# Patient Record
Sex: FEMALE | Race: BLACK OR AFRICAN AMERICAN | Employment: PART TIME | URBAN - METROPOLITAN AREA
[De-identification: names, ages, dates, MRNs, and addresses within clinical notes are randomized per-mention and may not be internally consistent; named-entity substitution may affect disease eponyms.]

---

## 2017-01-18 DIAGNOSIS — I10 ESSENTIAL HYPERTENSION WITH GOAL BLOOD PRESSURE LESS THAN 130/80: ICD-10-CM

## 2017-01-18 DIAGNOSIS — I50.82 BIVENTRICULAR FAILURE (HCC): ICD-10-CM

## 2017-01-18 RX ORDER — RAMIPRIL 5 MG/1
CAPSULE ORAL
Qty: 90 CAP | Refills: 0 | Status: SHIPPED | OUTPATIENT
Start: 2017-01-18 | End: 2018-02-28 | Stop reason: SDUPTHER

## 2017-01-26 ENCOUNTER — OFFICE VISIT (OUTPATIENT)
Dept: INTERNAL MEDICINE CLINIC | Age: 77
End: 2017-01-26

## 2017-01-26 ENCOUNTER — HOSPITAL ENCOUNTER (OUTPATIENT)
Dept: LAB | Age: 77
Discharge: HOME | End: 2017-01-26
Payer: MEDICARE

## 2017-01-26 VITALS
RESPIRATION RATE: 20 BRPM | DIASTOLIC BLOOD PRESSURE: 100 MMHG | TEMPERATURE: 97.9 F | OXYGEN SATURATION: 94 % | WEIGHT: 210 LBS | SYSTOLIC BLOOD PRESSURE: 180 MMHG | HEART RATE: 68 BPM | HEIGHT: 61 IN | BODY MASS INDEX: 39.65 KG/M2

## 2017-01-26 DIAGNOSIS — I10 ESSENTIAL HYPERTENSION: Primary | ICD-10-CM

## 2017-01-26 DIAGNOSIS — I48.0 PAROXYSMAL A-FIB (HCC): ICD-10-CM

## 2017-01-26 DIAGNOSIS — E55.9 VITAMIN D DEFICIENCY: ICD-10-CM

## 2017-01-26 DIAGNOSIS — I50.82 BIVENTRICULAR FAILURE (HCC): ICD-10-CM

## 2017-01-26 DIAGNOSIS — E78.2 MIXED HYPERLIPIDEMIA: ICD-10-CM

## 2017-01-26 PROCEDURE — 80053 COMPREHEN METABOLIC PANEL: CPT

## 2017-01-26 PROCEDURE — 36415 COLL VENOUS BLD VENIPUNCTURE: CPT

## 2017-01-26 PROCEDURE — 82306 VITAMIN D 25 HYDROXY: CPT

## 2017-01-26 NOTE — PROGRESS NOTES
Health Maintenance Due   Topic Date Due    DTaP/Tdap/Td series (1 - Tdap) 05/15/1963    FOBT Q 1 YEAR AGE 50-75  05/15/1992    ZOSTER VACCINE AGE 60>  05/15/2002    GLAUCOMA SCREENING Q2Y  05/15/2007    OSTEOPOROSIS SCREENING (DEXA)  05/15/2007    MEDICARE YEARLY EXAM  05/15/2007    Pneumococcal 65+ Low/Medium Risk (2 of 2 - PPSV23) 09/15/2016       Chief Complaint   Patient presents with    Follow-up     4 month    Hypertension    Cholesterol Problem    Irregular Heart Beat       1. Have you been to the ER, urgent care clinic since your last visit? Hospitalized since your last visit? No    2. Have you seen or consulted any other health care providers outside of the [address] [place] [address] since your last visit? Include any pap smears or colon screening. No    3) Do you have an Advance Directive on file? no    4) Are you interested in receiving information on Advance Directives? NO      Patient is accompanied by self I have received verbal consent from Alexys Chavez to discuss any/all medical information while they are present in the room.

## 2017-01-26 NOTE — PROGRESS NOTES
HISTORY OF PRESENT ILLNESS  Kenzie Lang is a 78 y.o. female here for follow up. She has gained 10 pound weight.mention she is watching diet. no SOB or orthopnea or PND. no leg edema either. Has CHF,on lasix. seeing cardiologist.doing well.watching salt. Has A fib too. on ASA. Lab reviewed. need lab work. Follow-up   Pertinent negatives include no shortness of breath. Hypertension    Pertinent negatives include no shortness of breath and no nausea. Cholesterol Problem   Pertinent negatives include no shortness of breath. Irregular Heart Beat    Pertinent negatives include no fever, no nausea and no shortness of breath. Her past medical history is significant for hypertension. Review of Systems   Constitutional: Negative. Negative for chills and fever. HENT: Negative. Eyes: Negative. Respiratory: Negative. Negative for shortness of breath. Cardiovascular: Negative. Negative for leg swelling. Gastrointestinal: Negative. Negative for heartburn and nausea. Genitourinary: Negative. Musculoskeletal: Negative. Skin: Negative. Neurological: Negative. Endo/Heme/Allergies: Negative. Psychiatric/Behavioral: Negative. Physical Exam   Constitutional: She appears well-developed and well-nourished. No distress. Neck: Normal range of motion. Neck supple. No JVD present. No thyromegaly present. Cardiovascular: Normal rate, regular rhythm, normal heart sounds and intact distal pulses. Pulmonary/Chest: Effort normal and breath sounds normal. No respiratory distress. She has no wheezes. Musculoskeletal: She exhibits edema. Trace edema   Psychiatric: She has a normal mood and affect. Her behavior is normal.       ASSESSMENT and Joseroxanne Chriss was seen today for follow-up, hypertension, cholesterol problem and irregular heart beat. Diagnoses and all orders for this visit:    Essential hypertension    Stable. on meds.     Will do,  -     METABOLIC PANEL, COMPREHENSIVE    Paroxysmal a-fib (HCC)    On coreg and ASA. In NSR. Will do,  -     METABOLIC PANEL, COMPREHENSIVE    Biventricular failure ([address]    Compensated. On lasix and altace. -     METABOLIC PANEL, COMPREHENSIVE    Mixed hyperlipidemia  -     METABOLIC PANEL, COMPREHENSIVE    Vitamin D deficiency  -     VITAMIN D, 25 HYDROXY            Discussed expected course/resolution/complications of diagnosis in detail with patient. Medication risks/benefits/costs/interactions/alternatives discussed with patient. Pt was given an after visit summary which includes diagnoses, current medications & vitals. Pt expressed understanding with the diagnosis and plan.

## 2017-01-26 NOTE — PATIENT INSTRUCTIONS

## 2017-01-26 NOTE — MR AVS SNAPSHOT
Visit Information Date & Time Provider Department Dept. Phone Encounter #  
 1/26/2017 11:15 AM Mina Escalante, [address] [facility] Internal Medicine [phone] [id number] Your Appointments 2/1/2017 11:00 AM  
ESTABLISHED PATIENT with Jaime Higgins MD  
[facility] ([facility]) Appt Note: 6 mo fu appt  sll; 6 mo fu appt sll r/s from 1/18 to 2/1  
 [address]  
[address] [address] Upcoming Health Maintenance Date Due DTaP/Tdap/Td series (1 - Tdap) 5/15/1963 FOBT Q 1 YEAR AGE 50-75 5/15/1992 ZOSTER VACCINE AGE 60> 5/15/2002 GLAUCOMA SCREENING Q2Y 5/15/2007 OSTEOPOROSIS SCREENING (DEXA) 5/15/2007 MEDICARE YEARLY EXAM 5/15/2007 Pneumococcal 65+ Low/Medium Risk (2 of 2 - PPSV23) 9/15/2016 Allergies as of 1/26/2017  Review Complete On: 1/26/2017 By: Mina Escalante MD  
 No Known Allergies Current Immunizations  Reviewed on 9/15/2016 Name Date Influenza High Dose Vaccine PF 1/7/2016 Influenza Vaccine 9/15/2016 Pneumococcal Conjugate (PCV-13) 9/15/2015 Not reviewed this visit You Were Diagnosed With   
  
 Codes Comments Essential hypertension    -  Primary ICD-10-CM: I10 
ICD-9-CM: 401.9 Paroxysmal a-fib (HCC)     ICD-10-CM: I48.0 ICD-9-CM: 427.31 Biventricular failure ([address]     ICD-10-CM: I50.9 ICD-9-CM: 428.0 Mixed hyperlipidemia     ICD-10-CM: E78.2 ICD-9-CM: 272.2 Vitamin D deficiency     ICD-10-CM: E55.9 ICD-9-CM: 268.9 Vitals BP Pulse Temp Resp Height(growth percentile) Weight(growth percentile) (!) 180/100 68 97.9 °F (36.6 °C) (Oral) 20 5' 1\" (1.549 m) 210 lb (95.3 kg) SpO2 BMI OB Status Smoking Status 94% 39.68 kg/m2 Menopause Former Smoker Vitals History BMI and BSA Data  Body Mass Index Body Surface Area  
 39.68 kg/m 2 2.03 m 2  
  
  
 Preferred Pharmacy Pharmacy Name Phone David Cadena [id number] [facility] [phone] Your Updated Medication List  
  
   
This list is accurate as of: 1/26/17 11:41 AM.  Always use your most recent med list. amLODIPine 10 mg tablet Commonly known as:  Aleena Jayde Take 1 Tab by mouth daily. aspirin delayed-release 81 mg tablet Take 1 Tab by mouth daily. atorvastatin 40 mg tablet Commonly known as:  LIPITOR Take 1 Tab by mouth daily. carvedilol 12.5 mg tablet Commonly known as:  Alberto Afshin Take 1 Tab by mouth two (2) times daily (with meals). cholecalciferol 1,000 unit tablet Commonly known as:  VITAMIN D3 Take 1,000 Units by mouth daily. esomeprazole 40 mg capsule Commonly known as:  Naoma Pedro Take 1 Cap by mouth daily. ferrous sulfate 325 mg (65 mg iron) EC tablet Commonly known as:  IRON Take 325 mg by mouth three (3) times daily (with meals). furosemide 40 mg tablet Commonly known as:  LASIX TAKE ONE TABLET BY MOUTH TWICE A DAY ON MONDAY WEDNESDAY, FRIDAY, AND SATURDAY AND TAKE 1 TABLET DAILY FOR TUESDAY THURSDAY AND SUNDAY  
  
 OTHER Shower chair with back  
  
 potassium chloride 20 mEq tablet Commonly known as:  KLOR-CON M20 Take 1 Tab by mouth two (2) times a day. ramipril 5 mg capsule Commonly known as:  ALTACE  
TAKE ONE CAPSULE BY MOUTH DAILY( needs appt for future refills) We Performed the Following METABOLIC PANEL, COMPREHENSIVE [36637 CPT(R)] VITAMIN D, 25 HYDROXY [id number] CPT(R)] Patient Instructions DASH Diet: Care Instructions Your Care Instructions The DASH diet is an eating plan that can help lower your blood pressure. DASH stands for Dietary Approaches to Stop Hypertension. Hypertension is high blood pressure.  
The DASH diet focuses on eating foods that are high in calcium, potassium, and magnesium. These nutrients can lower blood pressure. The foods that are highest in these nutrients are fruits, vegetables, low-fat dairy products, nuts, seeds, and legumes. But taking calcium, potassium, and magnesium supplements instead of eating foods that are high in those nutrients does not have the same effect. The DASH diet also includes whole grains, fish, and poultry. The DASH diet is one of several lifestyle changes your doctor may recommend to lower your high blood pressure. Your doctor may also want you to decrease the amount of sodium in your diet. Lowering sodium while following the DASH diet can lower blood pressure even further than just the DASH diet alone. Follow-up care is a key part of your treatment and safety. Be sure to make and go to all appointments, and call your doctor if you are having problems. It's also a good idea to know your test results and keep a list of the medicines you take. How can you care for yourself at home? Following the DASH diet · Eat 4 to 5 servings of fruit each day. A serving is 1 medium-sized piece of fruit, ½ cup chopped or canned fruit, 1/4 cup dried fruit, or 4 ounces (½ cup) of fruit juice. Choose fruit more often than fruit juice. · Eat 4 to 5 servings of vegetables each day. A serving is 1 cup of lettuce or raw leafy vegetables, ½ cup of chopped or cooked vegetables, or 4 ounces (½ cup) of vegetable juice. Choose vegetables more often than vegetable juice. · Get 2 to 3 servings of low-fat and fat-free dairy each day. A serving is 8 ounces of milk, 1 cup of yogurt, or 1 ½ ounces of cheese. · Eat 6 to 8 servings of grains each day. A serving is 1 slice of bread, 1 ounce of dry cereal, or ½ cup of cooked rice, pasta, or cooked cereal. Try to choose whole-grain products as much as possible. · Limit lean meat, poultry, and fish to 2 servings each day. A serving is 3 ounces, about the size of a deck of cards. · Eat 4 to 5 servings of nuts, seeds, and legumes (cooked dried beans, lentils, and split peas) each week. A serving is 1/3 cup of nuts, 2 tablespoons of seeds, or ½ cup of cooked beans or peas. · Limit fats and oils to 2 to 3 servings each day. A serving is 1 teaspoon of vegetable oil or 2 tablespoons of salad dressing. · Limit sweets and added sugars to 5 servings or less a week. A serving is 1 tablespoon jelly or jam, ½ cup sorbet, or 1 cup of lemonade. · Eat less than 2,300 milligrams (mg) of sodium a day. If you limit your sodium to 1,500 mg a day, you can lower your blood pressure even more. Tips for success · Start small. Do not try to make dramatic changes to your diet all at once. You might feel that you are missing out on your favorite foods and then be more likely to not follow the plan. Make small changes, and stick with them. Once those changes become habit, add a few more changes. · Try some of the following: ¨ Make it a goal to eat a fruit or vegetable at every meal and at snacks. This will make it easy to get the recommended amount of fruits and vegetables each day. ¨ Try yogurt topped with fruit and nuts for a snack or healthy dessert. ¨ Add lettuce, tomato, cucumber, and onion to sandwiches. ¨ Combine a ready-made pizza crust with low-fat mozzarella cheese and lots of vegetable toppings. Try using tomatoes, squash, spinach, broccoli, carrots, cauliflower, and onions. ¨ Have a variety of cut-up vegetables with a low-fat dip as an appetizer instead of chips and dip. ¨ Sprinkle sunflower seeds or chopped almonds over salads. Or try adding chopped walnuts or almonds to cooked vegetables. ¨ Try some vegetarian meals using beans and peas. Add garbanzo or kidney beans to salads. Make burritos and tacos with mashed galvan beans or black beans. Where can you learn more? Go to http://goldie-jemima.info/.  
Enter J135 in the search box to learn more about \"DASH Diet: Care Instructions. \" Current as of: March 23, 2016 Content Version: 11.1 © 1215-8458 [employer]. Care instructions adapted under license by [employer] (which disclaims liability or warranty for this information). If you have questions about a medical condition or this instruction, always ask your healthcare professional. [address] any warranty or liability for your use of this information. Introducing \[facility]\"" & HEALTH SERVICES! Dear Roger Andrade: 
Thank you for requesting a [employer] account. Our records indicate that you already have an active [employer] account. You can access your account anytime at [email or web address] Did you know that you can access your hospital and ER discharge instructions at any time in Ushahidi? You can also review all of your test results from your hospital stay or ER visit. Additional Information If you have questions, please visit the Frequently Asked Questions section of the [employer] website at [email or web address] Remember, Ushahidi is NOT to be used for urgent needs. For medical emergencies, dial 911. Now available from your iPhone and Android! Please provide this summary of care documentation to your next provider. Your primary care clinician is listed as Neida Ambrose. If you have any questions after today's visit, please call [phone].

## 2017-01-27 LAB
25(OH)D3+25(OH)D2 SERPL-MCNC: 23 NG/ML (ref 30–100)
ALBUMIN SERPL-MCNC: 4.3 G/DL (ref 3.5–4.8)
ALBUMIN/GLOB SERPL: 1.1 {RATIO} (ref 1.1–2.5)
ALP SERPL-CCNC: 110 IU/L (ref 39–117)
ALT SERPL-CCNC: 19 IU/L (ref 0–32)
AST SERPL-CCNC: 33 IU/L (ref 0–40)
BILIRUB SERPL-MCNC: 0.2 MG/DL (ref 0–1.2)
BUN SERPL-MCNC: 24 MG/DL (ref 8–27)
BUN/CREAT SERPL: 17 (ref 11–26)
CALCIUM SERPL-MCNC: 10.2 MG/DL (ref 8.7–10.3)
CHLORIDE SERPL-SCNC: 100 MMOL/L (ref 96–106)
CO2 SERPL-SCNC: 27 MMOL/L (ref 18–29)
CREAT SERPL-MCNC: 1.39 MG/DL (ref 0.57–1)
GLOBULIN SER CALC-MCNC: 3.8 G/DL (ref 1.5–4.5)
GLUCOSE SERPL-MCNC: 99 MG/DL (ref 65–99)
INTERPRETATION: NORMAL
POTASSIUM SERPL-SCNC: 4.4 MMOL/L (ref 3.5–5.2)
PROT SERPL-MCNC: 8.1 G/DL (ref 6–8.5)
SODIUM SERPL-SCNC: 143 MMOL/L (ref 134–144)

## 2017-02-01 ENCOUNTER — OFFICE VISIT (OUTPATIENT)
Dept: CARDIOLOGY CLINIC | Age: 77
End: 2017-02-01

## 2017-02-01 VITALS
BODY MASS INDEX: 39.27 KG/M2 | HEART RATE: 64 BPM | WEIGHT: 208 LBS | SYSTOLIC BLOOD PRESSURE: 120 MMHG | HEIGHT: 61 IN | DIASTOLIC BLOOD PRESSURE: 80 MMHG | OXYGEN SATURATION: 97 % | RESPIRATION RATE: 16 BRPM

## 2017-02-01 DIAGNOSIS — I48.0 PAROXYSMAL A-FIB (HCC): Primary | ICD-10-CM

## 2017-02-01 NOTE — MR AVS SNAPSHOT
Visit Information Date & Time Provider Department Dept. Phone Encounter #  
 2/1/2017 11:00 AM Raúl Salcedo MD [facility] [phone] [id number] Follow-up Instructions Return in about 6 months (around 8/1/2017). Follow-up and Disposition History Your Appointments 5/25/2017 11:15 AM  
ROUTINE CARE with Lucy James MD  
[facility] Internal Medicine [address]) Appt Note: 4 month follow up [address] [place] [address]  
[phone]  
  
   
 [address] Upcoming Health Maintenance Date Due DTaP/Tdap/Td series (1 - Tdap) 5/15/1961 FOBT Q 1 YEAR AGE 50-75 5/15/1992 ZOSTER VACCINE AGE 60> 5/15/2002 GLAUCOMA SCREENING Q2Y 5/15/2007 OSTEOPOROSIS SCREENING (DEXA) 5/15/2007 MEDICARE YEARLY EXAM 5/15/2007 Pneumococcal 65+ Low/Medium Risk (2 of 2 - PPSV23) 9/15/2016 Allergies as of 2/1/2017  Review Complete On: 2/1/2017 By: Raúl Salcedo MD  
 No Known Allergies Current Immunizations  Reviewed on 9/15/2016 Name Date Influenza High Dose Vaccine PF 1/7/2016 Influenza Vaccine 9/15/2016 Pneumococcal Conjugate (PCV-13) 9/15/2015 Not reviewed this visit You Were Diagnosed With   
  
 Codes Comments Paroxysmal a-fib (HCC)    -  Primary ICD-10-CM: I48.0 ICD-9-CM: 427.31 Vitals BP Pulse Resp Height(growth percentile) Weight(growth percentile) SpO2  
 120/80 (BP 1 Location: Left arm, BP Patient Position: Sitting) 64 16 5' 1\" (1.549 m) 208 lb (94.3 kg) 97% BMI OB Status Smoking Status 39.3 kg/m2 Menopause Former Smoker Vitals History BMI and BSA Data Body Mass Index Body Surface Area  
 39.3 kg/m 2 2.01 m 2 Preferred Pharmacy Pharmacy Name Phone Daron Torrez [address] [facility] [phone] Your Updated Medication List  
  
   
This list is accurate as of: 2/1/17 12:01 PM.  Always use your most recent med list. amLODIPine 10 mg tablet Commonly known as:  Voncile [place] Take 1 Tab by mouth daily. aspirin delayed-release 81 mg tablet Take 1 Tab by mouth daily. atorvastatin 40 mg tablet Commonly known as:  LIPITOR Take 1 Tab by mouth daily. carvedilol 12.5 mg tablet Commonly known as:  [place] Take 1 Tab by mouth two (2) times daily (with meals). cholecalciferol 1,000 unit tablet Commonly known as:  VITAMIN D3 Take 1,000 Units by mouth daily. esomeprazole 40 mg capsule Commonly known as:  Dorette nstein Take 1 Cap by mouth daily. ferrous sulfate 325 mg (65 mg iron) EC tablet Commonly known as:  IRON Take 325 mg by mouth three (3) times daily (with meals). furosemide 40 mg tablet Commonly known as:  LASIX TAKE ONE TABLET BY MOUTH TWICE A DAY ON MONDAY WEDNESDAY, FRIDAY, AND SATURDAY AND TAKE 1 TABLET DAILY FOR TUESDAY THURSDAY AND SUNDAY  
  
 OTHER Shower chair with back  
  
 potassium chloride 20 mEq tablet Commonly known as:  KLOR-CON M20 Take 1 Tab by mouth two (2) times a day. ramipril 5 mg capsule Commonly known as:  ALTACE  
TAKE ONE CAPSULE BY MOUTH DAILY( needs appt for future refills) We Performed the Following AMB POC EKG ROUTINE W/ 12 LEADS, INTER & REP [68843 CPT(R)] Follow-up Instructions Return in about 6 months (around 8/1/2017). Patient Instructions Limited echo in 6 months and see Maris Shepherd after test 
 
  
Introducing [facility] & HEALTH SERVICES! Dear Alda Torres: 
Thank you for requesting a [employer] account. Our records indicate that you already have an active [employer] account. You can access your account anytime at [email or web address] Did you know that you can access your hospital and ER discharge instructions at any time in AlwaySupport? You can also review all of your test results from your hospital stay or ER visit. Additional Information If you have questions, please visit the Frequently Asked Questions section of the AlwaySupport website at [email or web address] Remember, AlwaySupport is NOT to be used for urgent needs. For medical emergencies, dial 911. Now available from your iPhone and Android! Please provide this summary of care documentation to your next provider. Your primary care clinician is listed as Kim Lugo. If you have any questions after today's visit, please call [phone].

## 2017-05-25 ENCOUNTER — OFFICE VISIT (OUTPATIENT)
Dept: INTERNAL MEDICINE CLINIC | Age: 77
End: 2017-05-25

## 2017-05-25 ENCOUNTER — HOSPITAL ENCOUNTER (OUTPATIENT)
Dept: LAB | Age: 77
Discharge: HOME | End: 2017-05-25
Payer: MEDICARE

## 2017-05-25 VITALS
TEMPERATURE: 98 F | BODY MASS INDEX: 39.46 KG/M2 | HEIGHT: 61 IN | WEIGHT: 209 LBS | HEART RATE: 70 BPM | OXYGEN SATURATION: 97 % | SYSTOLIC BLOOD PRESSURE: 151 MMHG | RESPIRATION RATE: 20 BRPM | DIASTOLIC BLOOD PRESSURE: 86 MMHG

## 2017-05-25 DIAGNOSIS — E78.2 MIXED HYPERLIPIDEMIA: ICD-10-CM

## 2017-05-25 DIAGNOSIS — Z12.11 SCREENING FOR COLON CANCER: ICD-10-CM

## 2017-05-25 DIAGNOSIS — Z00.00 ROUTINE GENERAL MEDICAL EXAMINATION AT A HEALTH CARE FACILITY: ICD-10-CM

## 2017-05-25 DIAGNOSIS — Z71.89 ADVANCE CARE PLANNING: ICD-10-CM

## 2017-05-25 DIAGNOSIS — Z00.00 MEDICARE ANNUAL WELLNESS VISIT, INITIAL: ICD-10-CM

## 2017-05-25 DIAGNOSIS — Z13.5 GLAUCOMA SCREENING: ICD-10-CM

## 2017-05-25 DIAGNOSIS — Z12.39 SCREENING BREAST EXAMINATION: ICD-10-CM

## 2017-05-25 DIAGNOSIS — I50.82 BIVENTRICULAR FAILURE (HCC): ICD-10-CM

## 2017-05-25 DIAGNOSIS — Z78.0 MENOPAUSE: ICD-10-CM

## 2017-05-25 DIAGNOSIS — I10 ESSENTIAL HYPERTENSION: Primary | ICD-10-CM

## 2017-05-25 DIAGNOSIS — E55.9 VITAMIN D DEFICIENCY: ICD-10-CM

## 2017-05-25 DIAGNOSIS — K21.9 GASTROESOPHAGEAL REFLUX DISEASE WITHOUT ESOPHAGITIS: ICD-10-CM

## 2017-05-25 DIAGNOSIS — Z23 ENCOUNTER FOR IMMUNIZATION: ICD-10-CM

## 2017-05-25 DIAGNOSIS — I48.0 PAROXYSMAL A-FIB (HCC): ICD-10-CM

## 2017-05-25 PROCEDURE — 36415 COLL VENOUS BLD VENIPUNCTURE: CPT

## 2017-05-25 PROCEDURE — 82306 VITAMIN D 25 HYDROXY: CPT

## 2017-05-25 PROCEDURE — 80053 COMPREHEN METABOLIC PANEL: CPT

## 2017-05-25 NOTE — PROGRESS NOTES
Health Maintenance Due   Topic Date Due    DTaP/Tdap/Td series (1 - Tdap) 05/15/1963    FOBT Q 1 YEAR AGE 50-75  05/15/1992    ZOSTER VACCINE AGE 60>  05/15/2002    GLAUCOMA SCREENING Q2Y  05/15/2007    OSTEOPOROSIS SCREENING (DEXA)  05/15/2007    MEDICARE YEARLY EXAM  05/15/2007    Pneumococcal 65+ Low/Medium Risk (2 of 2 - PPSV23) 09/15/2016       Chief Complaint   Patient presents with   [address] [place] [address] Annual Wellness Visit     4 month follow up    Hypertension    Cholesterol Problem    Irregular Heart Beat       1. Have you been to the ER, urgent care clinic since your last visit? Hospitalized since your last visit? No    2. Have you seen or consulted any other health care providers outside of the [address] [place] [address] since your last visit? Include any pap smears or colon screening. No    3) Do you have an Advance Directive on file? no    4) Are you interested in receiving information on Advance Directives? NO      Patient is accompanied by self I have received verbal consent from Jakob Roman to discuss any/all medical information while they are present in the room.

## 2017-05-25 NOTE — PROGRESS NOTES
HISTORY OF PRESENT ILLNESS  Jakob Roman is a 78 y.o. female here for follow up. Doing well,complaint with med. Has CHF. no SOB,no orthopnea. Have elevated lipid. on med. lipid is OK. She is watching salt. Now eight gain. No leg edema. She feels better and active. Need medicare wellness visit. Has no living will. Hypertension    Pertinent negatives include no chest pain and no blurred vision. Cholesterol Problem   Pertinent negatives include no chest pain. Irregular Heart Beat    Pertinent negatives include no fever and no chest pain. Her past medical history is significant for hypertension. Follow-up   Pertinent negatives include no chest pain. Review of Systems   Constitutional: Negative. Negative for chills and fever. HENT: Negative. Eyes: Negative. Negative for blurred vision and double vision. Respiratory: Negative. Cardiovascular: Negative. Negative for chest pain. Gastrointestinal: Negative. Genitourinary: Negative. Musculoskeletal: Negative. Skin: Negative. Negative for itching and rash. Neurological: Negative. Psychiatric/Behavioral: Negative. Physical Exam   Constitutional: She appears well-developed and well-nourished. No distress. Neck: Normal range of motion. Neck supple. No JVD present. No thyromegaly present. Cardiovascular: Normal rate, regular rhythm, normal heart sounds and intact distal pulses. Pulmonary/Chest: Effort normal and breath sounds normal. No respiratory distress. She has no wheezes. Musculoskeletal: She exhibits no edema or tenderness. Psychiatric: She has a normal mood and affect. Her behavior is normal.       ASSESSMENT and Mickael Ganser was seen today for annual wellness visit, hypertension, cholesterol problem and irregular heart beat. Diagnoses and all orders for this visit:    Essential hypertension    Stable. on med.   Will do,  -     METABOLIC PANEL, COMPREHENSIVE    Gastroesophageal reflux disease without esophagitis  -     METABOLIC PANEL, COMPREHENSIVE    Mixed hyperlipidemia    On statin. Will do,  -     METABOLIC PANEL, COMPREHENSIVE    Biventricular failure ([address]    Compensated. On lasix. -     METABOLIC PANEL, COMPREHENSIVE    Paroxysmal a-fib (HCC)    On coreg. Medicare annual wellness visit, initial    Encounter for immunization  Will order,  -     pneumococcal 23-valent (PNEUMOVAX 23) 25 mcg/0.5 mL injection; 0.5 mL by IntraMUSCular route once for 1 dose. Glaucoma screening  -     REFERRAL TO OPHTHALMOLOGY    Menopause  -     DEXA BONE DENSITY STUDY AXIAL; Future    Screening breast examination  -     RIVKA MAMMO BI SCREENING INCL CAD; Future    Screening for colon cancer  -     REFERRAL TO GASTROENTEROLOGY    Advance care planning    ACP discussed with pt in detail , including choosing a healthcare agent to communicate patient's healthcare decisions if patient lost the ability to make decisions, such as after a sudden illness or accident. Understanding of the healthcare agent role was assessed and information provided. Discussed values, goals, and preferences if recovery is not expected, even with continued medical treatment in the event of: Imminent death/serious illness. Recommended completion of Advance Directive form after review of ACP materials and conversation with prospective healthcare agent. Recommended communicating the plan and making copies for the healthcare agent, personal physician, and others as appropriate (e.g., health system). Recommended review of completed ACP document annually or upon change in health status. Information book and form given. Face to face time spent was16 minutes      Vitamin D deficiency  -     VITAMIN D, 25 HYDROXY          Discussed expected course/resolution/complications of diagnosis in detail with patient. Medication risks/benefits/costs/interactions/alternatives discussed with patient.    Pt was given an after visit summary which includes diagnoses, current medications & vitals. Pt expressed understanding with the diagnosis and plan.

## 2017-05-25 NOTE — LETTER
6/1/2017 9:22 AM 
 
Ms. Benedict Easton [phone] Dear Benedict Meza: 
 
Please find your most recent results below. Resulted Orders METABOLIC PANEL, COMPREHENSIVE Result Value Ref Range Glucose 94 65 - 99 mg/dL BUN 24 8 - 27 mg/dL Creatinine 1.29 (H) 0.57 - 1.00 mg/dL GFR est non-AA 41 (L) >59 mL/min/1.73 GFR est AA 47 (L) >59 mL/min/1.73  
 BUN/Creatinine ratio 19 12 - 28 Sodium 140 134 - 144 mmol/L Potassium 4.3 3.5 - 5.2 mmol/L Chloride 97 96 - 106 mmol/L  
 CO2 28 18 - 29 mmol/L Calcium 9.9 8.7 - 10.3 mg/dL Protein, total 8.2 6.0 - 8.5 g/dL Albumin 4.1 3.5 - 4.8 g/dL GLOBULIN, TOTAL 4.1 1.5 - 4.5 g/dL A-G Ratio 1.0 (L) 1.2 - 2.2 Bilirubin, total 0.2 0.0 - 1.2 mg/dL Alk. phosphatase 106 39 - 117 IU/L  
 AST (SGOT) 35 0 - 40 IU/L  
 ALT (SGPT) 20 0 - 32 IU/L Narrative Performed at:  [address]  [id number] : Gabriela Ramirez MD, Phone:  [phone] VITAMIN D, 25 HYDROXY Result Value Ref Range VITAMIN D, 25-HYDROXY 29.1 (L) 30.0 - 100.0 ng/mL Comment:  
   Vitamin D deficiency has been defined by the [facility] practice guideline as a 
level of serum 25-OH vitamin D less than 20 ng/mL (1,2). The Endocrine Society went on to further define vitamin D 
insufficiency as a level between 21 and 29 ng/mL (2). 1. IOM ([place] of [employer]). 2010. Dietary reference 
   intakes for calcium and D. [facility]: The 
   [employer]. 2. Duglas MF, Ifeoma NC, Jordi QUINTERO, et al. 
   Evaluation, treatment, and prevention of vitamin D 
   deficiency: an Endocrine Society clinical practice 
   guideline. JCEM. 2011 Jul; 96(7):1911-30. Narrative Performed at:  [address]  [id number] : Rachel Figueroa MD, Phone:  [phone] CKD REPORT Result Value Ref Range Interpretation Note Comment:  
   Supplement report is available. Narrative Performed at:  [address] [place] [address]  [id number] : Vanessa Dougherty PhD, Phone:  [phone] RECOMMENDATIONS: 
 
Low vit D. Advise to take (over the counter) vit D 1000 unit by mouth every day for 4 months. all other labs are stable Please call me if you have any questions: [phone] Sincerely, Christine Mcdonald MD

## 2017-05-25 NOTE — PROGRESS NOTES
This is an Initial Medicare Annual Wellness Exam (AWV) (Performed 12 months after IPPE or effective date of Medicare Part B enrollment, Once in a lifetime)    I have reviewed the patient's medical history in detail and updated the computerized patient record. History     Past Medical History:   Diagnosis Date    A-fib [facility])     Chronic kidney disease     Hypercholesterolemia     Hypertension       History reviewed. No pertinent surgical history. Current Outpatient Prescriptions   Medication Sig Dispense Refill    pneumococcal 23-valent (PNEUMOVAX 23) 25 mcg/0.5 mL injection 0.5 mL by IntraMUSCular route once for 1 dose. 0.5 mL 0    ramipril (ALTACE) 5 mg capsule TAKE ONE CAPSULE BY MOUTH DAILY( needs appt for future refills) 90 Cap 0    aspirin delayed-release 81 mg tablet Take 1 Tab by mouth daily. 90 Tab 4    amLODIPine (NORVASC) 10 mg tablet Take 1 Tab by mouth daily. 90 Tab 3    carvedilol (COREG) 12.5 mg tablet Take 1 Tab by mouth two (2) times daily (with meals). 180 Tab 3    furosemide (LASIX) 40 mg tablet TAKE ONE TABLET BY MOUTH TWICE A DAY ON MONDAY WEDNESDAY, FRIDAY, AND SATURDAY AND TAKE 1 TABLET DAILY FOR TUESDAY THURSDAY AND SUNDAY 180 Tab 4    potassium chloride (KLOR-CON M20) 20 mEq tablet Take 1 Tab by mouth two (2) times a day. 180 Tab 3    atorvastatin (LIPITOR) 40 mg tablet Take 1 Tab by mouth daily. 90 Tab 3    esomeprazole (NEXIUM) 40 mg capsule Take 1 Cap by mouth daily. [place] [address] chair with back 1 Each 0    cholecalciferol (VITAMIN D3) 1,000 unit tablet Take 1,000 Units by mouth daily.  ferrous sulfate (IRON) 325 mg (65 mg iron) EC tablet Take 325 mg by mouth three (3) times daily (with meals).        No Known Allergies  Family History   Problem Relation Age of Onset    No Known Problems Mother     No Known Problems Father      Social History   Substance Use Topics    Smoking status: Former Smoker    Smokeless tobacco: Never Used    Alcohol use No Patient Active Problem List   Diagnosis Code    HTN (hypertension) I10    GERD (gastroesophageal reflux disease) K21.9    Mixed hyperlipidemia E78.2    Valvular disease I38    Paroxysmal a-fib ([address] I48.0    Biventricular failure (HCC) I50.9         Depression Risk Factor Screening:     PHQ over the last two weeks 5/25/2017   PHQ Not Done -   Little interest or pleasure in doing things Not at all   Feeling down, depressed or hopeless Not at all   Total Score PHQ 2 0     Alcohol Risk Factor Screening: On any occasion during the past 3 months, have you had more than 3 drinks containing alcohol? No    Do you average more than 7 drinks per week? No    Functional Ability and Level of Safety:     Hearing Loss   normal-to-mild    Activities of Daily Living   Self-care. Requires assistance with: no ADLs    Fall Risk     Fall Risk Assessment, last 12 mths 5/25/2017   Able to walk? Yes   Fall in past 12 months? No     Abuse Screen   Patient is not abused    Review of Systems   Not required  annual medicare wellness exam    Physical Examination     No exam data present    Evaluation of Cognitive Function:  Mood/affect:  happy  Appearance: age appropriate and casually dressed  Family member/caregiver input: not present     No exam performed today, annual medicare wellness exam.    Patient Care Team:  Nichole Sharma MD as PCP - General (Internal Medicine)  Lina To MD (Cardiology)  JOSIAH Prajapati, RN as Nurse Navigator (Internal Medicine)    Advice/Referrals/Counseling   Education and counseling provided:  Are appropriate based on today's review and evaluation  End-of-Life planning (with patient's consent)  Pneumococcal Vaccine  Screening for glaucoma  Diabetes screening test      Assessment/Plan       ICD-10-CM ICD-9-CM    1. Essential hypertension M35 225.0 METABOLIC PANEL, COMPREHENSIVE   2.  Gastroesophageal reflux disease without esophagitis B45.6 235.17 METABOLIC PANEL, COMPREHENSIVE   3. Mixed hyperlipidemia Y88.8 174.3 METABOLIC PANEL, COMPREHENSIVE   4. Biventricular failure (HCC) T91.8 037.5 METABOLIC PANEL, COMPREHENSIVE   5. Paroxysmal a-fib (HCC) I48.0 427.31    6. Medicare annual wellness visit, initial Z00.00 V70.0    7. Encounter for immunization Z23 V03.89 pneumococcal 23-valent (PNEUMOVAX 23) 25 mcg/0.5 mL injection      CANCELED: PNEUMOCOCCAL POLYSACCHARIDE VACCINE, 23-VALENT, ADULT OR IMMUNOSUPPRESSED PT DOSE,   8. Glaucoma screening Z13.5 V80.1 REFERRAL TO OPHTHALMOLOGY   9. Menopause Z78.0 627.2 DEXA BONE DENSITY STUDY AXIAL   10. Screening breast examination Z12.39 V76.10 RIVKA MAMMO BI SCREENING INCL CAD   11. Screening for colon cancer Z12.11 V76.51 REFERRAL TO GASTROENTEROLOGY   12. Advance care planning Z71.89 V65.49    13. Vitamin D deficiency E55.9 268.9 VITAMIN D, 25 HYDROXY   14. Routine general medical examination at a health care facility Z00.00 V70.0    .  1. Discussed and reviewed AMD. Spent 16 minutes with patient completing form in office. She named her 2 medical POA's including  Denisha Morgan as her primary. She does not wish for any futile care. She was provided the original form and office made a copy and scanned into her chart. 2. Discussed the following wellness screenings: Referred for glaucoma exam, labs ordered for glucose, bone density, she was sent to get her pneumococcal 23 at [facility]. mammo ordered, referred for colonoscopy. 3. AVS and preventative screenings table printed, reviewed, and handed to patient. Patient verbalized understanding to all information.

## 2017-05-25 NOTE — PATIENT INSTRUCTIONS
Medicare Part B Preventive Services Limitations Recommendation Scheduled   Glaucoma Screening  Covered for patients with diagnosis of diabetes or family history of glaucoma; -Americans age 48 and older; -Americans age 72 and older Completed  Recommended every 2 years Due    Colorectal Cancer Screening    -Fecal occult blood test every year OR  -Flexible sigmoidoscopy every 5 yrs OR  -Colonoscopy every 10 years Covered age 48 and older Completed   Recommended every 10 years age 54-65 Please let your provider know if you have any concerns    Bone Mass Measurement (Dexascan)     Covered age 72 and older     Completed         Recommended every 2 years Due     Any time after    Cardiovascular Screening Blood Tests   (Cholesterol panel) Covered every 5 years for all ages Completed 9/15/2016      Recommended every 5 years Due 9/2017   Diabetes Screening Tests    -Fasting blood sugar (FBS)  Hemoglobin A1C every 6 months for   pre-diabetic patients Completed 1/26/2017      Recommended every 3 years Due 1/2020   Seasonal Influenza Vaccination  Completed 9/15/2016    Recommended Annually Due fall 2017   Pneumococcal Vaccination  Completed   Recommended once over age 72    Prevnar   (pediatric Pneumococcal vaccine) Covered by Medicare Completed 9/15/2015  Recommended  complete   Tetanus Vaccine -Only covered by Medicare Part D through the pharmacy    -Requires a prescription from your primary care provider   Completed   Recommended every 10 years     Zoster Vaccine (Shingles) -Only covered by Medicare Part D through the pharmacy     Completed   Recommended once age 61 and older    Family Practice Management 2011    Advanced Medical Directive  If you have completed an Advanced Medical Directive please bring a copy to the office at your convenience to be scanned into your record.

## 2017-05-25 NOTE — MR AVS SNAPSHOT
Visit Information Date & Time Provider Department Dept. Phone Encounter #  
 5/25/2017 11:15 AM Bobby Graham, [address] [facility] Internal Medicine [phone] Your Appointments 7/21/2017 11:00 AM  
ECHO CARDIOGRAMS 2D with ECHO, [facility] ([facility] SCHEDULING) Appt Note: [address] [place] [address]  
[address] [facility]  
  
    
 7/21/2017 11:40 AM  
ESTABLISHED PATIENT with Angelic Tello MD  
[facility] ([facility] Appt Note: [address] [place] [address]  
[address] [address] Upcoming Health Maintenance Date Due COLONOSCOPY 5/15/1960 DTaP/Tdap/Td series (1 - Tdap) 5/15/1963 ZOSTER VACCINE AGE 60> 5/15/2002 GLAUCOMA SCREENING Q2Y 5/15/2007 OSTEOPOROSIS SCREENING (DEXA) 5/15/2007 MEDICARE YEARLY EXAM 5/15/2007 Pneumococcal 65+ Low/Medium Risk (2 of 2 - PPSV23) 9/15/2016 INFLUENZA AGE 9 TO ADULT 8/1/2017 Allergies as of 5/25/2017  Review Complete On: 5/25/2017 By: Bobby Graham MD  
 No Known Allergies Current Immunizations  Reviewed on 5/25/2017 Name Date Influenza High Dose Vaccine PF 1/7/2016 Influenza Vaccine 9/15/2016 Pneumococcal Conjugate (PCV-13) 9/15/2015 Pneumococcal Polysaccharide (PPSV-23)  Incomplete Reviewed by Bobby Graham MD on 5/25/2017 at 11:56 AM  
 Reviewed by Bobby Graham MD on 5/25/2017 at 11:56 AM  
You Were Diagnosed With   
  
 Codes Comments Essential hypertension    -  Primary ICD-10-CM: I10 
ICD-9-CM: 401.9 Gastroesophageal reflux disease without esophagitis     ICD-10-CM: K21.9 ICD-9-CM: 530.81 Mixed hyperlipidemia     ICD-10-CM: E78.2 ICD-9-CM: 272.2 Biventricular failure ([address]     ICD-10-CM: I50.9 ICD-9-CM: 428.0 Paroxysmal a-fib (HCC)     ICD-10-CM: I48.0 ICD-9-CM: 427.31 Medicare annual wellness visit, initial     ICD-10-CM: Z00.00 ICD-9-CM: V70.0 Encounter for immunization     ICD-10-CM: C77 ICD-9-CM: V03.89 Glaucoma screening     ICD-10-CM: Z13.5 ICD-9-CM: V80.1 Menopause     ICD-10-CM: Z78.0 ICD-9-CM: 627.2 Screening breast examination     ICD-10-CM: Z12.39 
ICD-9-CM: V76.10 Screening for colon cancer     ICD-10-CM: Z12.11 ICD-9-CM: V76.51 Advance care planning     ICD-10-CM: Z71.89 ICD-9-CM: V65.49 Vitamin D deficiency     ICD-10-CM: E55.9 ICD-9-CM: 268.9 Vitals BP Pulse Temp Resp Height(growth percentile) Weight(growth percentile) 151/86 (BP 1 Location: Left arm, BP Patient Position: Sitting) 70 98 °F (36.7 °C) (Oral) 20 5' 1\" (1.549 m) 209 lb (94.8 kg) SpO2 BMI OB Status Smoking Status 97% 39.49 kg/m2 Menopause Former Smoker Vitals History BMI and BSA Data Body Mass Index Body Surface Area  
 39.49 kg/m 2 2.02 m 2 Preferred Pharmacy Pharmacy Name Phone [facility] [id number] [facility] [phone] Your Updated Medication List  
  
   
This list is accurate as of: 5/25/17 12:02 PM.  Always use your most recent med list. amLODIPine 10 mg tablet Commonly known as:  Eward Brooklynn Take 1 Tab by mouth daily. aspirin delayed-release 81 mg tablet Take 1 Tab by mouth daily. atorvastatin 40 mg tablet Commonly known as:  LIPITOR Take 1 Tab by mouth daily. carvedilol 12.5 mg tablet Commonly known as:  Darletta Maiers Take 1 Tab by mouth two (2) times daily (with meals). cholecalciferol 1,000 unit tablet Commonly known as:  VITAMIN D3 Take 1,000 Units by mouth daily. esomeprazole 40 mg capsule Commonly known as:  Jackeline Webb Take 1 Cap by mouth daily. ferrous sulfate 325 mg (65 mg iron) EC tablet Commonly known as:  IRON Take 325 mg by mouth three (3) times daily (with meals). furosemide 40 mg tablet Commonly known as:  LASIX TAKE ONE TABLET BY MOUTH TWICE A DAY ON MONDAY WEDNESDAY, FRIDAY, AND SATURDAY AND TAKE 1 TABLET DAILY FOR TUESDAY THURSDAY AND SUNDAY  
  
 OTHER Shower chair with back  
  
 potassium chloride 20 mEq tablet Commonly known as:  KLOR-CON M20 Take 1 Tab by mouth two (2) times a day. ramipril 5 mg capsule Commonly known as:  ALTACE  
TAKE ONE CAPSULE BY MOUTH DAILY( needs appt for future refills) We Performed the Following METABOLIC PANEL, COMPREHENSIVE [21396 CPT(R)] PNEUMOCOCCAL POLYSACCHARIDE VACCINE, 23-VALENT, ADULT OR IMMUNOSUPPRESSED PT DOSE, [34869 CPT(R)] REFERRAL TO GASTROENTEROLOGY [[id number] Custom] Comments:  
 Please evaluate patient for colonoscopy REFERRAL TO OPHTHALMOLOGY [REF57 Custom] Comments:  
 Please evaluate patient for glucoma screening VITAMIN D, 25 HYDROXY [id number] CPT(R)] To-Do List   
 07/25/2017 Imaging:  DEXA BONE DENSITY STUDY AXIAL   
  
 07/25/2017 Imaging:  RIVKA MAMMO BI SCREENING INCL CAD Referral Information Referral ID Referred By Referred To  
  
 [id number] RENUKA, [address] [facility]   
   [id number] [place] [address] [address], [address] Visits Status Start Date End Date 1 New Request 5/25/17 5/25/18 If your referral has a status of pending review or denied, additional information will be sent to support the outcome of this decision. Referral ID Referred By Referred To  
 [id number] RENUKA [facility] [address] [facility], [facility] Visits Status Start Date End Date 1 New Request 5/25/17 5/25/18 If your referral has a status of pending review or denied, additional information will be sent to support the outcome of this decision. Patient Instructions Medicare Part B Preventive Services Limitations Recommendation Scheduled Glaucoma Screening  Covered for patients with diagnosis of diabetes or family history of glaucoma; -Americans age 48 and older; -Americans age 72 and older Completed Recommended every 2 years Due Colorectal Cancer Screening 
 
-Fecal occult blood test every year OR 
-Flexible sigmoidoscopy every 5 yrs OR 
-Colonoscopy every 10 years Covered age 48 and older Completed Recommended every 10 years age 54-65 Please let your provider know if you have any concerns Bone Mass Measurement (Dexascan) Covered age 72 and older Completed Recommended every 2 years Due Any time after Cardiovascular Screening Blood Tests  
(Cholesterol panel) Covered every 5 years for all ages Completed 9/15/2016 Recommended every 5 years Due 9/2017 Diabetes Screening Tests 
 
-Fasting blood sugar (FBS)  Hemoglobin A1C every 6 months for  
pre-diabetic patients Completed 1/26/2017 Recommended every 3 years Due 1/2020 Seasonal Influenza Vaccination  Completed 9/15/2016 Recommended Annually Due fall 2017 Pneumococcal Vaccination  Completed Recommended once over age 72 Prevnar  
(pediatric Pneumococcal vaccine) Covered by Medicare Completed 9/15/2015 Recommended  complete Tetanus Vaccine -Only covered by Medicare Part D through the pharmacy -Requires a prescription from your primary care provider Completed Recommended every 10 years Zoster Vaccine (Shingles) -Only covered by Medicare Part D through the pharmacy Completed Recommended once age 61 and older Family Practice Management 2011 Advanced Medical Directive If you have completed an Advanced Medical Directive please bring a copy to the office at your convenience to be scanned into your record. Introducing [facility] & HEALTH SERVICES! Dear Grace Chairez: 
Thank you for requesting a [employer] account.   Our records indicate that you already have an active [facility] account. You can access your account anytime at [email or web address] Did you know that you can access your hospital and ER discharge instructions at any time in [facility]? You can also review all of your test results from your hospital stay or ER visit. Additional Information If you have questions, please visit the Frequently Asked Questions section of the [facility] website at [email or web address] Remember, [facility] is NOT to be used for urgent needs. For medical emergencies, dial 911. Now available from your iPhone and Android! Please provide this summary of care documentation to your next provider. Your primary care clinician is listed as Rohini Roman. If you have any questions after today's visit, please call [phone].

## 2017-05-26 LAB
25(OH)D3+25(OH)D2 SERPL-MCNC: 29.1 NG/ML (ref 30–100)
ALBUMIN SERPL-MCNC: 4.1 G/DL (ref 3.5–4.8)
ALBUMIN/GLOB SERPL: 1 {RATIO} (ref 1.2–2.2)
ALP SERPL-CCNC: 106 IU/L (ref 39–117)
ALT SERPL-CCNC: 20 IU/L (ref 0–32)
AST SERPL-CCNC: 35 IU/L (ref 0–40)
BILIRUB SERPL-MCNC: 0.2 MG/DL (ref 0–1.2)
BUN SERPL-MCNC: 24 MG/DL (ref 8–27)
BUN/CREAT SERPL: 19 (ref 12–28)
CALCIUM SERPL-MCNC: 9.9 MG/DL (ref 8.7–10.3)
CHLORIDE SERPL-SCNC: 97 MMOL/L (ref 96–106)
CO2 SERPL-SCNC: 28 MMOL/L (ref 18–29)
CREAT SERPL-MCNC: 1.29 MG/DL (ref 0.57–1)
GLOBULIN SER CALC-MCNC: 4.1 G/DL (ref 1.5–4.5)
GLUCOSE SERPL-MCNC: 94 MG/DL (ref 65–99)
INTERPRETATION: NORMAL
POTASSIUM SERPL-SCNC: 4.3 MMOL/L (ref 3.5–5.2)
PROT SERPL-MCNC: 8.2 G/DL (ref 6–8.5)
SODIUM SERPL-SCNC: 140 MMOL/L (ref 134–144)

## 2017-07-13 ENCOUNTER — TELEPHONE (OUTPATIENT)
Dept: INTERNAL MEDICINE CLINIC | Age: 77
End: 2017-07-13

## 2017-07-13 NOTE — TELEPHONE ENCOUNTER
Patient needs Dr. Roel Matthews to write a referral to Dr Pramod Armenta - heart doctor. She has an HMO now and needs us to do the referral.  I told her she needed to change here PCP with the insurance company first, as Dr. Roel Matthews was not listed as here PCP.   Once the referral is written, this needs to go to Feng milan who will do the insurance part of the referral.

## 2017-07-14 DIAGNOSIS — I48.0 PAROXYSMAL A-FIB (HCC): Primary | ICD-10-CM

## 2017-09-08 ENCOUNTER — OFFICE VISIT (OUTPATIENT)
Dept: CARDIOLOGY CLINIC | Age: 77
End: 2017-09-08

## 2017-09-08 ENCOUNTER — CLINICAL SUPPORT (OUTPATIENT)
Dept: CARDIOLOGY CLINIC | Age: 77
End: 2017-09-08

## 2017-09-08 VITALS
DIASTOLIC BLOOD PRESSURE: 82 MMHG | BODY MASS INDEX: 39.46 KG/M2 | OXYGEN SATURATION: 97 % | SYSTOLIC BLOOD PRESSURE: 130 MMHG | WEIGHT: 209 LBS | HEART RATE: 65 BPM | HEIGHT: 61 IN

## 2017-09-08 DIAGNOSIS — I48.0 PAROXYSMAL A-FIB (HCC): Primary | ICD-10-CM

## 2017-09-08 DIAGNOSIS — I50.9 CONGESTIVE HEART FAILURE, UNSPECIFIED CONGESTIVE HEART FAILURE CHRONICITY, UNSPECIFIED CONGESTIVE HEART FAILURE TYPE: ICD-10-CM

## 2017-09-08 DIAGNOSIS — I34.0 NON-RHEUMATIC MITRAL REGURGITATION: ICD-10-CM

## 2017-09-08 DIAGNOSIS — I10 ESSENTIAL HYPERTENSION: Primary | ICD-10-CM

## 2017-09-08 NOTE — MR AVS SNAPSHOT
Visit Information Date & Time Provider Department Dept. Phone Encounter #  
 9/8/2017 11:00 AM Roxan Goltz, MD [facility] Jessica Ruffin [phone] [id number] Follow-up Instructions Return in about 6 months (around 3/8/2018). Follow-up and Disposition History Your Appointments 9/28/2017 11:15 AM  
ROUTINE CARE with Sandra Gagnon MD  
[facility] Internal Medicine [address]) Appt Note: 4 month follow up [address] [facility] [address]  
[phone]  
  
   
 [address] Upcoming Health Maintenance Date Due COLONOSCOPY 5/15/1960 DTaP/Tdap/Td series (1 - Tdap) 5/15/1963 ZOSTER VACCINE AGE 60> 3/15/2002 GLAUCOMA SCREENING Q2Y 5/15/2007 OSTEOPOROSIS SCREENING (DEXA) 5/15/2007 Pneumococcal 65+ Low/Medium Risk (2 of 2 - PPSV23) 9/15/2016 INFLUENZA AGE 9 TO ADULT 8/1/2017 MEDICARE YEARLY EXAM 5/26/2018 Allergies as of 9/8/2017  Review Complete On: 9/8/2017 By: Roxan Goltz, MD  
 No Known Allergies Current Immunizations  Reviewed on 5/25/2017 Name Date Influenza High Dose Vaccine PF 1/7/2016 Influenza Vaccine 9/15/2016 Pneumococcal Conjugate (PCV-13) 9/15/2015 Not reviewed this visit You Were Diagnosed With   
  
 Codes Comments Paroxysmal a-fib (HCC)    -  Primary ICD-10-CM: I48.0 ICD-9-CM: 427.31 Congestive heart failure, unspecified congestive heart failure chronicity, unspecified congestive heart failure type ([facility] [address]     ICD-10-CM: I50.9 ICD-9-CM: 428.0 Vitals BP Pulse Height(growth percentile) Weight(growth percentile) SpO2 BMI  
 130/82 (BP 1 Location: Left arm, BP Patient Position: Sitting) 65 5' 1\" (1.549 m) 209 lb (94.8 kg) 97% 39.49 kg/m2 OB Status Smoking Status Menopause Former Smoker Vitals History BMI and BSA Data  Body Mass Index Body Surface Area  
 39.49 kg/m 2 2.02 m 2  
 Preferred Pharm[facility] Name Phone [facility] [address] [phone] Your Updated Medication List  
  
   
This list is accurate as of: 9/8/17 12:09 PM.  Always use your most recent med list. amLODIPine 10 mg tablet Commonly known as:  Ernestine Chuyita Take 1 Tab by mouth daily. aspirin delayed-release 81 mg tablet Take 1 Tab by mouth daily. atorvastatin 40 mg tablet Commonly known as:  LIPITOR Take 1 Tab by mouth daily. carvedilol 12.5 mg tablet Commonly known as:  [place]  Take 1 Tab by mouth two (2) times daily (with meals). cholecalciferol 1,000 unit tablet Commonly known as:  VITAMIN D3 Take 1,000 Units by mouth daily. esomeprazole 40 mg capsule Commonly known as:  [place] Take 1 Cap by mouth daily. ferrous sulfate 325 mg (65 mg iron) EC tablet Commonly known as:  IRON Take 325 mg by mouth three (3) times daily (with meals). furosemide 40 mg tablet Commonly known as:  LASIX TAKE ONE TABLET BY MOUTH TWICE A DAY ON MONDAY WEDNESDAY, FRIDAY, AND SATURDAY AND TAKE 1 TABLET DAILY FOR TUESDAY THURSDAY AND SUNDAY  
  
 OTHER Shower chair with back  
  
 potassium chloride 20 mEq tablet Commonly known as:  KLOR-CON M20 Take 1 Tab by mouth two (2) times a day. ramipril 5 mg capsule Commonly known as:  ALTACE  
TAKE ONE CAPSULE BY MOUTH DAILY( needs appt for future refills) We Performed the Following AMB POC EKG ROUTINE W/ 12 LEADS, INTER & REP [03649 CPT(R)] ECHO LIMITED [[id number] Custom] Follow-up Instructions Return in about 6 months (around 3/8/2018). Patient Instructions 24 hour holter monitor ( Will call result ) Follow up with Sandra Bender in 6 months Introducing [facility]! Dear Spencer Reynolds: 
Thank you for requesting a [employer] account.   Our records indicate that you already have an active [employer] account. You can access your account anytime at [email or web address] Did you know that you can access your hospital and ER discharge instructions at any time in [employer]? You can also review all of your test results from your hospital stay or ER visit. Additional Information If you have questions, please visit the Frequently Asked Questions section of the [employer] website at [email or web address] Remember, [employer] is NOT to be used for urgent needs. For medical emergencies, dial 911. Now available from your iPhone and Android! Please provide this summary of care documentation to your next provider. Your primary care clinician is listed as Candi Kelly. If you have any questions after today's visit, please call [phone].
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
## 2017-09-27 DIAGNOSIS — I48.0 PAROXYSMAL A-FIB (HCC): ICD-10-CM

## 2017-09-27 RX ORDER — ASPIRIN 81 MG/1
TABLET ORAL
Qty: 30 TAB | Refills: 3 | Status: SHIPPED | OUTPATIENT
Start: 2017-09-27 | End: 2018-02-06 | Stop reason: SDUPTHER

## 2017-10-05 ENCOUNTER — CLINICAL SUPPORT (OUTPATIENT)
Dept: CARDIOLOGY CLINIC | Age: 77
End: 2017-10-05

## 2017-10-05 ENCOUNTER — OFFICE VISIT (OUTPATIENT)
Dept: INTERNAL MEDICINE CLINIC | Age: 77
End: 2017-10-05

## 2017-10-05 ENCOUNTER — HOSPITAL ENCOUNTER (OUTPATIENT)
Dept: LAB | Age: 77
Discharge: HOME | End: 2017-10-05
Payer: MEDICARE

## 2017-10-05 VITALS
HEART RATE: 74 BPM | RESPIRATION RATE: 19 BRPM | OXYGEN SATURATION: 97 % | HEIGHT: 61 IN | SYSTOLIC BLOOD PRESSURE: 163 MMHG | TEMPERATURE: 97.8 F | WEIGHT: 215 LBS | DIASTOLIC BLOOD PRESSURE: 84 MMHG | BODY MASS INDEX: 40.59 KG/M2

## 2017-10-05 DIAGNOSIS — Z12.11 SCREENING FOR COLON CANCER: ICD-10-CM

## 2017-10-05 DIAGNOSIS — I48.0 PAROXYSMAL A-FIB (HCC): ICD-10-CM

## 2017-10-05 DIAGNOSIS — E78.2 MIXED HYPERLIPIDEMIA: ICD-10-CM

## 2017-10-05 DIAGNOSIS — I48.0 PAROXYSMAL A-FIB (HCC): Primary | ICD-10-CM

## 2017-10-05 DIAGNOSIS — I10 ESSENTIAL HYPERTENSION: Primary | ICD-10-CM

## 2017-10-05 DIAGNOSIS — E55.9 VITAMIN D DEFICIENCY: ICD-10-CM

## 2017-10-05 DIAGNOSIS — I50.82 BIVENTRICULAR FAILURE (HCC): ICD-10-CM

## 2017-10-05 DIAGNOSIS — Z23 ENCOUNTER FOR IMMUNIZATION: ICD-10-CM

## 2017-10-05 DIAGNOSIS — Z13.5 GLAUCOMA SCREENING: ICD-10-CM

## 2017-10-05 PROCEDURE — 36415 COLL VENOUS BLD VENIPUNCTURE: CPT

## 2017-10-05 PROCEDURE — 80053 COMPREHEN METABOLIC PANEL: CPT

## 2017-10-05 PROCEDURE — 85027 COMPLETE CBC AUTOMATED: CPT

## 2017-10-05 PROCEDURE — 82306 VITAMIN D 25 HYDROXY: CPT

## 2017-10-05 NOTE — PROGRESS NOTES
Holter monitor applied per protocol. Patient instruction sheet was given, along with diary. Instructions provided on diary keeping, marking symptoms, care of the monitor device. The patient verbalized understanding and will call our office with any further questions or concerns.

## 2017-10-05 NOTE — PROGRESS NOTES
HISTORY OF PRESENT ILLNESS  Karrie Solis is a 78 y.o. female here for follow up. Has HTN,little elevated BP.compalint with meds. .  Has A fib,placed on holter monitor now. Reports leg weakness,on walker. refused to have PT. She has gainedweight.mention she is watching diet. no SOB or orthopnea or PND. no leg edema either. Has CHF,on lasix. seeing cardiologist.doing well.watching salt. Need flu shot. Extremity Weakness   Pertinent negatives include no shortness of breath and no nausea. Hypertension    Pertinent negatives include no shortness of breath and no nausea. GERD   Pertinent negatives include no shortness of breath. Cholesterol Problem   Pertinent negatives include no shortness of breath. Follow-up   Pertinent negatives include no shortness of breath. Irregular Heart Beat    Pertinent negatives include no fever, no nausea and no shortness of breath. Her past medical history is significant for hypertension. Review of Systems   Constitutional: Negative. Negative for chills and fever. HENT: Negative. Eyes: Negative. Respiratory: Negative. Negative for shortness of breath. Cardiovascular: Negative. Negative for leg swelling. Gastrointestinal: Negative. Negative for heartburn and nausea. Genitourinary: Negative. Musculoskeletal: Negative. Skin: Negative. Endo/Heme/Allergies: Negative. Psychiatric/Behavioral: Negative. Physical Exam   Constitutional: She appears well-developed and well-nourished. No distress. Neck: Normal range of motion. Neck supple. No JVD present. No thyromegaly present. Cardiovascular: Normal rate, regular rhythm, normal heart sounds and intact distal pulses. Pulmonary/Chest: Effort normal and breath sounds normal. No respiratory distress. She has no wheezes. Musculoskeletal: She exhibits edema. Trace edema   Psychiatric: She has a normal mood and affect.  Her behavior is normal.       ASSESSMENT and PLAN  Diagnoses and all orders for this visit:    1. Essential hypertension    Stable. On med. Will do,  -     CBC W/O DIFF    2. Paroxysmal a-fib (HCC)    Placed on holter monitor. ON ASA dn B blocker. -     METABOLIC PANEL, COMPREHENSIVE  -     CBC W/O DIFF    3. Mixed hyperlipidemia  Lipid is stable. On statin. -     METABOLIC PANEL, COMPREHENSIVE    4. Biventricular failure    Compensated. On lasix and KCL. 5. Glaucoma screening  Will refer,  -     REFERRAL TO OPHTHALMOLOGY    6. Screening for colon cancer  -     REFERRAL TO GASTROENTEROLOGY    7. Vitamin D deficiency  -     VITAMIN D, 25 HYDROXY    8. Encounter for immunization  -     INFLUENZA VIRUS VACCINE, HIGH DOSE SEASONAL, PRESERVATIVE FREE          Discussed expected course/resolution/complications of diagnosis in detail with patient. Medication risks/benefits/costs/interactions/alternatives discussed with patient. Pt was given an after visit summary which includes diagnoses, current medications & vitals. Pt expressed understanding with the diagnosis and plan.

## 2017-10-05 NOTE — MR AVS SNAPSHOT
Visit Information Date & Time Provider Department Dept. Phone Encounter #  
 10/5/2017 11:30 AM Sloane Brito MD [facility] Internal Medicine [phone] [id number] Your Appointments 3/14/2018 11:20 AM  
ESTABLISHED PATIENT with Raffi Coon MD  
[facility] ([address]) Appt Note: Dr Anshul Antoine UP  
 [address] [facility] [address] [place] [address]  
[address] [id number] Upcoming Health Maintenance Date Due COLONOSCOPY 5/15/1960 DTaP/Tdap/Td series (1 - Tdap) 5/15/1963 ZOSTER VACCINE AGE 60> 3/15/2002 GLAUCOMA SCREENING Q2Y 5/15/2007 OSTEOPOROSIS SCREENING (DEXA) 5/15/2007 INFLUENZA AGE 9 TO ADULT 8/1/2017 MEDICARE YEARLY EXAM 5/26/2018 Allergies as of 10/5/2017  Review Complete On: 10/5/2017 By: Sloane Brito MD  
 No Known Allergies Current Immunizations  Reviewed on 10/5/2017 Name Date Influenza High Dose Vaccine PF  Incomplete, 1/7/2016 Influenza Vaccine 9/15/2016 Pneumococcal Conjugate (PCV-13) 9/15/2015 Reviewed by Sloane Brito MD on 10/5/2017 at 12:20 PM  
You Were Diagnosed With   
  
 Codes Comments Essential hypertension    -  Primary ICD-10-CM: I10 
ICD-9-CM: 401.9 Paroxysmal a-fib (HCC)     ICD-10-CM: I48.0 ICD-9-CM: 427.31 Mixed hyperlipidemia     ICD-10-CM: E78.2 ICD-9-CM: 272.2 Biventricular failure     ICD-10-CM: I50.82 ICD-9-CM: 428.0 Glaucoma screening     ICD-10-CM: Z13.5 ICD-9-CM: V80.1 Screening for colon cancer     ICD-10-CM: Z12.11 ICD-9-CM: V76.51 Vitamin D deficiency     ICD-10-CM: E55.9 ICD-9-CM: 268.9 Encounter for immunization     ICD-10-CM: B69 ICD-9-CM: V03.89 Vitals BP Pulse Temp Resp Height(growth percentile) Weight(growth percentile)  163/84 (BP 1 Location: Left arm, BP Patient Position: Sitting) 74 97.8 °F (36.6 °C) (Oral) 19 5' 1\" (1.549 m) 215 lb (97.5 kg) SpO2 BMI OB Status Smoking Status 97% 40.62 kg/m2 Menopause Former Smoker Vitals History BMI and BSA Data Body Mass Index Body Surface Area  
 40.62 kg/m 2 2.05 m 2 Preferred Pharmacy Pharmacy Name Phone [facility] [address] [facility] [phone] Your Updated Medication List  
  
   
This list is accurate as of: 10/5/17 12:21 PM.  Always use your most recent med list. amLODIPine 10 mg tablet Commonly known as:  Izetta Harder Take 1 Tab by mouth daily. aspirin delayed-release 81 mg tablet TAKE ONE TABLET BY MOUTH DAILY  
  
 atorvastatin 40 mg tablet Commonly known as:  LIPITOR Take 1 Tab by mouth daily. carvedilol 12.5 mg tablet Commonly known as:  Layman Hunter Take 1 Tab by mouth two (2) times daily (with meals). cholecalciferol 1,000 unit tablet Commonly known as:  VITAMIN D3 Take 1,000 Units by mouth daily. esomeprazole 40 mg capsule Commonly known as:  Maci Legato Take 1 Cap by mouth daily. ferrous sulfate 325 mg (65 mg iron) EC tablet Commonly known as:  IRON Take 325 mg by mouth three (3) times daily (with meals). furosemide 40 mg tablet Commonly known as:  LASIX TAKE ONE TABLET BY MOUTH TWICE A DAY ON MONDAY WEDNESDAY, FRIDAY, AND SATURDAY AND TAKE 1 TABLET DAILY FOR TUESDAY THURSDAY AND SUNDAY  
  
 OTHER Shower chair with back  
  
 potassium chloride 20 mEq tablet Commonly known as:  KLOR-CON M20 Take 1 Tab by mouth two (2) times a day. ramipril 5 mg capsule Commonly known as:  ALTACE  
TAKE ONE CAPSULE BY MOUTH DAILY( needs appt for future refills) We Performed the Following CBC W/O DIFF [65860 CPT(R)] INFLUENZA VIRUS VACCINE, HIGH DOSE SEASONAL, PRESERVATIVE FREE [54099 CPT(R)] METABOLIC PANEL, COMPREHENSIVE [22359 CPT(R)] REFERRAL TO GASTROENTEROLOGY [PTV04 Custom] REFERRAL TO OPHTHALMOLOGY [REF57 Custom] Comments:  
 Glaucoma screening VITAMIN D, 25 HYDROXY [id number] CPT(R)] Referral Information Referral ID Referred By Referred To  
  
 [id number] RENUKA, [facility] [address] [place], [address] Visits Status Start Date End Date 1 New Request 10/5/17 10/5/18 If your referral has a status of pending review or denied, additional information will be sent to support the outcome of this decision. Referral ID Referred By Referred To  
 [id number] RENUKA, [address]  
   [address] [address] [address] [address] [place], [address] Visits Status Start Date End Date 1 New Request 10/5/17 10/5/18 If your referral has a status of pending review or denied, additional information will be sent to support the outcome of this decision. Patient Instructions DASH Diet: Care Instructions Your Care Instructions The DASH diet is an eating plan that can help lower your blood pressure. DASH stands for Dietary Approaches to Stop Hypertension. Hypertension is high blood pressure. The DASH diet focuses on eating foods that are high in calcium, potassium, and magnesium. These nutrients can lower blood pressure. The foods that are highest in these nutrients are fruits, vegetables, low-fat dairy products, nuts, seeds, and legumes. But taking calcium, potassium, and magnesium supplements instead of eating foods that are high in those nutrients does not have the same effect. The DASH diet also includes whole grains, fish, and poultry. The DASH diet is one of several lifestyle changes your doctor may recommend to lower your high blood pressure. Your doctor may also want you to decrease the amount of sodium in your diet. Lowering sodium while following the DASH diet can lower blood pressure even further than just the DASH diet alone. Follow-up care is a key part of your treatment and safety. Be sure to make and go to all appointments, and call your doctor if you are having problems. It's also a good idea to know your test results and keep a list of the medicines you take. How can you care for yourself at home? Following the DASH diet · Eat 4 to 5 servings of fruit each day. A serving is 1 medium-sized piece of fruit, ½ cup chopped or canned fruit, 1/4 cup dried fruit, or 4 ounces (½ cup) of fruit juice. Choose fruit more often than fruit juice. · Eat 4 to 5 servings of vegetables each day. A serving is 1 cup of lettuce or raw leafy vegetables, ½ cup of chopped or cooked vegetables, or 4 ounces (½ cup) of vegetable juice. Choose vegetables more often than vegetable juice. · Get 2 to 3 servings of low-fat and fat-free dairy each day. A serving is 8 ounces of milk, 1 cup of yogurt, or 1 ½ ounces of cheese. · Eat 6 to 8 servings of grains each day. A serving is 1 slice of bread, 1 ounce of dry cereal, or ½ cup of cooked rice, pasta, or cooked cereal. Try to choose whole-grain products as much as possible. · Limit lean meat, poultry, and fish to 2 servings each day. A serving is 3 ounces, about the size of a deck of cards. · Eat 4 to 5 servings of nuts, seeds, and legumes (cooked dried beans, lentils, and split peas) each week. A serving is 1/3 cup of nuts, 2 tablespoons of seeds, or ½ cup of cooked beans or peas. · Limit fats and oils to 2 to 3 servings each day. A serving is 1 teaspoon of vegetable oil or 2 tablespoons of salad dressing. · Limit sweets and added sugars to 5 servings or less a week. A serving is 1 tablespoon jelly or jam, ½ cup sorbet, or 1 cup of lemonade. · Eat less than 2,300 milligrams (mg) of sodium a day. If you limit your sodium to 1,500 mg a day, you can lower your blood pressure even more. Tips for success · Start small.  Do not try to make dramatic changes to your diet all at once. You might feel that you are missing out on your favorite foods and then be more likely to not follow the plan. Make small changes, and stick with them. Once those changes become habit, add a few more changes. · Try some of the following: ¨ Make it a goal to eat a fruit or vegetable at every meal and at snacks. This will make it easy to get the recommended amount of fruits and vegetables each day. ¨ Try yogurt topped with fruit and nuts for a snack or healthy dessert. ¨ Add lettuce, tomato, cucumber, and onion to sandwiches. ¨ Combine a ready-made pizza crust with low-fat mozzarella cheese and lots of vegetable toppings. Try using tomatoes, squash, spinach, broccoli, carrots, cauliflower, and onions. ¨ Have a variety of cut-up vegetables with a low-fat dip as an appetizer instead of chips and dip. ¨ Sprinkle sunflower seeds or chopped almonds over salads. Or try adding chopped walnuts or almonds to cooked vegetables. ¨ Try some vegetarian meals using beans and peas. Add garbanzo or kidney beans to salads. Make burritos and tacos with mashed galvan beans or black beans. Where can you learn more? Go to http://goldie-jemima.info/. Enter I918 in the search box to learn more about \"DASH Diet: Care Instructions. \" Current as of: April 3, 2017 Content Version: 11.3 © 6229-0221 [employer]. Care instructions adapted under license by [employer] (which disclaims liability or warranty for this information). If you have questions about a medical condition or this instruction, always ask your healthcare professional. [place] [address] any warranty or liability for your use of this information. Introducing [facility]! Dear [facility]: 
Thank you for requesting a Bondsy account. Our records indicate that you already have an active Bondsy account. You can access your account anytime at [email or web address] Did you know that you can access your hospital and ER discharge instructions at any time in Yotomo? You can also review all of your test results from your hospital stay or ER visit. Additional Information If you have questions, please visit the Frequently Asked Questions section of the Yotomo website at [email or web address] Remember, Yotomo is NOT to be used for urgent needs. For medical emergencies, dial 911. Now available from your iPhone and Android! Please provide this summary of care documentation to your next provider. Your primary care clinician is listed as Song Lam. If you have any questions after today's visit, please call [phone].

## 2017-10-05 NOTE — PATIENT INSTRUCTIONS

## 2017-10-06 LAB
25(OH)D3+25(OH)D2 SERPL-MCNC: 19.7 NG/ML (ref 30–100)
ALBUMIN SERPL-MCNC: 4.3 G/DL (ref 3.5–4.8)
ALBUMIN/GLOB SERPL: 1.1 {RATIO} (ref 1.2–2.2)
ALP SERPL-CCNC: 105 IU/L (ref 39–117)
ALT SERPL-CCNC: 16 IU/L (ref 0–32)
AST SERPL-CCNC: 31 IU/L (ref 0–40)
BILIRUB SERPL-MCNC: 0.3 MG/DL (ref 0–1.2)
BUN SERPL-MCNC: 25 MG/DL (ref 8–27)
BUN/CREAT SERPL: 20 (ref 12–28)
CALCIUM SERPL-MCNC: 10 MG/DL (ref 8.7–10.3)
CHLORIDE SERPL-SCNC: 97 MMOL/L (ref 96–106)
CO2 SERPL-SCNC: 30 MMOL/L (ref 18–29)
CREAT SERPL-MCNC: 1.25 MG/DL (ref 0.57–1)
ERYTHROCYTE [DISTWIDTH] IN BLOOD BY AUTOMATED COUNT: 14.8 % (ref 12.3–15.4)
GLOBULIN SER CALC-MCNC: 4 G/DL (ref 1.5–4.5)
GLUCOSE SERPL-MCNC: 99 MG/DL (ref 65–99)
HCT VFR BLD AUTO: 38.5 % (ref 34–46.6)
HGB BLD-MCNC: 12 G/DL (ref 11.1–15.9)
INTERPRETATION: NORMAL
MCH RBC QN AUTO: 26.3 PG (ref 26.6–33)
MCHC RBC AUTO-ENTMCNC: 31.2 G/DL (ref 31.5–35.7)
MCV RBC AUTO: 84 FL (ref 79–97)
PLATELET # BLD AUTO: 260 X10E3/UL (ref 150–379)
POTASSIUM SERPL-SCNC: 4.3 MMOL/L (ref 3.5–5.2)
PROT SERPL-MCNC: 8.3 G/DL (ref 6–8.5)
RBC # BLD AUTO: 4.56 X10E6/UL (ref 3.77–5.28)
SODIUM SERPL-SCNC: 142 MMOL/L (ref 134–144)
WBC # BLD AUTO: 7.8 X10E3/UL (ref 3.4–10.8)

## 2017-10-06 RX ORDER — ERGOCALCIFEROL 1.25 MG/1
50000 CAPSULE ORAL
Qty: 12 CAP | Refills: 0 | Status: SHIPPED | OUTPATIENT
Start: 2017-10-06 | End: 2018-07-12 | Stop reason: ALTCHOICE

## 2017-10-06 NOTE — PROGRESS NOTES
Vitamin D 50,000 units weekly x 12 weeks. After completion of 12 weeks, recommend OTC Vitamin D3 1000 units daily. Other labs stable.

## 2017-10-20 ENCOUNTER — TELEPHONE (OUTPATIENT)
Dept: CARDIOLOGY CLINIC | Age: 77
End: 2017-10-20

## 2017-10-20 NOTE — TELEPHONE ENCOUNTER
Verified patient with two patient identifiers. Spoke with patient,holter monitor result. Per Castellonlani johnson same meds.

## 2017-10-28 DIAGNOSIS — I48.0 PAROXYSMAL A-FIB (HCC): ICD-10-CM

## 2017-10-28 DIAGNOSIS — I10 ESSENTIAL HYPERTENSION WITH GOAL BLOOD PRESSURE LESS THAN 130/80: ICD-10-CM

## 2017-10-28 DIAGNOSIS — I50.82 BIVENTRICULAR FAILURE (HCC): ICD-10-CM

## 2017-10-30 RX ORDER — AMLODIPINE BESYLATE 10 MG/1
TABLET ORAL
Qty: 90 TAB | Refills: 2 | Status: SHIPPED | OUTPATIENT
Start: 2017-10-30 | End: 2018-07-12 | Stop reason: SDUPTHER

## 2017-10-30 RX ORDER — CARVEDILOL 12.5 MG/1
TABLET ORAL
Qty: 180 TAB | Refills: 2 | Status: SHIPPED | OUTPATIENT
Start: 2017-10-30 | End: 2018-07-12 | Stop reason: SDUPTHER

## 2017-11-06 DIAGNOSIS — I50.82 BIVENTRICULAR FAILURE (HCC): ICD-10-CM

## 2017-11-06 RX ORDER — FUROSEMIDE 40 MG/1
TABLET ORAL
Qty: 180 TAB | Refills: 4 | Status: SHIPPED | OUTPATIENT
Start: 2017-11-06 | End: 2019-08-01 | Stop reason: SDUPTHER

## 2017-11-08 DIAGNOSIS — K21.9 GASTROESOPHAGEAL REFLUX DISEASE WITHOUT ESOPHAGITIS: ICD-10-CM

## 2017-11-08 RX ORDER — ESOMEPRAZOLE MAGNESIUM 40 MG/1
CAPSULE, DELAYED RELEASE ORAL
Qty: 90 CAP | Refills: 2 | Status: SHIPPED | OUTPATIENT
Start: 2017-11-08 | End: 2018-08-03 | Stop reason: SDUPTHER

## 2017-11-13 DIAGNOSIS — I50.82 BIVENTRICULAR FAILURE (HCC): ICD-10-CM

## 2017-11-13 RX ORDER — POTASSIUM CHLORIDE 20 MEQ/1
TABLET, EXTENDED RELEASE ORAL
Qty: 180 TAB | Refills: 2 | Status: SHIPPED | OUTPATIENT
Start: 2017-11-13 | End: 2018-08-03 | Stop reason: SDUPTHER

## 2017-11-21 DIAGNOSIS — I10 ESSENTIAL HYPERTENSION WITH GOAL BLOOD PRESSURE LESS THAN 130/80: ICD-10-CM

## 2017-11-21 DIAGNOSIS — I50.82 BIVENTRICULAR FAILURE (HCC): ICD-10-CM

## 2017-11-21 RX ORDER — ATORVASTATIN CALCIUM 40 MG/1
TABLET, FILM COATED ORAL
Qty: 90 TAB | Refills: 2 | Status: SHIPPED | OUTPATIENT
Start: 2017-11-21 | End: 2018-08-18 | Stop reason: SDUPTHER

## 2018-02-06 DIAGNOSIS — I48.0 PAROXYSMAL A-FIB (HCC): ICD-10-CM

## 2018-02-06 RX ORDER — ASPIRIN 81 MG/1
TABLET ORAL
Qty: 30 TAB | Refills: 2 | Status: SHIPPED | OUTPATIENT
Start: 2018-02-06 | End: 2019-12-05 | Stop reason: SDUPTHER

## 2018-02-28 ENCOUNTER — OFFICE VISIT (OUTPATIENT)
Dept: INTERNAL MEDICINE CLINIC | Age: 78
End: 2018-02-28

## 2018-02-28 VITALS
BODY MASS INDEX: 40.22 KG/M2 | TEMPERATURE: 98 F | SYSTOLIC BLOOD PRESSURE: 160 MMHG | HEIGHT: 61 IN | RESPIRATION RATE: 20 BRPM | HEART RATE: 63 BPM | DIASTOLIC BLOOD PRESSURE: 100 MMHG | WEIGHT: 213 LBS | OXYGEN SATURATION: 96 %

## 2018-02-28 DIAGNOSIS — Z12.11 SCREENING FOR COLON CANCER: ICD-10-CM

## 2018-02-28 DIAGNOSIS — I48.0 PAROXYSMAL A-FIB (HCC): ICD-10-CM

## 2018-02-28 DIAGNOSIS — I10 ESSENTIAL HYPERTENSION WITH GOAL BLOOD PRESSURE LESS THAN 130/80: ICD-10-CM

## 2018-02-28 DIAGNOSIS — I10 ESSENTIAL HYPERTENSION: Primary | ICD-10-CM

## 2018-02-28 DIAGNOSIS — I50.82 BIVENTRICULAR FAILURE (HCC): ICD-10-CM

## 2018-02-28 DIAGNOSIS — Z13.5 GLAUCOMA SCREENING: ICD-10-CM

## 2018-02-28 DIAGNOSIS — E55.9 VITAMIN D DEFICIENCY: ICD-10-CM

## 2018-02-28 DIAGNOSIS — Z12.39 SCREENING BREAST EXAMINATION: ICD-10-CM

## 2018-02-28 DIAGNOSIS — E78.2 MIXED HYPERLIPIDEMIA: ICD-10-CM

## 2018-02-28 DIAGNOSIS — E66.01 OBESITY, MORBID (HCC): ICD-10-CM

## 2018-02-28 DIAGNOSIS — Z78.0 MENOPAUSE: ICD-10-CM

## 2018-02-28 RX ORDER — RAMIPRIL 5 MG/1
CAPSULE ORAL
Qty: 90 CAP | Refills: 1 | Status: SHIPPED | OUTPATIENT
Start: 2018-02-28 | End: 2018-07-12 | Stop reason: SDUPTHER

## 2018-02-28 NOTE — PROGRESS NOTES
Health Maintenance Due   Topic Date Due    COLONOSCOPY  05/15/1960    DTaP/Tdap/Td series (1 - Tdap) 05/15/1963    ZOSTER VACCINE AGE 60>  03/15/2002    GLAUCOMA SCREENING Q2Y  05/15/2007    OSTEOPOROSIS SCREENING (DEXA)  05/15/2007       Chief Complaint   Patient presents with    Hypertension     4 month follow up    Cholesterol Problem    Irregular Heart Beat       1. Have you been to the ER, urgent care clinic since your last visit? Hospitalized since your last visit? No    2. Have you seen or consulted any other health care providers outside of the [address] [place] [address] since your last visit? Include any pap smears or colon screening. No    3) Do you have an Advance Directive on file? no    4) Are you interested in receiving information on Advance Directives? NO      Patient is accompanied by self I have received verbal consent from Elvis Gottron to discuss any/all medical information while they are present in the room.

## 2018-02-28 NOTE — PROGRESS NOTES
HISTORY OF PRESENT ILLNESS  Lonell Brittle is a 78 y.o. female here for follow up. She has had 3 death in her family recently. She is anxious, blood pressure seems elevated. She was on ramipril, ran out of it. Not continuing it. Has HTN,little elevated BP.compalint with meds. .  Has A fib, stable on Coreg. Will see Dr. Carlos Go soon. Lost weight.  mention she is watching diet. no SOB or orthopnea or PND. no leg edema either. Has CHF,on lasix. seeing cardiologist.doing well.watching salt. Need mammogram and bone density and colonoscopy. Need to see eye specialist.  Hypertension    Pertinent negatives include no palpitations, no shortness of breath and no nausea. Cholesterol Problem   Pertinent negatives include no shortness of breath. Follow-up   Pertinent negatives include no shortness of breath. Review of Systems   Constitutional: Negative for chills and fever. HENT: Negative. Eyes: Negative. Respiratory: Negative. Negative for shortness of breath. Cardiovascular: Negative for palpitations and leg swelling. Gastrointestinal: Negative. Negative for heartburn and nausea. Genitourinary: Negative. Musculoskeletal: Negative. Skin: Negative. Endo/Heme/Allergies: Negative. Psychiatric/Behavioral: The patient is nervous/anxious. Physical Exam   Constitutional: She appears well-developed and well-nourished. No distress. Neck: Normal range of motion. Neck supple. No JVD present. No thyromegaly present. Cardiovascular: Normal rate, regular rhythm, normal heart sounds and intact distal pulses. Pulmonary/Chest: Effort normal and breath sounds normal. No respiratory distress. She has no wheezes. Musculoskeletal: She exhibits no edema. Trace edema   Psychiatric: She has a normal mood and affect. Her behavior is normal.       ASSESSMENT and PLAN  Diagnoses and all orders for this visit:    1. Essential hypertension    On amlodipine and Coreg. Blood pressure is elevated. She ran out of ramipril in the past.  Will refill,    -     ramipril (ALTACE) 5 mg capsule; TAKE ONE CAPSULE BY MOUTH DAILY  -     METABOLIC PANEL, COMPREHENSIVE    2. Paroxysmal a-fib (HCC)   rate controlled. On Coreg twice a day along with aspirin. Will check,  -     METABOLIC PANEL, COMPREHENSIVE  Will see Dr. Eder Corbin soon. 3. Biventricular failure  Compensated. On Lasix. Will add,  -     ramipril (ALTACE) 5 mg capsule; TAKE ONE CAPSULE BY MOUTH DAILY  -     METABOLIC PANEL, COMPREHENSIVE    4. Mixed hyperlipidemia  On statin. Will check,    -     METABOLIC PANEL, COMPREHENSIVE  -     LDL, DIRECT    5. Obesity, morbid ([facility] [address]  Addressed weight, diet and exercise with patient. Decrease carbohydrates (white foods, sweet foods, sweet drinks and alcohol), increase green leafy vegetables and protein (lean meats and beans) with each meal. Avoid fried foods. Eat 3-5 small meals daily. Do not skip meals. Increase water intake. Increase physical activity to 30 minutes daily for health benefit or 60 minutes daily to prevent weight regain, as tolerated. Get 7-8 hours uninterrupted sleep nightly. 6. Glaucoma screening  Will order,  -     REFERRAL TO OPHTHALMOLOGY    7. Menopause      -     DEXA BONE DENSITY STUDY AXIAL; Future    8. Screening for colon cancer  -     REFERRAL TO GASTROENTEROLOGY    9. Essential hypertension with goal blood pressure less than 130/80  -     ramipril (ALTACE) 5 mg capsule; TAKE ONE CAPSULE BY MOUTH DAILY    10. Vitamin D deficiency  -     VITAMIN D, 25 HYDROXY    11. Screening breast examination  -     RIVKA MAMMO BI SCREENING INCL CAD; Future      Discussed expected course/resolution/complications of diagnosis in detail with patient. Medication risks/benefits/costs/interactions/alternatives discussed with patient. Pt was given an after visit summary which includes diagnoses, current medications & vitals. Pt expressed understanding with the diagnosis and plan.

## 2018-02-28 NOTE — LETTER
3/7/2018 11:13 AM 
 
Ms. Rosa Easton [phone] Dear Rosa Delaney: 
 
Please find your most recent results below. Resulted Orders METABOLIC PANEL, COMPREHENSIVE Result Value Ref Range Glucose 95 65 - 99 mg/dL BUN 22 8 - 27 mg/dL Creatinine 1.18 (H) 0.57 - 1.00 mg/dL GFR est non-AA 45 (L) >59 mL/min/1.73 GFR est AA 52 (L) >59 mL/min/1.73  
 BUN/Creatinine ratio 19 12 - 28 Sodium 145 (H) 134 - 144 mmol/L Potassium 3.8 3.5 - 5.2 mmol/L Chloride 98 96 - 106 mmol/L  
 CO2 30 (H) 18 - 29 mmol/L Calcium 9.6 8.7 - 10.3 mg/dL Protein, total 8.0 6.0 - 8.5 g/dL Albumin 3.9 3.5 - 4.8 g/dL GLOBULIN, TOTAL 4.1 1.5 - 4.5 g/dL A-G Ratio 1.0 (L) 1.2 - 2.2 Bilirubin, total 0.3 0.0 - 1.2 mg/dL Alk. phosphatase 109 39 - 117 IU/L  
 AST (SGOT) 35 0 - 40 IU/L  
 ALT (SGPT) 20 0 - 32 IU/L Narrative Performed at:  [address]  [id number] : Marty Allred MD, Phone:  [phone] VITAMIN D, 25 HYDROXY Result Value Ref Range VITAMIN D, 25-HYDROXY 39.6 30.0 - 100.0 ng/mL Comment:  
   Vitamin D deficiency has been defined by the [address] practice guideline as a 
level of serum 25-OH vitamin D less than 20 ng/mL (1,2). The Endocrine Society went on to further define vitamin D 
insufficiency as a level between 21 and 29 ng/mL (2). 1. IOM ([place] of [employer]). 2010. Dietary reference 
   intakes for calcium and D. [facility]: The 
   [place]. 2. Duglas MF, Ifeoma NC, Jordi QUINTERO, et al. 
   Evaluation, treatment, and prevention of vitamin D 
   deficiency: an Endocrine Society clinical practice 
   guideline. JCEM. 2011 Jul; 96(7):1911-30. Narrative Performed at:  [address]  [id number] : Sesar Wen MD, Phone:  [phone] LDL, DIRECT Result Value Ref Range LDL,Direct 67 0 - 99 mg/dL Narrative Performed at:  [address]  [id number] : Sesar Wen MD, Phone:  [phone] CKD REPORT Result Value Ref Range Interpretation Note Comment:  
   Supplemental report is available. Narrative Performed at:  [address] [place] [address]  [id number] : Letitia Pérez PhD, Phone:  [phone] RECOMMENDATIONS: 
None. Keep up the good work! Please call me if you have any questions: [phone] Sincerely, Ora Olivas MD

## 2018-02-28 NOTE — MR AVS SNAPSHOT
[facility] [facility] 102 1400 [address] [place] [address] 
[phone] Patient: Hever Bautista MRN: [id number] HNE:[birth year] Visit Information Date & Time Provider Department Dept. Phone Encounter #  
 2/28/2018 11:30 AM Nikolas Bahena, [facility] [phone] [id number] Your Appointments 3/14/2018 11:20 AM  
ESTABLISHED PATIENT with Alex Majano MD  
[facility] ([facility]) Appt Note: Dr Mellissa Ham UP  
 [address]  [address] [place] [address]  
[address] [address] Upcoming Health Maintenance Date Due COLONOSCOPY 5/15/1960 DTaP/Tdap/Td series (1 - Tdap) 5/15/1963 ZOSTER VACCINE AGE 60> 3/15/2002 GLAUCOMA SCREENING Q2Y 5/15/2007 OSTEOPOROSIS SCREENING (DEXA) 5/15/2007 MEDICARE YEARLY EXAM 5/26/2018 Allergies as of 2/28/2018  Review Complete On: 2/28/2018 By: Nikolas Bahena MD  
 No Known Allergies Current Immunizations  Reviewed on 2/28/2018 Name Date Influenza High Dose Vaccine PF 10/5/2017, 1/7/2016 Influenza Vaccine 9/15/2016 Pneumococcal Conjugate (PCV-13) 9/15/2015 Pneumococcal Polysaccharide (PPSV-23) 6/16/2017 Reviewed by Nikolas Bahena MD on 2/28/2018 at 11:57 AM  
You Were Diagnosed With   
  
 Codes Comments Essential hypertension    -  Primary ICD-10-CM: I10 
ICD-9-CM: 401.9 Paroxysmal a-fib (HCC)     ICD-10-CM: I48.0 ICD-9-CM: 427.31 Biventricular failure     ICD-10-CM: I50.82 ICD-9-CM: 428.0 Mixed hyperlipidemia     ICD-10-CM: E78.2 ICD-9-CM: 272.2 Obesity, morbid ([address]     ICD-10-CM: E66.01 
ICD-9-CM: 278.01 Glaucoma screening     ICD-10-CM: Z13.5 ICD-9-CM: V80.1 Menopause     ICD-10-CM: Z78.0 ICD-9-CM: 627.2 Screening for colon cancer     ICD-10-CM: Z12.11 ICD-9-CM: V76.51   
 Essential hypertension with goal blood pressure less than 130/80     ICD-10-CM: I10 
ICD-9-CM: 401.9 Vitamin D deficiency     ICD-10-CM: E55.9 ICD-9-CM: 268.9 Screening breast examination     ICD-10-CM: Z12.31 
ICD-9-CM: V76.10 Vitals BP Pulse Temp Resp Height(growth percentile) Weight(growth percentile) (!) 160/100 63 98 °F (36.7 °C) (Oral) 20 5' 1\" (1.549 m) 213 lb (96.6 kg) SpO2 BMI OB Status Smoking Status 96% 40.25 kg/m2 Menopause Former Smoker Vitals History BMI and BSA Data Body Mass Index Body Surface Area  
 40.25 kg/m 2 2.04 m 2 Preferred Pharmacy Pharmacy Name Phone [facility] [address] [facility] [facility] [phone] Your Updated Medication List  
  
   
This list is accurate as of 2/28/18 11:58 AM.  Always use your most recent med list. amLODIPine 10 mg tablet Commonly known as:  Larayne Guille TAKE ONE TABLET BY MOUTH DAILY  
  
 aspirin delayed-release 81 mg tablet TAKE ONE TABLET BY MOUTH DAILY  
  
 atorvastatin 40 mg tablet Commonly known as:  LIPITOR  
TAKE ONE TABLET BY MOUTH DAILY  
  
 carvedilol 12.5 mg tablet Commonly known as:  COREG  
TAKE ONE TABLET BY MOUTH TWICE A DAY WITH MEALS  
  
 ergocalciferol 50,000 unit capsule Commonly known as:  ERGOCALCIFEROL Take 1 Cap by mouth every seven (7) days. esomeprazole 40 mg capsule Commonly known as:  NEXIUM  
TAKE ONE CAPSULE BY MOUTH DAILY ferrous sulfate 325 mg (65 mg iron) EC tablet Commonly known as:  IRON Take 325 mg by mouth three (3) times daily (with meals). furosemide 40 mg tablet Commonly known as:  LASIX TAKE ONE TABLET BY MOUTH TWICE A DAY ON MONDAY WEDNESDAY, FRIDAY, AND SATURDAY AND TAKE 1 TABLET DAILY FOR TUESDAY THURSDAY AND SUNDAY  
  
 OTHER Shower chair with back  
  
 potassium chloride 20 mEq tablet Commonly known as:  K-DUR, TERIOR-CON  
 TAKE ONE TABLET BY MOUTH TWICE A DAY  
  
 ramipril 5 mg capsule Commonly known as:  ALTACE  
TAKE ONE CAPSULE BY MOUTH DAILY Prescriptions Sent to Pharmacy Refills  
 ramipril (ALTACE) 5 mg capsule 1 Sig: TAKE ONE CAPSULE BY MOUTH DAILY Class: Normal  
 Pharmacy: [facility] [address] [facility] Ph #: [phone] We Performed the Following LDL, DIRECT [id number] CPT(R)] METABOLIC PANEL, COMPREHENSIVE [73400 CPT(R)] REFERRAL TO GASTROENTEROLOGY [MNY78 Custom] Comments:  
 colonoscopy REFERRAL TO OPHTHALMOLOGY [REF57 Custom] VITAMIN D, 25 HYDROXY [id number] CPT(R)] To-Do List   
 04/28/2018 Imaging:  DEXA BONE DENSITY STUDY AXIAL   
  
 04/28/2018 Imaging:  RIVKA MAMMO BI SCREENING INCL CAD Referral Information Referral ID Referred By Referred To  
  
 [id number] [facility] [address] [facility], [address] Visits Status Start Date End Date 1 New Request 2/28/18 2/28/19 If your referral has a status of pending review or denied, additional information will be sent to support the outcome of this decision. Referral ID Referred By Referred To  
 [id number] [facility] [phone] [facility], [address] Visits Status Start Date End Date 1 New Request 2/28/18 2/28/19 If your referral has a status of pending review or denied, additional information will be sent to support the outcome of this decision. Introducing [facility] & HEALTH SERVICES! Dear Monique Reasoner: 
Thank you for requesting a [employer] account. Our records indicate that you already have an active [employer] account. You can access your account anytime at [email or web address] Did you know that you can access your hospital and ER discharge instructions at any time in [employer]?   You can also review all of your test results from your hospital stay or ER visit. Additional Information If you have questions, please visit the Frequently Asked Questions section of the [employer] website at [email or web address] Remember, [employer] is NOT to be used for urgent needs. For medical emergencies, dial 911. Now available from your iPhone and Android! Please provide this summary of care documentation to your next provider. Your primary care clinician is listed as Sydnee Parks. If you have any questions after today's visit, please call [phone].

## 2018-03-01 LAB
25(OH)D3+25(OH)D2 SERPL-MCNC: 39.6 NG/ML (ref 30–100)
ALBUMIN SERPL-MCNC: 3.9 G/DL (ref 3.5–4.8)
ALBUMIN/GLOB SERPL: 1 {RATIO} (ref 1.2–2.2)
ALP SERPL-CCNC: 109 IU/L (ref 39–117)
ALT SERPL-CCNC: 20 IU/L (ref 0–32)
AST SERPL-CCNC: 35 IU/L (ref 0–40)
BILIRUB SERPL-MCNC: 0.3 MG/DL (ref 0–1.2)
BUN SERPL-MCNC: 22 MG/DL (ref 8–27)
BUN/CREAT SERPL: 19 (ref 12–28)
CALCIUM SERPL-MCNC: 9.6 MG/DL (ref 8.7–10.3)
CHLORIDE SERPL-SCNC: 98 MMOL/L (ref 96–106)
CO2 SERPL-SCNC: 30 MMOL/L (ref 18–29)
CREAT SERPL-MCNC: 1.18 MG/DL (ref 0.57–1)
GFR SERPLBLD CREATININE-BSD FMLA CKD-EPI: 45 ML/MIN/1.73
GFR SERPLBLD CREATININE-BSD FMLA CKD-EPI: 52 ML/MIN/1.73
GLOBULIN SER CALC-MCNC: 4.1 G/DL (ref 1.5–4.5)
GLUCOSE SERPL-MCNC: 95 MG/DL (ref 65–99)
INTERPRETATION: NORMAL
LDLC SERPL DIRECT ASSAY-MCNC: 67 MG/DL (ref 0–99)
POTASSIUM SERPL-SCNC: 3.8 MMOL/L (ref 3.5–5.2)
PROT SERPL-MCNC: 8 G/DL (ref 6–8.5)
SODIUM SERPL-SCNC: 145 MMOL/L (ref 134–144)

## 2018-03-26 ENCOUNTER — OFFICE VISIT (OUTPATIENT)
Dept: CARDIOLOGY CLINIC | Age: 78
End: 2018-03-26

## 2018-03-26 VITALS
SYSTOLIC BLOOD PRESSURE: 150 MMHG | BODY MASS INDEX: 40.78 KG/M2 | HEIGHT: 61 IN | WEIGHT: 216 LBS | DIASTOLIC BLOOD PRESSURE: 80 MMHG

## 2018-03-26 DIAGNOSIS — I48.0 PAROXYSMAL A-FIB (HCC): ICD-10-CM

## 2018-03-26 DIAGNOSIS — I42.9 CARDIOMYOPATHY, UNSPECIFIED TYPE (HCC): Primary | ICD-10-CM

## 2018-03-26 NOTE — PROGRESS NOTES
Chief Complaint   Patient presents with    Irregular Heart Beat     a fib     Follow-up     6 month      Verified patient with two types of identifiers.    Verified medications with the patient's medication list  Verified patient's pharmacy

## 2018-03-26 NOTE — MR AVS SNAPSHOT
[address] [facility] [address] 
[phone] Patient: Aden Ch MRN: [id number] VIKY:[birth year]18 Visit Information Date & Time Provider Department Dept. Phone Encounter #  
 3/26/2018 11:20 AM Nany Dye MD [facility] [phone] [id number] Follow-up Instructions Return in about 6 months (around 9/26/2018). Follow-up and Disposition History Your Appointments 6/27/2018 11:30 AM  
ROUTINE CARE with Torie Pacheco MD  
[facility] [address]) Appt Note: 4 month f/u  
 [address] [facility] [address] [facility] [address]  
[phone]  
  
   
 [address] [facility] [address] Upcoming Health Maintenance Date Due COLONOSCOPY 5/15/1960 DTaP/Tdap/Td series (1 - Tdap) 5/15/1963 ZOSTER VACCINE AGE 60> 3/15/2002 GLAUCOMA SCREENING Q2Y 5/15/2007 Bone Densitometry (Dexa) Screening 5/15/2007 Allergies as of 3/26/2018  Review Complete On: 3/26/2018 By: Nany Dye MD  
 No Known Allergies Current Immunizations  Reviewed on 2/28/2018 Name Date Influenza High Dose Vaccine PF 10/5/2017, 1/7/2016 Influenza Vaccine 9/15/2016 Pneumococcal Conjugate (PCV-13) 9/15/2015 Pneumococcal Polysaccharide (PPSV-23) 6/16/2017 Not reviewed this visit You Were Diagnosed With   
  
 Codes Comments Paroxysmal a-fib (HCC)    -  Primary ICD-10-CM: I48.0 ICD-9-CM: 427.31 Vitals BP Height(growth percentile) Weight(growth percentile) BMI OB Status Smoking Status 150/86 (BP 1 Location: Left arm, BP Patient Position: Sitting) 5' 1\" (1.549 m) 216 lb (98 kg) 40.81 kg/m2 Menopause Former Smoker Vitals History BMI and BSA Data Body Mass Index Body Surface Area  
 40.81 kg/m 2 2.05 m 2 Preferred Pharmacy Pharmacy Name Phone [facility] [address] [facility] [facility] [place] [phone] Your Updated Medication List  
  
   
This list is accurate as of 3/26/18 12:40 PM.  Always use your most recent med list. amLODIPine 10 mg tablet Commonly known as:  Vanderbilt Victor Manuelque TAKE ONE TABLET BY MOUTH DAILY  
  
 aspirin delayed-release 81 mg tablet TAKE ONE TABLET BY MOUTH DAILY  
  
 atorvastatin 40 mg tablet Commonly known as:  LIPITOR  
TAKE ONE TABLET BY MOUTH DAILY  
  
 carvedilol 12.5 mg tablet Commonly known as:  COREG  
TAKE ONE TABLET BY MOUTH TWICE A DAY WITH MEALS  
  
 ergocalciferol 50,000 unit capsule Commonly known as:  ERGOCALCIFEROL Take 1 Cap by mouth every seven (7) days. esomeprazole 40 mg capsule Commonly known as:  NEXIUM  
TAKE ONE CAPSULE BY MOUTH DAILY ferrous sulfate 325 mg (65 mg iron) EC tablet Commonly known as:  IRON Take 325 mg by mouth three (3) times daily (with meals). furosemide 40 mg tablet Commonly known as:  LASIX TAKE ONE TABLET BY MOUTH TWICE A DAY ON MONDAY WEDNESDAY, FRIDAY, AND SATURDAY AND TAKE 1 TABLET DAILY FOR TUESDAY THURSDAY AND SUNDAY  
  
 OTHER Shower chair with back  
  
 potassium chloride 20 mEq tablet Commonly known as:  K-DUR, KLOR-CON  
TAKE ONE TABLET BY MOUTH TWICE A DAY  
  
 ramipril 5 mg capsule Commonly known as:  ALTACE  
TAKE ONE CAPSULE BY MOUTH DAILY We Performed the Following AMB POC EKG ROUTINE W/ 12 LEADS, INTER & REP [60953 CPT(R)] Follow-up Instructions Return in about 6 months (around 9/26/2018). Patient Instructions Echo in 6 months and see Omar Sullivan a week after Introducing \[facility] [place] [facility]\"" & HEALTH SERVICES! Dear Girish Aiken: 
Thank you for requesting a [employer] account. Our records indicate that you already have an active [employer] account. You can access your account anytime at [email or web address] Did you know that you can access your hospital and ER discharge instructions at any time in Figure 1? You can also review all of your test results from your hospital stay or ER visit. Additional Information If you have questions, please visit the Frequently Asked Questions section of the [employer] website at [email or web address] Remember, Figure 1 is NOT to be used for urgent needs. For medical emergencies, dial 911. Now available from your iPhone and Android! Please provide this summary of care documentation to your next provider. Your primary care clinician is listed as Liliya Gupta. If you have any questions after today's visit, please call [phone].
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
## 2018-05-19 NOTE — PROGRESS NOTES
Health Maintenance Due   Topic Date Due    COLONOSCOPY  05/15/1960    DTaP/Tdap/Td series (1 - Tdap) 05/15/1963    ZOSTER VACCINE AGE 60>  03/15/2002    GLAUCOMA SCREENING Q2Y  05/15/2007    OSTEOPOROSIS SCREENING (DEXA)  05/15/2007    INFLUENZA AGE 9 TO ADULT  08/01/2017       Chief Complaint   Patient presents with    Extremity Weakness     left side    Hypertension    GERD    Cholesterol Problem       1. Have you been to the ER, urgent care clinic since your last visit? Hospitalized since your last visit? No    2. Have you seen or consulted any other health care providers outside of the [address] [place] [address] since your last visit? Include any pap smears or colon screening. No    3) Do you have an Advance Directive on file? YES    4) Are you interested in receiving information on Advance Directives? NO      Patient is accompanied by self I have received verbal consent from Kendrick Moreno to discuss any/all medical information while they are present in the room. Kendrick Moreno is a 78 y.o. female  who presents for routine immunizations. She denies any symptoms , reactions or allergies that would exclude them from being immunized today. Risks and adverse reactions were discussed and the VIS was given to them. All questions were addressed. She was observed for 10 min post injection. There were no reactions observed.     Cassi Rogers LPN [id number] Detailed

## 2018-07-12 ENCOUNTER — OFFICE VISIT (OUTPATIENT)
Dept: INTERNAL MEDICINE CLINIC | Age: 78
End: 2018-07-12

## 2018-07-12 VITALS
HEART RATE: 74 BPM | RESPIRATION RATE: 20 BRPM | SYSTOLIC BLOOD PRESSURE: 165 MMHG | DIASTOLIC BLOOD PRESSURE: 100 MMHG | HEIGHT: 61 IN | BODY MASS INDEX: 39.08 KG/M2 | WEIGHT: 207 LBS | TEMPERATURE: 98 F | OXYGEN SATURATION: 94 %

## 2018-07-12 DIAGNOSIS — Z78.0 MENOPAUSE: ICD-10-CM

## 2018-07-12 DIAGNOSIS — I50.82 BIVENTRICULAR FAILURE (HCC): ICD-10-CM

## 2018-07-12 DIAGNOSIS — I10 ESSENTIAL HYPERTENSION WITH GOAL BLOOD PRESSURE LESS THAN 130/80: Primary | ICD-10-CM

## 2018-07-12 DIAGNOSIS — Z12.11 SCREENING FOR COLON CANCER: ICD-10-CM

## 2018-07-12 DIAGNOSIS — Z00.00 MEDICARE ANNUAL WELLNESS VISIT, INITIAL: ICD-10-CM

## 2018-07-12 DIAGNOSIS — Z12.39 SCREENING BREAST EXAMINATION: ICD-10-CM

## 2018-07-12 DIAGNOSIS — I48.0 PAROXYSMAL A-FIB (HCC): ICD-10-CM

## 2018-07-12 DIAGNOSIS — Z23 ENCOUNTER FOR IMMUNIZATION: ICD-10-CM

## 2018-07-12 DIAGNOSIS — Z13.5 SCREENING FOR GLAUCOMA: ICD-10-CM

## 2018-07-12 RX ORDER — CARVEDILOL 12.5 MG/1
12.5 TABLET ORAL 2 TIMES DAILY WITH MEALS
Qty: 180 TAB | Refills: 2 | Status: SHIPPED | OUTPATIENT
Start: 2018-07-12 | End: 2019-08-01 | Stop reason: SDUPTHER

## 2018-07-12 RX ORDER — RAMIPRIL 5 MG/1
CAPSULE ORAL
Qty: 90 CAP | Refills: 2 | Status: SHIPPED | OUTPATIENT
Start: 2018-07-12 | End: 2018-09-26 | Stop reason: DRUGHIGH

## 2018-07-12 RX ORDER — AMLODIPINE BESYLATE 10 MG/1
10 TABLET ORAL DAILY
Qty: 90 TAB | Refills: 2 | Status: SHIPPED | OUTPATIENT
Start: 2018-07-12 | End: 2019-08-01 | Stop reason: SDUPTHER

## 2018-07-12 NOTE — PROGRESS NOTES
Nikki Zhang is a 70 y.o. female and presents for annual Medicare Wellness Visit. Problem List: Reviewed with patient and discussed risk factors. Patient Active Problem List   Diagnosis Code    HTN (hypertension) I10    GERD (gastroesophageal reflux disease) K21.9    Mixed hyperlipidemia E78.2    Valvular disease I38    Paroxysmal A-fib (HCC) I48.0    Biventricular failure (HCC) I50.82    Obesity, morbid ([address] E66.01       Current medical providers:  Patient Care Team:  Claudy Mckeon MD as PCP - General (Internal Medicine)  Loni Kehr, MD (Cardiology)  Betsy Ac LPN as Ambulatory Care Navigator (Internal Medicine)  Eli Paz RN as Nurse Navigator (Internal Medicine)    PSH: Reviewed with patient  History reviewed. No pertinent surgical history. SH: Reviewed with patient  Social History   Substance Use Topics    Smoking status: Former Smoker    Smokeless tobacco: Never Used    Alcohol use No       FH: Reviewed with patient  Family History   Problem Relation Age of Onset    No Known Problems Mother     No Known Problems Father        Medications/Allergies: Reviewed with patient  Current Outpatient Prescriptions on File Prior to Visit   Medication Sig Dispense Refill    aspirin delayed-release 81 mg tablet TAKE ONE TABLET BY MOUTH DAILY 30 Tab 2    atorvastatin (LIPITOR) 40 mg tablet TAKE ONE TABLET BY MOUTH DAILY 90 Tab 2    potassium chloride (K-DUR, KLOR-CON) 20 mEq tablet TAKE ONE TABLET BY MOUTH TWICE A  Tab 2    esomeprazole (NEXIUM) 40 mg capsule TAKE ONE CAPSULE BY MOUTH DAILY 90 Cap 2    furosemide (LASIX) 40 mg tablet TAKE ONE TABLET BY MOUTH TWICE A DAY ON MONDAY WEDNESDAY, FRIDAY, AND SATURDAY AND TAKE 1 TABLET DAILY FOR TUESDAY THURSDAY AND SUNDAY 180 Tab 4    OTHER Shower chair with back 1 Each 0    ferrous sulfate (IRON) 325 mg (65 mg iron) EC tablet Take 325 mg by mouth three (3) times daily (with meals).        No current facility-administered medications on file prior to visit. No Known Allergies    Objective:  Visit Vitals    BP (!) 165/100  Comment: has white coat HTN    Pulse 74    Temp 98 °F (36.7 °C) (Oral)    Resp 20    Ht 5' 1\" (1.549 m)    Wt 207 lb (93.9 kg)    SpO2 94%    BMI 39.11 kg/m2    Body mass index is 39.11 kg/(m^2). Assessment of cognitive impairment: Alert and oriented x 3    Depression Screen:   PHQ over the last two weeks 7/12/2018   PHQ Not Done -   Little interest or pleasure in doing things Not at all   Feeling down, depressed or hopeless Not at all   Total Score PHQ 2 0       Fall Risk Assessment:    Fall Risk Assessment, last 12 mths 7/12/2018   Able to walk? Yes   Fall in past 12 months? No   Fall with injury? -   Number of falls in past 12 months -   Fall Risk Score -       Functional Ability:   Does the patient exhibit a steady gait? yes   How long did it take the patient to get up and walk from a sitting position? 15 sec   Is the patient self reliant?  (ie can do own laundry, meals, household chores)  yes     Does the patient handle his/her own medications? yes     Does the patient handle his/her own money? yes     Is the patients home safe (ie good lighting, handrails on stairs and bath, etc.)? yes     Did you notice or did patient express any hearing difficulties? no     Did you notice or did patient express any vision difficulties?   no     Were distance and reading eye charts used? no       Advance Care Planning:   Patient was offered the opportunity to discuss advance care planning:  yes     Does patient have an Advance Directive:  yes   If no, did you provide information on Caring Connections?   yes       Plan:      Orders Placed This Encounter    RIVKA MAMMO BI SCREENING INCL CAD    DEXA BONE DENSITY STUDY AXIAL    METABOLIC PANEL, COMPREHENSIVE    CBC W/O DIFF    ramipril (ALTACE) 5 mg capsule    carvedilol (COREG) 12.5 mg tablet    amLODIPine (NORVASC) 10 mg tablet    varicella-zoster recombinant, PF, (SHINGRIX) 50 mcg/0.5 mL susr injection       Health Maintenance   Topic Date Due    COLONOSCOPY  05/15/1960    DTaP/Tdap/Td series (1 - Tdap) 05/15/1963    ZOSTER VACCINE AGE 60>  03/15/2002    GLAUCOMA SCREENING Q2Y  05/15/2007    Bone Densitometry (Dexa) Screening  05/15/2007    Influenza Age 9 to Adult  08/01/2018    MEDICARE YEARLY EXAM  07/13/2019    Pneumococcal 65+ Low/Medium Risk  Completed       *Patient verbalized understanding and agreement with the plan. A copy of the After Visit Summary with personalized health plan was given to the patient today.

## 2018-07-12 NOTE — PROGRESS NOTES
HISTORY OF PRESENT ILLNESS  Coleman Burroughs is a 70 y.o. female here for follow up. Noticed to have elevated blood pressure, she states she has been taking 3 blood pressure medications regularly. Watching salt. Has lost weight. No chest pain palpitation or shortness of breath  Doing well,complaint with med. Has CHF. no SOB,no orthopnea. Have elevated lipid. on med. lipid is OK. She is watching salt. Now eight gain. No leg edema. Need medicare wellness visit. Need bone density and mammogram.  Did not do colonoscopy yet. She will schedule it. Also she needs to see eye specialist.  She has name and ice physicians, she will schedule it. Hypertension    Pertinent negatives include no chest pain, no palpitations and no blurred vision. Cholesterol Problem   Pertinent negatives include no chest pain. Irregular Heart Beat    Pertinent negatives include no fever and no chest pain. Her past medical history is significant for hypertension. Follow-up   Pertinent negatives include no chest pain. Review of Systems   Constitutional: Negative. Negative for chills and fever. HENT: Negative. Eyes: Negative. Negative for blurred vision and double vision. Respiratory: Negative. Cardiovascular: Negative. Negative for chest pain and palpitations. Gastrointestinal: Negative. Genitourinary: Negative. Musculoskeletal: Negative. Skin: Negative. Negative for itching and rash. Neurological: Negative. Psychiatric/Behavioral: Negative. Physical Exam   Constitutional: She appears well-developed and well-nourished. No distress. Neck: Normal range of motion. Neck supple. No JVD present. No thyromegaly present. Cardiovascular: Normal rate, regular rhythm, normal heart sounds and intact distal pulses. Pulmonary/Chest: Effort normal and breath sounds normal. No respiratory distress. She has no wheezes. Musculoskeletal: She exhibits no edema or tenderness.    Psychiatric: She has a normal mood and affect. Her behavior is normal.       ASSESSMENT and PLAN    Diagnoses and all orders for this visit:    1. Essential hypertension with goal blood pressure less than 130/80    Elevated blood pressure. As per patient patient has taken all blood pressure medication. She mentioned she has whitecoat syndrome. We will refill,  -     ramipril (ALTACE) 5 mg capsule; TAKE ONE CAPSULE BY MOUTH DAILY  -     carvedilol (COREG) 12.5 mg tablet; Take 1 Tab by mouth two (2) times daily (with meals). -     amLODIPine (NORVASC) 10 mg tablet; Take 1 Tab by mouth daily.  -     METABOLIC PANEL, COMPREHENSIVE  -     CBC W/O DIFF  Be on DASH diet. Need to monitor blood pressure. It is over 150/80 at home, patient need to come back earlier. 2. Biventricular failure ([address]    Compensated. On Lasix as needed. Will refill,  -     ramipril (ALTACE) 5 mg capsule; TAKE ONE CAPSULE BY MOUTH DAILY  -     carvedilol (COREG) 12.5 mg tablet; Take 1 Tab by mouth two (2) times daily (with meals). -     amLODIPine (NORVASC) 10 mg tablet; Take 1 Tab by mouth daily. 3. Paroxysmal A-fib (HCC)    Rate and rhythm controlled. Will refill,  -     carvedilol (COREG) 12.5 mg tablet; Take 1 Tab by mouth two (2) times daily (with meals). -     METABOLIC PANEL, COMPREHENSIVE  -     CBC W/O DIFF    4. Medicare annual wellness visit, initial    5. Screening for glaucoma  She will call and make appointment. 6. Screening for colon cancer  Already have name for GI, she will call and make appointment  7. Menopause  -     DEXA BONE DENSITY STUDY AXIAL; Future    8. Screening breast examination  Will order,  -     RIVKA MAMMO BI SCREENING INCL CAD; Future    9. Encounter for immunization  -     varicella-zoster recombinant, PF, (SHINGRIX) 50 mcg/0.5 mL susr injection; Please give first dose now and 2nd dose in 2 months            Discussed expected course/resolution/complications of diagnosis in detail with patient.    Medication risks/benefits/costs/interactions/alternatives discussed with patient. Pt was given an after visit summary which includes diagnoses, current medications & vitals. Pt expressed understanding with the diagnosis and plan.

## 2018-07-12 NOTE — PROGRESS NOTES
Health Maintenance Due   Topic Date Due    COLONOSCOPY  05/15/1960    DTaP/Tdap/Td series (1 - Tdap) 05/15/1963    ZOSTER VACCINE AGE 60>  03/15/2002    GLAUCOMA SCREENING Q2Y  05/15/2007    Bone Densitometry (Dexa) Screening  05/15/2007       Chief Complaint   Patient presents with    Annual Wellness Visit    Hypotension    Cholesterol Problem       1. Have you been to the ER, urgent care clinic since your last visit? Hospitalized since your last visit? No    2. Have you seen or consulted any other health care providers outside of the [address] [place] [address] since your last visit? Include any pap smears or colon screening. No    3) Do you have an Advance Directive on file? no    4) Are you interested in receiving information on Advance Directives? NO      Patient is accompanied by self I have received verbal consent from Karrie Solis to discuss any/all medical information while they are present in the room.

## 2018-07-12 NOTE — PATIENT INSTRUCTIONS
Schedule of Personalized Health Plan  (Provide Copy to Patient)  The best way to stay healthy is to live a healthy lifestyle. A healthy lifestyle includes regular exercise, eating a well-balanced diet, keeping a healthy weight and not smoking. Regular physical exams and screening tests are another important way to take care of yourself. Preventive exams provided by health care providers can find health problems early when treatment works best and can keep you from getting certain diseases or illnesses. Preventive services include exams, lab tests, screenings, shots, monitoring and information to help you take care of your own health. All people over 65 should have a pneumonia shot. Pneumonia shots are usually only needed once in a lifetime unless your doctor decides differently. Up to date    All people over 72 should have a yearly flu shot. People over 65 are at medium to high risk for Hepatitis B. Three shots are needed for complete protection. In addition to your physical exam, some screening tests are recommended:    Bone mass measurement (dexa scan) is recommended every two years. ordered. Diabetes Mellitus screening is recommended every year. up to date    Glaucoma is an eye disease caused by high pressure in the eye. An eye exam is recommended every year. Ordered. Cardiovascular screening tests that check your cholesterol and other blood fat (lipid) levels are recommended every five years. Colorectal Cancer screening tests help to find pre-cancerous polyps (growths in the colon) so they can be removed before they turn into cancer. Tests ordered for screening depend on your personal and family history risk factors. Screening for Breast Cancer is recommended yearly with a mammogram.ordered.     Screening for Cervical Cancer is recommended every two years (annually for certain risk factors, such as previous history of STD or abnormal PAP in past 7 years), with a Pelvic Exam with PAP    Here is a list of your current Health Maintenance items with a due date:  Health Maintenance   Topic Date Due    COLONOSCOPY  05/15/1960    DTaP/Tdap/Td series (1 - Tdap) 05/15/1963    ZOSTER VACCINE AGE 60>  03/15/2002    GLAUCOMA SCREENING Q2Y  05/15/2007    Bone Densitometry (Dexa) Screening  05/15/2007    Influenza Age 9 to Adult  08/01/2018    MEDICARE YEARLY EXAM  07/13/2019    Pneumococcal 65+ Low/Medium Risk  Completed          DASH Diet: Care Instructions  Your Care Instructions    The DASH diet is an eating plan that can help lower your blood pressure. DASH stands for Dietary Approaches to Stop Hypertension. Hypertension is high blood pressure. The DASH diet focuses on eating foods that are high in calcium, potassium, and magnesium. These nutrients can lower blood pressure. The foods that are highest in these nutrients are fruits, vegetables, low-fat dairy products, nuts, seeds, and legumes. But taking calcium, potassium, and magnesium supplements instead of eating foods that are high in those nutrients does not have the same effect. The DASH diet also includes whole grains, fish, and poultry. The DASH diet is one of several lifestyle changes your doctor may recommend to lower your high blood pressure. Your doctor may also want you to decrease the amount of sodium in your diet. Lowering sodium while following the DASH diet can lower blood pressure even further than just the DASH diet alone. Follow-up care is a key part of your treatment and safety. Be sure to make and go to all appointments, and call your doctor if you are having problems. It's also a good idea to know your test results and keep a list of the medicines you take. How can you care for yourself at home? Following the DASH diet  · Eat 4 to 5 servings of fruit each day. A serving is 1 medium-sized piece of fruit, ½ cup chopped or canned fruit, 1/4 cup dried fruit, or 4 ounces (½ cup) of fruit juice.  Choose fruit more often than fruit juice.  · Eat 4 to 5 servings of vegetables each day. A serving is 1 cup of lettuce or raw leafy vegetables, ½ cup of chopped or cooked vegetables, or 4 ounces (½ cup) of vegetable juice. Choose vegetables more often than vegetable juice. · Get 2 to 3 servings of low-fat and fat-free dairy each day. A serving is 8 ounces of milk, 1 cup of yogurt, or 1 ½ ounces of cheese. · Eat 6 to 8 servings of grains each day. A serving is 1 slice of bread, 1 ounce of dry cereal, or ½ cup of cooked rice, pasta, or cooked cereal. Try to choose whole-grain products as much as possible. · Limit lean meat, poultry, and fish to 2 servings each day. A serving is 3 ounces, about the size of a deck of cards. · Eat 4 to 5 servings of nuts, seeds, and legumes (cooked dried beans, lentils, and split peas) each week. A serving is 1/3 cup of nuts, 2 tablespoons of seeds, or ½ cup of cooked beans or peas. · Limit fats and oils to 2 to 3 servings each day. A serving is 1 teaspoon of vegetable oil or 2 tablespoons of salad dressing. · Limit sweets and added sugars to 5 servings or less a week. A serving is 1 tablespoon jelly or jam, ½ cup sorbet, or 1 cup of lemonade. · Eat less than 2,300 milligrams (mg) of sodium a day. If you limit your sodium to 1,500 mg a day, you can lower your blood pressure even more. Tips for success  · Start small. Do not try to make dramatic changes to your diet all at once. You might feel that you are missing out on your favorite foods and then be more likely to not follow the plan. Make small changes, and stick with them. Once those changes become habit, add a few more changes. · Try some of the following:  ¨ Make it a goal to eat a fruit or vegetable at every meal and at snacks. This will make it easy to get the recommended amount of fruits and vegetables each day. ¨ Try yogurt topped with fruit and nuts for a snack or healthy dessert. ¨ Add lettuce, tomato, cucumber, and onion to sandwiches.   ¨ Combine a ready-made pizza crust with low-fat mozzarella cheese and lots of vegetable toppings. Try using tomatoes, squash, spinach, broccoli, carrots, cauliflower, and onions. ¨ Have a variety of cut-up vegetables with a low-fat dip as an appetizer instead of chips and dip. ¨ Sprinkle sunflower seeds or chopped almonds over salads. Or try adding chopped walnuts or almonds to cooked vegetables. ¨ Try some vegetarian meals using beans and peas. Add garbanzo or kidney beans to salads. Make burritos and tacos with mashed galvan beans or black beans. Where can you learn more? Go to http://goldie-jemima.info/. Enter W528 in the search box to learn more about \"DASH Diet: Care Instructions. \"  Current as of: December 6, 2017  Content Version: 11.7  © 5892-3261 [employer], [employer]. Care instructions adapted under license by [employer] (which disclaims liability or warranty for this information). If you have questions about a medical condition or this instruction, always ask your healthcare professional. [address] any warranty or liability for your use of this information.

## 2018-07-12 NOTE — MR AVS SNAPSHOT
[address] [facility] [address] [place] [address] 
[phone] Patient: Sherrie Heimlich MRN: [id number] WD:[id number] Visit Information Date & Time Provider Department Dept. Phone Encounter #  
 7/12/2018 11:15 AM Kim Lugo MD [facility] Internal Medicine [phone] [id number] Your Appointments 9/19/2018 11:00 AM  
ECHO CARDIOGRAMS 2D with Osiris Pulido [facility] (MARILIA SCHEDULING) Appt Note: 6 anmol f/u with echo [address] [place]  
[address] [facility]  
  
    
 9/26/2018 11:20 AM  
ESTABLISHED PATIENT with Pepe Dunn MD  
[facility] ([address]) Appt Note: 1 week f/u after testing [address] [place] [address]  
[address] [address] Upcoming Health Maintenance Date Due COLONOSCOPY 5/15/1960 DTaP/Tdap/Td series (1 - Tdap) 5/15/1963 ZOSTER VACCINE AGE 60> 3/15/2002 GLAUCOMA SCREENING Q2Y 5/15/2007 Bone Densitometry (Dexa) Screening 5/15/2007 Influenza Age 5 to Adult 8/1/2018 MEDICARE YEARLY EXAM 7/13/2019 Allergies as of 7/12/2018  Review Complete On: 7/12/2018 By: Kim Lugo MD  
 No Known Allergies Current Immunizations  Reviewed on 7/12/2018 Name Date Influenza High Dose Vaccine PF 10/5/2017, 1/7/2016 Influenza Vaccine 9/15/2016 Pneumococcal Conjugate (PCV-13) 9/15/2015 Pneumococcal Polysaccharide (PPSV-23) 6/16/2017 Reviewed by Kim Lugo MD on 7/12/2018 at 11:30 AM  
You Were Diagnosed With   
  
 Codes Comments Essential hypertension with goal blood pressure less than 130/80    -  Primary ICD-10-CM: I10 
ICD-9-CM: 401. 9 Biventricular failure ([address]     ICD-10-CM: B03.93 ICD-9-CM: 428.9 Paroxysmal A-fib (HCC)     ICD-10-CM: I48.0 ICD-9-CM: 427.31 Medicare annual wellness visit, initial     ICD-10-CM: Z00.00 ICD-9-CM: V70.0 Screening for glaucoma     ICD-10-CM: Z13.5 ICD-9-CM: V80.1 Screening for colon cancer     ICD-10-CM: Z12.11 ICD-9-CM: V76.51 Menopause     ICD-10-CM: Z78.0 ICD-9-CM: 627.2 Screening breast examination     ICD-10-CM: Z12.31 
ICD-9-CM: V76.10 Encounter for immunization     ICD-10-CM: H94 ICD-9-CM: V03.89 Vitals BP Pulse Temp Resp Height(growth percentile) Weight(growth percentile) (!) 165/100 74 98 °F (36.7 °C) (Oral) 20 5' 1\" (1.549 m) 207 lb (93.9 kg) SpO2 BMI OB Status Smoking Status 94% 39.11 kg/m2 Menopause Former Smoker Vitals History BMI and BSA Data Body Mass Index Body Surface Area  
 39.11 kg/m 2 2.01 m 2 Preferred Pharmacy Pharmacy Name Phone Jose Stout [address] [facility] [phone] Your Updated Medication List  
  
   
This list is accurate as of 7/12/18 11:35 AM.  Always use your most recent med list. amLODIPine 10 mg tablet Commonly known as:  Voncile [place] Take 1 Tab by mouth daily. aspirin delayed-release 81 mg tablet TAKE ONE TABLET BY MOUTH DAILY  
  
 atorvastatin 40 mg tablet Commonly known as:  LIPITOR  
TAKE ONE TABLET BY MOUTH DAILY  
  
 carvedilol 12.5 mg tablet Commonly known as:  Media [place] Take 1 Tab by mouth two (2) times daily (with meals). esomeprazole 40 mg capsule Commonly known as:  NEXIUM  
TAKE ONE CAPSULE BY MOUTH DAILY ferrous sulfate 325 mg (65 mg iron) EC tablet Commonly known as:  IRON Take 325 mg by mouth three (3) times daily (with meals). furosemide 40 mg tablet Commonly known as:  LASIX TAKE ONE TABLET BY MOUTH TWICE A DAY ON MONDAY WEDNESDAY, FRIDAY, AND SATURDAY AND TAKE 1 TABLET DAILY FOR TUESDAY THURSDAY AND SUNDAY  
  
 OTHER  
 Shower chair with back  
  
 potassium chloride 20 mEq tablet Commonly known as:  K-DUR, KLOR-CON  
TAKE ONE TABLET BY MOUTH TWICE A DAY  
  
 ramipril 5 mg capsule Commonly known as:  ALTACE  
TAKE ONE CAPSULE BY MOUTH DAILY  
  
 varicella-zoster recombinant (PF) 50 mcg/0.5 mL Susr injection Commonly known as:  GOOD [facility] Please give first dose now and 2nd dose in 2 months Prescriptions Sent to Pharmacy Refills  
 ramipril (ALTACE) 5 mg capsule 2 Sig: TAKE ONE CAPSULE BY MOUTH DAILY Class: Normal  
 Pharmacy: [address] [place] [address] Ph #: [phone]  
 carvedilol (COREG) 12.5 mg tablet 2 Sig: Take 1 Tab by mouth two (2) times daily (with meals). Class: Normal  
 Pharmacy: [address] Ph #: [phone] Route: Oral  
 amLODIPine (NORVASC) 10 mg tablet 2 Sig: Take 1 Tab by mouth daily. Class: Normal  
 Pharmacy: [address] Ph #: [phone] Route: Oral  
 varicella-zoster recombinant, PF, (SHINGRIX) 50 mcg/0.5 mL susr injection 1 Sig: Please give first dose now and 2nd dose in 2 months Class: Normal  
 Pharmacy: [address] Ph #: [phone] We Performed the Following CBC W/O DIFF [13029 CPT(R)] METABOLIC PANEL, COMPREHENSIVE [60509 CPT(R)] To-Do List   
 10/12/2018 Imaging:  DEXA BONE DENSITY STUDY AXIAL   
  
 10/12/2018 Imaging:  RIVKA MAMMO BI SCREENING INCL CAD Patient Instructions Schedule of Personalized Health Plan (Provide Copy to Patient) The best way to stay healthy is to live a healthy lifestyle. A healthy lifestyle includes regular exercise, eating a well-balanced diet, keeping a healthy weight and not smoking. Regular physical exams and screening tests are another important way to take care of yourself. Preventive exams provided by health care providers can find health problems early when treatment works best and can keep you from getting certain diseases or illnesses. Preventive services include exams, lab tests, screenings, shots, monitoring and information to help you take care of your own health. All people over 65 should have a pneumonia shot. Pneumonia shots are usually only needed once in a lifetime unless your doctor decides differently. Up to date All people over 65 should have a yearly flu shot. People over 65 are at medium to high risk for Hepatitis B. Three shots are needed for complete protection. In addition to your physical exam, some screening tests are recommended: 
 
Bone mass measurement (dexa scan) is recommended every two years. ordered. Diabetes Mellitus screening is recommended every year. up to date Glaucoma is an eye disease caused by high pressure in the eye. An eye exam is recommended every year. Ordered. Cardiovascular screening tests that check your cholesterol and other blood fat (lipid) levels are recommended every five years. Colorectal Cancer screening tests help to find pre-cancerous polyps (growths in the colon) so they can be removed before they turn into cancer. Tests ordered for screening depend on your personal and family history risk factors. Screening for Breast Cancer is recommended yearly with a mammogram.ordered. Screening for Cervical Cancer is recommended every two years (annually for certain risk factors, such as previous history of STD or abnormal PAP in past 7 years), with a Pelvic Exam with PAP Here is a list of your current Health Maintenance items with a due date: 
Health Maintenance Topic Date Due  
 COLONOSCOPY  05/15/1960  
 DTaP/Tdap/Td series (1 - Tdap) 05/15/1963  ZOSTER VACCINE AGE 60>  03/15/2002  GLAUCOMA SCREENING Q2Y  05/15/2007  Bone Densitometry (Dexa) Screening  05/15/2007  Influenza Age 5 to Adult  08/01/2018  MEDICARE YEARLY EXAM  07/13/2019  Pneumococcal 65+ Low/Medium Risk  Completed DASH Diet: Care Instructions Your Care Instructions The DASH diet is an eating plan that can help lower your blood pressure. DASH stands for Dietary Approaches to Stop Hypertension. Hypertension is high blood pressure. The DASH diet focuses on eating foods that are high in calcium, potassium, and magnesium. These nutrients can lower blood pressure. The foods that are highest in these nutrients are fruits, vegetables, low-fat dairy products, nuts, seeds, and legumes. But taking calcium, potassium, and magnesium supplements instead of eating foods that are high in those nutrients does not have the same effect. The DASH diet also includes whole grains, fish, and poultry. The DASH diet is one of several lifestyle changes your doctor may recommend to lower your high blood pressure. Your doctor may also want you to decrease the amount of sodium in your diet. Lowering sodium while following the DASH diet can lower blood pressure even further than just the DASH diet alone. Follow-up care is a key part of your treatment and safety. Be sure to make and go to all appointments, and call your doctor if you are having problems. It's also a good idea to know your test results and keep a list of the medicines you take. How can you care for yourself at home? Following the DASH diet · Eat 4 to 5 servings of fruit each day. A serving is 1 medium-sized piece of fruit, ½ cup chopped or canned fruit, 1/4 cup dried fruit, or 4 ounces (½ cup) of fruit juice. Choose fruit more often than fruit juice. · Eat 4 to 5 servings of vegetables each day. A serving is 1 cup of lettuce or raw leafy vegetables, ½ cup of chopped or cooked vegetables, or 4 ounces (½ cup) of vegetable juice.  Choose vegetables more often than vegetable juice. · Get 2 to 3 servings of low-fat and fat-free dairy each day. A serving is 8 ounces of milk, 1 cup of yogurt, or 1 ½ ounces of cheese. · Eat 6 to 8 servings of grains each day. A serving is 1 slice of bread, 1 ounce of dry cereal, or ½ cup of cooked rice, pasta, or cooked cereal. Try to choose whole-grain products as much as possible. · Limit lean meat, poultry, and fish to 2 servings each day. A serving is 3 ounces, about the size of a deck of cards. · Eat 4 to 5 servings of nuts, seeds, and legumes (cooked dried beans, lentils, and split peas) each week. A serving is 1/3 cup of nuts, 2 tablespoons of seeds, or ½ cup of cooked beans or peas. · Limit fats and oils to 2 to 3 servings each day. A serving is 1 teaspoon of vegetable oil or 2 tablespoons of salad dressing. · Limit sweets and added sugars to 5 servings or less a week. A serving is 1 tablespoon jelly or jam, ½ cup sorbet, or 1 cup of lemonade. · Eat less than 2,300 milligrams (mg) of sodium a day. If you limit your sodium to 1,500 mg a day, you can lower your blood pressure even more. Tips for success · Start small. Do not try to make dramatic changes to your diet all at once. You might feel that you are missing out on your favorite foods and then be more likely to not follow the plan. Make small changes, and stick with them. Once those changes become habit, add a few more changes. · Try some of the following: ¨ Make it a goal to eat a fruit or vegetable at every meal and at snacks. This will make it easy to get the recommended amount of fruits and vegetables each day. ¨ Try yogurt topped with fruit and nuts for a snack or healthy dessert. ¨ Add lettuce, tomato, cucumber, and onion to sandwiches. ¨ Combine a ready-made pizza crust with low-fat mozzarella cheese and lots of vegetable toppings. Try using tomatoes, squash, spinach, broccoli, carrots, cauliflower, and onions. ¨ Have a variety of cut-up vegetables with a low-fat dip as an appetizer instead of chips and dip. ¨ Sprinkle sunflower seeds or chopped almonds over salads. Or try adding chopped walnuts or almonds to cooked vegetables. ¨ Try some vegetarian meals using beans and peas. Add garbanzo or kidney beans to salads. Make burritos and tacos with mashed galvan beans or black beans. Where can you learn more? Go to http://goldie-jemima.info/. Enter R177 in the search box to learn more about \"DASH Diet: Care Instructions. \" Current as of: December 6, 2017 Content Version: 11.7 © 6180-5414 [employer]. Care instructions adapted under license by [employer] (which disclaims liability or warranty for this information). If you have questions about a medical condition or this instruction, always ask your healthcare professional. [place] [address] any warranty or liability for your use of this information. Introducing [facility]! Dear Marlena Hernandez: 
Thank you for requesting a Let's Talk account. Our records indicate that you already have an active Let's Talk account. You can access your account anytime at [email or web address] Did you know that you can access your hospital and ER discharge instructions at any time in Let's Talk? You can also review all of your test results from your hospital stay or ER visit. Additional Information If you have questions, please visit the Frequently Asked Questions section of the Let's Talk website at [email or web address] Remember, Let's Talk is NOT to be used for urgent needs. For medical emergencies, dial 911. Now available from your iPhone and Android! Please provide this summary of care documentation to your next provider. Your primary care clinician is listed as Sushil Gillespie. If you have any questions after today's visit, please call [phone].

## 2018-07-13 LAB
ALBUMIN SERPL-MCNC: 4 G/DL (ref 3.5–4.8)
ALBUMIN/GLOB SERPL: 1 {RATIO} (ref 1.2–2.2)
ALP SERPL-CCNC: 90 IU/L (ref 39–117)
ALT SERPL-CCNC: 19 IU/L (ref 0–32)
AST SERPL-CCNC: 42 IU/L (ref 0–40)
BILIRUB SERPL-MCNC: 0.2 MG/DL (ref 0–1.2)
BUN SERPL-MCNC: 13 MG/DL (ref 8–27)
BUN/CREAT SERPL: 12 (ref 12–28)
CALCIUM SERPL-MCNC: 9.8 MG/DL (ref 8.7–10.3)
CHLORIDE SERPL-SCNC: 102 MMOL/L (ref 96–106)
CO2 SERPL-SCNC: 27 MMOL/L (ref 20–29)
CREAT SERPL-MCNC: 1.06 MG/DL (ref 0.57–1)
ERYTHROCYTE [DISTWIDTH] IN BLOOD BY AUTOMATED COUNT: 15 % (ref 12.3–15.4)
GLOBULIN SER CALC-MCNC: 4 G/DL (ref 1.5–4.5)
GLUCOSE SERPL-MCNC: 100 MG/DL (ref 65–99)
HCT VFR BLD AUTO: 39.9 % (ref 34–46.6)
HGB BLD-MCNC: 12.1 G/DL (ref 11.1–15.9)
INTERPRETATION: NORMAL
MCH RBC QN AUTO: 26 PG (ref 26.6–33)
MCHC RBC AUTO-ENTMCNC: 30.3 G/DL (ref 31.5–35.7)
MCV RBC AUTO: 86 FL (ref 79–97)
PLATELET # BLD AUTO: 259 X10E3/UL (ref 150–379)
POTASSIUM SERPL-SCNC: 3.8 MMOL/L (ref 3.5–5.2)
PROT SERPL-MCNC: 8 G/DL (ref 6–8.5)
RBC # BLD AUTO: 4.65 X10E6/UL (ref 3.77–5.28)
SODIUM SERPL-SCNC: 145 MMOL/L (ref 134–144)
WBC # BLD AUTO: 7 X10E3/UL (ref 3.4–10.8)

## 2018-08-03 DIAGNOSIS — I50.82 BIVENTRICULAR FAILURE (HCC): ICD-10-CM

## 2018-08-03 DIAGNOSIS — K21.9 GASTROESOPHAGEAL REFLUX DISEASE WITHOUT ESOPHAGITIS: ICD-10-CM

## 2018-08-06 RX ORDER — ESOMEPRAZOLE MAGNESIUM 40 MG/1
CAPSULE, DELAYED RELEASE ORAL
Qty: 90 CAP | Refills: 1 | Status: SHIPPED | OUTPATIENT
Start: 2018-08-06 | End: 2019-12-05 | Stop reason: ALTCHOICE

## 2018-08-06 RX ORDER — POTASSIUM CHLORIDE 20 MEQ/1
TABLET, EXTENDED RELEASE ORAL
Qty: 78 TAB | Refills: 1 | Status: SHIPPED | OUTPATIENT
Start: 2018-08-06 | End: 2019-12-05 | Stop reason: SDUPTHER

## 2018-08-18 DIAGNOSIS — I50.82 BIVENTRICULAR FAILURE (HCC): ICD-10-CM

## 2018-08-18 DIAGNOSIS — I10 ESSENTIAL HYPERTENSION WITH GOAL BLOOD PRESSURE LESS THAN 130/80: ICD-10-CM

## 2018-08-20 RX ORDER — ATORVASTATIN CALCIUM 40 MG/1
TABLET, FILM COATED ORAL
Qty: 90 TAB | Refills: 1 | Status: SHIPPED | OUTPATIENT
Start: 2018-08-20 | End: 2019-06-22 | Stop reason: SDUPTHER

## 2018-08-29 ENCOUNTER — HOSPITAL ENCOUNTER (OUTPATIENT)
Dept: MAMMOGRAPHY | Age: 78
Discharge: HOME | End: 2018-08-29
Attending: INTERNAL MEDICINE
Payer: MEDICARE

## 2018-08-29 DIAGNOSIS — Z12.39 SCREENING BREAST EXAMINATION: ICD-10-CM

## 2018-08-29 DIAGNOSIS — Z78.0 MENOPAUSE: ICD-10-CM

## 2018-08-29 PROCEDURE — 77080 DXA BONE DENSITY AXIAL: CPT

## 2018-08-29 PROCEDURE — 77067 SCR MAMMO BI INCL CAD: CPT

## 2018-09-13 ENCOUNTER — TELEPHONE (OUTPATIENT)
Dept: INTERNAL MEDICINE CLINIC | Age: 78
End: 2018-09-13

## 2018-09-13 NOTE — TELEPHONE ENCOUNTER
Call placed to pt and left message stating that first mammo just states its incomplete and that is why they need more imaging

## 2018-09-13 NOTE — TELEPHONE ENCOUNTER
Patient did not receive results from first screening. She was told to return for a second screening. Please contact patient to confirm.  I gave her the # to go ahead and make her appointment while she waits for a call back from [facility]

## 2018-09-19 ENCOUNTER — CLINICAL SUPPORT (OUTPATIENT)
Dept: CARDIOLOGY CLINIC | Age: 78
End: 2018-09-19

## 2018-09-19 DIAGNOSIS — I42.9 CARDIOMYOPATHY, UNSPECIFIED TYPE (HCC): ICD-10-CM

## 2018-09-22 DIAGNOSIS — I10 ESSENTIAL HYPERTENSION: ICD-10-CM

## 2018-09-22 DIAGNOSIS — I10 ESSENTIAL HYPERTENSION WITH GOAL BLOOD PRESSURE LESS THAN 130/80: ICD-10-CM

## 2018-09-22 DIAGNOSIS — I50.82 BIVENTRICULAR FAILURE (HCC): ICD-10-CM

## 2018-09-24 RX ORDER — RAMIPRIL 5 MG/1
CAPSULE ORAL
Qty: 90 CAP | Refills: 0 | Status: SHIPPED | OUTPATIENT
Start: 2018-09-24 | End: 2018-09-26 | Stop reason: DRUGHIGH

## 2018-09-26 ENCOUNTER — OFFICE VISIT (OUTPATIENT)
Dept: CARDIOLOGY CLINIC | Age: 78
End: 2018-09-26

## 2018-09-26 VITALS
BODY MASS INDEX: 38.33 KG/M2 | WEIGHT: 203 LBS | HEIGHT: 61 IN | DIASTOLIC BLOOD PRESSURE: 80 MMHG | SYSTOLIC BLOOD PRESSURE: 150 MMHG

## 2018-09-26 DIAGNOSIS — I48.0 PAROXYSMAL A-FIB (HCC): Primary | ICD-10-CM

## 2018-09-26 RX ORDER — RAMIPRIL 10 MG/1
10 CAPSULE ORAL DAILY
Qty: 30 CAP | Refills: 6 | Status: SHIPPED | OUTPATIENT
Start: 2018-09-26 | End: 2019-06-15 | Stop reason: SDUPTHER

## 2018-09-26 RX ORDER — RAMIPRIL 10 MG/1
10 CAPSULE ORAL DAILY
COMMUNITY
End: 2018-09-26 | Stop reason: SDUPTHER
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
## 2018-09-26 NOTE — MR AVS SNAPSHOT
[address] [facility] [address] [facility] 
[phone] Patient: Mary Jean MRN: [id number] DARIAN:[birth year] Visit Information Date & Time Provider Department Dept. Phone Encounter #  
 9/26/2018 11:20 AM Daniele Nuñez MD [facility] [phone] [id number] Your Appointments 11/14/2018 11:30 AM  
ROUTINE CARE with Tamara Bateman MD  
[facility] Internal Medicine [facility] Appt Note: follow up [address] [facility] [address] [facility] [address] [place] [address]  
[phone]  
  
   
 [address] [facility] [address] Bogdan Vasquez Upcoming Health Maintenance Date Due COLONOSCOPY 5/15/1960 DTaP/Tdap/Td series (1 - Tdap) 5/15/1963 Shingrix Vaccine Age 50> (1 of 2) 5/15/1992 GLAUCOMA SCREENING Q2Y 5/15/2007 Influenza Age 5 to Adult 8/1/2018 MEDICARE YEARLY EXAM 7/13/2019 Allergies as of 9/26/2018  Review Complete On: 9/26/2018 By: Michelle Bravo RN No Known Allergies Current Immunizations  Reviewed on 7/12/2018 Name Date Influenza High Dose Vaccine PF 10/5/2017, 1/7/2016 Influenza Vaccine 9/15/2016 Pneumococcal Conjugate (PCV-13) 9/15/2015 Pneumococcal Polysaccharide (PPSV-23) 6/16/2017 Not reviewed this visit You Were Diagnosed With   
  
 Codes Comments Paroxysmal A-fib (HCC)    -  Primary ICD-10-CM: I48.0 ICD-9-CM: 427.31 Vitals BP Height(growth percentile) Weight(growth percentile) BMI OB Status Smoking Status (!) 164/102 (BP 1 Location: Left arm, BP Patient Position: Sitting) 5' 1\" (1.549 m) 203 lb (92.1 kg) 38.36 kg/m2 Menopause Former Smoker Vitals History BMI and BSA Data Body Mass Index Body Surface Area  
 38.36 kg/m 2 1.99 m 2 Preferred Pharmacy Pharmacy Name Phone Laura Yi [address] [facility] [phone] Your Updated Medication List  
  
   
This list is accurate as of 9/26/18 12:36 PM.  Always use your most recent med list. amLODIPine 10 mg tablet Commonly known as:  Kailey Darting Take 1 Tab by mouth daily. aspirin delayed-release 81 mg tablet TAKE ONE TABLET BY MOUTH DAILY  
  
 atorvastatin 40 mg tablet Commonly known as:  LIPITOR  
TAKE ONE TABLET BY MOUTH DAILY  
  
 carvedilol 12.5 mg tablet Commonly known as:  Ozie Bitter Take 1 Tab by mouth two (2) times daily (with meals). esomeprazole 40 mg capsule Commonly known as:  NEXIUM  
TAKE ONE CAPSULE BY MOUTH DAILY ferrous sulfate 325 mg (65 mg iron) EC tablet Commonly known as:  IRON Take 325 mg by mouth three (3) times daily (with meals). furosemide 40 mg tablet Commonly known as:  LASIX TAKE ONE TABLET BY MOUTH TWICE A DAY ON MONDAY WEDNESDAY, FRIDAY, AND SATURDAY AND TAKE 1 TABLET DAILY FOR TUESDAY THURSDAY AND SUNDAY  
  
 OTHER Shower chair with back  
  
 potassium chloride 20 mEq tablet Commonly known as:  K-DUR, KLOR-CON  
TAKE ONE TABLET BY MOUTH TWICE A DAY * ALTACE 10 mg capsule Generic drug:  ramipril Take 10 mg by mouth daily. * ramipril 5 mg capsule Commonly known as:  ALTACE  
TAKE ONE CAPSULE BY MOUTH DAILY  
  
 varicella-zoster recombinant (PF) 50 mcg/0.5 mL Susr injection Commonly known as:  [facility] Please give first dose now and 2nd dose in 2 months * Notice: This list has 2 medication(s) that are the same as other medications prescribed for you. Read the directions carefully, and ask your doctor or other care provider to review them with you. We Performed the Following AMB POC EKG ROUTINE W/ 12 LEADS, INTER & REP [24145 CPT(R)] Patient Instructions Increase Altace 10 mg daily BP check in 1 week Follow up with Haris Parker in 6 months Introducing [facility] & HEALTH SERVICES! Dear hospitals: Thank you for requesting a Philly account. Our records indicate that you already have an active Philly account. You can access your account anytime at [email or web address] Did you know that you can access your hospital and ER discharge instructions at any time in Philly? You can also review all of your test results from your hospital stay or ER visit. Additional Information If you have questions, please visit the Frequently Asked Questions section of the Philly website at [email or web address] Remember, Philly is NOT to be used for urgent needs. For medical emergencies, dial 911. Now available from your iPhone and Android! Please provide this summary of care documentation to your next provider. Your primary care clinician is listed as Simona Hernández. If you have any questions after today's visit, please call [phone].

## 2018-10-03 ENCOUNTER — OFFICE VISIT (OUTPATIENT)
Dept: CARDIOLOGY CLINIC | Age: 78
End: 2018-10-03

## 2018-10-03 VITALS — SYSTOLIC BLOOD PRESSURE: 140 MMHG | DIASTOLIC BLOOD PRESSURE: 70 MMHG

## 2018-10-03 DIAGNOSIS — I10 ESSENTIAL HYPERTENSION: Primary | ICD-10-CM

## 2018-10-03 NOTE — MR AVS SNAPSHOT
[address] [facility] [address] [facility] 
[phone] Patient: Tobey Buerger MRN: [id number] ITC:[id number] Visit Information Date & Time Provider Department Dept. Phone Encounter #  
 10/3/2018 11:00 AM Hira Sewell MD [facility] Debora Antonio [phone] [id number] Your Appointments 10/3/2018 11:00 AM  
ESTABLISHED PATIENT with Hira Sewell MD  
[facility] ([address]) Appt Note: BP ONLY per [facility] [address]  [address] [facility] [address]  
[address] [facility] [address]  
  
    
 11/14/2018 11:30 AM  
ROUTINE CARE with Mayra Souza MD  
[facility] [address] [place] [address]) Appt Note: follow up [address] [facility] [address] [facility] 13  
[phone]  
  
   
 [address] [facility] [address]  
  
    
 3/13/2019 11:20 AM  
ESTABLISHED PATIENT with Hira Sewell MD  
[facility] ([facility]) Appt Note: Dr Taylor Naranjo 6 Month Follow up  
 [address] [facility] [address] [place]  
[phone] Upcoming Health Maintenance Date Due COLONOSCOPY 5/15/1960 DTaP/Tdap/Td series (1 - Tdap) 5/15/1963 Shingrix Vaccine Age 50> (1 of 2) 5/15/1992 GLAUCOMA SCREENING Q2Y 5/15/2007 Influenza Age 5 to Adult 8/1/2018 MEDICARE YEARLY EXAM 7/13/2019 Allergies as of 10/3/2018  Review Complete On: 9/26/2018 By: Hira Sewell MD  
 No Known Allergies Current Immunizations  Reviewed on 7/12/2018 Name Date Influenza High Dose Vaccine PF 10/5/2017, 1/7/2016 Influenza Vaccine 9/15/2016 Pneumococcal Conjugate (PCV-13) 9/15/2015 Pneumococcal Polysaccharide (PPSV-23) 6/16/2017 Not reviewed this visit Vitals OB Status Smoking Status Menopause Former Smoker Your Updated Medication List  
  
 This list is accurate as of 10/2/18  7:39 AM.  Always use your most recent med list. amLODIPine 10 mg tablet Commonly known as:  Jolly Blunt Take 1 Tab by mouth daily. aspirin delayed-release 81 mg tablet TAKE ONE TABLET BY MOUTH DAILY  
  
 atorvastatin 40 mg tablet Commonly known as:  LIPITOR  
TAKE ONE TABLET BY MOUTH DAILY  
  
 carvedilol 12.5 mg tablet Commonly known as:  [place] Take 1 Tab by mouth two (2) times daily (with meals). esomeprazole 40 mg capsule Commonly known as:  NEXIUM  
TAKE ONE CAPSULE BY MOUTH DAILY ferrous sulfate 325 mg (65 mg iron) EC tablet Commonly known as:  IRON Take 325 mg by mouth three (3) times daily (with meals). furosemide 40 mg tablet Commonly known as:  LASIX TAKE ONE TABLET BY MOUTH TWICE A DAY ON MONDAY WEDNESDAY, FRIDAY, AND SATURDAY AND TAKE 1 TABLET DAILY FOR TUESDAY THURSDAY AND SUNDAY  
  
 OTHER Shower chair with back  
  
 potassium chloride 20 mEq tablet Commonly known as:  K-DUR, KLOR-CON  
TAKE ONE TABLET BY MOUTH TWICE A DAY  
  
 ramipril 10 mg capsule Commonly known as:  ALTACE Take 1 Cap by mouth daily. varicella-zoster recombinant (PF) 50 mcg/0.5 mL Susr injection Commonly known as:  [facility] Please give first dose now and 2nd dose in 2 months Introducing [facility] SERVICES! Dear [facility]: 
Thank you for requesting a Gnammo account. Our records indicate that you already have an active Gnammo account. You can access your account anytime at [email or web address] Did you know that you can access your hospital and ER discharge instructions at any time in Gnammo? You can also review all of your test results from your hospital stay or ER visit. Additional Information If you have questions, please visit the Frequently Asked Questions section of the Gnammo website at [email or web address] Remember, [employer] is NOT to be used for urgent needs. For medical emergencies, dial 911. Now available from your iPhone and Android! Please provide this summary of care documentation to your next provider. Your primary care clinician is listed as Patricia Hayward. If you have any questions after today's visit, please call [phone].

## 2018-10-26 ENCOUNTER — HOSPITAL ENCOUNTER (OUTPATIENT)
Dept: MAMMOGRAPHY | Age: 78
Discharge: HOME | End: 2018-10-26
Attending: INTERNAL MEDICINE
Payer: MEDICARE

## 2018-10-26 DIAGNOSIS — R92.8 ABNORMAL MAMMOGRAM: ICD-10-CM

## 2018-10-26 PROCEDURE — 77066 DX MAMMO INCL CAD BI: CPT

## 2018-10-26 PROCEDURE — 76642 ULTRASOUND BREAST LIMITED: CPT

## 2018-11-05 ENCOUNTER — HOSPITAL ENCOUNTER (OUTPATIENT)
Dept: MAMMOGRAPHY | Age: 78
Discharge: HOME | End: 2018-11-05
Attending: INTERNAL MEDICINE
Payer: MEDICARE

## 2018-11-05 DIAGNOSIS — N63.11 MASS OF UPPER OUTER QUADRANT OF RIGHT BREAST: ICD-10-CM

## 2018-11-05 DIAGNOSIS — N63.20 MASS OF BREAST, LEFT: ICD-10-CM

## 2018-11-05 DIAGNOSIS — R92.8 ABNORMAL MAMMOGRAM: ICD-10-CM

## 2018-11-05 PROCEDURE — 74011250636 HC RX REV CODE- 250/636: Performed by: RADIOLOGY

## 2018-11-05 PROCEDURE — 74011000250 HC RX REV CODE- 250: Performed by: RADIOLOGY

## 2018-11-05 PROCEDURE — 77065 DX MAMMO INCL CAD UNI: CPT

## 2018-11-05 PROCEDURE — 77030020003 US BX BREAST VAC LT 1ST LESION W/CLIP AND SPECIMEN

## 2018-11-05 PROCEDURE — 77030020003 US BX BREAST VAC LT ADDL W/CLIP AND SPECIMEN

## 2018-11-05 PROCEDURE — 88360 TUMOR IMMUNOHISTOCHEM/MANUAL: CPT | Performed by: INTERNAL MEDICINE

## 2018-11-05 PROCEDURE — 77030020004 US BX BREAST VAC RT ADDL W/CLIP AND SPECIMEN

## 2018-11-05 PROCEDURE — 88305 TISSUE EXAM BY PATHOLOGIST: CPT | Performed by: INTERNAL MEDICINE

## 2018-11-05 PROCEDURE — 77030020004 US BX BREAST VAC RT 1ST LESION W/CLIP AND SPECIMEN

## 2018-11-05 RX ORDER — LIDOCAINE HYDROCHLORIDE 10 MG/ML
5 INJECTION, SOLUTION EPIDURAL; INFILTRATION; INTRACAUDAL; PERINEURAL ONCE
Status: COMPLETED | OUTPATIENT
Start: 2018-11-05 | End: 2018-11-05

## 2018-11-05 RX ORDER — LIDOCAINE HYDROCHLORIDE AND EPINEPHRINE 10; 10 MG/ML; UG/ML
20 INJECTION, SOLUTION INFILTRATION; PERINEURAL ONCE
Status: COMPLETED | OUTPATIENT
Start: 2018-11-05 | End: 2018-11-05

## 2018-11-05 RX ADMIN — LIDOCAINE HYDROCHLORIDE AND EPINEPHRINE 200 MG: 10; 10 INJECTION, SOLUTION INFILTRATION; PERINEURAL at 10:15

## 2018-11-05 RX ADMIN — LIDOCAINE HYDROCHLORIDE 5 ML: 10 INJECTION, SOLUTION EPIDURAL; INFILTRATION; INTRACAUDAL; PERINEURAL at 10:50

## 2018-11-05 RX ADMIN — LIDOCAINE HYDROCHLORIDE AND EPINEPHRINE 10 ML: 10; 10 INJECTION, SOLUTION INFILTRATION; PERINEURAL at 10:50

## 2018-11-05 RX ADMIN — LIDOCAINE HYDROCHLORIDE 30 ML: 10 INJECTION, SOLUTION EPIDURAL; INFILTRATION; INTRACAUDAL; PERINEURAL at 10:15

## 2018-11-05 NOTE — PROGRESS NOTES
Patient tolerated bilateral breast biopsies well. Two biopsies were performed on the left breast and one on the right. Patient had scant bleeding noted. Biopsy sites were bandaged in the usual fashion and discharge instructions were reviewed with the patient. She was provided with written instructions as well. Advised her that the results should be available by Wednesday and that she can expect a phone call in the afternoon. Encouraged her to call with any questions or concerns.

## 2018-11-05 NOTE — DISCHARGE INSTRUCTIONS
Breast Biopsy Discharge Instructions    PAIN CONTROL     You may have mild discomfort; take 1-2 Tylenol every 4-6 hours as needed.  Do not take aspirin containing products or anti-inflammatory medications (Advil, Aleve, Motrin, Ibuprofen, etc.) for 24 hours.  Wearing a comfortable bra for support may help with discomfort. WOUND CARE      A small amount of bleeding or bruising at the biopsy site is normal. Watch for signs and symptoms of infections: skin warm to touch, yellowish drainage from wound, fever or severe pain.  Place an ice pack over the site hourly, 20-30 at a time for a few hours today.  The clear dressing is water resistant (you may shower, but do not allow the dressing to become saturated). You may remove the dressing in 48 hours. The steri-strips (small pieces of tape covering the incision) will fall off on their own in a few days. If the clear dressing irritates your skin or begins to peel off, you may remove it. Remember, if you remove the clear dressing before 48 hours, you should not get the site wet.  If at any point you have EXCESSIVE BLEEDING (saturating the gauze under the clear dressing) OR pain please call:    Daytime 7:30am-5:00pm: [facility] [phone]    After Hours:    [phone] (ask to speak to a radiologist)              or [address] [place] [address] [phone]    ACTIVITY     Do not participate in any strenuous activities for 24 hours (exercise, sports, housework, etc.).  You may resume work (light duty only) for the first 24 hours.  No heavy lifting with the arm on the affected side of your body. ADDITIONAL INSTRUCTIONS    We will call you with results on Wednesday November 7 in the afternoon.        YOUR TREATMENT TEAM TODAY WAS    MD: Shala Torres  RN: Dionne Field  Radiology Tech: Gina Olivares

## 2018-11-07 NOTE — PROGRESS NOTES
Dr. Karthik Fisher called patient with pathology. I called patient with appointment information, Dr. Michelle Lugo 98/61 at 2259, 1130 arrival time. Patient reports that the biopsy sites are healing well and she plans to remove the dressings tonight. Encouraged patient to call with any questions or concerns.

## 2018-11-14 ENCOUNTER — OFFICE VISIT (OUTPATIENT)
Dept: INTERNAL MEDICINE CLINIC | Age: 78
End: 2018-11-14

## 2018-11-14 VITALS
HEIGHT: 61 IN | DIASTOLIC BLOOD PRESSURE: 98 MMHG | WEIGHT: 202 LBS | HEART RATE: 80 BPM | OXYGEN SATURATION: 97 % | RESPIRATION RATE: 16 BRPM | TEMPERATURE: 96.2 F | SYSTOLIC BLOOD PRESSURE: 190 MMHG | BODY MASS INDEX: 38.14 KG/M2

## 2018-11-14 DIAGNOSIS — I48.0 PAROXYSMAL A-FIB (HCC): ICD-10-CM

## 2018-11-14 DIAGNOSIS — F41.9 ANXIETY DISORDER, UNSPECIFIED TYPE: ICD-10-CM

## 2018-11-14 DIAGNOSIS — I10 ESSENTIAL HYPERTENSION: Primary | ICD-10-CM

## 2018-11-14 DIAGNOSIS — C50.912 INVASIVE DUCTAL CARCINOMA OF BREAST, FEMALE, LEFT (HCC): ICD-10-CM

## 2018-11-14 DIAGNOSIS — I50.82 BIVENTRICULAR FAILURE (HCC): ICD-10-CM

## 2018-11-14 RX ORDER — ALPRAZOLAM 0.25 MG/1
0.25 TABLET ORAL
Qty: 30 TAB | Refills: 0 | Status: SHIPPED | OUTPATIENT
Start: 2018-11-14 | End: 2019-03-14 | Stop reason: SDUPTHER

## 2018-11-14 RX ORDER — SERTRALINE HYDROCHLORIDE 50 MG/1
50 TABLET, FILM COATED ORAL DAILY
Qty: 30 TAB | Refills: 3 | Status: SHIPPED | OUTPATIENT
Start: 2018-11-14 | End: 2019-03-14 | Stop reason: SDUPTHER

## 2018-11-14 NOTE — PATIENT INSTRUCTIONS
DASH Diet: Care Instructions Your Care Instructions The DASH diet is an eating plan that can help lower your blood pressure. DASH stands for Dietary Approaches to Stop Hypertension. Hypertension is high blood pressure. The DASH diet focuses on eating foods that are high in calcium, potassium, and magnesium. These nutrients can lower blood pressure. The foods that are highest in these nutrients are fruits, vegetables, low-fat dairy products, nuts, seeds, and legumes. But taking calcium, potassium, and magnesium supplements instead of eating foods that are high in those nutrients does not have the same effect. The DASH diet also includes whole grains, fish, and poultry. The DASH diet is one of several lifestyle changes your doctor may recommend to lower your high blood pressure. Your doctor may also want you to decrease the amount of sodium in your diet. Lowering sodium while following the DASH diet can lower blood pressure even further than just the DASH diet alone. Follow-up care is a key part of your treatment and safety. Be sure to make and go to all appointments, and call your doctor if you are having problems. It's also a good idea to know your test results and keep a list of the medicines you take. How can you care for yourself at home? Following the DASH diet · Eat 4 to 5 servings of fruit each day. A serving is 1 medium-sized piece of fruit, ½ cup chopped or canned fruit, 1/4 cup dried fruit, or 4 ounces (½ cup) of fruit juice. Choose fruit more often than fruit juice. · Eat 4 to 5 servings of vegetables each day. A serving is 1 cup of lettuce or raw leafy vegetables, ½ cup of chopped or cooked vegetables, or 4 ounces (½ cup) of vegetable juice. Choose vegetables more often than vegetable juice. · Get 2 to 3 servings of low-fat and fat-free dairy each day. A serving is 8 ounces of milk, 1 cup of yogurt, or 1 ½ ounces of cheese. · Eat 6 to 8 servings of grains each day. A serving is 1 slice of bread, 1 ounce of dry cereal, or ½ cup of cooked rice, pasta, or cooked cereal. Try to choose whole-grain products as much as possible. · Limit lean meat, poultry, and fish to 2 servings each day. A serving is 3 ounces, about the size of a deck of cards. · Eat 4 to 5 servings of nuts, seeds, and legumes (cooked dried beans, lentils, and split peas) each week. A serving is 1/3 cup of nuts, 2 tablespoons of seeds, or ½ cup of cooked beans or peas. · Limit fats and oils to 2 to 3 servings each day. A serving is 1 teaspoon of vegetable oil or 2 tablespoons of salad dressing. · Limit sweets and added sugars to 5 servings or less a week. A serving is 1 tablespoon jelly or jam, ½ cup sorbet, or 1 cup of lemonade. · Eat less than 2,300 milligrams (mg) of sodium a day. If you limit your sodium to 1,500 mg a day, you can lower your blood pressure even more. Tips for success · Start small. Do not try to make dramatic changes to your diet all at once. You might feel that you are missing out on your favorite foods and then be more likely to not follow the plan. Make small changes, and stick with them. Once those changes become habit, add a few more changes. · Try some of the following: ? Make it a goal to eat a fruit or vegetable at every meal and at snacks. This will make it easy to get the recommended amount of fruits and vegetables each day. ? Try yogurt topped with fruit and nuts for a snack or healthy dessert. ? Add lettuce, tomato, cucumber, and onion to sandwiches. ? Combine a ready-made pizza crust with low-fat mozzarella cheese and lots of vegetable toppings. Try using tomatoes, squash, spinach, broccoli, carrots, cauliflower, and onions. ? Have a variety of cut-up vegetables with a low-fat dip as an appetizer instead of chips and dip. ? Sprinkle sunflower seeds or chopped almonds over salads.  Or try adding chopped walnuts or almonds to cooked vegetables. ? Try some vegetarian meals using beans and peas. Add garbanzo or kidney beans to salads. Make burritos and tacos with mashed galvan beans or black beans. Where can you learn more? Go to http://goldie-jemima.info/. Enter Z347 in the search box to learn more about \"DASH Diet: Care Instructions. \" Current as of: December 6, 2017 Content Version: 11.8 © 6791-5751 [employer]. Care instructions adapted under license by [employer] (which disclaims liability or warranty for this information). If you have questions about a medical condition or this instruction, always ask your healthcare professional. [address] any warranty or liability for your use of this information.

## 2018-11-14 NOTE — PROGRESS NOTES
HISTORY OF PRESENT ILLNESS Racheal Masse is a 70 y.o. female here for follow up. She is very upset since that she was diagnosed with invasive ductal breast carcinoma. She has an appointment with her breast surgeon Dr. Juany Page next month. She is anxious about mastectomy. Sobbing and tearful. Noticed to have elevated blood pressure, she states she has been taking 3 blood pressure medications regularly. She is anxious. Has biventricular failure and hypertension, has seen cardiologist. 
Watching salt. Has lost weight. No chest pain palpitation or shortness of breath Labs reviewed, stable. Received flu shot and shingles vaccine. Hypertension Associated symptoms include anxiety. Pertinent negatives include no chest pain, no palpitations and no blurred vision. Follow-up Pertinent negatives include no chest pain. Anxiety Pertinent negatives include no chest pain. Review of Systems Constitutional: Negative. Negative for chills and fever. HENT: Negative. Eyes: Negative. Negative for blurred vision and double vision. Respiratory: Negative. Cardiovascular: Negative. Negative for chest pain and palpitations. Gastrointestinal: Negative. Genitourinary: Negative. Musculoskeletal: Negative. Skin: Negative. Negative for itching and rash. Neurological: Negative. Psychiatric/Behavioral: The patient is nervous/anxious. Physical Exam  
Constitutional: She appears well-developed and well-nourished. No distress. Neck: Normal range of motion. Neck supple. No JVD present. No thyromegaly present. Cardiovascular: Normal rate, regular rhythm, normal heart sounds and intact distal pulses. Pulmonary/Chest: Effort normal and breath sounds normal. No respiratory distress. She has no wheezes. Musculoskeletal: She exhibits no edema or tenderness. Psychiatric: She has a normal mood and affect. Her behavior is normal.  
anxious ASSESSMENT and PLAN 
 
 Diagnoses and all orders for this visit: 1. Essential hypertension Elevated blood pressure. Probably due to anxiety. We will not increase blood pressure medications now. I will treat her for anxiety. Advised her to monitor blood pressure and keep a blood pressure log. Will follow up with her in 1 month. Advised her to be on DASH diet. Will check, 
 
-     METABOLIC PANEL, COMPREHENSIVE 2. [facility] On Coreg and aspirin. Will check, 
-     METABOLIC PANEL, COMPREHENSIVE 3. 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 On Lasix. -     METABOLIC PANEL, COMPREHENSIVE 4. Anxiety disorder, unspecified type From recent diagnosis of breast cancer. Will start, 
-     ALPRAZolam (XANAX) 0.25 mg tablet; Take 1 Tab by mouth two (2) times daily as needed for Anxiety. Max Daily Amount: 0.5 mg. 
-     sertraline (ZOLOFT) 50 mg tablet; Take 1 Tab by mouth daily. take half tab po every day for 1 week then 1 tab po QD 5. Invasive ductal carcinoma of breast, female, left ([facility].) She will see Dr. Jenyn Aden soon. Discussed expected course/resolution/complications of diagnosis in detail with patient. Medication risks/benefits/costs/interactions/alternatives discussed with patient. Pt was given an after visit summary which includes diagnoses, current medications & vitals. Pt expressed understanding with the diagnosis and plan.

## 2018-11-14 NOTE — PROGRESS NOTES
Cliff Marte is a 70 y.o. female Chief Complaint Patient presents with  Hypertension f/u 1. Have you been to the ER, urgent care clinic since your last visit? Hospitalized since your last visit? No 
 
2. Have you seen or consulted any other health care providers outside of the [address] [place] [address] since your last visit? Include any pap smears or colon screening. No  
 
Visit Vitals BP (!) 190/98 (BP 1 Location: Left arm, BP Patient Position: Sitting) Pulse 80 Temp 96.2 °F (35.7 °C) (Oral) Resp 16 Ht 5' 1\" (1.549 m) Wt 202 lb (91.6 kg) SpO2 97% BMI 38.17 kg/m² Health Maintenance Due Topic Date Due  
 COLONOSCOPY  05/15/1960  
 DTaP/Tdap/Td series (1 - Tdap) 05/15/1963  Shingrix Vaccine Age 50> (1 of 2) 05/15/1992  GLAUCOMA SCREENING Q2Y  05/15/2007  Influenza Age 5 to Adult  08/01/2018 Heather Marino LPN

## 2018-11-15 LAB
ALBUMIN SERPL-MCNC: 3.8 G/DL (ref 3.5–4.8)
ALBUMIN/GLOB SERPL: 1 {RATIO} (ref 1.2–2.2)
ALP SERPL-CCNC: 91 IU/L (ref 39–117)
ALT SERPL-CCNC: 16 IU/L (ref 0–32)
AST SERPL-CCNC: 28 IU/L (ref 0–40)
BILIRUB SERPL-MCNC: 0.2 MG/DL (ref 0–1.2)
BUN SERPL-MCNC: 17 MG/DL (ref 8–27)
BUN/CREAT SERPL: 17 (ref 12–28)
CALCIUM SERPL-MCNC: 9.7 MG/DL (ref 8.7–10.3)
CHLORIDE SERPL-SCNC: 100 MMOL/L (ref 96–106)
CO2 SERPL-SCNC: 28 MMOL/L (ref 20–29)
CREAT SERPL-MCNC: 1.01 MG/DL (ref 0.57–1)
GLOBULIN SER CALC-MCNC: 4 G/DL (ref 1.5–4.5)
GLUCOSE SERPL-MCNC: 94 MG/DL (ref 65–99)
INTERPRETATION: NORMAL
POTASSIUM SERPL-SCNC: 3.8 MMOL/L (ref 3.5–5.2)
PROT SERPL-MCNC: 7.8 G/DL (ref 6–8.5)
SODIUM SERPL-SCNC: 143 MMOL/L (ref 134–144)

## 2018-12-20 ENCOUNTER — OFFICE VISIT (OUTPATIENT)
Dept: INTERNAL MEDICINE CLINIC | Age: 78
End: 2018-12-20

## 2018-12-20 VITALS
DIASTOLIC BLOOD PRESSURE: 110 MMHG | HEART RATE: 82 BPM | BODY MASS INDEX: 37.49 KG/M2 | SYSTOLIC BLOOD PRESSURE: 160 MMHG | OXYGEN SATURATION: 95 % | HEIGHT: 61 IN | TEMPERATURE: 97.4 F | RESPIRATION RATE: 18 BRPM | WEIGHT: 198.6 LBS

## 2018-12-20 DIAGNOSIS — I10 ESSENTIAL HYPERTENSION: Primary | ICD-10-CM

## 2018-12-20 DIAGNOSIS — I50.82 BIVENTRICULAR FAILURE (HCC): ICD-10-CM

## 2018-12-20 DIAGNOSIS — C50.912 INVASIVE DUCTAL CARCINOMA OF BREAST, FEMALE, LEFT (HCC): ICD-10-CM

## 2018-12-20 DIAGNOSIS — I48.0 PAROXYSMAL A-FIB (HCC): ICD-10-CM

## 2018-12-20 NOTE — PROGRESS NOTES
Gavino Turcios is a 70 y.o. female    Chief Complaint   Patient presents with    Hypertension     f/u     1. Have you been to the ER, urgent care clinic since your last visit? Hospitalized since your last visit? No    2. Have you seen or consulted any other health care providers outside of the [address] [place] [address] since your last visit? Include any pap smears or colon screening.  No     Visit Vitals  BP (!) 160/110 (BP 1 Location: Left arm, BP Patient Position: Sitting)   Pulse 82   Temp 97.4 °F (36.3 °C)   Resp 18   Ht 5' 1\" (1.549 m)   Wt 198 lb 9.6 oz (90.1 kg)   SpO2 95%   BMI 37.53 kg/m²     Health Maintenance Due   Topic Date Due    COLONOSCOPY  05/15/1960    DTaP/Tdap/Td series (1 - Tdap) 05/15/1963    GLAUCOMA SCREENING Q2Y  05/15/2007    Shingrix Vaccine Age 50> (2 of 2) 12/12/2018     Jhony Bojorquez LPN

## 2018-12-20 NOTE — PROGRESS NOTES
HISTORY OF PRESENT ILLNESS  Dolores Gonsalez is a 70 y.o. female here for follow up. Noticed to have elevated blood pressure, she states she has been taking 3 blood pressure medications regularly. She is anxious. Has biventricular failure and hypertension, has seen cardiologist.  Watching salt. Has lost weight. No chest pain palpitation or shortness of breath  Labs reviewed, stable. Has breast cancer. Will see Dr. Raquel Rogers soon. Anxiety seems under control. She does not need any Xanax right now. Hypertension    Pertinent negatives include no chest pain, no palpitations and no blurred vision. Follow-up   Pertinent negatives include no chest pain. Review of Systems   Constitutional: Negative. Negative for chills and fever. HENT: Negative. Eyes: Negative. Negative for blurred vision and double vision. Respiratory: Negative. Cardiovascular: Negative. Negative for chest pain and palpitations. Gastrointestinal: Negative. Genitourinary: Negative. Musculoskeletal: Negative. Skin: Negative. Negative for itching and rash. Neurological: Negative. Psychiatric/Behavioral: Negative. Physical Exam   Constitutional: She appears well-developed and well-nourished. No distress. Neck: Normal range of motion. Neck supple. No JVD present. No thyromegaly present. Cardiovascular: Normal rate, regular rhythm, normal heart sounds and intact distal pulses. Pulmonary/Chest: Effort normal and breath sounds normal. No respiratory distress. She has no wheezes. Musculoskeletal: She exhibits no edema or tenderness. Psychiatric: She has a normal mood and affect. Her behavior is normal.       ASSESSMENT and PLAN    Diagnoses and all orders for this visit:    1. Essential hypertension    Elevated blood pressure but patient has whitecoat syndrome. And she is going to monitor blood pressure at home. She is compliant with 3 blood pressure medication she is taking.   No be on DASH diet.  2. Paroxysmal A-fib (HCC)  On Coreg and aspirin. Stable. 3. Biventricular failure (HCC)  Taking Lasix every day. Compensated. Last echo was done in September, was normal.  4. Invasive ductal carcinoma of breast, female, left [facility])  We will see Dr. Maritza Flores end of this month. Discussed expected course/resolution/complications of diagnosis in detail with patient. Medication risks/benefits/costs/interactions/alternatives discussed with patient. Pt was given an after visit summary which includes diagnoses, current medications & vitals. Pt expressed understanding with the diagnosis and plan.

## 2018-12-20 NOTE — PATIENT INSTRUCTIONS
DASH Diet: Care Instructions  Your Care Instructions    The DASH diet is an eating plan that can help lower your blood pressure. DASH stands for Dietary Approaches to Stop Hypertension. Hypertension is high blood pressure. The DASH diet focuses on eating foods that are high in calcium, potassium, and magnesium. These nutrients can lower blood pressure. The foods that are highest in these nutrients are fruits, vegetables, low-fat dairy products, nuts, seeds, and legumes. But taking calcium, potassium, and magnesium supplements instead of eating foods that are high in those nutrients does not have the same effect. The DASH diet also includes whole grains, fish, and poultry. The DASH diet is one of several lifestyle changes your doctor may recommend to lower your high blood pressure. Your doctor may also want you to decrease the amount of sodium in your diet. Lowering sodium while following the DASH diet can lower blood pressure even further than just the DASH diet alone. Follow-up care is a key part of your treatment and safety. Be sure to make and go to all appointments, and call your doctor if you are having problems. It's also a good idea to know your test results and keep a list of the medicines you take. How can you care for yourself at home? Following the DASH diet  · Eat 4 to 5 servings of fruit each day. A serving is 1 medium-sized piece of fruit, ½ cup chopped or canned fruit, 1/4 cup dried fruit, or 4 ounces (½ cup) of fruit juice. Choose fruit more often than fruit juice. · Eat 4 to 5 servings of vegetables each day. A serving is 1 cup of lettuce or raw leafy vegetables, ½ cup of chopped or cooked vegetables, or 4 ounces (½ cup) of vegetable juice. Choose vegetables more often than vegetable juice. · Get 2 to 3 servings of low-fat and fat-free dairy each day. A serving is 8 ounces of milk, 1 cup of yogurt, or 1 ½ ounces of cheese. · Eat 6 to 8 servings of grains each day.  A serving is 1 slice of bread, 1 ounce of dry cereal, or ½ cup of cooked rice, pasta, or cooked cereal. Try to choose whole-grain products as much as possible. · Limit lean meat, poultry, and fish to 2 servings each day. A serving is 3 ounces, about the size of a deck of cards. · Eat 4 to 5 servings of nuts, seeds, and legumes (cooked dried beans, lentils, and split peas) each week. A serving is 1/3 cup of nuts, 2 tablespoons of seeds, or ½ cup of cooked beans or peas. · Limit fats and oils to 2 to 3 servings each day. A serving is 1 teaspoon of vegetable oil or 2 tablespoons of salad dressing. · Limit sweets and added sugars to 5 servings or less a week. A serving is 1 tablespoon jelly or jam, ½ cup sorbet, or 1 cup of lemonade. · Eat less than 2,300 milligrams (mg) of sodium a day. If you limit your sodium to 1,500 mg a day, you can lower your blood pressure even more. Tips for success  · Start small. Do not try to make dramatic changes to your diet all at once. You might feel that you are missing out on your favorite foods and then be more likely to not follow the plan. Make small changes, and stick with them. Once those changes become habit, add a few more changes. · Try some of the following:  ? Make it a goal to eat a fruit or vegetable at every meal and at snacks. This will make it easy to get the recommended amount of fruits and vegetables each day. ? Try yogurt topped with fruit and nuts for a snack or healthy dessert. ? Add lettuce, tomato, cucumber, and onion to sandwiches. ? Combine a ready-made pizza crust with low-fat mozzarella cheese and lots of vegetable toppings. Try using tomatoes, squash, spinach, broccoli, carrots, cauliflower, and onions. ? Have a variety of cut-up vegetables with a low-fat dip as an appetizer instead of chips and dip. ? Sprinkle sunflower seeds or chopped almonds over salads. Or try adding chopped walnuts or almonds to cooked vegetables.   ? Try some vegetarian meals using beans and peas. Add garbanzo or kidney beans to salads. Make burritos and tacos with mashed galvan beans or black beans. Where can you learn more? Go to http://goldie-jemima.info/. Enter W058 in the search box to learn more about \"DASH Diet: Care Instructions. \"  Current as of: December 6, 2017  Content Version: 11.8  © 8136-3975 [employer]. Care instructions adapted under license by [employer] (which disclaims liability or warranty for this information). If you have questions about a medical condition or this instruction, always ask your healthcare professional. [address] any warranty or liability for your use of this information.

## 2018-12-21 PROBLEM — C50.912 INFILTRATING DUCTAL CARCINOMA OF BREAST, LEFT (HCC): Status: ACTIVE | Noted: 2018-11-05

## 2018-12-21 NOTE — PROGRESS NOTES
HISTORY OF PRESENT ILLNESS  Rui Odom is a 70 y.o. female. HPI  ***  Past Medical History:   Diagnosis Date    A-fib [facility])     Chronic kidney disease     Hypercholesterolemia     Hypertension        No past surgical history on file. Social History     Socioeconomic History    Marital status: SINGLE     Spouse name: Not on file    Number of children: Not on file    Years of education: Not on file    Highest education level: Not on file   Social Needs    Financial resource strain: Not on file    Food insecurity - worry: Not on file    Food insecurity - inability: Not on file    Transportation needs - medical: Not on file   [place] needs - non-medical: Not on file   Occupational History    Not on file   Tobacco Use    Smoking status: Former Smoker    Smokeless tobacco: Never Used   Substance and Sexual Activity    Alcohol use: No     Alcohol/week: 0.0 oz    Drug use: No    Sexual activity: No   Other Topics Concern    Not on file   Social History Narrative    Not on file       Current Outpatient Medications on File Prior to Visit   Medication Sig Dispense Refill    ALPRAZolam (XANAX) 0.25 mg tablet Take 1 Tab by mouth two (2) times daily as needed for Anxiety. Max Daily Amount: 0.5 mg. 30 Tab 0    sertraline (ZOLOFT) 50 mg tablet Take 1 Tab by mouth daily. take half tab po every day for 1 week then 1 tab po QD 30 Tab 3    ramipril (ALTACE) 10 mg capsule Take 1 Cap by mouth daily. 30 Cap 6    atorvastatin (LIPITOR) 40 mg tablet TAKE ONE TABLET BY MOUTH DAILY 90 Tab 1    esomeprazole (NEXIUM) 40 mg capsule TAKE ONE CAPSULE BY MOUTH DAILY 90 Cap 1    potassium chloride (K-DUR, KLOR-CON) 20 mEq tablet TAKE ONE TABLET BY MOUTH TWICE A DAY 78 Tab 1    carvedilol (COREG) 12.5 mg tablet Take 1 Tab by mouth two (2) times daily (with meals). 180 Tab 2    amLODIPine (NORVASC) 10 mg tablet Take 1 Tab by mouth daily.  90 Tab 2    varicella-zoster recombinant, PF, (SHINGRIX) 50 mcg/0.5 mL susr injection Please give first dose now and 2nd dose in 2 months 0.5 mL 1    aspirin delayed-release 81 mg tablet TAKE ONE TABLET BY MOUTH DAILY 30 Tab 2    furosemide (LASIX) 40 mg tablet TAKE ONE TABLET BY MOUTH TWICE A DAY ON MONDAY WEDNESDAY, FRIDAY, AND SATURDAY AND TAKE 1 TABLET DAILY FOR TUESDAY THURSDAY AND SUNDAY 180 Tab 4    OTHER Shower chair with back 1 Each 0    ferrous sulfate (IRON) 325 mg (65 mg iron) EC tablet Take 325 mg by mouth three (3) times daily (with meals). No current facility-administered medications on file prior to visit. No Known Allergies    OB History     No data available        ROS  ***  Physical Exam   Cardiovascular: Normal rate, regular rhythm and normal heart sounds. Pulmonary/Chest: Breath sounds normal. Right breast exhibits no inverted nipple, no mass, no nipple discharge, no skin change and no tenderness. Left breast exhibits no inverted nipple, no mass, no nipple discharge, no skin change and no tenderness. Breasts are symmetrical.   Lymphadenopathy:        Right cervical: No superficial cervical, no deep cervical and no posterior cervical adenopathy present. Left cervical: No superficial cervical, no deep cervical and no posterior cervical adenopathy present. She has no axillary adenopathy. Right axillary: No pectoral and no lateral adenopathy present. Left axillary: No pectoral and no lateral adenopathy present. BREAST ULTRASOUND, Preop planning  Indication:preop planning  left Breast {Exam; breast location:[id number]}   Technique: The area was scanned using a high-frequency linear-array near-field transducer  Findings: *** irregular mass with dropout, Biopsy site visible with ultrasound  Impression: Breast cancer  Disposition:  Surgery    ASSESSMENT and PLAN    ICD-10-CM ICD-9-CM    1.  Infiltrating ductal carcinoma of breast, left (HCC) C50.912 174.9       Pt presents for consultation re: new diagnosis of LEFT breast IDC, ER+/IN+/HER2-, clinical stage ***.    ***EXAM***  US visualizes ***. We had a long discussion of options for treatment. A total of *** minutes were spent face-to-face with the patient and *** during this encounter, and over half of that time was spent on counseling and coordination of care. We discussed in depth the pathology results, and the need for surgery following MRI for extent of disease and surgical planning. The goals of treatment are to treat the breast, and to reduce risk of recurrence of this cancer in the breast or elsewhere in the body. SURGICAL TX: Discussed treatment options with risks, complications, benefits, and limitations, including lumpectomy with XRT, and unilateral or bilateral mastectomy with or without reconstruction, both having the same cure rate. During a lumpectomy, I would mobilize breast tissue to minimize cosmetic defect. Also discussed the placement of BioZorb during a lumpectomy, which functions as both a target for any adjuvant XRT, and as a \"scaffolding\" for breast tissue. The [place] Maid[place] will dissolve in approximately 1.5 years, and leave markers behind. Explained the importance of negative margins in either surgery, and that if margins are positive for cancer cells, we will re-excise in a second surgery. Also discussed benefit and technique of SLNBx of 3-4 LNs during initial surgery. RISK REDUCTION: We also covered risk reduction strategies as well as post-surgical therapies including XRT, chemotherapy, and hormonal therapy with estrogen blocker***(if ER+)***. Will send for MammaPrint to help determine whether pt will benefit from chemotherapy. Discussed that estrogen blocking pill will further reduce the risk of recurrence, as pts cancer is ER+***(if ER+)***. Will refer to radiation oncology and medical oncology for further consultation. PT QUESTIONS: We also discussed the pts questions and concerns, including ***. Pt has elected to proceed with ***.     Will order MRI and plan further from there - radiology will call pt to schedule. This plan was reviewed with the patient and patient agrees. All questions were answered.     Written by Ivan Casillas, as dictated by Dr. Maureen Seaman MD.      ***UPDATE PHYSICAL EXAM***  ***UPDATE CLINICAL STAGE***

## 2018-12-28 ENCOUNTER — DOCUMENTATION ONLY (OUTPATIENT)
Dept: SURGERY | Age: 78
End: 2018-12-28

## 2018-12-28 ENCOUNTER — OFFICE VISIT (OUTPATIENT)
Dept: SURGERY | Age: 78
End: 2018-12-28

## 2018-12-28 VITALS
SYSTOLIC BLOOD PRESSURE: 200 MMHG | HEIGHT: 61 IN | WEIGHT: 197.4 LBS | HEART RATE: 78 BPM | BODY MASS INDEX: 37.27 KG/M2 | DIASTOLIC BLOOD PRESSURE: 99 MMHG

## 2018-12-28 DIAGNOSIS — C50.912 INFILTRATING DUCTAL CARCINOMA OF BREAST, LEFT (HCC): Primary | ICD-10-CM

## 2018-12-28 NOTE — PROGRESS NOTES
HISTORY OF PRESENT ILLNESS  Dandre Kim is a 70 y.o. female. HPI  HPI NEW patient referred by Dr. Joseluis Marino for LEFT breast cancer, detected by screening mammogram and verified with biopsy. Denies any palpable lumps, breast pain, nipple drainage or inversion, skin dimpling or asymmetry. OB history:  Menarche 12, LMP 28, # of children 0, age of 1st delivery n/a, Hysterectomy/oophorectomy no/no, Breast bx yes, history of breast feeding no, BCP no, Hormone therapy no     No family history of breast or ovarian cancer     18: LEFT breast biopsies. PATH at LEFT site A: IDC, colloid type, nuclear grade 2, tumor involves entire tissue sample, ER+(100%)/AL+(100%)/HER2-. PATH at LEFT side B: Fragments of atypical papillary neoplasm. RIGHT breast bx PATH: Flat epithelial atypia involving dilated duct.     Imagin18: BILATERAL screening mammogram-BIRADS 0  10/26/18: BILATERAL diagnostic mammogram and U/S  18: BILATERAL U/S guided core biopsies      Past Medical History:   Diagnosis Date    [facility]     Chronic kidney disease     Hypercholesterolemia     Hypertension        History reviewed. No pertinent surgical history.     Social History     Socioeconomic History    Marital status: SINGLE     Spouse name: Not on file    Number of children: Not on file    Years of education: Not on file    Highest education level: Not on file   Social Needs    Financial resource strain: Not on file    Food insecurity - worry: Not on file    Food insecurity - inability: Not on file    Transportation needs - medical: Not on file   [place] needs - non-medical: Not on file   Occupational History    Not on file   Tobacco Use    Smoking status: Former Smoker     Last attempt to quit: 2013     Years since quittin.9    Smokeless tobacco: Never Used   Substance and Sexual Activity    Alcohol use: No     Alcohol/week: 0.0 oz    Drug use: No    Sexual activity: No   Other Topics Concern    Not on file   Social History Narrative    Not on file       Current Outpatient Medications on File Prior to Visit   Medication Sig Dispense Refill    ALPRAZolam (XANAX) 0.25 mg tablet Take 1 Tab by mouth two (2) times daily as needed for Anxiety. Max Daily Amount: 0.5 mg. 30 Tab 0    sertraline (ZOLOFT) 50 mg tablet Take 1 Tab by mouth daily. take half tab po every day for 1 week then 1 tab po QD 30 Tab 3    ramipril (ALTACE) 10 mg capsule Take 1 Cap by mouth daily. 30 Cap 6    atorvastatin (LIPITOR) 40 mg tablet TAKE ONE TABLET BY MOUTH DAILY 90 Tab 1    esomeprazole (NEXIUM) 40 mg capsule TAKE ONE CAPSULE BY MOUTH DAILY 90 Cap 1    potassium chloride (K-DUR, KLOR-CON) 20 mEq tablet TAKE ONE TABLET BY MOUTH TWICE A DAY 78 Tab 1    carvedilol (COREG) 12.5 mg tablet Take 1 Tab by mouth two (2) times daily (with meals). 180 Tab 2    amLODIPine (NORVASC) 10 mg tablet Take 1 Tab by mouth daily. 90 Tab 2    aspirin delayed-release 81 mg tablet TAKE ONE TABLET BY MOUTH DAILY 30 Tab 2    furosemide (LASIX) 40 mg tablet TAKE ONE TABLET BY MOUTH TWICE A DAY ON MONDAY WEDNESDAY, FRIDAY, AND SATURDAY AND TAKE 1 TABLET DAILY FOR TUESDAY THURSDAY AND SUNDAY 180 Tab 4    ferrous sulfate (IRON) 325 mg (65 mg iron) EC tablet Take 325 mg by mouth three (3) times daily (with meals).  varicella-zoster recombinant, PF, (SHINGRIX) 50 mcg/0.5 mL susr injection Please give first dose now and 2nd dose in 2 months 0.5 mL 1    OTHER Shower chair with back 1 Each 0     No current facility-administered medications on file prior to visit. No Known Allergies    OB History     Obstetric Comments    Menarche 10, LMP [id number], # of children 0, age of 1st delivery n/a, Hysterectomy/oophorectomy no/no, Breast bx yes, history of breast feeding no, BCP no, Hormone therapy no        ROS  Constitutional: Negative. HENT: Negative. Eyes: Negative. Respiratory: Negative. Cardiovascular: Negative.     Gastrointestinal: Negative. Genitourinary: Negative. Musculoskeletal: Negative. Skin: Negative. Neurological: Negative. Endo/Heme/Allergies: Negative. Psychiatric/Behavioral: Negative. Physical Exam   Cardiovascular: Normal rate, regular rhythm and normal heart sounds. Pulmonary/Chest: Breath sounds normal. Right breast exhibits no inverted nipple, no mass, no nipple discharge, no skin change and no tenderness. Left breast exhibits no inverted nipple, no mass, no nipple discharge, no skin change and no tenderness. Breasts are symmetrical.    Large masses at 8 and 2&3 o'clock positions. Lymphadenopathy:        Right cervical: No superficial cervical, no deep cervical and no posterior cervical adenopathy present. Left cervical: No superficial cervical, no deep cervical and no posterior cervical adenopathy present. She has no axillary adenopathy. Right axillary: No pectoral and no lateral adenopathy present. Left axillary: No pectoral and no lateral adenopathy present. BREAST ULTRASOUND Preop Planning  Indication: Left breast cancer -- detected by screening mammogram, verified with biopsy  Technique: The area was scanned using a high-frequency linear-array near-field transducer  Findings: mass, irregular, hypoechoic, dropout on inner and outer portion of breast.  Impression: left breast invasive ductal carcinoma  Disposition: 2 lumpectomies followed by radiation and oral medications or LEFT mastectomy with no radiation. Patient elects to wait treatment until she follows up with Dr. Shayy Majano. ASSESSMENT and PLAN    ICD-10-CM ICD-9-CM    1. Infiltrating ductal carcinoma of breast, left ([facility]) C50.912 174.9       Pt presents for consultation re: new diagnosis of LEFT breast IDC, ER+/LA+/HER2-.    US visualizes 2 large masses in LEFT breast.    We had a long discussion of options for treatment.  A total of 45 minutes were spent face-to-face during this encounter, and over half of that time was spent on counseling and coordination of care. We discussed in depth the pathology results, and the need for surgery. The goals of treatment are to treat the breast, and to reduce risk of recurrence of this cancer in the breast or elsewhere in the body. SURGICAL TX: Discussed treatment options with risks, complications, benefits, and limitations, including lumpectomy with XRT, and unilateral mastectomy with or without reconstruction, both having the same cure rate. During a lumpectomy, I would mobilize breast tissue to minimize cosmetic defect. Also discussed the placement of BioZorb during a lumpectomy, which functions as both a target for any adjuvant XRT, and as a \"scaffolding\" for breast tissue. The [place]us Cov[place] will dissolve in approximately 1.5 years, and leave markers behind. Explained the importance of negative margins in either surgery, and that if margins are positive for cancer cells, we will re-excise in a second surgery. Also discussed benefit and technique of SLNBx of 3-4 LNs during initial surgery. She has at least two tumors and really would be better off with a mastectomy. She was not quite prepared for that possibility and wanted to discuss with Dr. Angel Thurston but a lumpectomy would be a large undertaking with large cosmetic defect and possibility of not getting a clear margin. RISK REDUCTION: We also covered risk reduction strategies as well as post-surgical therapies including XRT, chemotherapy, and hormonal therapy with estrogen blocker. Will send for MammaPrint to help determine whether pt will benefit from chemotherapy. Discussed that estrogen blocking pill will further reduce the risk of recurrence. PT QUESTIONS: We also discussed the pts questions and concerns, including the likelihood of success if we proceed with lumpectomy. Pt is unsure if she would like to proceed with lumpectomy or mastectomy. She will follow up with Dr. Angel Thurston and discuss her options.      This plan was reviewed with the patient and patient agrees. All questions were answered.     Written by Yashira Mott, as dictated by Dr. Cody Morris MD.

## 2018-12-28 NOTE — LETTER
2018 9:10 AM 
 
Patient: Marco Antonio Delaney YOB: 1940 Date of Visit: 2018 Dear Dr. Gilford Ables: 
 
Thank you for referring Ms. Ivania Linn to me for evaluation/treatment. Below are the relevant portions of my assessment and plan of care. HISTORY OF PRESENT ILLNESS Marco Antonio Delaney is a 70 y.o. female. HPI 
HPI NEW patient referred by Dr. Vielka Huertas for LEFT breast cancer, detected by screening mammogram and verified with biopsy. Denies any palpable lumps, breast pain, nipple drainage or inversion, skin dimpling or asymmetry. OB history: 
Menarche 12, LMP 28, # of children 0, age of 1st delivery n/a, Hysterectomy/oophorectomy no/no, Breast bx yes, history of breast feeding no, BCP no, Hormone therapy no 
  
No family history of breast or ovarian cancer 
  
18: LEFT breast biopsies. PATH at LEFT site A: IDC, colloid type, nuclear grade 2, tumor involves entire tissue sample, ER+(100%)/[place]+(100%)/HER2-. PATH at LEFT side B: Fragments of atypical papillary neoplasm. RIGHT breast bx PATH: Flat epithelial atypia involving dilated duct. 
  
Imagin18: BILATERAL screening mammogram-BIRADS 0 
10/26/18: BILATERAL diagnostic mammogram and U/S 
18: BILATERAL U/S guided core biopsies Past Medical History:  
Diagnosis Date  [facility]  Chronic kidney disease  Hypercholesterolemia  Hypertension History reviewed. No pertinent surgical history. Social History Socioeconomic History  Marital status: SINGLE Spouse name: Not on file  Number of children: Not on file  Years of education: Not on file  Highest education level: Not on file Social Needs  Financial resource strain: Not on file  Food insecurity - worry: Not on file  Food insecurity - inability: Not on file  Transportation needs - medical: Not on file  Transportation needs - non-medical: Not on file Occupational History  Not on file Tobacco Use  
  Smoking status: Former Smoker Last attempt to quit: 2013 Years since quittin.9  Smokeless tobacco: Never Used Substance and Sexual Activity  Alcohol use: No  
  Alcohol/week: 0.0 oz  Drug use: No  
 Sexual activity: No  
Other Topics Concern  Not on file Social History Narrative  Not on file Current Outpatient Medications on File Prior to Visit Medication Sig Dispense Refill  ALPRAZolam (XANAX) 0.25 mg tablet Take 1 Tab by mouth two (2) times daily as needed for Anxiety. Max Daily Amount: 0.5 mg. 30 Tab 0  
 sertraline (ZOLOFT) 50 mg tablet Take 1 Tab by mouth daily. take half tab po every day for 1 week then 1 tab po QD 30 Tab 3  
 ramipril (ALTACE) 10 mg capsule Take 1 Cap by mouth daily. 30 Cap 6  
 atorvastatin (LIPITOR) 40 mg tablet TAKE ONE TABLET BY MOUTH DAILY 90 Tab 1  
 esomeprazole (NEXIUM) 40 mg capsule TAKE ONE CAPSULE BY MOUTH DAILY 90 Cap 1  potassium chloride (K-DUR, KLOR-CON) 20 mEq tablet TAKE ONE TABLET BY MOUTH TWICE A DAY 78 Tab 1  carvedilol (COREG) 12.5 mg tablet Take 1 Tab by mouth two (2) times daily (with meals). 180 Tab 2  
 amLODIPine (NORVASC) 10 mg tablet Take 1 Tab by mouth daily. 90 Tab 2  
 aspirin delayed-release 81 mg tablet TAKE ONE TABLET BY MOUTH DAILY 30 Tab 2  
 furosemide (LASIX) 40 mg tablet TAKE ONE TABLET BY MOUTH TWICE A DAY ON , FRIDAY, AND SATURDAY AND TAKE 1 TABLET DAILY FOR  AND  180 Tab 4  
 ferrous sulfate (IRON) 325 mg (65 mg iron) EC tablet Take 325 mg by mouth three (3) times daily (with meals).  varicella-zoster recombinant, PF, (SHINGRIX) 50 mcg/0.5 mL susr injection Please give first dose now and 2nd dose in 2 months 0.5 mL 1  
 OTHER Shower chair with back 1 Each 0 No current facility-administered medications on file prior to visit. No Known Allergies OB History Obstetric Comments Menarche 10, LMP 1987, # of children 0, age of 1st delivery n/a, Hysterectomy/oophorectomy no/no, Breast bx yes, history of breast feeding no, BCP no, Hormone therapy no ROS Constitutional: Negative. HENT: Negative. Eyes: Negative. Respiratory: Negative. Cardiovascular: Negative. Gastrointestinal: Negative. Genitourinary: Negative. Musculoskeletal: Negative. Skin: Negative. Neurological: Negative. Endo/Heme/Allergies: Negative. Psychiatric/Behavioral: Negative. Physical Exam  
Cardiovascular: Normal rate, regular rhythm and normal heart sounds. Pulmonary/Chest: Breath sounds normal. Right breast exhibits no inverted nipple, no mass, no nipple discharge, no skin change and no tenderness. Left breast exhibits no inverted nipple, no mass, no nipple discharge, no skin change and no tenderness. Breasts are symmetrical.  
 Large masses at 8 and 2&3 o'clock positions. Lymphadenopathy:  
     Right cervical: No superficial cervical, no deep cervical and no posterior cervical adenopathy present. Left cervical: No superficial cervical, no deep cervical and no posterior cervical adenopathy present. She has no axillary adenopathy. Right axillary: No pectoral and no lateral adenopathy present. Left axillary: No pectoral and no lateral adenopathy present. BREAST ULTRASOUND Preop Planning Indication: Left breast cancer -- detected by screening mammogram, verified with biopsy Technique: The area was scanned using a high-frequency linear-array near-field transducer Findings: mass, irregular, hypoechoic, dropout on inner and outer portion of breast. 
Impression: left breast invasive ductal carcinoma Disposition: 2 lumpectomies followed by radiation and oral medications or LEFT mastectomy with no radiation. Patient elects to wait treatment until she follows up with Dr. Hussein Lynch. ASSESSMENT and PLAN 
  ICD-10-CM ICD-9-CM 1. Infiltrating ductal carcinoma of breast, left (HCC) C50.912 174.9 Pt presents for consultation re: new diagnosis of LEFT breast IDC, ER+/RI+/HER2-. 
 
US visualizes 2 large masses in LEFT breast. 
 
We had a long discussion of options for treatment. A total of 45 minutes were spent face-to-face during this encounter, and over half of that time was spent on counseling and coordination of care. We discussed in depth the pathology results, and the need for surgery. The goals of treatment are to treat the breast, and to reduce risk of recurrence of this cancer in the breast or elsewhere in the body. SURGICAL TX: Discussed treatment options with risks, complications, benefits, and limitations, including lumpectomy with XRT, and unilateral mastectomy with or without reconstruction, both having the same cure rate. During a lumpectomy, I would mobilize breast tissue to minimize cosmetic defect. Also discussed the placement of BioZorb during a lumpectomy, which functions as both a target for any adjuvant XRT, and as a \"scaffolding\" for breast tissue. The Joycie Motts will dissolve in approximately 1.5 years, and leave markers behind. Explained the importance of negative margins in either surgery, and that if margins are positive for cancer cells, we will re-excise in a second surgery. Also discussed benefit and technique of SLNBx of 3-4 LNs during initial surgery. She has at least two tumors and really would be better off with a mastectomy. She was not quite prepared for that possibility and wanted to discuss with Dr. Bishop Murrell but a lumpectomy would be a large undertaking with large cosmetic defect and possibility of not getting a clear margin. RISK REDUCTION: We also covered risk reduction strategies as well as post-surgical therapies including XRT, chemotherapy, and hormonal therapy with estrogen blocker.  Will send for MammaPrint to help determine whether pt will benefit from chemotherapy. Discussed that estrogen blocking pill will further reduce the risk of recurrence. PT QUESTIONS: We also discussed the pts questions and concerns, including the likelihood of success if we proceed with lumpectomy. Pt is unsure if she would like to proceed with lumpectomy or mastectomy. She will follow up with Dr. Shelli Perez and discuss her options. This plan was reviewed with the patient and patient agrees. All questions were answered. Written by Tara Ortiz, as dictated by Dr. Alma Rosa Davidson MD. 
 
 
If you have questions, please do not hesitate to call me. I look forward to following Ms. Stefanie William along with you.  
 
 
 
Sincerely, 
 
 
Trinidad Laguna MD

## 2018-12-28 NOTE — PROGRESS NOTES
HISTORY OF PRESENT ILLNESS Amrit Marin is a 70 y.o. female. HPI NEW patient referred by Dr. Alaina James for LEFT breast cancer, detected by screening mammogram and verified with biopsy. Denies any palpable lumps, breast pain, nipple drainage or inversion, skin dimpling or asymmetry. OB history: 
Menarche 12, LMP 28, # of children 0, age of 1st delivery n/a, Hysterectomy/oophorectomy no/no, Breast bx yes, history of breast feeding no, BCP no, Hormone therapy no No family history of breast or ovarian cancer 18: LEFT breast biopsies. PATH at LEFT site A: IDC, colloid type, nuclear grade 2, tumor involves entire tissue sample, ER+(100%)/[place]+(100%)/HER2-. PATH at LEFT side B: Fragments of atypical papillary neoplasm. RIGHT breast bx PATH: Flat epithelial atypia involving dilated duct. Imagin18: BILATERAL screening mammogram-BIRADS 0 
10/26/18: BILATERAL diagnostic mammogram and U/S 
18: BILATERAL U/S guided core biopsies Review of Systems Constitutional: Negative. HENT: Negative. Eyes: Negative. Respiratory: Negative. Cardiovascular: Negative. Gastrointestinal: Negative. Genitourinary: Negative. Musculoskeletal: Negative. Skin: Negative. Neurological: Negative. Endo/Heme/Allergies: Negative. Psychiatric/Behavioral: Negative. Physical Exam 
 
ASSESSMENT and PLAN 
{ASSESSMENT/PLAN:02832}

## 2018-12-31 NOTE — COMMUNICATION BODY
HISTORY OF PRESENT ILLNESS  Michial Leyden is a 70 y.o. female. HPI  HPI NEW patient referred by Dr. Sarah Tse for LEFT breast cancer, detected by screening mammogram and verified with biopsy. Denies any palpable lumps, breast pain, nipple drainage or inversion, skin dimpling or asymmetry. OB history:  Menarche 12, LMP 28, # of children 0, age of 1st delivery n/a, Hysterectomy/oophorectomy no/no, Breast bx yes, history of breast feeding no, BCP no, Hormone therapy no     No family history of breast or ovarian cancer     18: LEFT breast biopsies. PATH at LEFT site A: IDC, colloid type, nuclear grade 2, tumor involves entire tissue sample, ER+(100%)/CA+(100%)/HER2-. PATH at LEFT side B: Fragments of atypical papillary neoplasm. RIGHT breast bx PATH: Flat epithelial atypia involving dilated duct.     Imagin18: BILATERAL screening mammogram-BIRADS 0  10/26/18: BILATERAL diagnostic mammogram and U/S  18: BILATERAL U/S guided core biopsies      Past Medical History:   Diagnosis Date    A-fib ([facility] [address]     Chronic kidney disease     Hypercholesterolemia     Hypertension        History reviewed. No pertinent surgical history.     Social History     Socioeconomic History    Marital status: SINGLE     Spouse name: Not on file    Number of children: Not on file    Years of education: Not on file    Highest education level: Not on file   Social Needs    Financial resource strain: Not on file    Food insecurity - worry: Not on file    Food insecurity - inability: Not on file    Transportation needs - medical: Not on file   [employer] needs - non-medical: Not on file   Occupational History    Not on file   Tobacco Use    Smoking status: Former Smoker     Last attempt to quit: 2013     Years since quittin.9    Smokeless tobacco: Never Used   Substance and Sexual Activity    Alcohol use: No     Alcohol/week: 0.0 oz    Drug use: No    Sexual activity: No   Other Topics Concern    Not on file   Social History Narrative    Not on file       Current Outpatient Medications on File Prior to Visit   Medication Sig Dispense Refill    ALPRAZolam (XANAX) 0.25 mg tablet Take 1 Tab by mouth two (2) times daily as needed for Anxiety. Max Daily Amount: 0.5 mg. 30 Tab 0    sertraline (ZOLOFT) 50 mg tablet Take 1 Tab by mouth daily. take half tab po every day for 1 week then 1 tab po QD 30 Tab 3    ramipril (ALTACE) 10 mg capsule Take 1 Cap by mouth daily. 30 Cap 6    atorvastatin (LIPITOR) 40 mg tablet TAKE ONE TABLET BY MOUTH DAILY 90 Tab 1    esomeprazole (NEXIUM) 40 mg capsule TAKE ONE CAPSULE BY MOUTH DAILY 90 Cap 1    potassium chloride (K-DUR, KLOR-CON) 20 mEq tablet TAKE ONE TABLET BY MOUTH TWICE A DAY 78 Tab 1    carvedilol (COREG) 12.5 mg tablet Take 1 Tab by mouth two (2) times daily (with meals). 180 Tab 2    amLODIPine (NORVASC) 10 mg tablet Take 1 Tab by mouth daily. 90 Tab 2    aspirin delayed-release 81 mg tablet TAKE ONE TABLET BY MOUTH DAILY 30 Tab 2    furosemide (LASIX) 40 mg tablet TAKE ONE TABLET BY MOUTH TWICE A DAY ON MONDAY WEDNESDAY, FRIDAY, AND SATURDAY AND TAKE 1 TABLET DAILY FOR TUESDAY THURSDAY AND SUNDAY 180 Tab 4    ferrous sulfate (IRON) 325 mg (65 mg iron) EC tablet Take 325 mg by mouth three (3) times daily (with meals).  varicella-zoster recombinant, PF, (SHINGRIX) 50 mcg/0.5 mL susr injection Please give first dose now and 2nd dose in 2 months 0.5 mL 1    OTHER Shower chair with back 1 Each 0     No current facility-administered medications on file prior to visit. No Known Allergies    OB History     Obstetric Comments    Menarche 10, LMP 28, # of children 0, age of 1st delivery n/a, Hysterectomy/oophorectomy no/no, Breast bx yes, history of breast feeding no, BCP no, Hormone therapy no        ROS  Constitutional: Negative. HENT: Negative. Eyes: Negative. Respiratory: Negative. Cardiovascular: Negative.     Gastrointestinal: Negative. Genitourinary: Negative. Musculoskeletal: Negative. Skin: Negative. Neurological: Negative. Endo/Heme/Allergies: Negative. Psychiatric/Behavioral: Negative. Physical Exam   Cardiovascular: Normal rate, regular rhythm and normal heart sounds. Pulmonary/Chest: Breath sounds normal. Right breast exhibits no inverted nipple, no mass, no nipple discharge, no skin change and no tenderness. Left breast exhibits no inverted nipple, no mass, no nipple discharge, no skin change and no tenderness. Breasts are symmetrical.    Large masses at 8 and 2&3 o'clock positions. Lymphadenopathy:        Right cervical: No superficial cervical, no deep cervical and no posterior cervical adenopathy present. Left cervical: No superficial cervical, no deep cervical and no posterior cervical adenopathy present. She has no axillary adenopathy. Right axillary: No pectoral and no lateral adenopathy present. Left axillary: No pectoral and no lateral adenopathy present. BREAST ULTRASOUND Preop Planning  Indication: Left breast cancer -- detected by screening mammogram, verified with biopsy  Technique: The area was scanned using a high-frequency linear-array near-field transducer  Findings: mass, irregular, hypoechoic, dropout on inner and outer portion of breast.  Impression: left breast invasive ductal carcinoma  Disposition: 2 lumpectomies followed by radiation and oral medications or LEFT mastectomy with no radiation. Patient elects to wait treatment until she follows up with Dr. Jauregui Brochure. ASSESSMENT and PLAN    ICD-10-CM ICD-9-CM    1. Infiltrating ductal carcinoma of breast, left (HCC) C50.912 174.9       Pt presents for consultation re: new diagnosis of LEFT breast IDC, ER+/MT+/HER2-.    US visualizes 2 large masses in LEFT breast.    We had a long discussion of options for treatment.  A total of 45 minutes were spent face-to-face during this encounter, and over half of that time was spent on counseling and coordination of care. We discussed in depth the pathology results, and the need for surgery. The goals of treatment are to treat the breast, and to reduce risk of recurrence of this cancer in the breast or elsewhere in the body. SURGICAL TX: Discussed treatment options with risks, complications, benefits, and limitations, including lumpectomy with XRT, and unilateral mastectomy with or without reconstruction, both having the same cure rate. During a lumpectomy, I would mobilize breast tissue to minimize cosmetic defect. Also discussed the placement of BioZorb during a lumpectomy, which functions as both a target for any adjuvant XRT, and as a \"scaffolding\" for breast tissue. The [place] will dissolve in approximately 1.5 years, and leave markers behind. Explained the importance of negative margins in either surgery, and that if margins are positive for cancer cells, we will re-excise in a second surgery. Also discussed benefit and technique of SLNBx of 3-4 LNs during initial surgery. She has at least two tumors and really would be better off with a mastectomy. She was not quite prepared for that possibility and wanted to discuss with Dr. Nalini Mcclure but a lumpectomy would be a large undertaking with large cosmetic defect and possibility of not getting a clear margin. RISK REDUCTION: We also covered risk reduction strategies as well as post-surgical therapies including XRT, chemotherapy, and hormonal therapy with estrogen blocker. Will send for MammaPrint to help determine whether pt will benefit from chemotherapy. Discussed that estrogen blocking pill will further reduce the risk of recurrence. PT QUESTIONS: We also discussed the pts questions and concerns, including the likelihood of success if we proceed with lumpectomy. Pt is unsure if she would like to proceed with lumpectomy or mastectomy. She will follow up with Dr. Nalini Mcclure and discuss her options.      This plan was reviewed with the patient and patient agrees. All questions were answered.     Written by Edna Reyes, as dictated by Dr. Radha Buck MD.

## 2019-02-27 ENCOUNTER — DOCUMENTATION ONLY (OUTPATIENT)
Dept: INTERNAL MEDICINE CLINIC | Age: 79
End: 2019-02-27

## 2019-03-13 ENCOUNTER — OFFICE VISIT (OUTPATIENT)
Dept: CARDIOLOGY CLINIC | Age: 79
End: 2019-03-13

## 2019-03-13 VITALS
DIASTOLIC BLOOD PRESSURE: 82 MMHG | SYSTOLIC BLOOD PRESSURE: 138 MMHG | RESPIRATION RATE: 14 BRPM | HEART RATE: 79 BPM | WEIGHT: 201.8 LBS | OXYGEN SATURATION: 96 % | BODY MASS INDEX: 38.1 KG/M2 | HEIGHT: 61 IN

## 2019-03-13 DIAGNOSIS — I42.9 CARDIOMYOPATHY, UNSPECIFIED TYPE (HCC): Primary | ICD-10-CM

## 2019-03-13 DIAGNOSIS — I10 ESSENTIAL HYPERTENSION: ICD-10-CM

## 2019-03-13 DIAGNOSIS — M79.89 LEFT LEG SWELLING: ICD-10-CM
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
## 2019-03-13 NOTE — PROGRESS NOTES
Visit Vitals  /82 (BP 1 Location: Left arm, BP Patient Position: Sitting)   Pulse 79   Resp 14   Ht 5' 1\" (1.549 m)   Wt 201 lb 12.8 oz (91.5 kg)   SpO2 96%   BMI 38.13 kg/m²

## 2019-03-14 ENCOUNTER — OFFICE VISIT (OUTPATIENT)
Dept: INTERNAL MEDICINE CLINIC | Age: 79
End: 2019-03-14

## 2019-03-14 VITALS
WEIGHT: 199.4 LBS | BODY MASS INDEX: 37.65 KG/M2 | HEIGHT: 61 IN | RESPIRATION RATE: 18 BRPM | OXYGEN SATURATION: 96 % | SYSTOLIC BLOOD PRESSURE: 165 MMHG | HEART RATE: 72 BPM | DIASTOLIC BLOOD PRESSURE: 90 MMHG | TEMPERATURE: 97.2 F

## 2019-03-14 DIAGNOSIS — E78.2 MIXED HYPERLIPIDEMIA: ICD-10-CM

## 2019-03-14 DIAGNOSIS — I48.0 PAROXYSMAL A-FIB (HCC): ICD-10-CM

## 2019-03-14 DIAGNOSIS — F41.0 ANXIETY ATTACK: ICD-10-CM

## 2019-03-14 DIAGNOSIS — I10 ESSENTIAL HYPERTENSION: ICD-10-CM

## 2019-03-14 DIAGNOSIS — C50.912 INFILTRATING DUCTAL CARCINOMA OF BREAST, LEFT (HCC): Primary | ICD-10-CM

## 2019-03-14 DIAGNOSIS — F41.9 ANXIETY DISORDER, UNSPECIFIED TYPE: ICD-10-CM

## 2019-03-14 DIAGNOSIS — E55.9 VITAMIN D DEFICIENCY: ICD-10-CM

## 2019-03-14 RX ORDER — SERTRALINE HYDROCHLORIDE 50 MG/1
50 TABLET, FILM COATED ORAL DAILY
Qty: 30 TAB | Refills: 5 | Status: SHIPPED | OUTPATIENT
Start: 2019-03-14 | End: 2019-12-05 | Stop reason: SDUPTHER

## 2019-03-14 RX ORDER — ALPRAZOLAM 0.25 MG/1
0.25 TABLET ORAL
Qty: 30 TAB | Refills: 1 | Status: SHIPPED | OUTPATIENT
Start: 2019-03-14 | End: 2019-09-13 | Stop reason: ALTCHOICE

## 2019-03-14 NOTE — PROGRESS NOTES
Cady Valle is a 70 y.o. female    Chief Complaint   Patient presents with    Hypertension    Cholesterol Problem    GERD     1. Have you been to the ER, urgent care clinic since your last visit? Hospitalized since your last visit? No     2. Have you seen or consulted any other health care providers outside of the [address] [place] [address] since your last visit? Include any pap smears or colon screening.   No     Visit Vitals  BP (!) 202/102   Pulse 72   Temp 97.2 °F (36.2 °C) (Oral)   Resp 18   Ht 5' 1\" (1.549 m)   Wt 199 lb 6.4 oz (90.4 kg)   SpO2 96%   BMI 37.68 kg/m²

## 2019-03-14 NOTE — PROGRESS NOTES
HISTORY OF PRESENT ILLNESS  Cady Valle is a 70 y.o. female here for follow up. Noticed to have elevated blood pressure. She is very anxious. She thinks Zoloft is helping her a lot. She has been taking Xanax as needed. Needed refill. She is diagnosed to have breast cancer which is making her more anxious. She is unable to do surgery. Since she has a problem with her housing. Soon she will filled out her housing and then decide on the surgery date. Has seen cardiologist recently. Blood pressure was okay yesterday. Noticed to have elevated blood pressure, she states she has been taking 3 blood pressure medications regularly. She is anxious. Has biventricular failure and hypertension, repeat echocardiogram looks better. Watching salt. Has lost weight. No chest pain palpitation or shortness of breath  Labs reviewed, stable. Hypertension    Pertinent negatives include no chest pain, no palpitations and no blurred vision. Follow-up   Pertinent negatives include no chest pain. Cholesterol Problem   Pertinent negatives include no chest pain. GERD   Pertinent negatives include no chest pain. Review of Systems   Constitutional: Negative. Negative for chills and fever. HENT: Negative. Eyes: Negative. Negative for blurred vision and double vision. Respiratory: Negative. Cardiovascular: Negative. Negative for chest pain and palpitations. Gastrointestinal: Negative. Genitourinary: Negative. Musculoskeletal: Negative. Skin: Negative. Negative for itching and rash. Neurological: Negative. Psychiatric/Behavioral: The patient is nervous/anxious. Physical Exam   Constitutional: She appears well-developed and well-nourished. No distress. Neck: Normal range of motion. Neck supple. No JVD present. No thyromegaly present. Cardiovascular: Normal rate, regular rhythm, normal heart sounds and intact distal pulses.    Pulmonary/Chest: Effort normal and breath sounds normal. No respiratory distress. She has no wheezes. Musculoskeletal: She exhibits no edema or tenderness. Psychiatric: She has a normal mood and affect. Her behavior is normal.   Slightly anxious. Not depressed. ASSESSMENT and PLAN  Diagnoses and all orders for this visit:    1. Infiltrating ductal carcinoma of breast, left [facility])    Patient had already seen Dr. Heather Moore. He advised her to have either lumpectomy or mastectomy but preferred to have mastectomy since patient is to lesion on left breast.  The patient still waiting since she has issues with her housing. Advised her to schedule appointment with Dr. Heather Moore to discuss pros and cons of both surgery and decide on one. She should see Dr. Renate Brady to get cardiac clearance before going for surgery. Will refer,  -     REFERRAL TO BREAST SURGERY  -     CBC WITH AUTOMATED DIFF  -     METABOLIC PANEL, COMPREHENSIVE    2. Essential hypertension    Blood pressure is elevated today since she is stressed out. Taking amlodipine and Coreg and ramipril. Will continue same. Will check,  -     CBC WITH AUTOMATED DIFF  -     METABOLIC PANEL, COMPREHENSIVE    3. Mixed hyperlipidemia    On statin. Will check,  -     METABOLIC PANEL, COMPREHENSIVE  -     LIPID PANEL    4. Paroxysmal A-fib (HCC)  Not a good candidate for blood thinner. Taking Coreg. Rate and rhythm controlled right now. 5. Vitamin D deficiency  -     VITAMIN D, 25 HYDROXY    6. Anxiety disorder, unspecified type    She does not want to go up with the Zoloft dosage. Will continue same dosage. Will give,    -     ALPRAZolam (XANAX) 0.25 mg tablet; Take 1 Tab by mouth two (2) times daily as needed for Anxiety. Max Daily Amount: 0.5 mg.  #30, with 1 refill.  -     sertraline (ZOLOFT) 50 mg tablet; Take 1 Tab by mouth daily. 1 tab po QD    7. Anxiety attack  -     ALPRAZolam (XANAX) 0.25 mg tablet; Take 1 Tab by mouth two (2) times daily as needed for Anxiety.  Max Daily Amount: 0.5 mg.        Discussed expected course/resolution/complications of diagnosis in detail with patient. Medication risks/benefits/costs/interactions/alternatives discussed with patient. Pt was given an after visit summary which includes diagnoses, current medications & vitals. Pt expressed understanding with the diagnosis and plan.

## 2019-03-14 NOTE — PATIENT INSTRUCTIONS
DASH Diet: Care Instructions  Your Care Instructions    The DASH diet is an eating plan that can help lower your blood pressure. DASH stands for Dietary Approaches to Stop Hypertension. Hypertension is high blood pressure. The DASH diet focuses on eating foods that are high in calcium, potassium, and magnesium. These nutrients can lower blood pressure. The foods that are highest in these nutrients are fruits, vegetables, low-fat dairy products, nuts, seeds, and legumes. But taking calcium, potassium, and magnesium supplements instead of eating foods that are high in those nutrients does not have the same effect. The DASH diet also includes whole grains, fish, and poultry. The DASH diet is one of several lifestyle changes your doctor may recommend to lower your high blood pressure. Your doctor may also want you to decrease the amount of sodium in your diet. Lowering sodium while following the DASH diet can lower blood pressure even further than just the DASH diet alone. Follow-up care is a key part of your treatment and safety. Be sure to make and go to all appointments, and call your doctor if you are having problems. It's also a good idea to know your test results and keep a list of the medicines you take. How can you care for yourself at home? Following the DASH diet  · Eat 4 to 5 servings of fruit each day. A serving is 1 medium-sized piece of fruit, ½ cup chopped or canned fruit, 1/4 cup dried fruit, or 4 ounces (½ cup) of fruit juice. Choose fruit more often than fruit juice. · Eat 4 to 5 servings of vegetables each day. A serving is 1 cup of lettuce or raw leafy vegetables, ½ cup of chopped or cooked vegetables, or 4 ounces (½ cup) of vegetable juice. Choose vegetables more often than vegetable juice. · Get 2 to 3 servings of low-fat and fat-free dairy each day. A serving is 8 ounces of milk, 1 cup of yogurt, or 1 ½ ounces of cheese. · Eat 6 to 8 servings of grains each day.  A serving is 1 slice of bread, 1 ounce of dry cereal, or ½ cup of cooked rice, pasta, or cooked cereal. Try to choose whole-grain products as much as possible. · Limit lean meat, poultry, and fish to 2 servings each day. A serving is 3 ounces, about the size of a deck of cards. · Eat 4 to 5 servings of nuts, seeds, and legumes (cooked dried beans, lentils, and split peas) each week. A serving is 1/3 cup of nuts, 2 tablespoons of seeds, or ½ cup of cooked beans or peas. · Limit fats and oils to 2 to 3 servings each day. A serving is 1 teaspoon of vegetable oil or 2 tablespoons of salad dressing. · Limit sweets and added sugars to 5 servings or less a week. A serving is 1 tablespoon jelly or jam, ½ cup sorbet, or 1 cup of lemonade. · Eat less than 2,300 milligrams (mg) of sodium a day. If you limit your sodium to 1,500 mg a day, you can lower your blood pressure even more. Tips for success  · Start small. Do not try to make dramatic changes to your diet all at once. You might feel that you are missing out on your favorite foods and then be more likely to not follow the plan. Make small changes, and stick with them. Once those changes become habit, add a few more changes. · Try some of the following:  ? Make it a goal to eat a fruit or vegetable at every meal and at snacks. This will make it easy to get the recommended amount of fruits and vegetables each day. ? Try yogurt topped with fruit and nuts for a snack or healthy dessert. ? Add lettuce, tomato, cucumber, and onion to sandwiches. ? Combine a ready-made pizza crust with low-fat mozzarella cheese and lots of vegetable toppings. Try using tomatoes, squash, spinach, broccoli, carrots, cauliflower, and onions. ? Have a variety of cut-up vegetables with a low-fat dip as an appetizer instead of chips and dip. ? Sprinkle sunflower seeds or chopped almonds over salads. Or try adding chopped walnuts or almonds to cooked vegetables.   ? Try some vegetarian meals using beans and peas. Add garbanzo or kidney beans to salads. Make burritos and tacos with mashed galvan beans or black beans. Where can you learn more? Go to http://goldie-jemima.info/. Enter O405 in the search box to learn more about \"DASH Diet: Care Instructions. \"  Current as of: July 22, 2018  Content Version: 11.9  © 1304-0846 [employer]. Care instructions adapted under license by [employer] (which disclaims liability or warranty for this information). If you have questions about a medical condition or this instruction, always ask your healthcare professional. [address] any warranty or liability for your use of this information.

## 2019-03-15 LAB
25(OH)D3+25(OH)D2 SERPL-MCNC: 12.2 NG/ML (ref 30–100)
ALBUMIN SERPL-MCNC: 3.6 G/DL (ref 3.5–4.8)
ALBUMIN/GLOB SERPL: 0.9 {RATIO} (ref 1.2–2.2)
ALP SERPL-CCNC: 89 IU/L (ref 39–117)
ALT SERPL-CCNC: 12 IU/L (ref 0–32)
AST SERPL-CCNC: 26 IU/L (ref 0–40)
BASOPHILS # BLD AUTO: 0.1 X10E3/UL (ref 0–0.2)
BASOPHILS NFR BLD AUTO: 1 %
BILIRUB SERPL-MCNC: <0.2 MG/DL (ref 0–1.2)
BUN SERPL-MCNC: 14 MG/DL (ref 8–27)
BUN/CREAT SERPL: 13 (ref 12–28)
CALCIUM SERPL-MCNC: 9.5 MG/DL (ref 8.7–10.3)
CHLORIDE SERPL-SCNC: 103 MMOL/L (ref 96–106)
CHOLEST SERPL-MCNC: 226 MG/DL (ref 100–199)
CO2 SERPL-SCNC: 28 MMOL/L (ref 20–29)
CREAT SERPL-MCNC: 1.06 MG/DL (ref 0.57–1)
EOSINOPHIL # BLD AUTO: 0.3 X10E3/UL (ref 0–0.4)
EOSINOPHIL NFR BLD AUTO: 4 %
ERYTHROCYTE [DISTWIDTH] IN BLOOD BY AUTOMATED COUNT: 14.8 % (ref 12.3–15.4)
GLOBULIN SER CALC-MCNC: 4 G/DL (ref 1.5–4.5)
GLUCOSE SERPL-MCNC: 101 MG/DL (ref 65–99)
HCT VFR BLD AUTO: 38.1 % (ref 34–46.6)
HDLC SERPL-MCNC: 48 MG/DL
HGB BLD-MCNC: 11.9 G/DL (ref 11.1–15.9)
IMM GRANULOCYTES # BLD AUTO: 0 X10E3/UL (ref 0–0.1)
IMM GRANULOCYTES NFR BLD AUTO: 0 %
INTERPRETATION, 910389: NORMAL
INTERPRETATION: NORMAL
LDLC SERPL CALC-MCNC: 148 MG/DL (ref 0–99)
LYMPHOCYTES # BLD AUTO: 2 X10E3/UL (ref 0.7–3.1)
LYMPHOCYTES NFR BLD AUTO: 29 %
MCH RBC QN AUTO: 26.2 PG (ref 26.6–33)
MCHC RBC AUTO-ENTMCNC: 31.2 G/DL (ref 31.5–35.7)
MCV RBC AUTO: 84 FL (ref 79–97)
MONOCYTES # BLD AUTO: 0.7 X10E3/UL (ref 0.1–0.9)
MONOCYTES NFR BLD AUTO: 11 %
NEUTROPHILS # BLD AUTO: 3.9 X10E3/UL (ref 1.4–7)
NEUTROPHILS NFR BLD AUTO: 55 %
PDF IMAGE, 910387: NORMAL
PLATELET # BLD AUTO: 272 X10E3/UL (ref 150–379)
POTASSIUM SERPL-SCNC: 4 MMOL/L (ref 3.5–5.2)
PROT SERPL-MCNC: 7.6 G/DL (ref 6–8.5)
RBC # BLD AUTO: 4.55 X10E6/UL (ref 3.77–5.28)
SODIUM SERPL-SCNC: 145 MMOL/L (ref 134–144)
TRIGL SERPL-MCNC: 149 MG/DL (ref 0–149)
VLDLC SERPL CALC-MCNC: 30 MG/DL (ref 5–40)
WBC # BLD AUTO: 7 X10E3/UL (ref 3.4–10.8)

## 2019-03-21 DIAGNOSIS — E55.9 VITAMIN D DEFICIENCY: Primary | ICD-10-CM

## 2019-03-21 RX ORDER — ERGOCALCIFEROL 1.25 MG/1
50000 CAPSULE ORAL
Qty: 4 CAP | Refills: 3 | Status: SHIPPED | OUTPATIENT
Start: 2019-03-21 | End: 2019-08-01 | Stop reason: ALTCHOICE

## 2019-03-21 NOTE — PROGRESS NOTES
LDL and total cholesterol high. Continue Lipitor. Be on low-cholesterol diet and exercise. vit D level very low.will start on vit D 50,000 unit 1 cap weekly for 4 months. will repeat level in 4 months. adv to be on milk product and expose to sun for 20 min a day.

## 2019-06-17 RX ORDER — RAMIPRIL 10 MG/1
CAPSULE ORAL
Qty: 90 CAP | Refills: 2 | Status: SHIPPED | OUTPATIENT
Start: 2019-06-17 | End: 2019-08-01 | Stop reason: SDUPTHER

## 2019-06-22 DIAGNOSIS — I10 ESSENTIAL HYPERTENSION WITH GOAL BLOOD PRESSURE LESS THAN 130/80: ICD-10-CM

## 2019-06-22 DIAGNOSIS — I50.82 BIVENTRICULAR FAILURE (HCC): ICD-10-CM

## 2019-06-23 RX ORDER — ATORVASTATIN CALCIUM 40 MG/1
TABLET, FILM COATED ORAL
Qty: 90 TAB | Refills: 0 | Status: SHIPPED | OUTPATIENT
Start: 2019-06-23 | End: 2019-08-01 | Stop reason: SDUPTHER

## 2019-08-01 ENCOUNTER — OFFICE VISIT (OUTPATIENT)
Dept: INTERNAL MEDICINE CLINIC | Age: 79
End: 2019-08-01

## 2019-08-01 VITALS
HEART RATE: 90 BPM | TEMPERATURE: 97.4 F | WEIGHT: 193.4 LBS | RESPIRATION RATE: 18 BRPM | BODY MASS INDEX: 36.51 KG/M2 | DIASTOLIC BLOOD PRESSURE: 120 MMHG | SYSTOLIC BLOOD PRESSURE: 180 MMHG | OXYGEN SATURATION: 98 % | HEIGHT: 61 IN

## 2019-08-01 DIAGNOSIS — E78.2 MIXED HYPERLIPIDEMIA: ICD-10-CM

## 2019-08-01 DIAGNOSIS — I48.0 PAROXYSMAL A-FIB (HCC): ICD-10-CM

## 2019-08-01 DIAGNOSIS — I50.82 BIVENTRICULAR FAILURE (HCC): ICD-10-CM

## 2019-08-01 DIAGNOSIS — Z00.00 MEDICARE ANNUAL WELLNESS VISIT, SUBSEQUENT: ICD-10-CM

## 2019-08-01 DIAGNOSIS — Z12.11 SCREENING FOR COLON CANCER: ICD-10-CM

## 2019-08-01 DIAGNOSIS — Z13.5 GLAUCOMA SCREENING: ICD-10-CM

## 2019-08-01 DIAGNOSIS — C50.912 INFILTRATING DUCTAL CARCINOMA OF BREAST, LEFT (HCC): ICD-10-CM

## 2019-08-01 DIAGNOSIS — I10 ESSENTIAL HYPERTENSION WITH GOAL BLOOD PRESSURE LESS THAN 130/80: ICD-10-CM

## 2019-08-01 DIAGNOSIS — I10 ESSENTIAL HYPERTENSION: Primary | ICD-10-CM

## 2019-08-01 DIAGNOSIS — E55.9 VITAMIN D DEFICIENCY: ICD-10-CM

## 2019-08-01 RX ORDER — ATORVASTATIN CALCIUM 40 MG/1
40 TABLET, FILM COATED ORAL DAILY
Qty: 90 TAB | Refills: 2 | Status: SHIPPED | OUTPATIENT
Start: 2019-08-01 | End: 2019-09-13 | Stop reason: SDUPTHER

## 2019-08-01 RX ORDER — AMLODIPINE BESYLATE 10 MG/1
10 TABLET ORAL DAILY
Qty: 90 TAB | Refills: 2 | Status: SHIPPED | OUTPATIENT
Start: 2019-08-01 | End: 2019-12-05 | Stop reason: SDUPTHER

## 2019-08-01 RX ORDER — CARVEDILOL 12.5 MG/1
12.5 TABLET ORAL 2 TIMES DAILY WITH MEALS
Qty: 180 TAB | Refills: 2 | Status: SHIPPED | OUTPATIENT
Start: 2019-08-01 | End: 2019-12-05 | Stop reason: SDUPTHER

## 2019-08-01 RX ORDER — FUROSEMIDE 40 MG/1
TABLET ORAL
Qty: 180 TAB | Refills: 4 | Status: SHIPPED | OUTPATIENT
Start: 2019-08-01 | End: 2019-12-05 | Stop reason: SDUPTHER

## 2019-08-01 RX ORDER — RAMIPRIL 10 MG/1
10 CAPSULE ORAL DAILY
Qty: 90 CAP | Refills: 2 | Status: SHIPPED | OUTPATIENT
Start: 2019-08-01 | End: 2019-12-05 | Stop reason: SDUPTHER

## 2019-08-01 NOTE — PATIENT INSTRUCTIONS
Medicare Wellness Visit, Female The best way to live healthy is to have a lifestyle where you eat a well-balanced diet, exercise regularly, limit alcohol use, and quit all forms of tobacco/nicotine, if applicable. Regular preventive services are another way to keep healthy. Preventive services (vaccines, screening tests, monitoring & exams) can help personalize your care plan, which helps you manage your own care. Screening tests can find health problems at the earliest stages, when they are easiest to treat. [facility] follows the current, evidence-based guidelines published by the [facility] ([facility]) when recommending preventive services for our patients. Because we follow these guidelines, sometimes recommendations change over time as research supports it. (For example, mammograms used to be recommended annually. Even though Medicare will still pay for an annual mammogram, the newer guidelines recommend a mammogram every two years for women of average risk.) Of course, you and your doctor may decide to screen more often for some diseases, based on your risk and your health status. Preventive services for you include: - Medicare offers their members a free annual wellness visit, which is time for you and your primary care provider to discuss and plan for your preventive service needs. Take advantage of this benefit every year! 
-All adults over the age of 72 should receive the recommended pneumonia vaccines. Current USPSTF guidelines recommend a series of two vaccines for the best pneumonia protection.  
-All adults should have a flu vaccine yearly and a tetanus vaccine every 10 years. All adults age 61 and older should receive a shingles vaccine once in their lifetime.   
-A bone mass density test is recommended when a woman turns 65 to screen for osteoporosis. This test is only recommended one time, as a screening. Some providers will use this same test as a disease monitoring tool if you already have osteoporosis. -All adults age 38-68 who are overweight should have a diabetes screening test once every three years.  
-Other screening tests and preventive services for persons with diabetes include: an eye exam to screen for diabetic retinopathy, a kidney function test, a foot exam, and stricter control over your cholesterol.  
-Cardiovascular screening for adults with routine risk involves an electrocardiogram (ECG) at intervals determined by your doctor.  
-Colorectal cancer screenings should be done for adults age 54-65 with no increased risk factors for colorectal cancer. There are a number of acceptable methods of screening for this type of cancer. Each test has its own benefits and drawbacks. Discuss with your doctor what is most appropriate for you during your annual wellness visit. The different tests include: colonoscopy (considered the best screening method), a fecal occult blood test, a fecal DNA test, and sigmoidoscopy. -Breast cancer screenings are recommended every other year for women of normal risk, age 54-69. 
-Cervical cancer screenings for women over age 72 are only recommended with certain risk factors.  
-All adults born between [facility] should be screened once for Hepatitis C. Here is a list of your current Health Maintenance items (your personalized list of preventive services) with a due date: 
Health Maintenance Due Topic Date Due  
 Colonoscopy  05/15/1960  
 DTaP/Tdap/Td  (1 - Tdap) 05/15/1963  Glaucoma Screening   05/15/2007  Shingles Vaccine (2 of 2) 12/12/2018  Flu Vaccine  08/01/2019

## 2019-08-01 NOTE — PROGRESS NOTES
This is the Subsequent Medicare Annual Wellness Exam, performed 12 months or more after the Initial AWV or the last Subsequent AWV    I have reviewed the patient's medical history in detail and updated the computerized patient record. History     Past Medical History:   Diagnosis Date    A-fib [facility])     Chronic kidney disease     Hypercholesterolemia     Hypertension       History reviewed. No pertinent surgical history. Current Outpatient Medications   Medication Sig Dispense Refill    atorvastatin (LIPITOR) 40 mg tablet TAKE ONE TABLET BY MOUTH DAILY 90 Tab 0    ramipril (ALTACE) 10 mg capsule TAKE ONE CAPSULE BY MOUTH DAILY 90 Cap 2    ALPRAZolam (XANAX) 0.25 mg tablet Take 1 Tab by mouth two (2) times daily as needed for Anxiety. Max Daily Amount: 0.5 mg. 30 Tab 1    sertraline (ZOLOFT) 50 mg tablet Take 1 Tab by mouth daily. 1 tab po QD 30 Tab 5    esomeprazole (NEXIUM) 40 mg capsule TAKE ONE CAPSULE BY MOUTH DAILY 90 Cap 1    potassium chloride (K-DUR, KLOR-CON) 20 mEq tablet TAKE ONE TABLET BY MOUTH TWICE A DAY 78 Tab 1    carvedilol (COREG) 12.5 mg tablet Take 1 Tab by mouth two (2) times daily (with meals). 180 Tab 2    amLODIPine (NORVASC) 10 mg tablet Take 1 Tab by mouth daily. 90 Tab 2    aspirin delayed-release 81 mg tablet TAKE ONE TABLET BY MOUTH DAILY 30 Tab 2    furosemide (LASIX) 40 mg tablet TAKE ONE TABLET BY MOUTH TWICE A DAY ON MONDAY WEDNESDAY, FRIDAY, AND SATURDAY AND TAKE 1 TABLET DAILY FOR TUESDAY THURSDAY AND SUNDAY 180 Tab 4    ferrous sulfate (IRON) 325 mg (65 mg iron) EC tablet Take 325 mg by mouth three (3) times daily (with meals).  ergocalciferol (ERGOCALCIFEROL) 50,000 unit capsule Take 1 Cap by mouth every seven (7) days.  4 Cap 3    varicella-zoster recombinant, PF, (SHINGRIX) 50 mcg/0.5 mL susr injection Please give first dose now and 2nd dose in 2 months 0.5 mL 1    OTHER Shower chair with back 1 Each 0     No Known Allergies  Family History   Problem Relation Age of Onset    No Known Problems Mother     No Known Problems Father      Social History     Tobacco Use    Smoking status: Former Smoker     Last attempt to quit:      Years since quittin.5    Smokeless tobacco: Never Used   Substance Use Topics    Alcohol use: No     Alcohol/week: 0.0 standard drinks     Patient Active Problem List   Diagnosis Code    HTN (hypertension) I10    GERD (gastroesophageal reflux disease) K21.9    Mixed hyperlipidemia E78.2    Valvular disease I38    Paroxysmal A-fib (HCC) I48.0    Biventricular failure (HCC) I50.82    Obesity, morbid ([facility] [address] E66.01    Infiltrating ductal carcinoma of breast, left ([facility] [address] C50.912       Depression Risk Factor Screening:     3 most recent PHQ Screens 2018   PHQ Not Done -   Little interest or pleasure in doing things Not at all   Feeling down, depressed, irritable, or hopeless Not at all   Total Score PHQ 2 0     Alcohol Risk Factor Screening: You do not drink alcohol or very rarely. Functional Ability and Level of Safety:   Hearing Loss  Hearing is good. Activities of Daily Living  The home contains: rollator  Patient does total self care    Fall Risk  Fall Risk Assessment, last 12 mths 2019   Able to walk? Yes   Fall in past 12 months?  No   Fall with injury? -   Number of falls in past 12 months -   Fall Risk Score -       Abuse Screen  Patient is not abused    Cognitive Screening   Evaluation of Cognitive Function:  Has your family/caregiver stated any concerns about your memory: no  Normal    Patient Care Team   Patient Care Team:  Cisco Padgett MD as PCP - General (Internal Medicine)  Raheem Lay MD (Cardiology)  Yoseph Jj RN as Nurse Navigator (Internal Medicine)  Irene Contreras MD (Breast Surgery)    Assessment/Plan   Education and counseling provided:  Are appropriate based on today's review and evaluation  End-of-Life planning (with patient's consent)  Colorectal cancer screening tests  Bone mass measurement (DEXA)  Screening for glaucoma  Diabetes screening test        Health Maintenance Due   Topic Date Due    COLONOSCOPY  05/15/1960    DTaP/Tdap/Td series (1 - Tdap) 05/15/1963    GLAUCOMA SCREENING Q2Y  05/15/2007    Shingrix Vaccine Age 50> (2 of 2) 12/12/2018    Influenza Age 5 to Adult  08/01/2019

## 2019-08-01 NOTE — PROGRESS NOTES
Westley Barthel is a 70 y.o. female    Chief Complaint   Patient presents with    Hypertension    Cholesterol Problem    Vitamin D Deficiency    Annual Wellness Visit     1. Have you been to the ER, urgent care clinic since your last visit? Hospitalized since your last visit? No     2. Have you seen or consulted any other health care providers outside of the [address] [place] [address] since your last visit? Include any pap smears or colon screening.   No      Visit Vitals  BP (!) 230/162 (BP 1 Location: Right arm, BP Patient Position: Sitting)   Pulse 90   Temp 97.4 °F (36.3 °C)   Resp 18   Ht 5' 1\" (1.549 m)   Wt 193 lb 6.4 oz (87.7 kg)   SpO2 98%   BMI 36.54 kg/m²

## 2019-08-01 NOTE — PROGRESS NOTES
HISTORY OF PRESENT ILLNESS  Puma Moses is a 70 y.o. female here for follow up. Has pressure today. She mentioned she has taken blood pressure medicine. I do not think she is taking diuretics. I will send a prescription for it. Not monitoring blood pressure at home. Need a blood pressure machine. Has biventricular failure. Should be on diuretics. No weight gain. She mentioned she takes it regularly. We will see her cardiologist in September. Has breast cancer, she did not decide if she would like to get lumpectomy. She is still taking and if she decides to go back to see Dr. Meet Hernandez. Watching salt. Has lost weight. No chest pain palpitation or shortness of breath  Labs reviewed, stable. Bone density is up-to-date. Need to see an eye specialist and colonoscopy. Here for Medicare wellness visit. Has living will. Hypertension    Pertinent negatives include no chest pain, no palpitations and no blurred vision. Cholesterol Problem   Pertinent negatives include no chest pain. GERD   Pertinent negatives include no chest pain. Follow-up   Pertinent negatives include no chest pain. Review of Systems   Constitutional: Negative. Negative for chills and fever. HENT: Negative. Eyes: Negative. Negative for blurred vision and double vision. Respiratory: Negative. Cardiovascular: Negative. Negative for chest pain and palpitations. Gastrointestinal: Negative. Genitourinary: Negative. Musculoskeletal: Negative. Skin: Negative. Negative for itching and rash. Neurological: Negative. Psychiatric/Behavioral: The patient is nervous/anxious. Physical Exam   Constitutional: She appears well-developed and well-nourished. No distress. Neck: Normal range of motion. Neck supple. No JVD present. No thyromegaly present. Cardiovascular: Normal rate, regular rhythm, normal heart sounds and intact distal pulses.    Pulmonary/Chest: Effort normal and breath sounds normal. No respiratory distress. She has no wheezes. Musculoskeletal: She exhibits no edema or tenderness. Psychiatric: She has a normal mood and affect. Her behavior is normal.   Slightly anxious. Not depressed. ASSESSMENT and PLAN    Diagnoses and all orders for this visit:    1. Essential hypertension    Elevated blood pressure. Advised her to monitor blood pressure closely. I have given her prescription for [employer] blood pressure machine. Advised her to take her blood pressure medicine and monitor blood pressure and call me back if blood pressures. I do not think she has take any water pill today. Advised her to go back home and take her Lasix. -     METABOLIC PANEL, COMPREHENSIVE  Me on DASH diet. Follow-up on the blood pressure control. 2. Biventricular failure ([address]    Compensated. Taking diuretics. Will refill,  -     amLODIPine (NORVASC) 10 mg tablet; Take 1 Tab by mouth daily. -     atorvastatin (LIPITOR) 40 mg tablet; Take 1 Tab by mouth daily. -     carvedilol (COREG) 12.5 mg tablet; Take 1 Tab by mouth two (2) times daily (with meals). -     ramipril (ALTACE) 10 mg capsule; Take 1 Cap by mouth daily. -     furosemide (LASIX) 40 mg tablet; TAKE ONE TABLET BY MOUTH TWICE A DAY ON MONDAY WEDNESDAY, FRIDAY, AND SATURDAY AND TAKE 1 TABLET DAILY FOR TUESDAY THURSDAY AND SUNDAY  -     METABOLIC PANEL, COMPREHENSIVE    3. Essential hypertension with goal blood pressure less than 130/80  -     amLODIPine (NORVASC) 10 mg tablet; Take 1 Tab by mouth daily. -     atorvastatin (LIPITOR) 40 mg tablet; Take 1 Tab by mouth daily. -     carvedilol (COREG) 12.5 mg tablet; Take 1 Tab by mouth two (2) times daily (with meals). -     METABOLIC PANEL, COMPREHENSIVE    4. Paroxysmal A-fib (HCC)    Right now rate and rhythm control. Taking aspirin and Coreg.  -     carvedilol (COREG) 12.5 mg tablet; Take 1 Tab by mouth two (2) times daily (with meals). -     METABOLIC PANEL, COMPREHENSIVE    5.  Mixed hyperlipidemia    On statin. Will check,  -     METABOLIC PANEL, COMPREHENSIVE    6. Infiltrating ductal carcinoma of breast, left [facility])  Did not decide if she would like to go for the surgery. 7. Vitamin D deficiency  -     VITAMIN D, 25 HYDROXY    8. Glaucoma screening  -     REFERRAL TO OPHTHALMOLOGY    9. Screening for colon cancer  -     REFERRAL TO GASTROENTEROLOGY          Discussed expected course/resolution/complications of diagnosis in detail with patient. Medication risks/benefits/costs/interactions/alternatives discussed with patient. Pt was given an after visit summary which includes diagnoses, current medications & vitals. Pt expressed understanding with the diagnosis and plan.

## 2019-08-02 LAB
25(OH)D3+25(OH)D2 SERPL-MCNC: 10.8 NG/ML (ref 30–100)
ALBUMIN SERPL-MCNC: 4 G/DL (ref 3.5–4.8)
ALBUMIN/GLOB SERPL: 1 {RATIO} (ref 1.2–2.2)
ALP SERPL-CCNC: 81 IU/L (ref 39–117)
ALT SERPL-CCNC: 12 IU/L (ref 0–32)
AST SERPL-CCNC: 31 IU/L (ref 0–40)
BILIRUB SERPL-MCNC: <0.2 MG/DL (ref 0–1.2)
BUN SERPL-MCNC: 10 MG/DL (ref 8–27)
BUN/CREAT SERPL: 8 (ref 12–28)
CALCIUM SERPL-MCNC: 9.7 MG/DL (ref 8.7–10.3)
CHLORIDE SERPL-SCNC: 103 MMOL/L (ref 96–106)
CO2 SERPL-SCNC: 28 MMOL/L (ref 20–29)
CREAT SERPL-MCNC: 1.23 MG/DL (ref 0.57–1)
GLOBULIN SER CALC-MCNC: 4 G/DL (ref 1.5–4.5)
GLUCOSE SERPL-MCNC: 102 MG/DL (ref 65–99)
INTERPRETATION: NORMAL
POTASSIUM SERPL-SCNC: 3.9 MMOL/L (ref 3.5–5.2)
PROT SERPL-MCNC: 8 G/DL (ref 6–8.5)
SODIUM SERPL-SCNC: 145 MMOL/L (ref 134–144)

## 2019-08-06 DIAGNOSIS — E55.9 VITAMIN D DEFICIENCY: Primary | ICD-10-CM

## 2019-08-06 RX ORDER — ERGOCALCIFEROL 1.25 MG/1
50000 CAPSULE ORAL
Qty: 4 CAP | Refills: 3 | Status: SHIPPED | OUTPATIENT
Start: 2019-08-06 | End: 2019-12-05 | Stop reason: ALTCHOICE

## 2019-08-06 NOTE — PROGRESS NOTES
vit D level very low.will start on vit D 50,000 unit 1 cap weekly for 4 months. will repeat level in 4 months. adv to be on milk product and expose to sun for 20 min a day. . Kidney function slightly worse. Need to drink more fluid.

## 2019-09-13 ENCOUNTER — OFFICE VISIT (OUTPATIENT)
Dept: CARDIOLOGY CLINIC | Age: 79
End: 2019-09-13

## 2019-09-13 VITALS
RESPIRATION RATE: 16 BRPM | DIASTOLIC BLOOD PRESSURE: 120 MMHG | OXYGEN SATURATION: 95 % | SYSTOLIC BLOOD PRESSURE: 180 MMHG | HEIGHT: 61 IN | BODY MASS INDEX: 37 KG/M2 | WEIGHT: 196 LBS | HEART RATE: 79 BPM

## 2019-09-13 DIAGNOSIS — I10 ESSENTIAL HYPERTENSION WITH GOAL BLOOD PRESSURE LESS THAN 130/80: ICD-10-CM

## 2019-09-13 DIAGNOSIS — I10 ESSENTIAL HYPERTENSION: ICD-10-CM

## 2019-09-13 DIAGNOSIS — I48.0 PAROXYSMAL A-FIB (HCC): ICD-10-CM

## 2019-09-13 DIAGNOSIS — I50.82 BIVENTRICULAR FAILURE (HCC): ICD-10-CM

## 2019-09-13 DIAGNOSIS — I42.9 CARDIOMYOPATHY, UNSPECIFIED TYPE (HCC): Primary | ICD-10-CM

## 2019-09-13 NOTE — PROGRESS NOTES
Chief Complaint   Patient presents with    Follow-up     with echocardiogram.  Denies chest pain/shortness of breath/dizziness. Decrease in swelling. Visit Vitals  BP (!) 220/140 (BP 1 Location: Right arm, BP Patient Position: Supine)   Pulse 79   Resp 16   Ht 5' 1\" (1.549 m)   Wt 196 lb (88.9 kg)   SpO2 95%   BMI 37.03 kg/m²     Echo done. Patient unsure of medications taken. Does not have a list. Encouraged her to make list of medications and carry with her at all times.
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
## 2019-09-16 RX ORDER — ATORVASTATIN CALCIUM 40 MG/1
40 TABLET, FILM COATED ORAL DAILY
Qty: 90 TAB | Refills: 2 | Status: SHIPPED | OUTPATIENT
Start: 2019-09-16 | End: 2019-12-05 | Stop reason: SDUPTHER

## 2019-12-01 DIAGNOSIS — F41.9 ANXIETY DISORDER, UNSPECIFIED TYPE: ICD-10-CM

## 2019-12-02 RX ORDER — SERTRALINE HYDROCHLORIDE 50 MG/1
TABLET, FILM COATED ORAL
Qty: 30 TAB | Refills: 2 | Status: SHIPPED | OUTPATIENT
Start: 2019-12-02 | End: 2019-12-05 | Stop reason: SDUPTHER

## 2019-12-05 ENCOUNTER — OFFICE VISIT (OUTPATIENT)
Dept: INTERNAL MEDICINE CLINIC | Age: 79
End: 2019-12-05

## 2019-12-05 VITALS
OXYGEN SATURATION: 97 % | SYSTOLIC BLOOD PRESSURE: 144 MMHG | TEMPERATURE: 98 F | HEART RATE: 73 BPM | DIASTOLIC BLOOD PRESSURE: 86 MMHG | RESPIRATION RATE: 17 BRPM | WEIGHT: 191.6 LBS | BODY MASS INDEX: 36.17 KG/M2 | HEIGHT: 61 IN

## 2019-12-05 DIAGNOSIS — C50.912 INVASIVE DUCTAL CARCINOMA OF BREAST, FEMALE, LEFT (HCC): ICD-10-CM

## 2019-12-05 DIAGNOSIS — F41.9 ANXIETY DISORDER, UNSPECIFIED TYPE: ICD-10-CM

## 2019-12-05 DIAGNOSIS — I48.0 PAROXYSMAL A-FIB (HCC): ICD-10-CM

## 2019-12-05 DIAGNOSIS — I50.82 BIVENTRICULAR FAILURE (HCC): ICD-10-CM

## 2019-12-05 DIAGNOSIS — E55.9 VITAMIN D DEFICIENCY: ICD-10-CM

## 2019-12-05 DIAGNOSIS — I10 ESSENTIAL HYPERTENSION WITH GOAL BLOOD PRESSURE LESS THAN 130/80: Primary | ICD-10-CM

## 2019-12-05 RX ORDER — ASPIRIN 81 MG/1
TABLET ORAL
Qty: 90 TAB | Refills: 4 | Status: SHIPPED | OUTPATIENT
Start: 2019-12-05 | End: 2020-05-12

## 2019-12-05 RX ORDER — ALPRAZOLAM 0.25 MG/1
0.25 TABLET ORAL
COMMUNITY
End: 2020-05-12

## 2019-12-05 RX ORDER — ATORVASTATIN CALCIUM 40 MG/1
40 TABLET, FILM COATED ORAL DAILY
Qty: 90 TAB | Refills: 2 | Status: SHIPPED | OUTPATIENT
Start: 2019-12-05 | End: 2020-06-30

## 2019-12-05 RX ORDER — RAMIPRIL 10 MG/1
10 CAPSULE ORAL DAILY
Qty: 90 CAP | Refills: 2 | Status: SHIPPED | OUTPATIENT
Start: 2019-12-05 | End: 2020-05-12

## 2019-12-05 RX ORDER — AMLODIPINE BESYLATE 10 MG/1
10 TABLET ORAL DAILY
Qty: 90 TAB | Refills: 2 | Status: SHIPPED | OUTPATIENT
Start: 2019-12-05 | End: 2020-05-12

## 2019-12-05 RX ORDER — POTASSIUM CHLORIDE 20 MEQ/1
20 TABLET, EXTENDED RELEASE ORAL DAILY
Qty: 90 TAB | Refills: 2 | Status: SHIPPED | OUTPATIENT
Start: 2019-12-05 | End: 2020-05-12

## 2019-12-05 RX ORDER — SERTRALINE HYDROCHLORIDE 50 MG/1
50 TABLET, FILM COATED ORAL DAILY
Qty: 90 TAB | Refills: 3 | Status: SHIPPED | OUTPATIENT
Start: 2019-12-05 | End: 2020-05-12

## 2019-12-05 RX ORDER — FUROSEMIDE 40 MG/1
TABLET ORAL
Qty: 180 TAB | Refills: 4 | Status: SHIPPED | OUTPATIENT
Start: 2019-12-05 | End: 2020-05-12

## 2019-12-05 RX ORDER — CARVEDILOL 12.5 MG/1
12.5 TABLET ORAL 2 TIMES DAILY WITH MEALS
Qty: 180 TAB | Refills: 2 | Status: SHIPPED | OUTPATIENT
Start: 2019-12-05 | End: 2020-05-12

## 2019-12-05 NOTE — PROGRESS NOTES
Health Maintenance Due   Topic Date Due    DTaP/Tdap/Td series (1 - Tdap) 05/15/1953    COLONOSCOPY  05/15/1960    GLAUCOMA SCREENING Q2Y  05/15/2007    Shingrix Vaccine Age 50> (2 of 2) 12/12/2018    Influenza Age 5 to Adult  08/01/2019       Chief Complaint   Patient presents with    Hypertension    Cholesterol Problem       1. Have you been to the ER, urgent care clinic since your last visit? Hospitalized since your last visit? No    2. Have you seen or consulted any other health care providers outside of the [address] [place] [address] since your last visit? Include any pap smears or colon screening. No    3) Do you have an Advance Directive on file? yes    4) Are you interested in receiving information on Advance Directives? NO      Patient is accompanied by self I have received verbal consent from Allen Loera to discuss any/all medical information while they are present in the room.

## 2019-12-05 NOTE — PROGRESS NOTES
HISTORY OF PRESENT ILLNESS  Hetal Gutierrez is a 70 y.o. female here for follow up. Has hypertension, compliant with medicine. No chest pain palpitation or shortness of breath. Has biventricular failure. on diuretics. No weight gain. She mentioned she takes it regularly. No orthopnea. Has breast cancer, she did not decide if she would like to get lumpectomy. Watching salt. Has lost weight. No chest pain palpitation or shortness of breath  Labs reviewed, stable. Anxiety seems stable. On Zoloft. Need refill. Hypertension    Pertinent negatives include no chest pain, no palpitations and no blurred vision. Cholesterol Problem   Pertinent negatives include no chest pain. GERD   Pertinent negatives include no chest pain. Follow-up   Pertinent negatives include no chest pain. Medication Refill   Pertinent negatives include no chest pain. Review of Systems   Constitutional: Negative. Negative for chills and fever. HENT: Negative. Eyes: Negative. Negative for blurred vision and double vision. Respiratory: Negative. Cardiovascular: Negative. Negative for chest pain and palpitations. Gastrointestinal: Negative. Genitourinary: Negative. Musculoskeletal: Negative. Skin: Negative. Negative for itching and rash. Neurological: Negative. Endo/Heme/Allergies: Negative. Psychiatric/Behavioral: Negative. Physical Exam  Constitutional:       General: She is not in acute distress. Appearance: Normal appearance. She is well-developed. She is obese. Neck:      Musculoskeletal: Normal range of motion and neck supple. Thyroid: No thyromegaly. Vascular: No JVD. Cardiovascular:      Rate and Rhythm: Normal rate and regular rhythm. Heart sounds: Normal heart sounds. Pulmonary:      Effort: Pulmonary effort is normal. No respiratory distress. Breath sounds: Normal breath sounds. No wheezing.    Musculoskeletal:         General: No swelling or tenderness. Neurological:      Mental Status: She is alert. Psychiatric:         Mood and Affect: Mood normal.         Behavior: Behavior normal.         ASSESSMENT and PLAN    Diagnoses and all orders for this visit:    1. Essential hypertension with goal blood pressure less than 130/80    Stable blood pressure. Will refill,  -     amLODIPine (NORVASC) 10 mg tablet; Take 1 Tab by mouth daily. -     atorvastatin (LIPITOR) 40 mg tablet; Take 1 Tab by mouth daily. -     carvedilol (COREG) 12.5 mg tablet; Take 1 Tab by mouth two (2) times daily (with meals). -     CBC W/O DIFF  -     METABOLIC PANEL, COMPREHENSIVE  -     LDL, DIRECT    2. Anxiety disorder, unspecified type    Stable. Will refill,  -     sertraline (ZOLOFT) 50 mg tablet; Take 1 Tab by mouth daily. 1 tab po QD    3. Biventricular failure ([address]    Compensated. Weight seems stable. Will refill,  -     furosemide (LASIX) 40 mg tablet; TAKE ONE TABLET BY MOUTH TWICE A DAY ON MONDAY WEDNESDAY, FRIDAY, AND SATURDAY AND TAKE 1 TABLET DAILY FOR TUESDAY THURSDAY AND SUNDAY  -     potassium chloride (K-DUR, KLOR-CON) 20 mEq tablet; Take 1 Tab by mouth daily. -     ramipril (ALTACE) 10 mg capsule; Take 1 Cap by mouth daily. -     amLODIPine (NORVASC) 10 mg tablet; Take 1 Tab by mouth daily. -     atorvastatin (LIPITOR) 40 mg tablet; Take 1 Tab by mouth daily. -     carvedilol (COREG) 12.5 mg tablet; Take 1 Tab by mouth two (2) times daily (with meals). -     CBC W/O DIFF  -     METABOLIC PANEL, COMPREHENSIVE  She will see Dr. Alma Ojeda soon. 4. Paroxysmal A-fib (HCC)    Rate and rhythm control. Will refill,  -     carvedilol (COREG) 12.5 mg tablet; Take 1 Tab by mouth two (2) times daily (with meals). -     aspirin delayed-release 81 mg tablet; TAKE ONE TABLET BY MOUTH DAILY  -     CBC W/O DIFF  -     METABOLIC PANEL, COMPREHENSIVE    5. Vitamin D deficiency    Finish up supplement. Will recheck,  -     VITAMIN D, 25 HYDROXY    6.  Invasive ductal carcinoma of breast, female, left [facility])    She was seen by Dr. Yessy Rivers. Advised to have breast surgery. She was probably not cleared by cardiologist for breast surgery. Advised her to see Dr. Thuy Rooney and take his recommendation to see if she is okay to get breast surgery. Discussed expected course/resolution/complications of diagnosis in detail with patient. Medication risks/benefits/costs/interactions/alternatives discussed with patient. Pt was given an after visit summary which includes diagnoses, current medications & vitals. Pt expressed understanding with the diagnosis and plan.

## 2019-12-06 LAB
25(OH)D3+25(OH)D2 SERPL-MCNC: 8.4 NG/ML (ref 30–100)
ALBUMIN SERPL-MCNC: 3.9 G/DL (ref 3.5–4.8)
ALBUMIN/GLOB SERPL: 1.1 {RATIO} (ref 1.2–2.2)
ALP SERPL-CCNC: 74 IU/L (ref 39–117)
ALT SERPL-CCNC: 11 IU/L (ref 0–32)
AST SERPL-CCNC: 24 IU/L (ref 0–40)
BILIRUB SERPL-MCNC: 0.3 MG/DL (ref 0–1.2)
BUN SERPL-MCNC: 15 MG/DL (ref 8–27)
BUN/CREAT SERPL: 14 (ref 12–28)
CALCIUM SERPL-MCNC: 9.5 MG/DL (ref 8.7–10.3)
CHLORIDE SERPL-SCNC: 103 MMOL/L (ref 96–106)
CO2 SERPL-SCNC: 25 MMOL/L (ref 20–29)
CREAT SERPL-MCNC: 1.04 MG/DL (ref 0.57–1)
ERYTHROCYTE [DISTWIDTH] IN BLOOD BY AUTOMATED COUNT: 13.5 % (ref 12.3–15.4)
GLOBULIN SER CALC-MCNC: 3.5 G/DL (ref 1.5–4.5)
GLUCOSE SERPL-MCNC: 94 MG/DL (ref 65–99)
HCT VFR BLD AUTO: 36.4 % (ref 34–46.6)
HGB BLD-MCNC: 11.6 G/DL (ref 11.1–15.9)
INTERPRETATION: NORMAL
LDLC SERPL DIRECT ASSAY-MCNC: 163 MG/DL (ref 0–99)
MCH RBC QN AUTO: 25.7 PG (ref 26.6–33)
MCHC RBC AUTO-ENTMCNC: 31.9 G/DL (ref 31.5–35.7)
MCV RBC AUTO: 81 FL (ref 79–97)
PLATELET # BLD AUTO: 257 X10E3/UL (ref 150–450)
POTASSIUM SERPL-SCNC: 3.6 MMOL/L (ref 3.5–5.2)
PROT SERPL-MCNC: 7.4 G/DL (ref 6–8.5)
RBC # BLD AUTO: 4.51 X10E6/UL (ref 3.77–5.28)
SODIUM SERPL-SCNC: 142 MMOL/L (ref 134–144)
WBC # BLD AUTO: 5.5 X10E3/UL (ref 3.4–10.8)

## 2019-12-09 DIAGNOSIS — E55.9 VITAMIN D DEFICIENCY: Primary | ICD-10-CM

## 2019-12-09 RX ORDER — ERGOCALCIFEROL 1.25 MG/1
50000 CAPSULE ORAL
Qty: 12 CAP | Refills: 0 | Status: SHIPPED | OUTPATIENT
Start: 2019-12-09 | End: 2020-05-12

## 2020-03-17 ENCOUNTER — EXTERNAL NURSING HOME DOCUMENTATION (OUTPATIENT)
Dept: INTERNAL MEDICINE CLINIC | Age: 80
End: 2020-03-17

## 2020-03-17 DIAGNOSIS — N63.0 BREAST MASS: ICD-10-CM

## 2020-03-17 DIAGNOSIS — I10 ESSENTIAL HYPERTENSION WITH GOAL BLOOD PRESSURE LESS THAN 130/80: ICD-10-CM

## 2020-03-17 DIAGNOSIS — I48.0 PAROXYSMAL A-FIB (HCC): ICD-10-CM

## 2020-03-17 DIAGNOSIS — N18.30 STAGE 3 CHRONIC KIDNEY DISEASE (HCC): ICD-10-CM

## 2020-03-17 DIAGNOSIS — I50.82 BIVENTRICULAR FAILURE (HCC): ICD-10-CM

## 2020-03-17 DIAGNOSIS — I50.23 SYSTOLIC CHF, ACUTE ON CHRONIC (HCC): Primary | ICD-10-CM

## 2020-03-17 NOTE — PROGRESS NOTES
History and Physical    History of Present Illness: This is a 70year old, -American female, with past medical history significant for chronic systolic heart failure with ejection fraction 30%, atrial fibrillation, hypertension and CKD stage 3, presented to [facility] ER with profound weakness and confusion. She was found by EMS with a heart rate of 150-160, had bilateral leg swelling. In the ER she was in andrews heart failure, received IV Lasix. She had a very high BNP level. Echocardiogram done, ejection fraction showed 19%, diastolic dysfunction could not be assessed, and severe global hypokinesia. Patient was seen by cardiologist.  Heart failure has been managed. She had ultrasound of abdomen and kidneys done, showed medical renal disease, but shadows in the aorta, which were evaluated by CT scan of abdomen and showed infrarenal abdominal aortic aneurysm, 4.4 x 5.1 cm, circumferential surrounding stranding  . Lynette Boudreaux She underwent emergent aortic aneurysm repair on March 2nd and later started back on antiplatelet and anticoagulation. Her heart rate was controlled with Diltiazem. She was stabilized and was transferred to [facility] rehab. She is doing well in the rehab right now, no palpitations, no shortness of breath. Past Medical History:  Significant for congestive heart failure, atrial fibrillation, hypertension, CKD, tricuspid valve regurgitation, abdominal aortic aneurysm. Past Surgical History:  Cardiac catheterizations, DC cardiac shock. Social History:  Patient has a history of tobacco smoking in the past, not drinking any alcohol right now. Family History:  Not contributory.     Medications:  Patient is on right now Eliquis 5 mg, one tablet twice a day, aspirin 81 mg once a day, Lipitor 40 mg every day, Coreg 6.25 mg twice a day, vitamin D 50,000 units once a week, Hydralazine 10 mg three times a day, Isosorbide Dinitrate 10 mg three times a day, Torsemide 40 mg p.o. twice a day.    Review of Systems:  Complete review of systems done, right now essentially negative. Objective:    GENERAL:  This is a pleasant, -American female, not in any apparent distress. VITALS:  T:  98 degrees Fahrenheit. P:  64 per minute. BP:  148/68  mmHg. SaO2:  98% on room air. WT:  164 pounds. HEENT:  No abnormality detected. NECK:  Supple, no JVD, no carotid bruits, no thyromegaly. CHEST:  Chest is clear to auscultation bilaterally. No rales, no rhonchi. CARDIOVASCULAR:  S1, S2 present. Irregular heartbeat, rate controlled. ABDOMEN:  Soft, nontender, nondistended, bowel sounds present. EXTREMITIES:  Trace edema is present. Dorsalis pedis pulse normal.    NEUROLOGICAL:  Alert and oriented x2. Cranial nerves II-XII grossly intact. Motor 5/5 bilaterally. Sensory within normal limits. Assessment and Plan:  1. Acute on chronic congestive heart failure due to nonischemic cardiomyopathy. Patient received IV diuretics with IV Lasix and Metolazone. Had cardiac cath done, showed elevated right and left sided filling pressures, pulmonary hypertension, postcapillary etiology. Echocardiogram showed ejection fraction 20% with global hypokinesia. She is a poor candidate for ICD due to having poor insight into her medical disease, so it was not placed. Patient is managed medically. She is on Coreg twice a day along with Hydralazine three times a day, aspirin and statin. Also she was placed on Eliquis 5 mg twice a day. She will get PT and OT. 2. ventricular tachycardia, which subsided. No ICD was placed. Will monitor her closely. 3. Abdominal aortic aneurysm, status post urgent AAA repair done. She is doing well. 4. Non ST elevation MI, demand ischemia with low ejection fraction. Patient was diuresed. 5. Acute kidney injury on chronic kidney disease. Baseline creatinine was 1.42. It was 2.4 on admission. Will monitor her kidney function closely. 6. Left breast mass. Patient needs diagnostic mammogram with ultrasound and biopsy as an outpatient. THIS IS NOT A COMPLETE MEDICAL RECORD ON THIS PATIENT.    THIS IS A PATIENT AT [facility].    PLEASE CONTACT THE FACILITY LISTED BELOW FOR MORE INFORMATION ON THIS PATIENT.    THE COMPLETE RECORD RESIDES WITH THIS LONG TERM CARE FACILITY

## 2020-03-19 ENCOUNTER — EXTERNAL NURSING HOME DOCUMENTATION (OUTPATIENT)
Dept: INTERNAL MEDICINE CLINIC | Age: 80
End: 2020-03-19

## 2020-03-19 VITALS — BODY MASS INDEX: 36.2 KG/M2 | HEIGHT: 61 IN

## 2020-03-19 DIAGNOSIS — E55.9 VITAMIN D DEFICIENCY: ICD-10-CM

## 2020-03-19 DIAGNOSIS — I48.0 PAROXYSMAL A-FIB (HCC): ICD-10-CM

## 2020-03-19 DIAGNOSIS — M79.671 RIGHT FOOT PAIN: Primary | ICD-10-CM

## 2020-03-19 DIAGNOSIS — I50.23 SYSTOLIC CHF, ACUTE ON CHRONIC (HCC): ICD-10-CM

## 2020-03-19 DIAGNOSIS — I10 ESSENTIAL HYPERTENSION WITH GOAL BLOOD PRESSURE LESS THAN 130/80: ICD-10-CM

## 2020-03-20 ENCOUNTER — EXTERNAL NURSING HOME DOCUMENTATION (OUTPATIENT)
Dept: INTERNAL MEDICINE CLINIC | Age: 80
End: 2020-03-20

## 2020-03-20 DIAGNOSIS — M25.571 ACUTE BILATERAL ANKLE PAIN: Primary | ICD-10-CM

## 2020-03-20 DIAGNOSIS — M10.279 ACUTE DRUG-INDUCED GOUT OF ANKLE, UNSPECIFIED LATERALITY: ICD-10-CM

## 2020-03-20 DIAGNOSIS — I10 ESSENTIAL HYPERTENSION WITH GOAL BLOOD PRESSURE LESS THAN 130/80: ICD-10-CM

## 2020-03-20 DIAGNOSIS — I48.0 PAROXYSMAL A-FIB (HCC): ICD-10-CM

## 2020-03-20 DIAGNOSIS — I50.23 SYSTOLIC CHF, ACUTE ON CHRONIC (HCC): ICD-10-CM

## 2020-03-20 DIAGNOSIS — M25.572 ACUTE BILATERAL ANKLE PAIN: Primary | ICD-10-CM

## 2020-03-20 NOTE — PROGRESS NOTES
THIS IS NOT A COMPLETED MEDICAL RECORD ON THIS PATIENT. THIS IS A PATIENT OF [facility]   PLEASE CONTACT THE FACILITY BELOW FOR MORE INFORMATION ON THIS PATIENT   THE COMPLETE RECORD RESIDES WITH THIS LONG TERM CARE FACILITY  HISTORY OF PRESENT ILLNESS  Wang Diaz is a 70 y.o. female. Patient is under that care of Dr Melania Hinojosa at [facility]. Has a past medical history of chronic systolic heart failure with ejection fraction 30%, atrial fibrillation, hypertension and CKD stage 3. Was seen at [facility]  weakness and confusion. She was found by EMS with a heart rate of 150-160, had bilateral leg swelling. In the ER she was in andrews heart failure, received IV Lasix. She had a very high BNP level. Echocardiogram done, ejection fraction showed 93%, diastolic dysfunction. Had ultrasound of abdomen and kidneys done, showed medical renal disease, but shadows in the aorta,  evaluated by CT scan of abdomen and showed infrarenal abdominal aortic aneurysm, 4.4 x 5.1 cm, circumferential surrounding stranding  Was asked per nursing and PT to see patient for complaints of right foot pain when working with therapy. Patient reports that it painful to bear weight to the foot. Noticed it today and was not willing to work with PT. Does not note any trauma. HPI    Review of Systems   Constitutional: Negative. Respiratory: Negative. Cardiovascular: Negative. Gastrointestinal: Negative. Genitourinary: Negative. Musculoskeletal: Positive for back pain and joint pain. Negative for falls. Neurological: Negative. Psychiatric/Behavioral: Negative. Physical Exam  Vitals signs and nursing note reviewed. HENT:      Head: Normocephalic and atraumatic. Neck:      Musculoskeletal: Neck supple. Cardiovascular:      Rate and Rhythm: Normal rate and regular rhythm. Pulmonary:      Effort: Pulmonary effort is normal.      Breath sounds: Normal breath sounds.    Abdominal:      General: Bowel sounds are normal. Palpations: Abdomen is soft. Comments: Scaring midline    Musculoskeletal:      Right lower leg: No edema. Left lower leg: No edema. Right foot: Tenderness present. No swelling. Comments: Pain with flexion and hyperextention of right foot    Skin:     General: Skin is warm. Comments: Scaring at midline    Neurological:      Mental Status: She is alert and oriented to person, place, and time. Psychiatric:         Mood and Affect: Mood normal.         ASSESSMENT and PLAN  Diagnoses and all orders for this visit:    1. Right foot pain    2. Systolic CHF, acute on chronic (HCC)    3. Essential hypertension with goal blood pressure less than 130/80    4. Paroxysmal A-fib ([address]    5. Vitamin D deficiency        PLAN:  1. X ray of the right foot  2. Work with PT as tolerated  3. Patient does not want medication until xray results   4.  Nursing to reports changes to MD or NP    reviewed diet, exercise and weight control  reviewed medications and side effects in detail  radiology results and schedule of future radiology studies reviewed with patient

## 2020-03-20 NOTE — PROGRESS NOTES
Progress Note     Subjective:   Ms. Timothy Rodriguez is doing well in the rehab, but complaining of bilateral ankle pain. Ankles seem slightly warm to touch. Denies any fall or injury. She mentions since she has been here for the last three or four days she is having ankle pain. Pain is mild to moderate in intensity, not severe. No fever, no other discomfort. Breathing okay. She is sleeping well too. Objective:    VITALS:  T:  96.8 degrees Fahrenheit. P:  64 per minute. BP:   148/68 mmHg. SaO2:  98% on room air. WT:  165 pounds. HEENT:  No abnormality detected. NECK:  Supple, no JVD, no carotid bruits, no thyromegaly. CHEST:  Chest is clear to auscultation bilaterally. No rales, no rhonchi. CARDIOVASCULAR:  S1, S2 normal.  Regular rate and rhythm. ABDOMEN:  Soft, nontender, nondistended, bowel sounds present. EXTREMITIES:  Trace edema present. Tenderness in bilateral ankles. Ankles seem warm to touch. NEUROLOGICAL:  Alert and oriented x2. No neurological deficits. Assessment and Plan:  1. Bilateral ankle pain, probably gout attack from diuretics. I will give her prednisone 5 mg twice a day for one week. Will monitor her ankle pain, if it gets worse we will get x-rays of ankles. For now, I will discontinue ankle x-ray order. 2. Acute on chronic congestive heart failure with nonischemic cardiomyopathy. Has very low ejection fraction, she is a poor candidate for ICD. She is on Eliquis, Coreg and Hydralazine. Also taking Torsemide. Will continue it for now. 3. Abdominal aortic aneurysm, status post repair. She is doing well with blood pressure under control. 4. Acute kidney injury on CKD. Creatinine 1.42. Will repeat CMP and we will also check uric acid level. THIS IS NOT A COMPLETE MEDICAL RECORD ON THIS PATIENT.    THIS IS A PATIENT AT [facility].    PLEASE CONTACT THE FACILITY LISTED BELOW FOR MORE INFORMATION ON THIS PATIENT.    THE COMPLETE RECORD RESIDES WITH THIS LONG TERM CARE FACILITY

## 2020-03-27 ENCOUNTER — EXTERNAL NURSING HOME DOCUMENTATION (OUTPATIENT)
Dept: INTERNAL MEDICINE CLINIC | Age: 80
End: 2020-03-27

## 2020-03-27 DIAGNOSIS — I50.23 SYSTOLIC CHF, ACUTE ON CHRONIC (HCC): Primary | ICD-10-CM

## 2020-03-27 DIAGNOSIS — I10 ESSENTIAL HYPERTENSION WITH GOAL BLOOD PRESSURE LESS THAN 130/80: ICD-10-CM

## 2020-03-27 DIAGNOSIS — I10 ESSENTIAL HYPERTENSION: ICD-10-CM

## 2020-03-27 DIAGNOSIS — N18.30 STAGE 3 CHRONIC KIDNEY DISEASE (HCC): ICD-10-CM

## 2020-03-27 NOTE — PROGRESS NOTES
Progress Note     Subjective:   Ms. Tom Miner is doing well in the nursing home. She was admitted with congestive heart failure. Right now she has no shortness of breath or orthopnea. She is getting physical therapy and progressing slowly. She has mild confusion today. She has underlying dementia too. Otherwise she is sleeping well too. Objective:    VITALS:  T:  98.2 degrees Fahrenheit. P:  69 per minute. BP:   100/50 mmHg. SaO2:  94% on room air. WT:  165 pounds. HEENT:  No abnormality detected. NECK:  Supple, no JVD, no carotid bruits, no thyromegaly. CHEST:  Chest is clear to auscultation bilaterally. No rales, no rhonchi. CARDIOVASCULAR:  S1, S2 normal.  Regular rate and rhythm. ABDOMEN:  Soft, nontender, nondistended, bowel sounds present. EXTREMITIES:  No edema is noted. Dorsalis pedis pulse normal.       Laboratory Data:   Labs reviewed. Last labs done several days back. BUN was elevated, creatinine 2, hemoglobin 9.5. Assessment and Plan:  1. Mild confusion, can be because of elevated BUN and creatinine. Will reduce her Torsemide to 40 mg a day. Encouraged her to drink more fluids. Will repeat BMP in two days. Also I will check urine to make sure she has no infection. 2. Acute on chronic congestive heart failure, right now compensated on Torsemide twice a day. Will reduce it to once a day. She is also on Coreg and Hydralazine, will continue it. 3. Non ST elevation MI with low ejection fraction, demand ischemia. Patient already has coronary artery disease and she is on aspirin, statin and Eliquis. Will continue it. 4. Acute on chronic kidney disease, right now creatinine is very high. I think she is on too much Torsemide. Will reduce it. Will encourage her to have more fluids. 5. Abdominal aortic aneurysm, status post repair. She is doing well. 6. Ventricular tachycardia, which has subsided. No ICD placed. She is doing okay.     THIS IS NOT A COMPLETE MEDICAL RECORD ON THIS PATIENT.    THIS IS A PATIENT AT [facility].    PLEASE CONTACT THE FACILITY LISTED BELOW FOR MORE INFORMATION ON THIS PATIENT.    THE COMPLETE RECORD RESIDES WITH THIS LONG TERM CARE FACILITY

## 2020-04-17 ENCOUNTER — EXTERNAL NURSING HOME DOCUMENTATION (OUTPATIENT)
Dept: INTERNAL MEDICINE CLINIC | Age: 80
End: 2020-04-17

## 2020-04-17 DIAGNOSIS — I50.82 BIVENTRICULAR FAILURE (HCC): Primary | ICD-10-CM

## 2020-04-17 DIAGNOSIS — E78.2 MIXED HYPERLIPIDEMIA: ICD-10-CM

## 2020-04-17 DIAGNOSIS — I48.0 PAROXYSMAL A-FIB (HCC): ICD-10-CM

## 2020-04-17 DIAGNOSIS — I10 ESSENTIAL HYPERTENSION: ICD-10-CM

## 2020-04-17 NOTE — PROGRESS NOTES
Progress Note     Subjective:   Ms. Elvia Burgos is doing well in the rehab. Noticed to have mild leg edema, but no shortness of breath or orthopnea. She is sleeping well. No other discomfort. Objective:    VITALS:  T:  98.1 degrees Fahrenheit. P:  72 per minute. BP:  130/70 mmHg. SaO2:  97% on room air. HEENT:  No abnormality detected. NECK:  Supple, no JVD, no carotid bruits, no thyromegaly. CHEST:  Chest is clear to auscultation bilaterally. No rales, no rhonchi. CARDIOVASCULAR:  S1, S2 normal.  Regular rate and rhythm. ABDOMEN:  Soft, nontender, nondistended, bowel sounds present. EXTREMITIES:  Bilateral 1+ leg edema present. Dorsalis pedis pulse normal.    NEUROLOGICAL:  Alert and oriented x3. No neurological deficits. Laboratory Data:  Last labs reviewed. Creatinine came down to 1.8, BUN was high. Assessment and Plan:  1. Congestive heart failure with nonischemic cardiomyopathy. Patient's ejection fracture is very low. Was on Torsemide 40 mg once twice a day. Creatinine was elevated, I have changed the dosage of medicine. She is taking Torsemide 20 mg at noon. She seems compensated, but has some leg swelling. We will monitor her right now. She should have leg elevation and low salt diet. 2. Renal insufficiency because of high dose of Torsemide. I have reduced the dosage to Torsemide 40 mg in the morning and 20 mg at noon Monday, Wednesday and Friday. We will repeat labs, CMP. 3. Ventricular tachycardia. ICD was not placed. She is doing well. 4. Abdominal aortic aneurysm, status post AAA repair done. She is doing well. 5. Left breast mass. Patient needs a diagnostic mammogram and biopsy. We will do this once the situation improves. THIS IS NOT A COMPLETE MEDICAL RECORD ON THIS PATIENT.    THIS IS A PATIENT AT [facility].    PLEASE CONTACT THE FACILITY LISTED BELOW FOR MORE INFORMATION ON THIS PATIENT.    THE COMPLETE RECORD RESIDES WITH THIS LONG TERM CARE FACILITY

## 2020-05-01 ENCOUNTER — EXTERNAL NURSING HOME DOCUMENTATION (OUTPATIENT)
Dept: INTERNAL MEDICINE CLINIC | Age: 80
End: 2020-05-01

## 2020-05-01 DIAGNOSIS — I48.0 PAROXYSMAL A-FIB (HCC): ICD-10-CM

## 2020-05-01 DIAGNOSIS — R63.5 WEIGHT GAIN: ICD-10-CM

## 2020-05-01 DIAGNOSIS — I50.82 BIVENTRICULAR FAILURE (HCC): ICD-10-CM

## 2020-05-01 DIAGNOSIS — I50.43 ACUTE ON CHRONIC COMBINED SYSTOLIC AND DIASTOLIC ACC/AHA STAGE C CONGESTIVE HEART FAILURE (HCC): Primary | ICD-10-CM

## 2020-05-01 DIAGNOSIS — I10 ESSENTIAL HYPERTENSION WITH GOAL BLOOD PRESSURE LESS THAN 130/80: ICD-10-CM

## 2020-05-01 NOTE — PROGRESS NOTES
Progress Note     Subjective:   Ms. Magnus Rollins is having increased leg swelling and mild shortness of breath. She has a history of congestive heart failure. I had increased her diuretics, but it is not helping her. She has approximately gained 40 pounds of weight over the past several weeks. No chest pain, no other discomfort. Objective:    VITALS:  T:  98 degrees Fahrenheit. P:  62 per minute. BP:   155/79 mmHg. SaO2:  99% on room air. WT:  201 pounds. She was 154 pounds in the past.  HEENT:  No abnormality detected. NECK:  Supple, no JVD, no carotid bruits, no thyromegaly. CHEST:  Chest is clear to auscultation bilaterally. Fine rales present in the base. CARDIOVASCULAR:  S1, S2 normal.  Mild irregular heartbeat present, seems stable. ABDOMEN:  Soft, nontender, nondistended, bowel sounds present. EXTREMITIES: Bilateral 2+ edema present up to knee. Laboratory Data:  Labs reviewed. Recent lab work showed creatinine 1.61, BUN 42, sodium and potassium levels okay. Assessment and Plan:  1. Acute on chronic congestive heart failure with nonischemic cardiomyopathy. Patient has a very low ejection fraction. She is on Torsemide 40 mg every day and 20 mg three days a week. She is decompensated. We will discontinue Torsemide. We will start her on Bumex 1 mg twice a day for one week and then reassess. We will continue potassium chloride 20 mEq a day. I will also add Metolazone 2.5 mg Monday, Wednesday, Friday. She is on low salt diet. She will need to see her cardiologist, Dr. Michael Bateman, as an outpatient. She is already on Coreg and Hydralazine and she was also placed on Eliquis and statin. She is on low salt diet. 2.  ventricular tachycardia in the hospital.  Patient is on Coreg, heart rate is in the 60s. She has some irregular heartbeats. We will need her to see cardiologist.  3. Non ST elevation MI with non ischemia. Very low ejection fraction.   Patient was placed on aspirin and statin and also on Coreg and Eliquis. 4. CKD. Baseline creatinine was 1.42 . We will monitor kidney function closely. THIS IS NOT A COMPLETE MEDICAL RECORD ON THIS PATIENT.    THIS IS A PATIENT AT [facility].    PLEASE CONTACT THE FACILITY LISTED BELOW FOR MORE INFORMATION ON THIS PATIENT.    THE COMPLETE RECORD RESIDES WITH THIS LONG TERM CARE FACILITY

## 2020-05-08 ENCOUNTER — TELEPHONE (OUTPATIENT)
Dept: CARDIOLOGY CLINIC | Age: 80
End: 2020-05-08

## 2020-05-08 ENCOUNTER — EXTERNAL NURSING HOME DOCUMENTATION (OUTPATIENT)
Dept: INTERNAL MEDICINE CLINIC | Age: 80
End: 2020-05-08

## 2020-05-08 DIAGNOSIS — I48.0 PAROXYSMAL A-FIB (HCC): ICD-10-CM

## 2020-05-08 DIAGNOSIS — N18.30 STAGE 3 CHRONIC KIDNEY DISEASE (HCC): ICD-10-CM

## 2020-05-08 DIAGNOSIS — E78.2 MIXED HYPERLIPIDEMIA: ICD-10-CM

## 2020-05-08 DIAGNOSIS — I10 ESSENTIAL HYPERTENSION WITH GOAL BLOOD PRESSURE LESS THAN 130/80: ICD-10-CM

## 2020-05-08 DIAGNOSIS — I50.43 ACUTE ON CHRONIC COMBINED SYSTOLIC AND DIASTOLIC ACC/AHA STAGE C CONGESTIVE HEART FAILURE (HCC): Primary | ICD-10-CM

## 2020-05-08 NOTE — TELEPHONE ENCOUNTER
Spoke to JOB Mukherjee [facility]. [phone] ext [phone]. Dr. Karel Guerrero requesting follow up visit to access CHF exacerbation/increased creatinine. Patient with increased in SOB, lungs clear. States virtual visit appropriate. Dr. Karel Guerrero to see patient today.      Future Appointments   Date Time Provider Leonides Bishop   5/12/2020  2:30 PM Grecia Kidd  [address]     To fax recent BP readings, medications, weights, etc.

## 2020-05-08 NOTE — PROGRESS NOTES
Progress Note     Subjective:   Ms. Slime Dong is still having leg swelling. She is not short of breath at rest, but she is short of breath with exertion. I am seeing her when she is lying down in the bed flat with two pillows. She is not really short of breath. Leg swelling is still there. No chest pain or palpitations. She complained about her eye, that she has some blurred vision, and she needs to see an eye doctor. Objective:    VITALS:  T:  98.2 degrees Fahrenheit. P:  80 per minute. BP:   144/67 mmHg. SaO2:  99% on room air. WT:  199 pounds. She was 175 pounds one month back. HEENT:  No abnormality detected. NECK:  Supple, no JVD, no carotid bruits, no thyromegaly. CHEST:  Chest is clear to auscultation bilaterally. No rales, no rhonchi. CARDIOVASCULAR:  S1, S2 normal.  Regular rate and rhythm. ABDOMEN:  Soft, nontender, nondistended, bowel sounds present. EXTREMITIES:  Bilateral 1+ pedal edema present. NEUROLOGICAL:  Alert and oriented x3. No neurological deficits. Laboratory Data:  Labs not done yet. Assessment and Plan:  1. Congestive heart failure with nonischemic cardiomyopathy. She has very low ejection fraction. I have changed her diuretic to Bumex 1 mg twice a day, along with Metolazone 2.5 mg Monday, Wednesday and Friday. Waiting for her lab work. Her creatinine was already elevated, so I am really cautious about giving more diuretics. I am waiting for Dr. Lois Chacko appointment. For now she will be on low salt diet. We will monitor her closely, her creatinine. 2. Renal insufficiency. We will repeat CMP. We will readjust dose of diuretics after that. 3. Ventricular tachycardia. ICD was not placed. She had some irregular heart rate. 4. Abdominal aortic aneurysm, status post AAA repair done. She is doing well. We will wait for the lab work. 5. Blurred vision. Will get an eye appointment. THIS IS NOT A COMPLETE MEDICAL RECORD ON THIS PATIENT.    THIS IS A PATIENT AT [facility].    PLEASE CONTACT THE FACILITY LISTED BELOW FOR MORE INFORMATION ON THIS PATIENT.    THE COMPLETE RECORD RESIDES WITH THIS LONG TERM CARE FACILITY

## 2020-05-08 NOTE — TELEPHONE ENCOUNTER
Yuliya german/ [facility] is calling to get the patient scheduled for CHF exacerbation. Please advise.     Phone #: [phone]  Thanks

## 2020-05-12 ENCOUNTER — TELEPHONE (OUTPATIENT)
Dept: CARDIOLOGY CLINIC | Age: 80
End: 2020-05-12

## 2020-05-12 ENCOUNTER — VIRTUAL VISIT (OUTPATIENT)
Dept: CARDIOLOGY CLINIC | Age: 80
End: 2020-05-12

## 2020-05-12 VITALS
BODY MASS INDEX: 37.6 KG/M2 | DIASTOLIC BLOOD PRESSURE: 67 MMHG | HEART RATE: 64 BPM | WEIGHT: 199 LBS | SYSTOLIC BLOOD PRESSURE: 142 MMHG

## 2020-05-12 DIAGNOSIS — I10 ESSENTIAL HYPERTENSION WITH GOAL BLOOD PRESSURE LESS THAN 130/80: ICD-10-CM

## 2020-05-12 DIAGNOSIS — I10 ESSENTIAL HYPERTENSION: ICD-10-CM

## 2020-05-12 DIAGNOSIS — I50.82 BIVENTRICULAR FAILURE (HCC): Primary | ICD-10-CM

## 2020-05-12 DIAGNOSIS — I48.0 PAROXYSMAL A-FIB (HCC): ICD-10-CM

## 2020-05-12 RX ORDER — ISOSORBIDE DINITRATE 10 MG/1
10 TABLET ORAL 3 TIMES DAILY
COMMUNITY
Start: 2020-05-07

## 2020-05-12 RX ORDER — GUAIFENESIN 1200 MG
2 TABLET, EXTENDED RELEASE 12 HR ORAL
COMMUNITY

## 2020-05-12 RX ORDER — ASPIRIN 325 MG
50000 TABLET, DELAYED RELEASE (ENTERIC COATED) ORAL
COMMUNITY
Start: 2020-05-11

## 2020-05-12 RX ORDER — CARVEDILOL 6.25 MG/1
6.25 TABLET ORAL 2 TIMES DAILY WITH MEALS
COMMUNITY
Start: 2020-05-11

## 2020-05-12 RX ORDER — APIXABAN 5 MG/1
5 TABLET, FILM COATED ORAL EVERY 12 HOURS
COMMUNITY
Start: 2020-05-08 | End: 2020-07-23

## 2020-05-12 RX ORDER — METOLAZONE 2.5 MG/1
2.5 TABLET ORAL
COMMUNITY
Start: 2020-05-01 | End: 2020-07-23

## 2020-05-12 RX ORDER — POTASSIUM CHLORIDE 750 MG/1
20 TABLET, EXTENDED RELEASE ORAL DAILY
COMMUNITY
Start: 2020-05-01

## 2020-05-12 RX ORDER — BUMETANIDE 1 MG/1
0.5 TABLET ORAL DAILY
COMMUNITY
Start: 2020-05-08 | End: 2020-06-30

## 2020-05-12 RX ORDER — HYDRALAZINE HYDROCHLORIDE 10 MG/1
10 TABLET, FILM COATED ORAL 3 TIMES DAILY
COMMUNITY
Start: 2020-04-13

## 2020-05-12 NOTE — PROGRESS NOTES
CAV Cardiology Telemedicine Encounter                                                         Pursuant to the emergency declaration under the [facility] waiver authority and the [employer] Act, this Virtual  Visit was conducted, with patient's consent, to reduce the patient's risk of exposure to COVID-19 and provide continuity of care for an established patient. Services were provided through a video synchronous discussion virtually to substitute for in-person clinic visit. Subjective    In the last 6-8 weeks she has noticed more sob weight gain and le edema   no cp reported      HPI  70 y.o. female. Patient with h/o HTN, HLD and  CHF, she is a very poor historian   she was admitted at [facility] on 1 /15 with CHF, AF, underwent dccv and amio rx. This was stopped . Also it was felt she was not a good candidate for anticoagulation on account of falls and non compliance. She did have left and right HC with 30% lad stenosis,60% d1 and EF 30-35%.   Seen by ep it was felt that she was not a candidate at that time for AICD. She also has CKD   Echo on 2/15:Systolic function was moderately reduced by EF (biplane  method of disks). Ejection fraction was estimated to be 33 %. There was  global moderate- severe diffuse hypokinesis. Wall thickness was moderately  increased. There was moderate concentric hypertrophy. Left atrium: The atrium was moderately dilated. Mitral valve: There was moderate thickening of the posterior leaflet. There was severe regurgitation. The regurgitant jet was eccentric and  directed posteriorly. Tricuspid valve: There was moderate regurgitation. Pulmonary artery  systolic pressure: 50 mmHg. There was moderate pulmonary hypertension. Pulmonic valve: There was mild regurgitation.   ECHO on 4/15 : EF 55-60%, mild tr and MR, cpt 2/15 EF/TR and MR have now significantly improved  Echo on 6/15/16:Left ventricle: Size was normal. Systolic function was normal. Ejection  fraction was estimated in the range of 60 % to 65 %. There were no  regional wall motion abnormalities. Wall thickness was mildly increased  (septum) Doppler parameters were consistent with abnormal left ventricular  relaxation (grade 1 diastolic dysfunction). Tricuspid valve: There was mild regurgitation. Pulmonary artery systolic  pressure: 38 mmHg.     Echo on : EF 60% mild mr     Holter on 10/17: NSR  frequent pacs and frequent pvcs (1.1%)     ECHO on :Left ventricle: Systolic function was normal. Ejection fraction was  estimated to be 61 % in the range of 60 % to 65 %. There were no regional  wall motion abnormalities. Doppler parameters were consistent with  abnormal left ventricular relaxation (grade 1 diastolic dysfunction). Left atrium: The atrium was mildly dilated. Mitral valve: There was mild regurgitation. Tricuspid valve: There was mild regurgitation. There was mild pulmonary  hypertension. · DOPPLER VENOUS LE on 3/19:No evidence of acute deep vein thrombosis in the left common femoral, superficial femoral, popliteal, posterior tibial, and peroneal veins. The veins were imaged in the transverse and longitudinal planes. The vessels showed normal color filling and compressibility. Doppler interrogation showed phasic and spontaneous flow. Past Medical History:   Diagnosis Date    A-fib [facility])     Chronic kidney disease     Hypercholesterolemia     Hypertension          No past surgical history on file. Social History     Tobacco Use    Smoking status: Former Smoker     Last attempt to quit: 2013     Years since quittin.3    Smokeless tobacco: Never Used   Substance Use Topics    Alcohol use: No     Alcohol/week: 0.0 standard drinks    Drug use: No       19   ECHO ADULT FOLLOW-UP OR LIMITED 2019    Narrative · Left Ventricle: Normal cavity size. Borderline hypertrophy. Moderate-to-severe global systolic dysfunction. Estimated left ventricular   ejection fraction is 36 - 40%. Visually measured ejection fraction. · Mitral Valve: Mitral valve thickening. · Aortic Valve: Aortic valve sclerosis. Signed by: Marilou Ann MD                     Visit Vitals  /67   Pulse 64   Wt 199 lb (90.3 kg)   BMI 37.60 kg/m²         Wt Readings from Last 3 Encounters:   05/12/20 199 lb (90.3 kg)   12/05/19 191 lb 9.6 oz (86.9 kg)   09/13/19 196 lb (88.9 kg)           Review of Symptoms  11 systems reviewed, negative other than as stated in the HPI    Physical Exam:    Due to this being a TeleHealth evaluation, many elements of the physical examination are unable to be assessed. General: Well developed, in no acute distress, cooperative and alert  HEENT: Pupils equal/round. No marked JVD visible on video. Respiratory: No audible wheezing, no signs of respiratory distress, lips non cyanotic  Extremities:  No edema  Neuro: A&Ox3, speech clear, no facial droop, answering questions appropriately  Skin: Skin color is normal. No rashes or lesions.  Non diaphoretic on visible skin during exam                Lab Results   Component Value Date/Time    Cholesterol, total 226 (H) 03/14/2019 02:14 PM    HDL Cholesterol 48 03/14/2019 02:14 PM    LDL,Direct 163 (H) 12/05/2019 01:04 PM    LDL, calculated 148 (H) 03/14/2019 02:14 PM    VLDL, calculated 30 03/14/2019 02:14 PM    Triglyceride 149 03/14/2019 02:14 PM         Lab Results   Component Value Date/Time    Sodium 142 12/05/2019 01:04 PM    Potassium 3.6 12/05/2019 01:04 PM    Chloride 103 12/05/2019 01:04 PM    CO2 25 12/05/2019 01:04 PM    Glucose 94 12/05/2019 01:04 PM    BUN 15 12/05/2019 01:04 PM    Creatinine 1.04 (H) 12/05/2019 01:04 PM    BUN/Creatinine ratio 14 12/05/2019 01:04 PM    GFR est AA 60 12/05/2019 01:04 PM    GFR est non-AA 52 (L) 12/05/2019 01:04 PM    Calcium 9.5 12/05/2019 01:04 PM         Lab Results   Component Value Date/Time    ALT (SGPT) 11 12/05/2019 01:04 PM    AST (SGOT) 24 12/05/2019 01:04 PM    Alk. phosphatase 74 12/05/2019 01:04 PM    Bilirubin, total 0.3 12/05/2019 01:04 PM         Lab Results   Component Value Date/Time    WBC 5.5 12/05/2019 01:04 PM    HGB 11.6 12/05/2019 01:04 PM    HCT 36.4 12/05/2019 01:04 PM    PLATELET 051 67/20/5727 01:04 PM    MCV 81 12/05/2019 01:04 PM     Current Outpatient Medications on File Prior to Visit   Medication Sig Dispense Refill    ergocalciferol (ERGOCALCIFEROL) 50,000 unit capsule Take 1 Cap by mouth every seven (7) days. 12 Cap 0    ALPRAZolam (XANAX) 0.25 mg tablet Take 0.25 mg by mouth two (2) times daily as needed for Anxiety.  sertraline (ZOLOFT) 50 mg tablet Take 1 Tab by mouth daily. 1 tab po QD 90 Tab 3    furosemide (LASIX) 40 mg tablet TAKE ONE TABLET BY MOUTH TWICE A DAY ON MONDAY WEDNESDAY, FRIDAY, AND SATURDAY AND TAKE 1 TABLET DAILY FOR TUESDAY THURSDAY AND SUNDAY 180 Tab 4    potassium chloride (K-DUR, KLOR-CON) 20 mEq tablet Take 1 Tab by mouth daily. 90 Tab 2    ramipril (ALTACE) 10 mg capsule Take 1 Cap by mouth daily. 90 Cap 2    amLODIPine (NORVASC) 10 mg tablet Take 1 Tab by mouth daily. 90 Tab 2    atorvastatin (LIPITOR) 40 mg tablet Take 1 Tab by mouth daily. 90 Tab 2    carvedilol (COREG) 12.5 mg tablet Take 1 Tab by mouth two (2) times daily (with meals). 180 Tab 2    aspirin delayed-release 81 mg tablet TAKE ONE TABLET BY MOUTH DAILY 90 Tab 4    OTHER Shower chair with back 1 Each 0     No current facility-administered medications on file prior to visit. Assessment  /Plan  :  1.  Cardiomyopathy: Last estimated ejection fraction was 35 to 40%. Etiology of cardiomyopathy unclear at this time. Her signs and symptoms are suggestive of decompensated congestive heart failure with mild degree. This coincided with a lack of diuretics therapy which has been ongoing since March.   Diuretics were resumed at that time but given renal function worsening they were again stopped. I suspect we will need to allow some of the renal dysfunction on diuretics to better control her volume status. I will resume Bumex 1 mg daily. She will continue potassium 20 mEq daily. BM P will be obtained in 1 week. She has not undergone nuclear stress test as previously discussed with her. Continue coreg hydralazine and nitrates    2. Paroxysmal atrial fibrillation: Unable to obtain EKG at this time. This is a virtual visit. Patient is currently on Eliquis. I will recommend stopping aspirin at this time. The patient is in fact severely anemic and will be preferable to avoid both anticoagulants    3. Lower extremity edema: Still present. Likely related to combination of venous insufficiency miles mostly related to her congestive heart failure I suspect. Starting Bumex. 4.  Hyperlipidemia: Closely followed by her primary care physician. 5.  Hypertension: Slightly elevated. Hopefully that will improve as well with the addition of Bumex. 6.  Anemia: Closely followed by her primary care physician    7. CKD: creatinine between 1.6 and 1.4. obtain bmp in 1 week    See her back in virtual visit in 10 days. She will call if she gains more than 2 pounds in 24 hours or 5 pounds within 3 days or if she goes over 200 pounds total after the initiation of Bumex.                     We discussed the expected course, resolution and complications of the diagnosis(es) in detail. Medication risks, benefits, costs, interactions, and alternatives were discussed as indicated. I advised her   to contact the office if her condition worsens, changes or fails to improve as anticipated. she expressed understanding with the diagnosis(es) and plan        Vladislav Talavera MD      Greater than 20 minutes was spent in direct video patient care, planning and chart review. This visit was conducted using [employer] services.

## 2020-05-12 NOTE — PATIENT INSTRUCTIONS
Resume bumex 1 mg daily. Weigh patient daily. Call office for weight increase of greater than 2 lbs in 24 hours or 5 lbs in 3 days or weight greater than 200 lbs. Resume potassium chloride 20 meq daily. Have labs of BMP obtained in 1 week. Our office will call Supa Maki at [facility] at [phone] Ext. [phone]. Stop taking aspirin. Schedule virtual visit in 10 days.

## 2020-05-13 NOTE — PROGRESS NOTES
Discussed virtual visit with Liana Foster at [facility] [phone] Ext [phone]. Virtual visit scheduled. Copy of AVS faxed to Liana Foster at [phone]. Confirmation received.

## 2020-05-22 ENCOUNTER — EXTERNAL NURSING HOME DOCUMENTATION (OUTPATIENT)
Dept: INTERNAL MEDICINE CLINIC | Age: 80
End: 2020-05-22

## 2020-05-22 ENCOUNTER — VIRTUAL VISIT (OUTPATIENT)
Dept: CARDIOLOGY CLINIC | Age: 80
End: 2020-05-22

## 2020-05-22 VITALS
HEART RATE: 60 BPM | SYSTOLIC BLOOD PRESSURE: 153 MMHG | BODY MASS INDEX: 32.97 KG/M2 | WEIGHT: 174.5 LBS | DIASTOLIC BLOOD PRESSURE: 82 MMHG

## 2020-05-22 DIAGNOSIS — N18.30 STAGE 3 CHRONIC KIDNEY DISEASE (HCC): ICD-10-CM

## 2020-05-22 DIAGNOSIS — I10 ESSENTIAL HYPERTENSION WITH GOAL BLOOD PRESSURE LESS THAN 130/80: ICD-10-CM

## 2020-05-22 DIAGNOSIS — I50.43 ACUTE ON CHRONIC COMBINED SYSTOLIC AND DIASTOLIC ACC/AHA STAGE C CONGESTIVE HEART FAILURE (HCC): Primary | ICD-10-CM

## 2020-05-22 DIAGNOSIS — I48.0 PAROXYSMAL A-FIB (HCC): ICD-10-CM

## 2020-05-22 DIAGNOSIS — I10 ESSENTIAL HYPERTENSION: ICD-10-CM

## 2020-05-22 DIAGNOSIS — I50.82 BIVENTRICULAR FAILURE (HCC): Primary | ICD-10-CM

## 2020-05-22 NOTE — PROGRESS NOTES
Progress Note     Subjective:   Ms. Watson Silva is doing better. Finally her leg swelling has improved significantly. No shortness of breath right now. No fever. No other discomfort. Objective:    VITALS:  T:  97.7 degrees Fahrenheit. P:  60 per minute. BP:   132/74 mmHg. SaO2:  96% on room air. WT:  174 pounds. She was 201 pounds. She has lost approximately 25 pounds of weight. HEENT:  No abnormality detected. NECK:  Supple, no JVD, no carotid bruits, no thyromegaly. CHEST:  Chest is clear to auscultation bilaterally. No rales, no rhonchi. CARDIOVASCULAR:  S1, S2 normal.  Regular rate and rhythm. ABDOMEN:  Soft, nontender, nondistended, bowel sounds present. EXTREMITIES:  Bilateral 1+ leg edema, has improved significantly from last time. NEUROLOGICAL:  Alert and oriented x2. No neurological deficits. Assessment and Plan:  1. Congestive heart failure. Leg edema has improved significantly. She is on Bumex 1 mg twice a day. Her CMP was done, creatinine 1.5. I will reduce Bumex slowly, 1 mg every day, and 1 mg at noon time and 1 mg Friday and Saturday. We will repeat BMP in one week. We will monitor her daily weight and salt intake. 2. Renal insufficiency. Creatinine stable. We will monitor it closely. 3. Abdominal aortic aneurysm, status post repair. Hemoglobin has been good. 4. Ventricular tachycardia. Heart rate seems normal right now. THIS IS NOT A COMPLETE MEDICAL RECORD ON THIS PATIENT.    THIS IS A PATIENT AT [facility].    PLEASE CONTACT THE FACILITY LISTED BELOW FOR MORE INFORMATION ON THIS PATIENT.    THE COMPLETE RECORD RESIDES WITH THIS LONG TERM CARE FACILITY

## 2020-05-22 NOTE — PROGRESS NOTES
CAV Cardiology Telemedicine Encounter                                                         Pursuant to the emergency declaration under the [facility] waiver authority and the [employer] Act, this Virtual  Visit was conducted, with patient's consent, to reduce the patient's risk of exposure to COVID-19 and provide continuity of care for an established patient. Services were provided through a video synchronous discussion virtually to substitute for in-person clinic visit. Subjective    She is feeling much better   le edema much improved and she has lost 25 lbs! HPI    70 y. o. female. Patient with h/o HTN, HLD and  CHF, she is a very poor historian   she was admitted at [facility] on 1 /15 with CHF, AF, underwent dccv and amio rx. This was stopped . Also it was felt she was not a good candidate for anticoagulation on account of falls and non compliance. She did have left and right HC with 30% lad stenosis,60% d1 and EF 30-35%.   Seen by ep it was felt that she was not a candidate at that time for AICD. She also has CKD   Echo on 2/15:Systolic function was moderately reduced by EF (biplane  method of disks). Ejection fraction was estimated to be 33 %. There was  global moderate- severe diffuse hypokinesis. Wall thickness was moderately  increased. There was moderate concentric hypertrophy. Left atrium: The atrium was moderately dilated. Mitral valve: There was moderate thickening of the posterior leaflet. There was severe regurgitation. The regurgitant jet was eccentric and  directed posteriorly. Tricuspid valve: There was moderate regurgitation. Pulmonary artery  systolic pressure: 50 mmHg. There was moderate pulmonary hypertension. Pulmonic valve: There was mild regurgitation.   ECHO on 4/15 : EF 55-60%, mild tr and MR, cpt 2/15 EF/TR and MR have now significantly improved  Echo on 6/15/16:Left ventricle: Size was normal. Systolic function was normal. Ejection  fraction was estimated in the range of 60 % to 65 %. There were no  regional wall motion abnormalities. Wall thickness was mildly increased  (septum) Doppler parameters were consistent with abnormal left ventricular  relaxation (grade 1 diastolic dysfunction). Tricuspid valve: There was mild regurgitation. Pulmonary artery systolic  pressure: 38 mmHg.     Echo on 9/17: EF 60% mild mr     Holter on 10/17: NSR  frequent pacs and frequent pvcs (1.1%)     ECHO on 9/18:Left ventricle: Systolic function was normal. Ejection fraction was  estimated to be 61 % in the range of 60 % to 65 %. There were no regional  wall motion abnormalities. Doppler parameters were consistent with  abnormal left ventricular relaxation (grade 1 diastolic dysfunction). Left atrium: The atrium was mildly dilated. Mitral valve: There was mild regurgitation. Tricuspid valve: There was mild regurgitation. There was mild pulmonary  hypertension. · DOPPLER VENOUS LE on 3/19:No evidence of acute deep vein thrombosis in the left common femoral, superficial femoral, popliteal, posterior tibial, and peroneal veins. The veins were imaged in the transverse and longitudinal planes. The vessels showed normal color filling and compressibility. Doppler interrogation showed phasic and spontaneous flow.           09/13/19   ECHO ADULT FOLLOW-UP OR LIMITED 09/13/2019 9/13/2019    Narrative · Left Ventricle: Normal cavity size. Borderline hypertrophy. Moderate-to-severe global systolic dysfunction. Estimated left ventricular   ejection fraction is 36 - 40%. Visually measured ejection fraction. · Mitral Valve: Mitral valve thickening. · Aortic Valve: Aortic valve sclerosis.         Signed by: Deanne Du MD                           Past Medical History:   Diagnosis Date    A[facility]fib [facility])     Chronic kidney disease     Hypercholesterolemia     Hypertension          No past surgical history on file. Social History     Tobacco Use    Smoking status: Former Smoker     Last attempt to quit: 2013     Years since quittin.3    Smokeless tobacco: Never Used   Substance Use Topics    Alcohol use: No     Alcohol/week: 0.0 standard drinks    Drug use: No             Visit Vitals  /82 (BP 1 Location: Right arm, BP Patient Position: Sitting)   Pulse 60   Wt 174 lb 8 oz (79.2 kg)   BMI 32.97 kg/m²         Wt Readings from Last 3 Encounters:   20 174 lb 8 oz (79.2 kg)   20 199 lb (90.3 kg)   19 191 lb 9.6 oz (86.9 kg)           Review of Symptoms  11 systems reviewed, negative other than as stated in the HPI    Physical Exam:    Due to this being a TeleHealth evaluation, many elements of the physical examination are unable to be assessed. General: Well developed, in no acute distress, cooperative and alert  HEENT: Pupils equal/round. No marked JVD visible on video. Respiratory: No audible wheezing, no signs of respiratory distress, lips non cyanotic  Extremities:  No edema  Neuro: A&Ox3, speech clear, no facial droop, answering questions appropriately  Skin: Skin color is normal. No rashes or lesions. Non diaphoretic on visible skin during exam          Current Outpatient Medications on File Prior to Visit   Medication Sig Dispense Refill    Eliquis 5 mg tablet Take 5 mg by mouth every twelve (12) hours.  bumetanide (BUMEX) 1 mg tablet Take 1 mg by mouth daily.  cholecalciferol (VITAMIN D3) (50,000 UNITS /1250 MCG) capsule Take 50,000 Units by mouth every seven (7) days.  hydrALAZINE (APRESOLINE) 10 mg tablet Take 10 mg by mouth three (3) times daily.  isosorbide dinitrate (ISORDIL) 10 mg tablet Take 10 mg by mouth three (3) times daily.  metOLazone (ZAROXOLYN) 2.5 mg tablet Take 2.5 mg by mouth every Monday, Wednesday, Friday.  carvediloL (COREG) 6.25 mg tablet Take 6.25 mg by mouth two (2) times daily (with meals).       potassium chloride (KLOR-CON) 10 mEq tablet Take 20 mEq by mouth daily.  acetaminophen (TylenoL) 325 mg cap Take 2 Tabs by mouth every four to six (4-6) hours as needed.  atorvastatin (LIPITOR) 40 mg tablet Take 1 Tab by mouth daily. 80 Tab 2    OTHER Shower chair with back 1 Each 0     No current facility-administered medications on file prior to visit. Lab Results   Component Value Date/Time    Cholesterol, total 226 (H) 03/14/2019 02:14 PM    HDL Cholesterol 48 03/14/2019 02:14 PM    LDL,Direct 163 (H) 12/05/2019 01:04 PM    LDL, calculated 148 (H) 03/14/2019 02:14 PM    VLDL, calculated 30 03/14/2019 02:14 PM    Triglyceride 149 03/14/2019 02:14 PM         Lab Results   Component Value Date/Time    Sodium 142 12/05/2019 01:04 PM    Potassium 3.6 12/05/2019 01:04 PM    Chloride 103 12/05/2019 01:04 PM    CO2 25 12/05/2019 01:04 PM    Glucose 94 12/05/2019 01:04 PM    BUN 15 12/05/2019 01:04 PM    Creatinine 1.04 (H) 12/05/2019 01:04 PM    BUN/Creatinine ratio 14 12/05/2019 01:04 PM    GFR est AA 60 12/05/2019 01:04 PM    GFR est non-AA 52 (L) 12/05/2019 01:04 PM    Calcium 9.5 12/05/2019 01:04 PM         Lab Results   Component Value Date/Time    ALT (SGPT) 11 12/05/2019 01:04 PM    AST (SGOT) 24 12/05/2019 01:04 PM    Alk. phosphatase 74 12/05/2019 01:04 PM    Bilirubin, total 0.3 12/05/2019 01:04 PM         Lab Results   Component Value Date/Time    WBC 5.5 12/05/2019 01:04 PM    HGB 11.6 12/05/2019 01:04 PM    HCT 36.4 12/05/2019 01:04 PM    PLATELET 379 33/03/2849 01:04 PM    MCV 81 12/05/2019 01:04 PM             Assessment  /Plan  :    1.  Cardiomyopathy: Last estimated ejection fraction was 35 to 40%. Etiology of cardiomyopathy unclear at this time.     chf decompensation resolved . She has lost 25 lbs !     Continue bumex 1 mg daily  Check bmp  Results not yet available  Also on metolazone started by pcp  Consider stopping metolazone if creatinine has increased       She has not undergone nuclear stress test as previously discussed with her.     Continue coreg hydralazine and nitrates     2.  Paroxysmal atrial fibrillation: Unable to obtain EKG at this time. This is a virtual visit. Patient is currently on Eliquis. Stay off asa for now  The patient was  in fact anemic and will be preferable to avoid both anticoagulants     3. Lower extremity edema: much better continue with bumex     4. Hyperlipidemia: Closely followed by her primary care physician.     5. Hypertension: Slightly elevated. Hopefully that will improve as well with the addition of Bumex.     6. Anemia: Closely followed by her primary care physician     7. CKD: creatinine between 1.6 and 1.4. obtain bmp in 1 week     See her back in virtual visit in 1 month    obtain bmp in 10 days                    We discussed the expected course, resolution and complications of the diagnosis(es) in detail. Medication risks, benefits, costs, interactions, and alternatives were discussed as indicated. I advised her   to contact the office if her condition worsens, changes or fails to improve as anticipated. she expressed understanding with the diagnosis(es) and plan        Raquel Stauffer MD      Greater than 20 minutes was spent in direct video patient care, planning and chart review. This visit was conducted using [employer] services.

## 2020-06-12 ENCOUNTER — EXTERNAL NURSING HOME DOCUMENTATION (OUTPATIENT)
Dept: INTERNAL MEDICINE CLINIC | Age: 80
End: 2020-06-12

## 2020-06-12 DIAGNOSIS — N18.30 STAGE 3 CHRONIC KIDNEY DISEASE (HCC): ICD-10-CM

## 2020-06-12 DIAGNOSIS — E55.9 VITAMIN D DEFICIENCY: ICD-10-CM

## 2020-06-12 DIAGNOSIS — I10 ESSENTIAL HYPERTENSION WITH GOAL BLOOD PRESSURE LESS THAN 130/80: Primary | ICD-10-CM

## 2020-06-12 DIAGNOSIS — I50.82 BIVENTRICULAR CONGESTIVE HEART FAILURE (HCC): ICD-10-CM

## 2020-06-12 DIAGNOSIS — R26.9 GAIT ABNORMALITY: ICD-10-CM

## 2020-06-12 NOTE — PROGRESS NOTES
Progress Note     Subjective:   Ms. Garry Stevenson is doing well. She has continued to lose weight. Leg edema has improved significantly. She has lost more weight, she was 199 pounds on the past, right now she is 174 pounds. She is feeling more comfortable. Bumex is helping her a lot. She mentions she has some ankle pain and gait weakness and physical therapy is working with her. Otherwise she is sleeping well. No other discomfort. Objective:    VITALS:  T:  98.9 degrees Fahrenheit. P:  66 per minute. BP:  134/67 mmHg. SaO2:  99% on room air. WT:  174 pounds. HEENT:  No abnormality detected. NECK:  Supple, no JVD, no carotid bruits, no thyromegaly. CHEST:  Chest is clear to auscultation bilaterally. No rales, no rhonchi. CARDIOVASCULAR:  S1, S2 normal.  Regular rate and rhythm. ABDOMEN:  Soft, nontender, nondistended, bowel sounds present. EXTREMITIES:  Trace edema present. Mild tenderness on the left ankle. Range of motion is okay. NEUROLOGICAL:  Alert and oriented x3. No neurological deficits. Laboratory Data:  Labs reviewed. Recent labs showed creatinine is 1.5 and BUN 40. Potassium level normal.    Assessment and Plan:  1. Congestive heart failure. Patient is on Bumex 1 mg once a day. I will repeat BMP to see how her kidney function tests are.  2. Abdominal aortic aneurysm, status post repair. Hemoglobin has been stable. We will repeat CBC also. 3. Ventricular tachycardia, rate and rhythm is controlled right now. She has improved a lot. 4. Left breast mass. Patient is not pursuing any further investigation. THIS IS NOT A COMPLETE MEDICAL RECORD ON THIS PATIENT.    THIS IS A PATIENT AT [facility].    PLEASE CONTACT THE FACILITY LISTED BELOW FOR MORE INFORMATION ON THIS PATIENT.    THE COMPLETE RECORD RESIDES WITH THIS LONG TERM CARE FACILITY

## 2020-06-18 ENCOUNTER — VIRTUAL VISIT (OUTPATIENT)
Dept: CARDIOLOGY CLINIC | Age: 80
End: 2020-06-18

## 2020-06-18 DIAGNOSIS — I10 ESSENTIAL HYPERTENSION: ICD-10-CM

## 2020-06-18 DIAGNOSIS — I50.82 BIVENTRICULAR FAILURE (HCC): ICD-10-CM

## 2020-06-18 DIAGNOSIS — I10 ESSENTIAL HYPERTENSION WITH GOAL BLOOD PRESSURE LESS THAN 130/80: Primary | ICD-10-CM

## 2020-06-18 DIAGNOSIS — I48.0 PAROXYSMAL A-FIB (HCC): ICD-10-CM

## 2020-06-18 NOTE — PROGRESS NOTES
[facility] Cardiology Telemedicine Encounter                                                         Pursuant to the emergency declaration under the [facility], [facility] waiver authority and the [employer] Act, this Virtual  Visit was conducted, with patient's consent, to reduce the patient's risk of exposure to COVID-19 and provide continuity of care for an established patient. Services were provided through a video synchronous discussion virtually to substitute for in-person clinic visit. Subjective            HPI  70 y. o. female. Patient with h/o HTN, HLD and  CHF, she is a very poor historian   she was admitted at [facility] on 1 /15 with CHF, AF, underwent dccv and amio rx. This was stopped . Also it was felt she was not a good candidate for anticoagulation on account of falls and non compliance. She did have left and right HC with 30% lad stenosis,60% d1 and EF 30-35%.   Seen by ep it was felt that she was not a candidate at that time for AICD. She also has CKD   Echo on 2/15:Systolic function was moderately reduced by EF (biplane  method of disks). Ejection fraction was estimated to be 33 %. There was  global moderate- severe diffuse hypokinesis. Wall thickness was moderately  increased. There was moderate concentric hypertrophy. Left atrium: The atrium was moderately dilated. Mitral valve: There was moderate thickening of the posterior leaflet. There was severe regurgitation. The regurgitant jet was eccentric and  directed posteriorly. Tricuspid valve: There was moderate regurgitation. Pulmonary artery  systolic pressure: 50 mmHg. There was moderate pulmonary hypertension. Pulmonic valve: There was mild regurgitation.   ECHO on 4/15 : EF 55-60%, mild tr and MR, cpt 2/15 EF/TR and MR have now significantly improved  Echo on 6/15/16:Left ventricle: Size was normal. Systolic function was normal. Ejection  fraction was estimated in the range of 60 % to 65 %. There were no  regional wall motion abnormalities. Wall thickness was mildly increased  (septum) Doppler parameters were consistent with abnormal left ventricular  relaxation (grade 1 diastolic dysfunction). Tricuspid valve: There was mild regurgitation. Pulmonary artery systolic  pressure: 38 mmHg.     Echo on 9/17: EF 60% mild mr     Holter on 10/17: NSR  frequent pacs and frequent pvcs (1.1%)     ECHO on 9/18:Left ventricle: Systolic function was normal. Ejection fraction was  estimated to be 61 % in the range of 60 % to 65 %. There were no regional  wall motion abnormalities. Doppler parameters were consistent with  abnormal left ventricular relaxation (grade 1 diastolic dysfunction). Left atrium: The atrium was mildly dilated. Mitral valve: There was mild regurgitation. Tricuspid valve: There was mild regurgitation. There was mild pulmonary  hypertension. · DOPPLER VENOUS LE on 3/19:No evidence of acute deep vein thrombosis in the left common femoral, superficial femoral, popliteal, posterior tibial, and peroneal veins. The veins were imaged in the transverse and longitudinal planes. The vessels showed normal color filling and compressibility. Doppler interrogation showed phasic and spontaneous flow.                   09/13/19   ECHO ADULT FOLLOW-UP OR LIMITED 09/13/2019 9/13/2019     Narrative · Left Ventricle: Normal cavity size. Borderline hypertrophy. Moderate-to-severe global systolic dysfunction. Estimated left ventricular   ejection fraction is 36 - 40%. Visually measured ejection fraction. · Mitral Valve: Mitral valve thickening. · Aortic Valve: Aortic valve sclerosis.     Signed by: Christyne Paget, MD               09/13/19   ECHO ADULT FOLLOW-UP OR LIMITED 09/13/2019 9/13/2019    Narrative · Left Ventricle: Normal cavity size. Borderline hypertrophy. Moderate-to-severe global systolic dysfunction.  Estimated left ventricular ejection fraction is 36 - 40%. Visually measured ejection fraction. · Mitral Valve: Mitral valve thickening. · Aortic Valve: Aortic valve sclerosis. Signed by: Waylon Benton MD                           Past Medical History:   Diagnosis Date    A-fib [facility])     Chronic kidney disease     Hypercholesterolemia     Hypertension          No past surgical history on file. Social History     Tobacco Use    Smoking status: Former Smoker     Last attempt to quit: 2013     Years since quittin.4    Smokeless tobacco: Never Used   Substance Use Topics    Alcohol use: No     Alcohol/week: 0.0 standard drinks    Drug use: No             There were no vitals taken for this visit. Wt Readings from Last 3 Encounters:   20 174 lb 8 oz (79.2 kg)   20 199 lb (90.3 kg)   19 191 lb 9.6 oz (86.9 kg)           Review of Symptoms  11 systems reviewed, negative other than as stated in the HPI    Physical Exam:    Due to this being a TeleHealth evaluation, many elements of the physical examination are unable to be assessed. General: Well developed, in no acute distress, cooperative and alert  HEENT: Pupils equal/round. No marked JVD visible on video. Respiratory: No audible wheezing, no signs of respiratory distress, lips non cyanotic  Extremities:  No edema  Neuro: A&Ox3, speech clear, no facial droop, answering questions appropriately  Skin: Skin color is normal. No rashes or lesions. Non diaphoretic on visible skin during exam          Current Outpatient Medications on File Prior to Visit   Medication Sig Dispense Refill    Eliquis 5 mg tablet Take 5 mg by mouth every twelve (12) hours.  bumetanide (BUMEX) 1 mg tablet Take 1 mg by mouth daily.  cholecalciferol (VITAMIN D3) (50,000 UNITS /1250 MCG) capsule Take 50,000 Units by mouth every seven (7) days.  hydrALAZINE (APRESOLINE) 10 mg tablet Take 10 mg by mouth three (3) times daily.       isosorbide dinitrate (ISORDIL) 10 mg tablet Take 10 mg by mouth three (3) times daily.  metOLazone (ZAROXOLYN) 2.5 mg tablet Take 2.5 mg by mouth every Monday, Wednesday, Friday.  carvediloL (COREG) 6.25 mg tablet Take 6.25 mg by mouth two (2) times daily (with meals).  potassium chloride (KLOR-CON) 10 mEq tablet Take 20 mEq by mouth daily.  acetaminophen (TylenoL) 325 mg cap Take 2 Tabs by mouth every four to six (4-6) hours as needed.  atorvastatin (LIPITOR) 40 mg tablet Take 1 Tab by mouth daily. 80 Tab 2    OTHER Shower chair with back 1 Each 0     No current facility-administered medications on file prior to visit. Lab Results   Component Value Date/Time    Cholesterol, total 226 (H) 03/14/2019 02:14 PM    HDL Cholesterol 48 03/14/2019 02:14 PM    LDL,Direct 163 (H) 12/05/2019 01:04 PM    LDL, calculated 148 (H) 03/14/2019 02:14 PM    VLDL, calculated 30 03/14/2019 02:14 PM    Triglyceride 149 03/14/2019 02:14 PM         Lab Results   Component Value Date/Time    Sodium 142 12/05/2019 01:04 PM    Potassium 3.6 12/05/2019 01:04 PM    Chloride 103 12/05/2019 01:04 PM    CO2 25 12/05/2019 01:04 PM    Glucose 94 12/05/2019 01:04 PM    BUN 15 12/05/2019 01:04 PM    Creatinine 1.04 (H) 12/05/2019 01:04 PM    BUN/Creatinine ratio 14 12/05/2019 01:04 PM    GFR est AA 60 12/05/2019 01:04 PM    GFR est non-AA 52 (L) 12/05/2019 01:04 PM    Calcium 9.5 12/05/2019 01:04 PM         Lab Results   Component Value Date/Time    ALT (SGPT) 11 12/05/2019 01:04 PM    Alk. phosphatase 74 12/05/2019 01:04 PM    Bilirubin, total 0.3 12/05/2019 01:04 PM         Lab Results   Component Value Date/Time    WBC 5.5 12/05/2019 01:04 PM    HGB 11.6 12/05/2019 01:04 PM    HCT 36.4 12/05/2019 01:04 PM    PLATELET 288 01/49/3997 01:04 PM    MCV 81 12/05/2019 01:04 PM             Assessment  /Plan  :  1.  Cardiomyopathy: Last estimated ejection fraction was 35 to 40%.   Etiology of cardiomyopathy unclear at this time.     chf decompensation resolved . She has lost 25 lbs !     Continue bumex 1 mg daily  Check bmp  Results not yet available  Also on metolazone started by pcp  Consider stopping metolazone if creatinine has increased        She has not undergone nuclear stress test as previously discussed with her.     Continue coreg hydralazine and nitrates     2.  Paroxysmal atrial fibrillation: Unable to obtain EKG at this time.  This is a virtual visit. Saul Ortiz is currently on Eliquis.    Stay off asa for now  The patient was  in fact anemic and will be preferable to avoid both anticoagulants     3.  Lower extremity edema: much better continue with bumex      4.  Hyperlipidemia: Closely followed by her primary care physician.     5.  Hypertension: Slightly elevated.  Hopefully that will improve as well with the addition of Bumex.     6.  Anemia: Closely followed by her primary care physician     7. CKD: creatinine between 1.6 and 1.4. obtain bmp in 1 week     See her back in virtual visit in 1 month     obtain bmp in 10 days                We discussed the expected course, resolution and complications of the diagnosis(es) in detail. Medication risks, benefits, costs, interactions, and alternatives were discussed as indicated. I advised her   to contact the office if her condition worsens, changes or fails to improve as anticipated. she expressed understanding with the diagnosis(es) and plan        Jose Strong MD      Greater than 20 minutes was spent in direct video patient care, planning and chart review. This visit was conducted using [employer] services.

## 2020-06-30 ENCOUNTER — HOSPITAL ENCOUNTER (INPATIENT)
Age: 80
LOS: 8 days | Discharge: NURSING, REHAB OR LONG-TERM CARE | DRG: 871 | End: 2020-07-08
Attending: EMERGENCY MEDICINE | Admitting: HOSPITALIST
Payer: MEDICARE

## 2020-06-30 ENCOUNTER — APPOINTMENT (OUTPATIENT)
Dept: GENERAL RADIOLOGY | Age: 80
DRG: 871 | End: 2020-06-30
Attending: EMERGENCY MEDICINE
Payer: MEDICARE

## 2020-06-30 ENCOUNTER — APPOINTMENT (OUTPATIENT)
Dept: CT IMAGING | Age: 80
DRG: 871 | End: 2020-06-30
Attending: HOSPITALIST
Payer: MEDICARE

## 2020-06-30 DIAGNOSIS — I34.0 MODERATE MITRAL VALVE REGURGITATION: ICD-10-CM

## 2020-06-30 DIAGNOSIS — J18.9 PNEUMONIA DUE TO INFECTIOUS ORGANISM, UNSPECIFIED LATERALITY, UNSPECIFIED PART OF LUNG: ICD-10-CM

## 2020-06-30 DIAGNOSIS — C50.912 INFILTRATING DUCTAL CARCINOMA OF BREAST, LEFT (HCC): ICD-10-CM

## 2020-06-30 DIAGNOSIS — I07.1 TRICUSPID VALVE INSUFFICIENCY, UNSPECIFIED ETIOLOGY: ICD-10-CM

## 2020-06-30 DIAGNOSIS — I27.20 PULMONARY HYPERTENSION (HCC): ICD-10-CM

## 2020-06-30 DIAGNOSIS — R65.20 SEVERE SEPSIS (HCC): Primary | ICD-10-CM

## 2020-06-30 DIAGNOSIS — I48.0 PAROXYSMAL A-FIB (HCC): ICD-10-CM

## 2020-06-30 DIAGNOSIS — A49.9 ESBL (EXTENDED SPECTRUM BETA-LACTAMASE) PRODUCING BACTERIA INFECTION: ICD-10-CM

## 2020-06-30 DIAGNOSIS — N39.0 URINARY TRACT INFECTION WITHOUT HEMATURIA, SITE UNSPECIFIED: ICD-10-CM

## 2020-06-30 DIAGNOSIS — B96.20 BACTEREMIA DUE TO ESCHERICHIA COLI: ICD-10-CM

## 2020-06-30 DIAGNOSIS — A41.9 SEVERE SEPSIS (HCC): Primary | ICD-10-CM

## 2020-06-30 DIAGNOSIS — I15.9 SECONDARY HYPERTENSION: ICD-10-CM

## 2020-06-30 DIAGNOSIS — Z16.12 ESBL (EXTENDED SPECTRUM BETA-LACTAMASE) PRODUCING BACTERIA INFECTION: ICD-10-CM

## 2020-06-30 DIAGNOSIS — R78.81 BACTEREMIA DUE TO ESCHERICHIA COLI: ICD-10-CM

## 2020-06-30 LAB
ABO + RH BLD: NORMAL
ALBUMIN SERPL-MCNC: 2.4 G/DL (ref 3.5–5)
ALBUMIN/GLOB SERPL: 0.5 {RATIO} (ref 1.1–2.2)
ALP SERPL-CCNC: 70 U/L (ref 45–117)
ALT SERPL-CCNC: 14 U/L (ref 12–78)
ANION GAP SERPL CALC-SCNC: 10 MMOL/L (ref 5–15)
APPEARANCE UR: ABNORMAL
AST SERPL-CCNC: 22 U/L (ref 15–37)
ATRIAL RATE: 60 BPM
BACTERIA URNS QL MICRO: ABNORMAL /HPF
BASOPHILS # BLD: 0 K/UL (ref 0–0.1)
BASOPHILS NFR BLD: 0 % (ref 0–1)
BILIRUB SERPL-MCNC: 1.1 MG/DL (ref 0.2–1)
BILIRUB UR QL CFM: ABNORMAL
BLOOD GROUP ANTIBODIES SERPL: NORMAL
BUN SERPL-MCNC: 59 MG/DL (ref 6–20)
BUN/CREAT SERPL: 22 (ref 12–20)
CALCIUM SERPL-MCNC: 9 MG/DL (ref 8.5–10.1)
CALCULATED R AXIS, ECG10: -21 DEGREES
CALCULATED T AXIS, ECG11: 179 DEGREES
CHLORIDE SERPL-SCNC: 100 MMOL/L (ref 97–108)
CO2 SERPL-SCNC: 29 MMOL/L (ref 21–32)
COLOR UR: ABNORMAL
COMMENT, HOLDF: NORMAL
CREAT SERPL-MCNC: 2.69 MG/DL (ref 0.55–1.02)
DIAGNOSIS, 93000: NORMAL
DIFFERENTIAL METHOD BLD: ABNORMAL
EOSINOPHIL # BLD: 0 K/UL (ref 0–0.4)
EOSINOPHIL NFR BLD: 0 % (ref 0–7)
EPITH CASTS URNS QL MICRO: ABNORMAL /LPF
ERYTHROCYTE [DISTWIDTH] IN BLOOD BY AUTOMATED COUNT: 21.3 % (ref 11.5–14.5)
FERRITIN SERPL-MCNC: 46 NG/ML (ref 8–252)
GLOBULIN SER CALC-MCNC: 5.1 G/DL (ref 2–4)
GLUCOSE SERPL-MCNC: 85 MG/DL (ref 65–100)
GLUCOSE UR STRIP.AUTO-MCNC: NEGATIVE MG/DL
HCT VFR BLD AUTO: 26.9 % (ref 35–47)
HCT VFR BLD AUTO: 28.4 % (ref 35–47)
HGB BLD-MCNC: 7.6 G/DL (ref 11.5–16)
HGB BLD-MCNC: 7.9 G/DL (ref 11.5–16)
HGB UR QL STRIP: ABNORMAL
IMM GRANULOCYTES # BLD AUTO: 0.3 K/UL (ref 0–0.04)
IMM GRANULOCYTES NFR BLD AUTO: 2 % (ref 0–0.5)
INR PPP: 1.4 (ref 0.9–1.1)
KETONES UR QL STRIP.AUTO: NEGATIVE MG/DL
LACTATE SERPL-SCNC: 1.6 MMOL/L (ref 0.4–2)
LDH SERPL L TO P-CCNC: 184 U/L (ref 81–246)
LEUKOCYTE ESTERASE UR QL STRIP.AUTO: ABNORMAL
LYMPHOCYTES # BLD: 0.5 K/UL (ref 0.8–3.5)
LYMPHOCYTES NFR BLD: 3 % (ref 12–49)
MCH RBC QN AUTO: 21.2 PG (ref 26–34)
MCHC RBC AUTO-ENTMCNC: 28.3 G/DL (ref 30–36.5)
MCV RBC AUTO: 74.9 FL (ref 80–99)
MONOCYTES # BLD: 1.5 K/UL (ref 0–1)
MONOCYTES NFR BLD: 10 % (ref 5–13)
NEUTS SEG # BLD: 13.1 K/UL (ref 1.8–8)
NEUTS SEG NFR BLD: 85 % (ref 32–75)
NITRITE UR QL STRIP.AUTO: NEGATIVE
NRBC # BLD: 0 K/UL (ref 0–0.01)
NRBC BLD-RTO: 0 PER 100 WBC
PH UR STRIP: 6 [PH] (ref 5–8)
PLATELET # BLD AUTO: 224 K/UL (ref 150–400)
PMV BLD AUTO: 9.8 FL (ref 8.9–12.9)
POTASSIUM SERPL-SCNC: 3.5 MMOL/L (ref 3.5–5.1)
PROCALCITONIN SERPL-MCNC: 34.9 NG/ML
PROT SERPL-MCNC: 7.5 G/DL (ref 6.4–8.2)
PROT UR STRIP-MCNC: >300 MG/DL
PROTHROMBIN TIME: 14 SEC (ref 9–11.1)
Q-T INTERVAL, ECG07: 370 MS
QRS DURATION, ECG06: 102 MS
QTC CALCULATION (BEZET), ECG08: 457 MS
RBC # BLD AUTO: 3.59 M/UL (ref 3.8–5.2)
RBC #/AREA URNS HPF: >100 /HPF (ref 0–5)
RBC MORPH BLD: ABNORMAL
SAMPLES BEING HELD,HOLD: NORMAL
SODIUM SERPL-SCNC: 139 MMOL/L (ref 136–145)
SP GR UR REFRACTOMETRY: 1.02 (ref 1–1.03)
SPECIMEN EXP DATE BLD: NORMAL
UR CULT HOLD, URHOLD: NORMAL
UROBILINOGEN UR QL STRIP.AUTO: 1 EU/DL (ref 0.2–1)
VENTRICULAR RATE, ECG03: 92 BPM
WBC # BLD AUTO: 15.4 K/UL (ref 3.6–11)
WBC URNS QL MICRO: >100 /HPF (ref 0–4)

## 2020-06-30 PROCEDURE — 82728 ASSAY OF FERRITIN: CPT

## 2020-06-30 PROCEDURE — 87040 BLOOD CULTURE FOR BACTERIA: CPT

## 2020-06-30 PROCEDURE — 87186 SC STD MICRODIL/AGAR DIL: CPT

## 2020-06-30 PROCEDURE — 84145 PROCALCITONIN (PCT): CPT

## 2020-06-30 PROCEDURE — 74011250637 HC RX REV CODE- 250/637

## 2020-06-30 PROCEDURE — 77030019905 HC CATH URETH INTMIT MDII -A

## 2020-06-30 PROCEDURE — 74011000258 HC RX REV CODE- 258: Performed by: EMERGENCY MEDICINE

## 2020-06-30 PROCEDURE — 87086 URINE CULTURE/COLONY COUNT: CPT

## 2020-06-30 PROCEDURE — 74011000258 HC RX REV CODE- 258: Performed by: HOSPITALIST

## 2020-06-30 PROCEDURE — 87635 SARS-COV-2 COVID-19 AMP PRB: CPT

## 2020-06-30 PROCEDURE — 70450 CT HEAD/BRAIN W/O DYE: CPT

## 2020-06-30 PROCEDURE — 96361 HYDRATE IV INFUSION ADD-ON: CPT

## 2020-06-30 PROCEDURE — 85610 PROTHROMBIN TIME: CPT

## 2020-06-30 PROCEDURE — 65660000000 HC RM CCU STEPDOWN

## 2020-06-30 PROCEDURE — 80053 COMPREHEN METABOLIC PANEL: CPT

## 2020-06-30 PROCEDURE — 74011250636 HC RX REV CODE- 250/636: Performed by: HOSPITALIST

## 2020-06-30 PROCEDURE — 74011250636 HC RX REV CODE- 250/636: Performed by: EMERGENCY MEDICINE

## 2020-06-30 PROCEDURE — 96365 THER/PROPH/DIAG IV INF INIT: CPT

## 2020-06-30 PROCEDURE — 93005 ELECTROCARDIOGRAM TRACING: CPT

## 2020-06-30 PROCEDURE — 71045 X-RAY EXAM CHEST 1 VIEW: CPT

## 2020-06-30 PROCEDURE — 99285 EMERGENCY DEPT VISIT HI MDM: CPT

## 2020-06-30 PROCEDURE — 36415 COLL VENOUS BLD VENIPUNCTURE: CPT

## 2020-06-30 PROCEDURE — 85018 HEMOGLOBIN: CPT

## 2020-06-30 PROCEDURE — 85025 COMPLETE CBC W/AUTO DIFF WBC: CPT

## 2020-06-30 PROCEDURE — 87077 CULTURE AEROBIC IDENTIFY: CPT

## 2020-06-30 PROCEDURE — 81001 URINALYSIS AUTO W/SCOPE: CPT

## 2020-06-30 PROCEDURE — 83615 LACTATE (LD) (LDH) ENZYME: CPT

## 2020-06-30 PROCEDURE — 83605 ASSAY OF LACTIC ACID: CPT

## 2020-06-30 PROCEDURE — 86900 BLOOD TYPING SEROLOGIC ABO: CPT

## 2020-06-30 RX ORDER — ACETAMINOPHEN 650 MG/1
SUPPOSITORY RECTAL
Status: COMPLETED
Start: 2020-06-30 | End: 2020-06-30

## 2020-06-30 RX ORDER — ATORVASTATIN CALCIUM 40 MG/1
40 TABLET, FILM COATED ORAL
COMMUNITY

## 2020-06-30 RX ORDER — BUMETANIDE 0.5 MG/1
0.5 TABLET ORAL DAILY
COMMUNITY

## 2020-06-30 RX ORDER — SODIUM CHLORIDE 0.9 % (FLUSH) 0.9 %
5-10 SYRINGE (ML) INJECTION AS NEEDED
Status: DISCONTINUED | OUTPATIENT
Start: 2020-06-30 | End: 2020-07-08 | Stop reason: HOSPADM

## 2020-06-30 RX ORDER — SODIUM CHLORIDE 9 MG/ML
50 INJECTION, SOLUTION INTRAVENOUS CONTINUOUS
Status: DISCONTINUED | OUTPATIENT
Start: 2020-06-30 | End: 2020-07-07

## 2020-06-30 RX ORDER — ACETAMINOPHEN 650 MG/1
650 SUPPOSITORY RECTAL
Status: COMPLETED | OUTPATIENT
Start: 2020-06-30 | End: 2020-06-30

## 2020-06-30 RX ADMIN — ACETAMINOPHEN 650 MG: 650 SUPPOSITORY RECTAL at 12:05

## 2020-06-30 RX ADMIN — SODIUM CHLORIDE 100 ML/HR: 900 INJECTION, SOLUTION INTRAVENOUS at 16:31

## 2020-06-30 RX ADMIN — SODIUM CHLORIDE 1000 ML: 900 INJECTION, SOLUTION INTRAVENOUS at 12:05

## 2020-06-30 RX ADMIN — PIPERACILLIN AND TAZOBACTAM 3.38 G: 3; .375 INJECTION, POWDER, LYOPHILIZED, FOR SOLUTION INTRAVENOUS at 20:08

## 2020-06-30 RX ADMIN — SODIUM CHLORIDE 1000 ML: 900 INJECTION, SOLUTION INTRAVENOUS at 12:06

## 2020-06-30 RX ADMIN — SODIUM CHLORIDE 427 ML: 900 INJECTION, SOLUTION INTRAVENOUS at 12:06

## 2020-06-30 RX ADMIN — PIPERACILLIN AND TAZOBACTAM 3.38 G: 3; .375 INJECTION, POWDER, LYOPHILIZED, FOR SOLUTION INTRAVENOUS at 12:04

## 2020-06-30 NOTE — ED PROVIDER NOTES
HPI     Pt is a 68 y.o. F with PMH of afib, anemia, CKD, HLD, HTN, CAD here from [facility] with c/o fever and altered mental status. The pt is confused thus hx limited. However, pt had temp of 101 and was moved to [facility] unit at facility. She was noted to be altered and only oriented to self. At baseline she is oriented x 3 and able to walk and talk. In addition, she was hypotensive en route. No other known complaints at this time. Past Medical History:   Diagnosis Date    A-fib ([facility]     Anemia     Chronic kidney disease     CKD (chronic kidney disease)     Hypercholesterolemia     Hyperlipidemia     Hypertension     MI (myocardial infarction) ([facility]        History reviewed. No pertinent surgical history.       Family History:   Problem Relation Age of Onset    No Known Problems Mother     No Known Problems Father        Social History     Socioeconomic History    Marital status: SINGLE     Spouse name: Not on file    Number of children: Not on file    Years of education: Not on file    Highest education level: Not on file   Occupational History    Not on file   Social Needs    Financial resource strain: Not on file    Food insecurity     Worry: Not on file     Inability: Not on file    Transportation needs     Medical: Not on file     Non-medical: Not on file   Tobacco Use    Smoking status: Former Smoker     Last attempt to quit: 2013     Years since quittin.4    Smokeless tobacco: Never Used   Substance and Sexual Activity    Alcohol use: No     Alcohol/week: 0.0 standard drinks    Drug use: No    Sexual activity: Never   Lifestyle    Physical activity     Days per week: Not on file     Minutes per session: Not on file    Stress: Not on file   Relationships    Social connections     Talks on phone: Not on file     Gets together: Not on file     Attends [employer] service: Not on file     Active member of club or organization: Not on file     Attends meetings of clubs or organizations: Not on file     Relationship status: Not on file    Intimate partner violence     Fear of current or ex partner: Not on file     Emotionally abused: Not on file     Physically abused: Not on file     Forced sexual activity: Not on file   Other Topics Concern    Not on file   Social History Narrative    Not on file         ALLERGIES: Patient has no known allergies. Review of Systems   Unable to perform ROS: Mental status change       Vitals:    06/30/20 1057 06/30/20 1058 06/30/20 1059 06/30/20 1105   BP:       Pulse: (!) 112 (!) 113  99   Resp: 16 24  28   Temp:       SpO2: 100% 100% 100% 100%   Weight:                Physical Exam  Constitutional:       Appearance: She is well-developed. She is not toxic-appearing. HENT:      Head: Normocephalic and atraumatic. Mouth/Throat:      Mouth: Mucous membranes are dry. Eyes:      Extraocular Movements: Extraocular movements intact. Pupils: Pupils are equal, round, and reactive to light. Comments: Pale conjunctivae   Neck:      Musculoskeletal: Normal range of motion. Cardiovascular:      Rate and Rhythm: Normal rate. Rhythm irregular. Heart sounds: Normal heart sounds. Pulmonary:      Effort: Pulmonary effort is normal.   Chest:      Comments: Left breast abnormal  Abdominal:      General: There is no distension. Palpations: Abdomen is soft. Tenderness: There is no abdominal tenderness. Genitourinary:     Comments: Urine is dark and thick  Musculoskeletal: Normal range of motion. General: No swelling. Skin:     General: Skin is warm and dry. Capillary Refill: Capillary refill takes less than 2 seconds. Findings: No rash. Neurological:      Mental Status: She is alert. GCS: GCS eye subscore is 4. GCS verbal subscore is 4. GCS motor subscore is 6. MDM       Procedures    ED EKG interpretation:  Atrial fibrillation, irregular, rate 92. No p waves identified.   QRS duration prolonged slightly. Normal axis. No STEMI but non specific T wave abnormality in lateral leads. EKG documented by dEmund Rodriguez MD, scribe, as interpreted by Chela Stevens MD, ED MD.    . Hospitalist Diana Serve for Admission  12:34 PM    ED Room Number: ER17/17  Patient Name and age: Preet Shi 68 y.o.  female  Working Diagnosis:   1. Severe sepsis ([address]    2. Urinary tract infection without hematuria, site unspecified    3. Pneumonia due to infectious organism, unspecified laterality, unspecified part of lung        COVID-19 Suspicion:  yes    Code Status:  Full Code unknown but pt is altered  Readmission: no  Isolation Requirements:  yes  Recommended Level of Care:  telemetry  Department:[facility] Adult ED - 21   Other:  Pt from [address] care with fever. Altered mental status. Was hypotensive but improving with IVF. Normal lactate. But leukocytosis with abnormal urine and CXR. DEVON.      Edmund Rodriguez MD

## 2020-06-30 NOTE — PROGRESS NOTES
1650: Verbal shift change report given to [address] (oncoming nurse) by Bean Gan (offgoing nurse). Report included the following information SBAR, Kardex, MAR, Recent Results and Cardiac Rhythm A fib. 1930: Bedside and Verbal shift change report given to Lisa Sauceda (oncoming nurse) by [address] (offgoing nurse). Report included the following information SBAR, Kardex, MAR, Recent Results and Cardiac Rhythm A fib.

## 2020-06-30 NOTE — PROGRESS NOTES
Problem: Falls - Risk of  Goal: *Absence of Falls  Description: Document Roger Meka Fall Risk and appropriate interventions in the flowsheet.   Outcome: Progressing Towards Goal  Note: Fall Risk Interventions:       Mentation Interventions: Bed/chair exit alarm, Room close to nurse's station, Increase mobility, More frequent rounding         Elimination Interventions: Bed/chair exit alarm, Call light in reach, Patient to call for help with toileting needs              Problem: Patient Education: Go to Patient Education Activity  Goal: Patient/Family Education  Outcome: Progressing Towards Goal

## 2020-06-30 NOTE — ROUTINE PROCESS
TRANSFER - OUT REPORT: 
 
Verbal report given to Renetta Valdez (name) on Valentina Acosta  being transferred to [facility] room 421 (unit) for routine progression of care Report consisted of patients Situation, Background, Assessment and  
Recommendations(SBAR). Information from the following report(s) SBAR and ED Summary was reviewed with the receiving nurse. Lines:  
Peripheral IV 06/30/20 Left Hand (Active) Peripheral IV 06/30/20 Right Hand (Active) Peripheral IV 06/30/20 Left Antecubital (Active) Opportunity for questions and clarification was provided. Patient transported with: 
 [place]

## 2020-06-30 NOTE — PROGRESS NOTES
Admission Medication Reconciliation:        Spoke with Ella Brown (LPN at [facility]) by telephone @ [phone], unable to speak with patient face to face at this time due to general isolation precautions in the ED related to COVID-19 pandemic. Ella Brown is a reliable historian. RX query is available at this time. Medication changes (since last review):  Revised:  Atorvastatin to QHS  Bumex dose from 1 mg to 0.5 mg    Thank you for allowing me to participate in the care of your patient. Ananya Ayon PharmD, RN # [phone]     [facility] pharmacy benefit data reflects medications filled and processed through the patient's insurance, however   this data does NOT capture whether the medication was picked up or is currently being taken by the patient. Allergies:  Patient has no known allergies. Significant PMH/Disease States:   Past Medical History:   Diagnosis Date    A-fib ([address]     Anemia     Chronic kidney disease     CKD (chronic kidney disease)     Hypercholesterolemia     Hyperlipidemia     Hypertension     MI (myocardial infarction) [facility])      Chief Complaint for this Admission:    Chief Complaint   Patient presents with    Altered mental status     Prior to Admission Medications:   Prior to Admission Medications   Prescriptions Last Dose Informant Taking? Eliquis 5 mg tablet 6/29/2020 at Unknown time  Yes   Sig: Take 5 mg by mouth every twelve (12) hours. acetaminophen (TylenoL) 325 mg cap   No   Sig: Take 2 Tabs by mouth every four to six (4-6) hours as needed. atorvastatin (LIPITOR) 40 mg tablet 6/29/2020 at Unknown time  Yes   Sig: Take 40 mg by mouth nightly. bumetanide (BUMEX) 0.5 mg tablet 6/29/2020 at Unknown time  Yes   Sig: Take 0.5 mg by mouth daily. carvediloL (COREG) 6.25 mg tablet 6/29/2020 at Unknown time  Yes   Sig: Take 6.25 mg by mouth two (2) times daily (with meals).    cholecalciferol (VITAMIN D3) (50,000 UNITS /1250 MCG) capsule 6/29/2020  No   Sig: Take 50,000 Units by mouth every seven (7) days. hydrALAZINE (APRESOLINE) 10 mg tablet 6/29/2020 at Unknown time  Yes   Sig: Take 10 mg by mouth three (3) times daily. isosorbide dinitrate (ISORDIL) 10 mg tablet 6/29/2020 at Unknown time  Yes   Sig: Take 10 mg by mouth three (3) times daily. metOLazone (ZAROXOLYN) 2.5 mg tablet 6/29/2020 at Unknown time  Yes   Sig: Take 2.5 mg by mouth every Monday, Wednesday, Friday. potassium chloride (KLOR-CON) 10 mEq tablet 6/29/2020 at Unknown time  Yes   Sig: Take 20 mEq by mouth daily. Facility-Administered Medications: None       Please contact the main inpatient pharmacy with any questions or concerns at [phone] and we will direct you to the clinical pharmacist covering this patient's care while in-house.    ISAIAH Sinclair

## 2020-06-30 NOTE — H&P
[facility] [facility] Adult  Hospitalist Group  History and Physical    Primary Care Provider: Anny Parker MD  Date of Service:  6/30/2020    Subjective: Norberto Moya is a 68 y.o. female with PMH of atrial fib,CKD, anemia, MI and other comorbidities was sent to the hospital from a facility because of altered mental status and fever. Patient is a poor historian, unable to provide much history-states that she is here for hypertension . Per chart review, patient had fever of 101 and was recently moved to COVID unit at the facility. They also noted that patient's mental status was not at baseline. Blood pressure was in systolic 19A. She was sent to emergency room for further evaluation. In the emergency room, she received IV fluids and IV antibiotics. COVID test was done hospitalist was consulted for admission. Review of Systems:  Unable to obtain due to patient's mental status. Past Medical History:   Diagnosis Date    [facility] ([facility]     Anemia     Chronic kidney disease     CKD (chronic kidney disease)     Hypercholesterolemia     Hyperlipidemia     Hypertension     [facility]       History reviewed. No pertinent surgical history. Prior to Admission medications    Medication Sig Start Date End Date Taking? Authorizing Provider   atorvastatin (LIPITOR) 40 mg tablet Take 40 mg by mouth nightly. Yes Provider, Historical   bumetanide (BUMEX) 0.5 mg tablet Take 0.5 mg by mouth daily. Yes Provider, Historical   Eliquis 5 mg tablet Take 5 mg by mouth every twelve (12) hours. 5/8/20  Yes Provider, Historical   hydrALAZINE (APRESOLINE) 10 mg tablet Take 10 mg by mouth three (3) times daily. 4/13/20  Yes Provider, Historical   isosorbide dinitrate (ISORDIL) 10 mg tablet Take 10 mg by mouth three (3) times daily. 5/7/20  Yes Provider, Historical   metOLazone (ZAROXOLYN) 2.5 mg tablet Take 2.5 mg by mouth every Monday, Wednesday, Friday.  5/1/20  Yes Provider, Historical   carvediloL (COREG) 6.25 mg tablet Take 6.25 mg by mouth two (2) times daily (with meals). 5/11/20  Yes Provider, Historical   potassium chloride (KLOR-CON) 10 mEq tablet Take 20 mEq by mouth daily. 5/1/20  Yes Provider, Historical   cholecalciferol (VITAMIN D3) (50,000 UNITS /1250 MCG) capsule Take 50,000 Units by mouth every seven (7) days. 5/11/20   Provider, Historical   acetaminophen (TylenoL) 325 mg cap Take 2 Tabs by mouth every four to six (4-6) hours as needed. Provider, Historical     No Known Allergies     Unable to obtain family history due to patient's mental status  SOCIAL HISTORY:  Patient resides at facility  Patient ambulates independently  Smoking history: Per chart review she was a former smoker and quit few years ago. Alcohol history: Does not drink alcohol        Objective:       Physical Exam:   Gen:   Elderly patient, confused  HEENT:   EOMI,anicteric, no pallor,hearing intact to voice, moist mucous membranes  Neck:  Supple, without masses, thyroid non-tender  Resp:   Decreased breath sounds at bases, no wheezing  Card:  No murmurs, normal S1, S2 without thrills, bruits  Abd:  Soft, non-tender, non-distended, normoactive bowel sounds are present  Lymph:  No palpable lymph nodes  Musc:   No lower extremity edema  Skin: Chronic skin discoloration on lower extremity  Neuro:   Alert and oriented x2-knows her name and date of birth, states that she is in the hospital.  Moves all extremities she was following commands in consistently  Psych:   Not anxious or agitated         Data Review: All diagnostic labs and studies have been reviewed. Xr Chest Port    Result Date: 6/30/2020  Impression: Mild parenchymal opacity right middle lobe may represent acute infiltrate. Correlation with frontal and lateral views may be helpful.          Assessment:     Active Problems:    Sepsis ([facility] (6/30/2020)      #Sepsis:  #Urinary tract infection  #Pneumonia-right middle lobe infiltrate  -Patient has fever and leukocytosis. -Currently on Zosyn. Blood cultures and urine cultures pending  -SARS-CoV-2 pending.  -Continue IV fluids.  -Check pro-Kody, ferritin, LDH.  -Sepsis reassessment completed    #Acute metabolic encephalopathy due to infection, uremia  -Tx as above  -Obtain CT head. #Acute renal failure on CKD:  -Baseline creatinine-1-1.2  -Creatinine is 2.6 today.  -Likely due to sepsis, on diuretics prior to admission  -Obtain ultrasound of the kidneys. #Anemia:  -Hb 7.6 this morning which was 11.6 six months ago  -She has hematuria UA positive for >100 RBC     #hematuria likely due to UTI and anticoagulation  -hold anticoagulation. #Chronic atrial fibrillation  -Hold anticoagulation as she has anemia and hematuria  -She is currently rate controlled    #Hypertension-blood pressure soft hold antihypertensives. I called patient's medical power of  Mr. Graciela Christie-discussed what resuscitation means in case of cardiac or respiratory arrest, he wants patient to remain full code.       Signed By: Felicia Real MD     June 30, 2020

## 2020-06-30 NOTE — ED TRIAGE NOTES
Pt arrives via EMS [facility]. She had a fever of 101 F and was moved to the covid unit at facility. Staff noted that she was altered, oriented only to self, and normally walks and talks at baseline. BG in 130s, hypotensive with  SBP in 80s.

## 2020-07-01 ENCOUNTER — APPOINTMENT (OUTPATIENT)
Dept: ULTRASOUND IMAGING | Age: 80
DRG: 871 | End: 2020-07-01
Attending: HOSPITALIST
Payer: MEDICARE

## 2020-07-01 ENCOUNTER — APPOINTMENT (OUTPATIENT)
Dept: GENERAL RADIOLOGY | Age: 80
DRG: 871 | End: 2020-07-01
Attending: HOSPITALIST
Payer: MEDICARE

## 2020-07-01 LAB
ALBUMIN SERPL-MCNC: 2.1 G/DL (ref 3.5–5)
ALBUMIN SERPL-MCNC: 2.2 G/DL (ref 3.5–5)
ALBUMIN/GLOB SERPL: 0.4 {RATIO} (ref 1.1–2.2)
ALP SERPL-CCNC: 62 U/L (ref 45–117)
ALT SERPL-CCNC: 13 U/L (ref 12–78)
ANION GAP SERPL CALC-SCNC: 7 MMOL/L (ref 5–15)
ANION GAP SERPL CALC-SCNC: 9 MMOL/L (ref 5–15)
AST SERPL-CCNC: 23 U/L (ref 15–37)
BASOPHILS # BLD: 0 K/UL (ref 0–0.1)
BASOPHILS NFR BLD: 0 % (ref 0–1)
BILIRUB SERPL-MCNC: 0.8 MG/DL (ref 0.2–1)
BUN SERPL-MCNC: 59 MG/DL (ref 6–20)
BUN SERPL-MCNC: 62 MG/DL (ref 6–20)
BUN/CREAT SERPL: 22 (ref 12–20)
BUN/CREAT SERPL: 23 (ref 12–20)
CALCIUM SERPL-MCNC: 8.1 MG/DL (ref 8.5–10.1)
CALCIUM SERPL-MCNC: 8.3 MG/DL (ref 8.5–10.1)
CHLORIDE SERPL-SCNC: 106 MMOL/L (ref 97–108)
CHLORIDE SERPL-SCNC: 106 MMOL/L (ref 97–108)
CO2 SERPL-SCNC: 26 MMOL/L (ref 21–32)
CO2 SERPL-SCNC: 27 MMOL/L (ref 21–32)
COMMENT, HOLDF: NORMAL
COMMENT, HOLDF: NORMAL
CREAT SERPL-MCNC: 2.65 MG/DL (ref 0.55–1.02)
CREAT SERPL-MCNC: 2.66 MG/DL (ref 0.55–1.02)
DIFFERENTIAL METHOD BLD: ABNORMAL
EOSINOPHIL # BLD: 0 K/UL (ref 0–0.4)
EOSINOPHIL NFR BLD: 0 % (ref 0–7)
ERYTHROCYTE [DISTWIDTH] IN BLOOD BY AUTOMATED COUNT: 21.3 % (ref 11.5–14.5)
GLOBULIN SER CALC-MCNC: 4.9 G/DL (ref 2–4)
GLUCOSE SERPL-MCNC: 103 MG/DL (ref 65–100)
GLUCOSE SERPL-MCNC: 93 MG/DL (ref 65–100)
HCT VFR BLD AUTO: 26.2 % (ref 35–47)
HGB BLD-MCNC: 7.3 G/DL (ref 11.5–16)
IMM GRANULOCYTES # BLD AUTO: 0.1 K/UL (ref 0–0.04)
IMM GRANULOCYTES NFR BLD AUTO: 1 % (ref 0–0.5)
LYMPHOCYTES # BLD: 0.6 K/UL (ref 0.8–3.5)
LYMPHOCYTES NFR BLD: 4 % (ref 12–49)
MCH RBC QN AUTO: 21.3 PG (ref 26–34)
MCHC RBC AUTO-ENTMCNC: 27.9 G/DL (ref 30–36.5)
MCV RBC AUTO: 76.4 FL (ref 80–99)
MONOCYTES # BLD: 1.7 K/UL (ref 0–1)
MONOCYTES NFR BLD: 12 % (ref 5–13)
NEUTS SEG # BLD: 11.5 K/UL (ref 1.8–8)
NEUTS SEG NFR BLD: 83 % (ref 32–75)
NRBC # BLD: 0 K/UL (ref 0–0.01)
NRBC BLD-RTO: 0 PER 100 WBC
PHOSPHATE SERPL-MCNC: 3.9 MG/DL (ref 2.6–4.7)
PHOSPHATE SERPL-MCNC: 3.9 MG/DL (ref 2.6–4.7)
PLATELET # BLD AUTO: 193 K/UL (ref 150–400)
PMV BLD AUTO: 9.6 FL (ref 8.9–12.9)
POTASSIUM SERPL-SCNC: 3.8 MMOL/L (ref 3.5–5.1)
POTASSIUM SERPL-SCNC: 3.9 MMOL/L (ref 3.5–5.1)
PROT SERPL-MCNC: 7.1 G/DL (ref 6.4–8.2)
RBC # BLD AUTO: 3.43 M/UL (ref 3.8–5.2)
RBC MORPH BLD: ABNORMAL
SAMPLES BEING HELD,HOLD: NORMAL
SAMPLES BEING HELD,HOLD: NORMAL
SARS-COV-2, COV2: NOT DETECTED
SARS-COV-2, COV2: NOT DETECTED
SODIUM SERPL-SCNC: 140 MMOL/L (ref 136–145)
SODIUM SERPL-SCNC: 141 MMOL/L (ref 136–145)
SOURCE, COVRS: NORMAL
SOURCE, COVRS: NORMAL
SPECIMEN SOURCE, FCOV2M: NORMAL
SPECIMEN SOURCE, FCOV2M: NORMAL
WBC # BLD AUTO: 13.9 K/UL (ref 3.6–11)

## 2020-07-01 PROCEDURE — 74011250636 HC RX REV CODE- 250/636: Performed by: HOSPITALIST

## 2020-07-01 PROCEDURE — 80053 COMPREHEN METABOLIC PANEL: CPT

## 2020-07-01 PROCEDURE — 71045 X-RAY EXAM CHEST 1 VIEW: CPT

## 2020-07-01 PROCEDURE — 77030005513 HC CATH URETH FOL11 MDII -B

## 2020-07-01 PROCEDURE — 51798 US URINE CAPACITY MEASURE: CPT

## 2020-07-01 PROCEDURE — 87635 SARS-COV-2 COVID-19 AMP PRB: CPT

## 2020-07-01 PROCEDURE — 84100 ASSAY OF PHOSPHORUS: CPT

## 2020-07-01 PROCEDURE — 87899 AGENT NOS ASSAY W/OPTIC: CPT

## 2020-07-01 PROCEDURE — 80069 RENAL FUNCTION PANEL: CPT

## 2020-07-01 PROCEDURE — 65660000000 HC RM CCU STEPDOWN

## 2020-07-01 PROCEDURE — 36415 COLL VENOUS BLD VENIPUNCTURE: CPT

## 2020-07-01 PROCEDURE — 87449 NOS EACH ORGANISM AG IA: CPT

## 2020-07-01 PROCEDURE — 85025 COMPLETE CBC W/AUTO DIFF WBC: CPT

## 2020-07-01 PROCEDURE — 74011250637 HC RX REV CODE- 250/637: Performed by: HOSPITALIST

## 2020-07-01 PROCEDURE — 74011000258 HC RX REV CODE- 258: Performed by: HOSPITALIST

## 2020-07-01 RX ORDER — CARVEDILOL 6.25 MG/1
6.25 TABLET ORAL 2 TIMES DAILY WITH MEALS
Status: DISCONTINUED | OUTPATIENT
Start: 2020-07-01 | End: 2020-07-08 | Stop reason: HOSPADM

## 2020-07-01 RX ADMIN — SODIUM CHLORIDE 100 ML/HR: 900 INJECTION, SOLUTION INTRAVENOUS at 16:07

## 2020-07-01 RX ADMIN — SODIUM CHLORIDE 100 ML/HR: 900 INJECTION, SOLUTION INTRAVENOUS at 03:25

## 2020-07-01 RX ADMIN — PIPERACILLIN AND TAZOBACTAM 3.38 G: 3; .375 INJECTION, POWDER, LYOPHILIZED, FOR SOLUTION INTRAVENOUS at 20:32

## 2020-07-01 RX ADMIN — CARVEDILOL 6.25 MG: 6.25 TABLET, FILM COATED ORAL at 17:22

## 2020-07-01 RX ADMIN — PIPERACILLIN AND TAZOBACTAM 3.38 G: 3; .375 INJECTION, POWDER, LYOPHILIZED, FOR SOLUTION INTRAVENOUS at 08:42

## 2020-07-01 NOTE — PROGRESS NOTES
Problem: Falls - Risk of  Goal: *Absence of Falls  Description: Document [place] Fall Risk and appropriate interventions in the flowsheet. Outcome: Progressing Towards Goal  Note: Fall Risk Interventions:  Mobility Interventions: Communicate number of staff needed for ambulation/transfer, Patient to call before getting OOB, PT Consult for mobility concerns, Strengthening exercises (ROM-active/passive)    Mentation Interventions: Bed/chair exit alarm         Elimination Interventions: Bed/chair exit alarm              Problem: Patient Education: Go to Patient Education Activity  Goal: Patient/Family Education  Outcome: Progressing Towards Goal     Problem: Pressure Injury - Risk of  Goal: *Prevention of pressure injury  Description: Document Pratik Scale and appropriate interventions in the flowsheet. Outcome: Progressing Towards Goal  Note: Pressure Injury Interventions:       Moisture Interventions: Absorbent underpads    Activity Interventions: Increase time out of bed    Mobility Interventions: Pressure redistribution bed/mattress (bed type)    Nutrition Interventions: Document food/fluid/supplement intake    Friction and Shear Interventions: HOB 30 degrees or less, Minimize layers                Problem: Patient Education: Go to Patient Education Activity  Goal: Patient/Family Education  Outcome: Progressing Towards Goal       Verbal shift change report given to Rita Peacock (oncoming nurse) by Felicia Marrero (offgoing nurse). Report included the following information SBAR, Kardex, ED Summary, Intake/Output, MAR, Accordion, Recent Results, Med Rec Status, Cardiac Rhythm Afibb, Alarm Parameters  and Quality Measures.

## 2020-07-01 NOTE — ACP (ADVANCE CARE PLANNING)
[facility] [facility] Adult  Hospitalist Group                                      Advance Care Planning Note    Name: Onofre Haque  YOB: 1940  MRN: [id number]  Admission Date: 6/30/2020 10:46 AM    Date of discussion: 6/30/2020    Active Diagnoses:  #Sepsis  #Acute renal failure  #UTI  #Pneumonia  #Acute encephalopathy    These active diagnoses are of sufficient risk that focused discussion on advance care planning is indicated in order to allow the patient to thoughtfully consider personal goals of care, and if situations arise that prevent the ability to personally give input, to ensure appropriate representation of their personal desires for different levels and aggressiveness of care. Discussion:     Persons present and participating in discussion:  Gonsalo Hernandez MD,    Patient is unable to participate in medical decision making. She has advance care  Directive listing Vonnie Eastman at [facility]. I spoke to Anderson Ballesteros - he told me that patient at baseline has some memory issues. I discussed with him that patient is now admitted to the hospital with multiple medical issues. I asked him about what patient's wishes were in case of cardiac or respiratory arrest. Discussed in detail about what resuscitation means (chest compression,shocks,mech ventilator etc). He told me that patient Ms. Silvestre Valente should be resuscitated with all measures. Full code. Discussion happened over the phone     Time Spent:     Total time spent face-to-face in education and discussion: 18 minutes.      Gonsalo Hernandez MD  Date of Service:  6/30/2020  8:32 PM

## 2020-07-01 NOTE — CDMP QUERY
Pt admitted with Sepsis and UTI and has Anemia documented. Please further specify type and acuity of anemia in the medical record. ? Anemia due to acute blood loss ? Anemia due to chronic blood loss ? Anemia due to chronic disease, please specify disease ? Dilutional anemia 
? Other, please specify ? Clinically unable to determine The medical record reflects the following: 
   Risk Factors: admitted with Hematuria, hx of CKD Clinical Indicators: , BP 86/42 on admission, with Hematuria noted, and RBC >100 on U, stool Heme pending. Hgb 7.6 on admission, and was 11.6 six months ago. Documentation that the Anemia is likely d/t UTI and Anticoagulation. Treatment: daily CBC, has been typed and crossed, multiple IVF bolus. Thank you, Kirsten MCNAIR Clinical Documentation Improvement [phone]

## 2020-07-01 NOTE — PROGRESS NOTES
Problem: Falls - Risk of  Goal: *Absence of Falls  Description: Document Arlene Reynoso Fall Risk and appropriate interventions in the flowsheet. Outcome: Progressing Towards Goal  Note: Fall Risk Interventions:  Mobility Interventions: Communicate number of staff needed for ambulation/transfer, Patient to call before getting OOB    Mentation Interventions: Reorient patient, Adequate sleep, hydration, pain control    Medication Interventions: Patient to call before getting OOB    Elimination Interventions: Call light in reach              Problem: Pressure Injury - Risk of  Goal: *Prevention of pressure injury  Description: Document Pratik Scale and appropriate interventions in the flowsheet. Outcome: Progressing Towards Goal  Note: Pressure Injury Interventions:       Moisture Interventions: Absorbent underpads, Minimize layers, Internal/External urinary devices    Activity Interventions: Pressure redistribution bed/mattress(bed type)    Mobility Interventions: Pressure redistribution bed/mattress (bed type), Turn and reposition approx. every two hours(pillow and wedges)    Nutrition Interventions: Document food/fluid/supplement intake    Friction and Shear Interventions: Minimize layers                Problem: Chronic Renal Failure  Goal: *Fluid and electrolytes stabilized  Outcome: Progressing Towards Goal     Problem: Hypertension  Goal: *Blood pressure within specified parameters  Outcome: Progressing Towards Goal  Note: Patient's blood pressure to remain under 120/80. Patient is currently taking coreg. Problem: Infection - Risk of, Urinary Catheter-Associated Urinary Tract Infection  Goal: *Absence of infection signs and symptoms  Outcome: Progressing Towards Goal  Note: Patient reman free of infection and fevers.      Problem: Fluid Volume - Risk of, Imbalanced  Goal: *Balanced intake and output  Outcome: Progressing Towards Goal  Note: Patient has a rivera catheter placed for accurate measurement of urinary output.

## 2020-07-01 NOTE — PROGRESS NOTES
[facility] Adult  Hospitalist Group                                                                                          Hospitalist Progress Note  Fatoumata Jimenez MD  Answering service: [phone] from in house phone        Date of Service:  2020  NAME:  Joey Houser  :  1940  MRN:  [id number]      Admission Summary:   Joey Houser is a 68 y.o. female with PMH of atrial fib,CKD, anemia, MI and other comorbidities was sent to the hospital from a facility because of altered mental status and fever.       Per chart review, patient had fever of 101 and was recently moved to COVID unit at the facility. They also noted that patient's mental status was not at baseline. Blood pressure was in systolic 49O. She was sent to emergency room for further evaluation. Interval history / Subjective:   COVID first test negative retesting reviewed chart discussed with RN  And has CKD which is worsen as well as blood in the urine from possible anticoagulation use and hemoglobin trended down to 7.6      Assessment & Plan:     #Sepsis: With bacteremia gram-negative rods- GRAM NEGATIVE RODS GROWING IN ALL 4 BOTTLES DRAWN ( AND [facility] SITES)   #Urinary tract infection  -Patient has fever and leukocytosis. -Currently on Zosyn.   Blood cultures and urine cultures sensitivity pending    #Pneumonia-right middle lobe infiltrate  -SARS-CoV-2 first 1- retesting   -zinc and ascorbic acid patient is still not requiring any oxygen consulted ID and pulmonary for possible other intervention with rendesivir and convalescent plasma  -Continue IV fluids.  -Check pro-Kody, ferritin, LDH.     #Acute metabolic encephalopathy due to infection, uremia  -Tx as above improving  -Obtain CT head- Volume loss and white matter disease but no acute intracranial abnormality.     #Acute renal failure on CKD:  -Baseline creatinine-1-1.2  -Creatinine is 2.6 today.  -Obtain ultrasound of the kidneys.  -Will not give too much volume to this patient has COVID positive avoid nephrotoxins hold diuretics     #Chronic blood loss anemia possibly in the setting of using anticoagulant:  -Hb 7.6 this morning which was 11.6 six months ago  -She has hematuria UA positive for >100 RBC      #hematuria likely due to UTI and anticoagulation  -hold anticoagulation.     #Chronic atrial fibrillation  -Hold anticoagulation as she has anemia and hematuria  -She is currently rate controlled     #Hypertension-blood pressure soft hold antihypertensives.     Code status: Full  DVT prophylaxis: SCD    Care Plan discussed with: Patient/Family and Nurse  Anticipated Disposition: Home w/Family and SNF/LTC  Anticipated Discharge: Greater than 48 hours     Hospital Problems  Date Reviewed: 5/22/2020          Codes Class Noted POA    Sepsis ([facility] [address] ICD-10-CM: A41.9  ICD-9-CM: 038.9, 995.91  6/30/2020 Unknown                Review of Systems:   Pertinent items are noted in HPI. Ct Head Wo Cont    Result Date: 6/30/2020  IMPRESSION: Volume loss and white matter disease but no acute intracranial abnormality    Xr Chest Port    Result Date: 7/1/2020  IMPRESSION: 1. Unchanged cardiomegaly. 2.  Mild hazy bilateral opacities are nonspecific but may represent edema. Xr Chest Port    Result Date: 6/30/2020  Impression: Mild parenchymal opacity right middle lobe may represent acute infiltrate. Correlation with frontal and lateral views may be helpful. Vital Signs:    Last 24hrs VS reviewed since prior progress note.  Most recent are:  Visit Vitals  /62 (BP 1 Location: Right arm, BP Patient Position: At rest)   Pulse 100   Temp 98.3 °F (36.8 °C)   Resp 22   Ht 5' 1\" (1.549 m)   Wt 80.4 kg (177 lb 4 oz)   SpO2 100%   BMI 33.49 kg/m²         Intake/Output Summary (Last 24 hours) at 7/1/2020 1203  Last data filed at 7/1/2020 0326  Gross per 24 hour   Intake 3838.66 ml   Output 1000 ml   Net 2838.66 ml        Physical Examination:             Constitutional:  No acute distress, cooperative, pleasant    ENT:  Oral mucosa moist, oropharynx benign. Resp:  CTA bilaterally. No wheezing/rhonchi/rales. No accessory muscle use   CV:  Regular rhythm, normal rate, no murmurs, gallops, rubs    GI:  Soft, non distended, non tender. normoactive bowel sounds, no hepatosplenomegaly     Musculoskeletal:  No edema, warm, 2+ pulses throughout    Neurologic:  Moves all extremities. AAOx3, CN II-XII reviewed     Psych:  Good insight, Not anxious nor agitated. Data Review:    I personally reviewed  Image and labs      Labs:     Recent Labs     07/01/20  0337 06/30/20  2225 06/30/20  1103   WBC 13.9*  --  15.4*   HGB 7.3* 7.9* 7.6*   HCT 26.2* 28.4* 26.9*     --  224     Recent Labs     07/01/20  0337 06/30/20  1103     140 139   K 3.9  3.8 3.5     106 100   CO2 26  27 29   BUN 59*  62* 59*   CREA 2.65*  2.66* 2.69*   GLU 93  103* 85   CA 8.1*  8.3* 9.0   PHOS 3.9  3.9  --      Recent Labs     07/01/20  0337 06/30/20  1103   ALT 13 14   AP 62 70   TBILI 0.8 1.1*   TP 7.1 7.5   ALB 2.1*  2.2* 2.4*   GLOB 4.9* 5.1*     Recent Labs     06/30/20  1103   INR 1.4*   PTP 14.0*      Recent Labs     06/30/20  1103   FERR 46      No results found for: FOL, RBCF   No results for input(s): PH, PCO2, PO2 in the last 72 hours. No results for input(s): CPK, CKNDX, TROIQ in the last 72 hours.     No lab exists for component: CPKMB  Lab Results   Component Value Date/Time    Cholesterol, total 226 (H) 03/14/2019 02:14 PM    HDL Cholesterol 48 03/14/2019 02:14 PM    LDL,Direct 163 (H) 12/05/2019 01:04 PM    LDL, calculated 148 (H) 03/14/2019 02:14 PM    Triglyceride 149 03/14/2019 02:14 PM     No results found for: GLUCPOC  Lab Results   Component Value Date/Time    Color BROWN 06/30/2020 11:03 AM    Appearance TURBID (A) 06/30/2020 11:03 AM    Specific gravity 1.020 06/30/2020 11:03 AM    pH (UA) 6.0 06/30/2020 11:03 AM    Protein >300 (A) 06/30/2020 11:03 AM    Glucose Negative 06/30/2020 11:03 AM    Ketone Negative 06/30/2020 11:03 AM    Urobilinogen 1.0 06/30/2020 11:03 AM    Nitrites Negative 06/30/2020 11:03 AM    Leukocyte Esterase SMALL (A) 06/30/2020 11:03 AM    Epithelial cells FEW 06/30/2020 11:03 AM    Bacteria 1+ (A) 06/30/2020 11:03 AM    WBC >100 (H) 06/30/2020 11:03 AM    RBC >100 (H) 06/30/2020 11:03 AM         Medications Reviewed:     Current Facility-Administered Medications   Medication Dose Route Frequency    carvediloL (COREG) tablet 6.25 mg  6.25 mg Oral BID WITH MEALS    sodium chloride (NS) flush 5-10 mL  5-10 mL IntraVENous PRN    piperacillin-tazobactam (ZOSYN) 3.375 g in 0.9% sodium chloride (MBP/ADV) 100 mL  3.375 g IntraVENous Q12H    0.9% sodium chloride infusion  100 mL/hr IntraVENous CONTINUOUS     ______________________________________________________________________  EXPECTED LENGTH OF STAY: 4d 19h  ACTUAL LENGTH OF STAY:          1                 Nola Sun MD

## 2020-07-01 NOTE — PROGRESS NOTES
TRANSFER - IN REPORT:    Verbal report received from Aysha S Jose Guadalupe Vaughn RN (name) on Cloretta Hemp  being received from ED (unit) for routine progression of care      Report consisted of patients Situation, Background, Assessment and   Recommendations(SBAR). Information from the following report(s) SBAR, Kardex, Intake/Output, MAR, Accordion and Recent Results was reviewed with the receiving nurse. Opportunity for questions and clarification was provided. Assessment completed upon patients arrival to unit and care assumed. .Bedside shift change report given to Jaguar Gupta RN  (oncoming nurse) by Guy Palencia RN  (offgoing nurse). Report included the following information SBAR, Kardex, Intake/Output, MAR, Accordion and Recent Results.

## 2020-07-01 NOTE — PROGRESS NOTES
Problem: Falls - Risk of  Goal: *Absence of Falls  Description: Document Andry An Fall Risk and appropriate interventions in the flowsheet. Outcome: Progressing Towards Goal  Note: Fall Risk Interventions:       Mentation Interventions: Bed/chair exit alarm         Elimination Interventions: Bed/chair exit alarm              Problem: Pressure Injury - Risk of  Goal: *Prevention of pressure injury  Description: Document Pratik Scale and appropriate interventions in the flowsheet.   Outcome: Progressing Towards Goal  Note: Pressure Injury Interventions:       Moisture Interventions: Absorbent underpads    Activity Interventions: Increase time out of bed    Mobility Interventions: Pressure redistribution bed/mattress (bed type)    Nutrition Interventions: Document food/fluid/supplement intake

## 2020-07-01 NOTE — PROGRESS NOTES
TRANSITIONS OF CARE PLAN:   1. DESTINATION: [facility]  2. TRANSPORT: BLS  3. ADDITIONAL SUPPORT: Cousin/mPOA Skip Khoury  4. DME: [address], Wheelchair  5. HOME HEALTH: [facility]  6. CODE STATUS/AMD STATUS: Full Code; AMD is on file  7. FOLLOW UP APPOINTMENTS: PCP  8. STILL NEEDS: IVF, ABX, COVID PUI x 2, Renal US, possible ID and Pulmonary consults    Reason for Admission:   Sepsis                  RUR Score:     19%             PCP: First and Last name:  Dr. Stephanie Medina   Name of Practice: [facility] Internal Medicine at [facility]   Are you a current patient: Yes/No: YES   Approximate date of last visit: unknown   Can you participate in a virtual visit if needed: NO    Do you (patient/family) have any concerns for transition/discharge? Declining health; has been a LTC resident for 2-3 months, post discharge from [address] [place] [address] for utilizing home health: [facility]    Current Advanced Directive/Advance Care Plan:  Full Code; on file            Transition of Care Plan:      CM spoke with patient's mPOA (Gray Self: [phone]) by phone. mPOA is identified as patient's cousin and only living relative. mPOA identified that patient has done rehab at [facility] and with last admission to [address] [place] [address] returned to the facility with decision to transition the patient to LTC. Patient has now been at [facility] for 2-3 months. Plan will be for patient to return to [facility]. CM submitted referral via [facility]. Patient will need BLS transport. The Plan for Transition of Care is related to the following treatment goals: LTC at [facility]    The Patient and/or patient representative mPOA, Gray Elena, was provided with a choice of provider and agrees   with the discharge plan.  [x] Yes [] No    Freedom of choice list was provided with basic dialogue that supports the patient's individualized plan of care/goals, treatment preferences and shares the quality data associated with the providers.  [x] Yes [] No      Care Management Interventions  PCP Verified by CM: Yes(followed by Dr. Nando Alas at [facility])  [address] [place] (RRAT>21 & CHF Dx)?: No  Mode of Transport at Discharge: BLS  Transition of Care Consult (CM Consult): Discharge Planning, Long Term Care(LTC at [facility])  [address] [place] Kyra Reynolds: Yes  Discharge Durable Medical Equipment: No(has dme of: walker, wheelchair)  Health Maintenance Reviewed: Yes(cm spoke with patient's mPOA, Hernando Rubio, by phone)  Physical Therapy Consult: No  Occupational Therapy Consult: No  Speech Therapy Consult: No  Current Support Network: Nursing Facility(resident of [facility])  Confirm Follow Up Transport: Other (see comment)([facility] transports)  The Plan for Transition of Care is Related to the Following Treatment Goals : Return to [facility]  The Patient and/or Patient Representative was Provided with a Choice of Provider and Agrees with the Discharge Plan?: Yes  Name of the Patient Representative Who was Provided with a Choice of Provider and Agrees with the Discharge Plan: mPOA: [address] of Choice List was Provided with Basic Dialogue that Supports the Patient's Individualized Plan of Care/Goals, Treatment Preferences and Shares the Quality Data Associated with the Providers?: Yes  [place] Resource Information Provided?: No  Discharge Location  Discharge Placement: Long Term Care(resident of [facility] for 2-3 months)    CRM: Zunilda Redmond, MPH, [address] [place] [address]; Z: [phone]

## 2020-07-01 NOTE — CONSULTS
NEPHROLOGY CONSULT NOTE     Patient: Clementine Pritchett MRN: [id number]  PCP: Arlen Pace MD   :     1940  Age:   68 y.o. Sex:  female      Referring physician: Santos Linares MD  Reason for consultation: 68 y.o. female with Sepsis ([facility] [N44.7] complicated by DEVON   Admission Date: 2020 10:46 AM  LOS: 1 day      ASSESSMENT and PLAN :   DEVON on CKD:  - likely 2/2 ATN from sepsis  - bladder scan and place rivera  - cont IVF  - Renal U/S once COVID neg  - daily labs and strict I/Os for now    CKD III:  - baseline Cr 1.2    Urosepsis:  - cultues + for GNR in blood and urine  - on zosyn    COVID-19 PUI    Chronic anemia    Hx of afib     Active Problems / Assessment AAActive  : Active Problems:    Sepsis ([facility] (2020)         Subjective:   HPI: Clementine Pritchett is a 68 y.o. female who has been admitted to the hospital for fever and hypotension. She was found to have GNR bacteremia and UTI. She has a hx of afib, CKD, chronic anemia, CAD. Her Cr appears to be around 1.1 to 1.2 at baseline, now 2.6 and unchanged. She has been receiving IVF and abx. Her UOP has been declining per RN. She does have purewick in place per RN. Her COVID-19 was neg x 1 and is being retested today. Per RN, oxygenation stable, no edema, cp, sob, n/v/d. Past Medical Hx:   Past Medical History:   Diagnosis Date    A-fib ([facility]     Anemia     Chronic kidney disease     CKD (chronic kidney disease)     Hypercholesterolemia     Hyperlipidemia     Hypertension     MI (myocardial infarction) ([facility]         Past Surgical Hx:   History reviewed. No pertinent surgical history. Medications:  Prior to Admission medications    Medication Sig Start Date End Date Taking? Authorizing Provider   atorvastatin (LIPITOR) 40 mg tablet Take 40 mg by mouth nightly. Yes Provider, Historical   bumetanide (BUMEX) 0.5 mg tablet Take 0.5 mg by mouth daily.    Yes Provider, Historical   Eliquis 5 mg tablet Take 5 mg by mouth every twelve (12) hours. 5/8/20  Yes Provider, Historical   hydrALAZINE (APRESOLINE) 10 mg tablet Take 10 mg by mouth three (3) times daily. 4/13/20  Yes Provider, Historical   isosorbide dinitrate (ISORDIL) 10 mg tablet Take 10 mg by mouth three (3) times daily. 5/7/20  Yes Provider, Historical   metOLazone (ZAROXOLYN) 2.5 mg tablet Take 2.5 mg by mouth every Monday, Wednesday, Friday. 5/1/20  Yes Provider, Historical   carvediloL (COREG) 6.25 mg tablet Take 6.25 mg by mouth two (2) times daily (with meals). 5/11/20  Yes Provider, Historical   potassium chloride (KLOR-CON) 10 mEq tablet Take 20 mEq by mouth daily. 5/1/20  Yes Provider, Historical   cholecalciferol (VITAMIN D3) (50,000 UNITS /1250 MCG) capsule Take 50,000 Units by mouth every seven (7) days. 5/11/20   Provider, Historical   acetaminophen (TylenoL) 325 mg cap Take 2 Tabs by mouth every four to six (4-6) hours as needed. Provider, Historical       No Known Allergies    Social Hx:  reports that she quit smoking about 7 years ago. She has never used smokeless tobacco. She reports that she does not drink alcohol or use drugs. Family History   Problem Relation Age of Onset    No Known Problems Mother     No Known Problems Father        Review of Systems:  A twelve point review of system was performed today. Pertinent positives and negatives are mentioned in the HPI. The reminder of the ROS is negative and noncontributory. Objective:    Vitals:    Vitals:    07/01/20 0326 07/01/20 0724 07/01/20 0800 07/01/20 1144   BP: 126/80 117/49  128/62   Pulse: 100 98 93 100   Resp: 20 20 22   Temp: 99 °F (37.2 °C) 98.7 °F (37.1 °C)  98.3 °F (36.8 °C)   SpO2: 100% 99%  100%   Weight: 80.4 kg (177 lb 4 oz)      Height:         I&O's:  06/30 0701 - 07/01 0700  In: 3838.7 [P.O.:120;  I.V.:3718.7]  Out: 1000 [Urine:1000]  Visit Vitals  /62 (BP 1 Location: Right arm, BP Patient Position: At rest)   Pulse 100   Temp 98.3 °F (36.8 °C)   Resp 22   Ht 5' 1\" (1.549 m)   Wt 80.4 kg (177 lb 4 oz)   SpO2 100%   BMI 33.49 kg/m²       Physical Exam:    Not examined in person due to COVID PUI in order to preserve PPE per ASN/RPA guidelines\"    General:no distress   Lungs : stable oxygenation  CVS: RRR on monitor, no edema per RN  Neurologic: non focal, alert per RN  : no rivera    Laboratory Results:    Lab Results   Component Value Date    BUN 62 (H) 07/01/2020    BUN 59 (H) 07/01/2020     07/01/2020     07/01/2020    K 3.8 07/01/2020    K 3.9 07/01/2020     07/01/2020     07/01/2020    CO2 27 07/01/2020    CO2 26 07/01/2020       Lab Results   Component Value Date    BUN 62 (H) 07/01/2020    BUN 59 (H) 07/01/2020    BUN 59 (H) 06/30/2020    BUN 15 12/05/2019    BUN 10 08/01/2019    K 3.8 07/01/2020    K 3.9 07/01/2020    K 3.5 06/30/2020    K 3.6 12/05/2019    K 3.9 08/01/2019       Lab Results   Component Value Date    WBC 13.9 (H) 07/01/2020    RBC 3.43 (L) 07/01/2020    HGB 7.3 (L) 07/01/2020    HCT 26.2 (L) 07/01/2020    MCV 76.4 (L) 07/01/2020    MCH 21.3 (L) 07/01/2020    RDW 21.3 (H) 07/01/2020     07/01/2020       Lab Results   Component Value Date    PHOS 3.9 07/01/2020    PHOS 3.9 07/01/2020       Urine dipstick:   Lab Results   Component Value Date/Time    Color BROWN 06/30/2020 11:03 AM    Appearance TURBID (A) 06/30/2020 11:03 AM    Specific gravity 1.020 06/30/2020 11:03 AM    pH (UA) 6.0 06/30/2020 11:03 AM    Protein >300 (A) 06/30/2020 11:03 AM    Glucose Negative 06/30/2020 11:03 AM    Ketone Negative 06/30/2020 11:03 AM    Urobilinogen 1.0 06/30/2020 11:03 AM    Nitrites Negative 06/30/2020 11:03 AM    Leukocyte Esterase SMALL (A) 06/30/2020 11:03 AM    Epithelial cells FEW 06/30/2020 11:03 AM    Bacteria 1+ (A) 06/30/2020 11:03 AM    WBC >100 (H) 06/30/2020 11:03 AM    RBC >100 (H) 06/30/2020 11:03 AM       I have reviewed the following:    All pertinent labs, microbiology data, radiology imaging for my assessment Thank you for allowing us to participate in the care of this patient. We will follow patient. Please dont hesitate to call with any questions    Ish Brown MD  7/1/2020        [facility] Nephrology THE [address] [place] [address]  Phone - [phone]   Fax - [phone]  [email or web address]

## 2020-07-01 NOTE — ROUTINE PROCESS
Verbal shift change report given to [facility] RN and Laura Gonzalez (oncoming nurse) by Aydin Ocampo (offgoing nurse). Report included the following information SBAR, Intake/Output, MAR, Recent Results and Cardiac Rhythm Afib.

## 2020-07-01 NOTE — ACP (ADVANCE CARE PLANNING)
[facility] Adult  Hospitalist Group                                      Advance Care Planning Note    Name: Paula Weathers  YOB: 1940  MRN: [id number]  Admission Date: 6/30/2020 10:46 AM    Date of discussion: 7/1/2020    Active Diagnoses:    Hospital Problems  Date Reviewed: 5/22/2020          Codes Class Noted POA    Sepsis [facility]) ICD-10-CM: A41.9  ICD-9-CM: 038.9, 995.91  6/30/2020 Unknown              These active diagnoses are of sufficient risk that focused discussion on advance care planning is indicated in order to allow the patient to thoughtfully consider personal goals of care, and if situations arise that prevent the ability to personally give input, to ensure appropriate representation of their personal desires for different levels and aggressiveness of care. Persons present and participating in discussion: Chuck Longo MD, [facility]    Discussion: Cussed with the patient's medical power of  about advanced directive and reviewed that signed by patient herself from 5/2017 and as per her as advanced wishes patient does not want to prolong her life with CPR mechanical ventilation or any other artificial measures discussed with the medical power of  at this point and I told him to honor her wishes and placed a DNR order    Time Spent:     Total time spent face-to-face in education and discussion: 16  minutes.      Nola Sun MD  Date of Service:  7/1/2020  3:12 PM

## 2020-07-02 ENCOUNTER — APPOINTMENT (OUTPATIENT)
Dept: ULTRASOUND IMAGING | Age: 80
DRG: 871 | End: 2020-07-02
Attending: HOSPITALIST
Payer: MEDICARE

## 2020-07-02 LAB
ALBUMIN SERPL-MCNC: 2.1 G/DL (ref 3.5–5)
ANION GAP SERPL CALC-SCNC: 7 MMOL/L (ref 5–15)
BACTERIA SPEC CULT: ABNORMAL
BASOPHILS # BLD: 0 K/UL (ref 0–0.1)
BASOPHILS NFR BLD: 0 % (ref 0–1)
BUN SERPL-MCNC: 56 MG/DL (ref 6–20)
BUN/CREAT SERPL: 23 (ref 12–20)
CALCIUM SERPL-MCNC: 8.5 MG/DL (ref 8.5–10.1)
CC UR VC: ABNORMAL
CHLORIDE SERPL-SCNC: 106 MMOL/L (ref 97–108)
CO2 SERPL-SCNC: 26 MMOL/L (ref 21–32)
CREAT SERPL-MCNC: 2.39 MG/DL (ref 0.55–1.02)
DIFFERENTIAL METHOD BLD: ABNORMAL
EOSINOPHIL # BLD: 0.2 K/UL (ref 0–0.4)
EOSINOPHIL NFR BLD: 2 % (ref 0–7)
ERYTHROCYTE [DISTWIDTH] IN BLOOD BY AUTOMATED COUNT: 21.5 % (ref 11.5–14.5)
GLUCOSE SERPL-MCNC: 117 MG/DL (ref 65–100)
HCT VFR BLD AUTO: 26.8 % (ref 35–47)
HGB BLD-MCNC: 7.6 G/DL (ref 11.5–16)
IMM GRANULOCYTES # BLD AUTO: 0 K/UL (ref 0–0.04)
IMM GRANULOCYTES NFR BLD AUTO: 0 % (ref 0–0.5)
LYMPHOCYTES # BLD: 0.8 K/UL (ref 0.8–3.5)
LYMPHOCYTES NFR BLD: 9 % (ref 12–49)
MAGNESIUM SERPL-MCNC: 1.6 MG/DL (ref 1.6–2.4)
MCH RBC QN AUTO: 21.7 PG (ref 26–34)
MCHC RBC AUTO-ENTMCNC: 28.4 G/DL (ref 30–36.5)
MCV RBC AUTO: 76.4 FL (ref 80–99)
MONOCYTES # BLD: 1.2 K/UL (ref 0–1)
MONOCYTES NFR BLD: 13 % (ref 5–13)
NEUTS SEG # BLD: 7.1 K/UL (ref 1.8–8)
NEUTS SEG NFR BLD: 76 % (ref 32–75)
NRBC # BLD: 0 K/UL (ref 0–0.01)
NRBC BLD-RTO: 0 PER 100 WBC
PHOSPHATE SERPL-MCNC: 3.5 MG/DL (ref 2.6–4.7)
PLATELET # BLD AUTO: 196 K/UL (ref 150–400)
PMV BLD AUTO: 10.5 FL (ref 8.9–12.9)
POTASSIUM SERPL-SCNC: 3.7 MMOL/L (ref 3.5–5.1)
RBC # BLD AUTO: 3.51 M/UL (ref 3.8–5.2)
RBC MORPH BLD: ABNORMAL
SERVICE CMNT-IMP: ABNORMAL
SODIUM SERPL-SCNC: 139 MMOL/L (ref 136–145)
WBC # BLD AUTO: 9.3 K/UL (ref 3.6–11)

## 2020-07-02 PROCEDURE — 74011250636 HC RX REV CODE- 250/636: Performed by: HOSPITALIST

## 2020-07-02 PROCEDURE — 74011250636 HC RX REV CODE- 250/636: Performed by: INTERNAL MEDICINE

## 2020-07-02 PROCEDURE — 74011000258 HC RX REV CODE- 258: Performed by: INTERNAL MEDICINE

## 2020-07-02 PROCEDURE — 83735 ASSAY OF MAGNESIUM: CPT

## 2020-07-02 PROCEDURE — 80069 RENAL FUNCTION PANEL: CPT

## 2020-07-02 PROCEDURE — 76770 US EXAM ABDO BACK WALL COMP: CPT

## 2020-07-02 PROCEDURE — 74011000258 HC RX REV CODE- 258: Performed by: HOSPITALIST

## 2020-07-02 PROCEDURE — 85025 COMPLETE CBC W/AUTO DIFF WBC: CPT

## 2020-07-02 PROCEDURE — 74011250637 HC RX REV CODE- 250/637: Performed by: HOSPITALIST

## 2020-07-02 PROCEDURE — 87040 BLOOD CULTURE FOR BACTERIA: CPT

## 2020-07-02 PROCEDURE — 36415 COLL VENOUS BLD VENIPUNCTURE: CPT

## 2020-07-02 PROCEDURE — 65660000000 HC RM CCU STEPDOWN

## 2020-07-02 PROCEDURE — 77010033678 HC OXYGEN DAILY

## 2020-07-02 RX ADMIN — SODIUM CHLORIDE 100 ML/HR: 900 INJECTION, SOLUTION INTRAVENOUS at 23:41

## 2020-07-02 RX ADMIN — CARVEDILOL 6.25 MG: 6.25 TABLET, FILM COATED ORAL at 17:35

## 2020-07-02 RX ADMIN — PIPERACILLIN AND TAZOBACTAM 3.38 G: 3; .375 INJECTION, POWDER, LYOPHILIZED, FOR SOLUTION INTRAVENOUS at 09:04

## 2020-07-02 RX ADMIN — CARVEDILOL 6.25 MG: 6.25 TABLET, FILM COATED ORAL at 09:03

## 2020-07-02 RX ADMIN — SODIUM CHLORIDE 100 ML/HR: 900 INJECTION, SOLUTION INTRAVENOUS at 12:45

## 2020-07-02 RX ADMIN — SODIUM CHLORIDE 100 ML/HR: 900 INJECTION, SOLUTION INTRAVENOUS at 02:50

## 2020-07-02 RX ADMIN — MEROPENEM 500 MG: 500 INJECTION, POWDER, FOR SOLUTION INTRAVENOUS at 17:44

## 2020-07-02 NOTE — PROGRESS NOTES
[address] [facility] Adult  Hospitalist Group                                                                                          Hospitalist Progress Note  Miracle Talbert MD  Answering service: [phone] OR [phone] from in house phone        Date of Service:  2020  NAME:  Joe Solares  :  1940  MRN:  [id number]      Admission Summary:   Joe Solares is a 68 y.o. female with PMH of atrial fib,CKD, anemia, MI and other comorbidities was sent to the hospital from a facility because of altered mental status and fever.       Per chart review, patient had fever of 101 and was recently moved to COVID unit at the facility. They also noted that patient's mental status was not at baseline. Blood pressure was in systolic 66A. She was sent to emergency room for further evaluation. Interval history / Subjective:   Second COVID resulted negative. Blood cultures positive for gram-negative rods, consistent with ESBL, meropenem started. Patient says she is feeling much better today     Assessment & Plan:     Sepsis (POA Fever, WBC): With bacteremia gram-negative rods, UTI likely source   - Switch to IV meropenem today   - F/U blood culture result  - Urine growing ESBL   - ID consulted.      Potential PNA vs. Fluid overload   -SARS-CoV-2 negative  -Continue IV fluids.  -Procalcitonin 34.9, will repeat tomorrow      Acute metabolic encephalopathy due to infection, uremia  -Tx as above improving  -Obtain CT head- Volume loss and white matter disease but no acute intracranial abnormality.     Acute renal failure stage 2 Cr up to 2.6 from baseline of 1.2 on CKD: Likely secondary to sepsis, urinary retention   - Slowly improving   - Continue rivera catheter  - I and O and continue to monitor   - nephrology following      Chronic blood loss anemia possibly in the setting of using anticoagulant:  -Hb 7.6 this morning which was 11.6 six months ago  -She has hematuria UA positive for >100 RBC   - holding anticoagulation      Chronic atrial fibrillation  -Hold anticoagulation as she has anemia and hematuria  -She is currently rate controlled     Hypertension-blood pressure soft hold antihypertensives.     Code status: Full  DVT prophylaxis: SCD    Care Plan discussed with: Patient/Family and Nurse  Anticipated Disposition: Home w/Family and SNF/LTC  Anticipated Discharge: Greater than 48 hours     Hospital Problems  Date Reviewed: 5/22/2020          Codes Class Noted POA    Sepsis ([facility] ICD-10-CM: A41.9  ICD-9-CM: 038.9, 995.91  6/30/2020 Unknown                Review of Systems:   Pertinent items are noted in HPI. Ct Head Wo Cont    Result Date: 6/30/2020  IMPRESSION: Volume loss and white matter disease but no acute intracranial abnormality    Us Retroperitoneum Comp    Result Date: 7/2/2020  IMPRESSION: 1. No hydronephrosis. Small perinephric fluid. 2. Increased echogenicity of both kidneys consistent with medical renal parenchymal disease. 3. Urinary bladder not assessed. 4. Incidentally noted cholelithiasis with gallbladder wall thickening and small ascites. Xr Chest Port    Result Date: 7/1/2020  IMPRESSION: 1. Unchanged cardiomegaly. 2.  Mild hazy bilateral opacities are nonspecific but may represent edema. Xr Chest Port    Result Date: 6/30/2020  Impression: Mild parenchymal opacity right middle lobe may represent acute infiltrate. Correlation with frontal and lateral views may be helpful. Vital Signs:    Last 24hrs VS reviewed since prior progress note.  Most recent are:  Visit Vitals  /81 (BP 1 Location: Right arm, BP Patient Position: At rest)   Pulse 100   Temp 97.8 °F (36.6 °C)   Resp 25   Ht 5' 1\" (1.549 m)   Wt 78.7 kg (173 lb 8 oz)   SpO2 98%   BMI 32.78 kg/m²         Intake/Output Summary (Last 24 hours) at 7/2/2020 1642  Last data filed at 7/2/2020 1537  Gross per 24 hour   Intake    Output 1275 ml   Net -1275 ml        Physical Examination:             Constitutional: No acute distress, cooperative, pleasant    ENT:  Oral mucosa moist, oropharynx benign. Poor dentition    Resp:  CTA bilaterally. No wheezing/rhonchi/rales. No accessory muscle use   CV:  Regular rhythm, normal rate, no murmurs, gallops, rubs    GI:  Soft, non distended, non tender. normoactive bowel sounds, no hepatosplenomegaly     Musculoskeletal:  No edema, warm, 2+ pulses throughout  : rivera in place     Neurologic:  Moves all extremities. AAOx3, CN II-XII reviewed     Psych:  Good insight, Not anxious nor agitated. Data Review:    I personally reviewed  Image and labs      Labs:     Recent Labs     07/02/20  0507 07/01/20  0337   WBC 9.3 13.9*   HGB 7.6* 7.3*   HCT 26.8* 26.2*    193     Recent Labs     07/02/20  0507 07/01/20  0337 06/30/20  1103    141  140 139   K 3.7 3.9  3.8 3.5    106  106 100   CO2 26 26  27 29   BUN 56* 59*  62* 59*   CREA 2.39* 2.65*  2.66* 2.69*   * 93  103* 85   CA 8.5 8.1*  8.3* 9.0   MG 1.6  --   --    PHOS 3.5 3.9  3.9  --      Recent Labs     07/02/20  0507 07/01/20  0337 06/30/20  1103   ALT  --  13 14   AP  --  62 70   TBILI  --  0.8 1.1*   TP  --  7.1 7.5   ALB 2.1* 2.1*  2.2* 2.4*   GLOB  --  4.9* 5.1*     Recent Labs     06/30/20  1103   INR 1.4*   PTP 14.0*      Recent Labs     06/30/20  1103   FERR 46      No results found for: FOL, RBCF   No results for input(s): PH, PCO2, PO2 in the last 72 hours. No results for input(s): CPK, CKNDX, TROIQ in the last 72 hours.     No lab exists for component: CPKMB  Lab Results   Component Value Date/Time    Cholesterol, total 226 (H) 03/14/2019 02:14 PM    HDL Cholesterol 48 03/14/2019 02:14 PM    LDL,Direct 163 (H) 12/05/2019 01:04 PM    LDL, calculated 148 (H) 03/14/2019 02:14 PM    Triglyceride 149 03/14/2019 02:14 PM     No results found for: GLUCPOC  Lab Results   Component Value Date/Time    Color BROWN 06/30/2020 11:03 AM    Appearance TURBID (A) 06/30/2020 11:03 AM    Specific gravity 1.020 06/30/2020 11:03 AM    pH (UA) 6.0 06/30/2020 11:03 AM    Protein >300 (A) 06/30/2020 11:03 AM    Glucose Negative 06/30/2020 11:03 AM    Ketone Negative 06/30/2020 11:03 AM    Urobilinogen 1.0 06/30/2020 11:03 AM    Nitrites Negative 06/30/2020 11:03 AM    Leukocyte Esterase SMALL (A) 06/30/2020 11:03 AM    Epithelial cells FEW 06/30/2020 11:03 AM    Bacteria 1+ (A) 06/30/2020 11:03 AM    WBC >100 (H) 06/30/2020 11:03 AM    RBC >100 (H) 06/30/2020 11:03 AM         Medications Reviewed:     Current Facility-Administered Medications   Medication Dose Route Frequency    meropenem (MERREM) 500 mg in 0.9% sodium chloride (MBP/ADV) 50 mL  500 mg IntraVENous Q12H    carvediloL (COREG) tablet 6.25 mg  6.25 mg Oral BID WITH MEALS    sodium chloride (NS) flush 5-10 mL  5-10 mL IntraVENous PRN    0.9% sodium chloride infusion  100 mL/hr IntraVENous CONTINUOUS     ______________________________________________________________________  EXPECTED LENGTH OF STAY: 4d 19h  ACTUAL LENGTH OF STAY:          2                 Francis Berry MD

## 2020-07-02 NOTE — PROGRESS NOTES
Verbal shift change report given to TABBY Victor (oncoming nurse) by Erik Gallardo RN and Tyrell Aleman RN (offgoing nurse). Report included the following information SBAR, Kardex, ED Summary, MAR, Recent Results, Med Rec Status and Cardiac Rhythm Afib with PVCs.

## 2020-07-02 NOTE — PROGRESS NOTES
Verbal shift change report given to Vaelntin Arndt RN (oncoming nurse) by Mariel Cruz RN and Arun Acevedo RN (offgoing nurse). Report included the following information SBAR, Kardex, ED Summary, MAR, Recent Results, Med Rec Status and Cardiac Rhythm Afib with PVCs.

## 2020-07-02 NOTE — PROGRESS NOTES
Problem: Falls - Risk of  Goal: *Absence of Falls  Description: Document Camilo Bonner Fall Risk and appropriate interventions in the flowsheet. Outcome: Progressing Towards Goal  Note: Fall Risk Interventions:  Mobility Interventions: Communicate number of staff needed for ambulation/transfer, Patient to call before getting OOB    Mentation Interventions: Adequate sleep, hydration, pain control    Medication Interventions: Patient to call before getting OOB    Elimination Interventions: Call light in reach              Problem: Pressure Injury - Risk of  Goal: *Prevention of pressure injury  Description: Document Pratik Scale and appropriate interventions in the flowsheet. Outcome: Progressing Towards Goal  Note: Pressure Injury Interventions:  Sensory Interventions: Assess changes in LOC    Moisture Interventions: Absorbent underpads, Minimize layers, Internal/External urinary devices    Activity Interventions: Pressure redistribution bed/mattress(bed type)    Mobility Interventions: Assess need for specialty bed    Nutrition Interventions: Document food/fluid/supplement intake    Friction and Shear Interventions: Minimize layers                Problem: Chronic Renal Failure  Goal: *Fluid and electrolytes stabilized  Outcome: Progressing Towards Goal  Note: Patient currently had a rivera catheter in for accurate measurement of urine output. Problem: Infection - Risk of, Urinary Catheter-Associated Urinary Tract Infection  Goal: *Absence of infection signs and symptoms  Outcome: Progressing Towards Goal  Note: Patient remains free of infection and fever during shift.

## 2020-07-02 NOTE — PROGRESS NOTES
0430 - Patient had 14 beat run V Tach. Asymptomatic. Vitals stable. Cassi Spangler NP (hospitalis) notified. Labs ordered.

## 2020-07-02 NOTE — PROGRESS NOTES
Nephrology Progress Note  Federico Be  Date of Admission : 6/30/2020    CC: Follow up for DEVON on CKD       Assessment and Plan     DEVON on CKD:  - likely 2/2 ATN from sepsis + urinary retention  - renal U/S unremarkable  - keep rivera for now  - cont IVF  - daily labs     CKD III:  - baseline Cr 1.2     Urosepsis:  - cultues + for GNR in blood and urine  - on zosyn     COVID-19 PUI     Chronic anemia     Hx of afib       Interval History:  Stable overnight. UOP improved, some retention, so rivera placed yesterday. Cr down. No distress per RN. No edema per RN. Awaiting results of 2nd COVID test.      Current Medications: all current  Medications have been eviewed in EPIC  Review of Systems: Pertinent items are noted in HPI. Objective:  Vitals:    Vitals:    07/02/20 0252 07/02/20 0302 07/02/20 0434 07/02/20 0737   BP: 120/64  133/77 (!) 141/92   Pulse: 98  97 94   Resp: 22   29   Temp: 98.4 °F (36.9 °C)   98.3 °F (36.8 °C)   SpO2: 97%   100%   Weight:  78.7 kg (173 lb 8 oz)     Height:         Intake and Output:  No intake/output data recorded. 06/30 1901 - 07/02 0700  In: 1263.3 [P.O.:120; I.V.:1143.3]  Out: 1875 [PNCKJ:3286]    Not examined in person due to COVID PUI in order to preserve PPE per ASN/RPA guidelines\"     General:no distress   Lungs : stable oxygenation  CVS: RRR on monitor, no edema per RN  Neurologic: non focal, alert per RN  : rivera in place    []    High complexity decision making was performed  []    Patient is at high-risk of decompensation with multiple organ involvement    Lab Data Personally Reviewed: I have reviewed all the pertinent labs, microbiology data and radiology studies during assessment.     Recent Labs     07/02/20  0507 07/01/20  0337 06/30/20  1103    141  140 139   K 3.7 3.9  3.8 3.5    106  106 100   CO2 26 26  27 29   * 93  103* 85   BUN 56* 59*  62* 59*   CREA 2.39* 2.65*  2.66* 2.69*   CA 8.5 8.1*  8.3* 9.0   MG 1.6  --   --    PHOS 3.5 3.9  3.9  --    ALB 2.1* 2.1*  2.2* 2.4*   ALT  --  13 14   INR  --   --  1.4*     Recent Labs     07/02/20  0507 07/01/20  0337 06/30/20  2225 06/30/20  1103   WBC 9.3 13.9*  --  15.4*   HGB 7.6* 7.3* 7.9* 7.6*   HCT 26.8* 26.2* 28.4* 26.9*    193  --  224     No results found for: SDES  Lab Results   Component Value Date/Time    Culture result: (A) 06/30/2020 11:03 AM     GRAM NEGATIVE RODS GROWING IN ALL 4 BOTTLES DRAWN ( AND [facility] SITES)    Culture result: (A) 06/30/2020 11:03 AM     PRELIMINARY REPORT OF GRAM NEGATIVE RODS GROWING IN 2 OF 4 BOTTLES DRAWN CALLED TO AND READ BACK BY PAULA BROWN RN ON 6/30/20 AT 2340 ([facility]). MS    Culture result: GRAM NEGATIVE RODS (A) 06/30/2020 11:03 AM     Recent Results (from the past 24 hour(s))   SARS-COV-2    Collection Time: 07/01/20 11:41 AM   Result Value Ref Range    Specimen source Nasopharyngeal      SARS-CoV-2 Not detected NOTD      Specimen source Nasopharyngeal     SAMPLES BEING HELD    Collection Time: 07/01/20  2:05 PM   Result Value Ref Range    SAMPLES BEING HELD 1CUP UA     COMMENT        Add-on orders for these samples will be processed based on acceptable specimen integrity and analyte stability, which may vary by analyte. CBC WITH AUTOMATED DIFF    Collection Time: 07/02/20  5:07 AM   Result Value Ref Range    WBC 9.3 3.6 - 11.0 K/uL    RBC 3.51 (L) 3.80 - 5.20 M/uL    HGB 7.6 (L) 11.5 - 16.0 g/dL    HCT 26.8 (L) 35.0 - 47.0 %    MCV 76.4 (L) 80.0 - 99.0 FL    MCH 21.7 (L) 26.0 - 34.0 PG    MCHC 28.4 (L) 30.0 - 36.5 g/dL    RDW 21.5 (H) 11.5 - 14.5 %    PLATELET 618 665 - 269 K/uL    MPV 10.5 8.9 - 12.9 FL    NRBC 0.0 0  WBC    ABSOLUTE NRBC 0.00 0.00 - 0.01 K/uL    NEUTROPHILS 76 (H) 32 - 75 %    LYMPHOCYTES 9 (L) 12 - 49 %    MONOCYTES 13 5 - 13 %    EOSINOPHILS 2 0 - 7 %    BASOPHILS 0 0 - 1 %    IMMATURE GRANULOCYTES 0 0.0 - 0.5 %    ABS. NEUTROPHILS 7.1 1.8 - 8.0 K/UL    ABS. LYMPHOCYTES 0.8 0.8 - 3.5 K/UL    ABS. MONOCYTES 1.2 (H) 0.0 - 1.0 K/UL    ABS. EOSINOPHILS 0.2 0.0 - 0.4 K/UL    ABS. BASOPHILS 0.0 0.0 - 0.1 K/UL    ABS. IMM. GRANS. 0.0 0.00 - 0.04 K/UL    DF SMEAR SCANNED      RBC COMMENTS ANISOCYTOSIS  2+        RBC COMMENTS PHANI CELLS  PRESENT        RBC COMMENTS SCHISTOCYTES  PRESENT        RBC COMMENTS MICROCYTOSIS  1+        RBC COMMENTS HYPOCHROMIA  2+       RENAL FUNCTION PANEL    Collection Time: 07/02/20  5:07 AM   Result Value Ref Range    Sodium 139 136 - 145 mmol/L    Potassium 3.7 3.5 - 5.1 mmol/L    Chloride 106 97 - 108 mmol/L    CO2 26 21 - 32 mmol/L    Anion gap 7 5 - 15 mmol/L    Glucose 117 (H) 65 - 100 mg/dL    BUN 56 (H) 6 - 20 MG/DL    Creatinine 2.39 (H) 0.55 - 1.02 MG/DL    BUN/Creatinine ratio 23 (H) 12 - 20      GFR est AA 24 (L) >60 ml/min/1.73m2    GFR est non-AA 20 (L) >60 ml/min/1.73m2    Calcium 8.5 8.5 - 10.1 MG/DL    Phosphorus 3.5 2.6 - 4.7 MG/DL    Albumin 2.1 (L) 3.5 - 5.0 g/dL   MAGNESIUM    Collection Time: 07/02/20  5:07 AM   Result Value Ref Range    Magnesium 1.6 1.6 - 2.4 mg/dL                 Mervin Chen MD  [facility]   [address] [place] [address]  Phone - [phone]   Fax - [phone]  [email or web address]

## 2020-07-03 LAB
ALBUMIN SERPL-MCNC: 2.2 G/DL (ref 3.5–5)
ANION GAP SERPL CALC-SCNC: 6 MMOL/L (ref 5–15)
BASOPHILS # BLD: 0 K/UL (ref 0–0.1)
BASOPHILS NFR BLD: 0 % (ref 0–1)
BUN SERPL-MCNC: 48 MG/DL (ref 6–20)
BUN/CREAT SERPL: 24 (ref 12–20)
CALCIUM SERPL-MCNC: 8.7 MG/DL (ref 8.5–10.1)
CHLORIDE SERPL-SCNC: 107 MMOL/L (ref 97–108)
CO2 SERPL-SCNC: 26 MMOL/L (ref 21–32)
CREAT SERPL-MCNC: 2.03 MG/DL (ref 0.55–1.02)
DIFFERENTIAL METHOD BLD: ABNORMAL
EOSINOPHIL # BLD: 0.2 K/UL (ref 0–0.4)
EOSINOPHIL NFR BLD: 2 % (ref 0–7)
ERYTHROCYTE [DISTWIDTH] IN BLOOD BY AUTOMATED COUNT: 21.7 % (ref 11.5–14.5)
FLUID CULTURE, SPNG2: NORMAL
GLUCOSE SERPL-MCNC: 125 MG/DL (ref 65–100)
HCT VFR BLD AUTO: 27.2 % (ref 35–47)
HGB BLD-MCNC: 7.4 G/DL (ref 11.5–16)
IMM GRANULOCYTES # BLD AUTO: 0.1 K/UL (ref 0–0.04)
IMM GRANULOCYTES NFR BLD AUTO: 1 % (ref 0–0.5)
L PNEUMO1 AG UR QL IA: NEGATIVE
LYMPHOCYTES # BLD: 1 K/UL (ref 0.8–3.5)
LYMPHOCYTES NFR BLD: 12 % (ref 12–49)
MCH RBC QN AUTO: 21.3 PG (ref 26–34)
MCHC RBC AUTO-ENTMCNC: 27.2 G/DL (ref 30–36.5)
MCV RBC AUTO: 78.4 FL (ref 80–99)
MONOCYTES # BLD: 1.2 K/UL (ref 0–1)
MONOCYTES NFR BLD: 14 % (ref 5–13)
NEUTS SEG # BLD: 5.9 K/UL (ref 1.8–8)
NEUTS SEG NFR BLD: 71 % (ref 32–75)
NRBC # BLD: 0 K/UL (ref 0–0.01)
NRBC BLD-RTO: 0 PER 100 WBC
ORGANISM ID, SPNG3: NORMAL
PHOSPHATE SERPL-MCNC: 3.3 MG/DL (ref 2.6–4.7)
PLATELET # BLD AUTO: 223 K/UL (ref 150–400)
PLATELET COMMENTS,PCOM: ABNORMAL
PLEASE NOTE, SPNG4: NORMAL
PMV BLD AUTO: 10.4 FL (ref 8.9–12.9)
POTASSIUM SERPL-SCNC: 3.7 MMOL/L (ref 3.5–5.1)
PROCALCITONIN SERPL-MCNC: 26.44 NG/ML
RBC # BLD AUTO: 3.47 M/UL (ref 3.8–5.2)
RBC MORPH BLD: ABNORMAL
S PNEUM AG SPEC QL LA: NEGATIVE
SODIUM SERPL-SCNC: 139 MMOL/L (ref 136–145)
SPECIMEN SOURCE: NORMAL
SPECIMEN SOURCE: NORMAL
SPECIMEN, SPNG1: NORMAL
WBC # BLD AUTO: 8.4 K/UL (ref 3.6–11)

## 2020-07-03 PROCEDURE — 77010033678 HC OXYGEN DAILY

## 2020-07-03 PROCEDURE — 80069 RENAL FUNCTION PANEL: CPT

## 2020-07-03 PROCEDURE — 85025 COMPLETE CBC W/AUTO DIFF WBC: CPT

## 2020-07-03 PROCEDURE — 74011250637 HC RX REV CODE- 250/637: Performed by: NURSE PRACTITIONER

## 2020-07-03 PROCEDURE — 74011000258 HC RX REV CODE- 258: Performed by: INTERNAL MEDICINE

## 2020-07-03 PROCEDURE — 74011250637 HC RX REV CODE- 250/637: Performed by: HOSPITALIST

## 2020-07-03 PROCEDURE — 74011250636 HC RX REV CODE- 250/636: Performed by: HOSPITALIST

## 2020-07-03 PROCEDURE — 36415 COLL VENOUS BLD VENIPUNCTURE: CPT

## 2020-07-03 PROCEDURE — 74011250636 HC RX REV CODE- 250/636: Performed by: INTERNAL MEDICINE

## 2020-07-03 PROCEDURE — 65270000032 HC RM SEMIPRIVATE

## 2020-07-03 PROCEDURE — 84145 PROCALCITONIN (PCT): CPT

## 2020-07-03 RX ORDER — ACETAMINOPHEN 325 MG/1
650 TABLET ORAL
Status: DISCONTINUED | OUTPATIENT
Start: 2020-07-03 | End: 2020-07-08 | Stop reason: HOSPADM

## 2020-07-03 RX ADMIN — SODIUM CHLORIDE 100 ML/HR: 900 INJECTION, SOLUTION INTRAVENOUS at 15:30

## 2020-07-03 RX ADMIN — MEROPENEM 500 MG: 500 INJECTION, POWDER, FOR SOLUTION INTRAVENOUS at 15:36

## 2020-07-03 RX ADMIN — CARVEDILOL 6.25 MG: 6.25 TABLET, FILM COATED ORAL at 16:36

## 2020-07-03 RX ADMIN — ACETAMINOPHEN 650 MG: 325 TABLET ORAL at 21:08

## 2020-07-03 RX ADMIN — MEROPENEM 500 MG: 500 INJECTION, POWDER, FOR SOLUTION INTRAVENOUS at 04:18

## 2020-07-03 RX ADMIN — CARVEDILOL 6.25 MG: 6.25 TABLET, FILM COATED ORAL at 08:23

## 2020-07-03 NOTE — PROGRESS NOTES
Primary Nurse Zoe Rosenthal and ELZBIETA Slater RN performed a dual skin assessment on this patient No impairment noted  Pratik score is 18

## 2020-07-03 NOTE — PROGRESS NOTES
Problem: Falls - Risk of  Goal: Absence of Falls  Outcome: Progressing Towards Goal  Call light in reach, Patient to call for help with toileting needs, Toileting schedule/hourly rounds          Problem: Pressure Injury - Risk of  Goal: Prevention of pressure injury  Outcome: Progressing Towards Goal  Moisture Interventions: Absorbent underpads, Internal/External urinary devices, Minimize layers, linen dry and wrinkle free, pt demonstrated that she can turn herself in bed          Problem: Chronic Renal Failure  Goal: Fluid and electrolytes stabilized  Outcome: Progressing Towards Goal  Pt getting NS at 100mL/hr, monitoring input and output         Problem: Infection - Risk of, Urinary Catheter-Associated Urinary Tract Infection  Goal: Absence of infection signs and symptoms  Outcome: Progressing Towards Goal  External rivera care completed, pt has no complaints of any pain, urine yellow and clear            0744-Pt's urine output is 350, has rivera catheter, had NS running at 100mL/hr all night, BP at 181/99, Wilder Melgar MD notified, order received to flush rivera, dayshift nurse Myranda notified. Hourly rounding done, pt in NSR, on RA, O2 saturation greater than 90%, has rivera, urine yellow and clear, no complaints of pain throughout shift. 0800-Bedside shift change report given to [facility] (oncoming nurse) by Yaz Matthew RN (offgoing nurse). Report included the following information SBAR, Kardex, ED Summary, Intake/Output, MAR, Accordion, Recent Results, Med Rec Status and Cardiac Rhythm A fib.

## 2020-07-03 NOTE — PROGRESS NOTES
0730: Bedside and Verbal shift change report given to Leana Maxwell RN (oncoming nurse) by Yvonne Burrows RN (offgoing nurse). Report included the following information SBAR, Kardex, Intake/Output, MAR, Recent Results, Cardiac Rhythm Afib and Alarm Parameters . 1200: Verbal shift change report given to Rishi Slade RN (oncoming nurse) by Leana Maxwell RN (offgoing nurse). Report included the following information SBAR, Kardex, Intake/Output, MAR, Recent Results, Cardiac Rhythm Afib and Alarm Parameters .

## 2020-07-03 NOTE — PROGRESS NOTES
Physical Therapy Note    PT orders received and acknowledged. Evaluation is started at transport arrives to bring patient to 6th floor. Will follow up with PT evaluation as able.     Thank you,  Yennifer HERNANDEZT

## 2020-07-03 NOTE — PROGRESS NOTES
[address] [facility] Adult  Hospitalist Group                                                                                          Hospitalist Progress Note  Kostas Ford MD  Answering service: [phone] OR [phone] from in house phone        Date of Service:  7/3/2020  NAME:  Opal Palm  :  1940  MRN:  [id number]      Admission Summary:   Opal Palm is a 68 y.o. female with PMH of atrial fib,CKD, anemia, MI and other comorbidities was sent to the hospital from a facility because of altered mental status and fever.       Per chart review, patient had fever of 101 and was recently moved to COVID unit at the facility. They also noted that patient's mental status was not at baseline. Blood pressure was in systolic 68N. She was sent to emergency room for further evaluation. Interval history / Subjective:      Blood culture results with ESBL E. Coli  Patient still notes significant improvement, no complaints. Denies any abdominal pain, chest pain, shortness of breath. Rivera catheter remains in place. Assessment & Plan:     Sepsis (POA Fever, WBC): With ESBL, UTI likely source, improved. -Continue IV meropenem at current time  - F/U repeat blood culture so far negative  - Urine growing ESBL as well   - ID consulted. - Will need PICC and 14 days of antibiotics. Potential PNA vs. Fluid overload   -SARS-CoV-2 negative  -Continue IV fluids.  -Procalcitonin 34.9, downtrending      Acute metabolic encephalopathy due to infection, uremia, improved.    -Tx as above improving  -CT head- Volume loss and white matter disease but no acute intracranial abnormality.     Acute renal failure stage 2 Cr up to 2.6 from baseline of 1.2 on CKD: Likely secondary to sepsis, urinary retention   - Slowly improving   - Continue rivera catheter  - I and O and continue to monitor   - nephrology following     Chronic blood loss anemia possibly in the setting of using anticoagulant:  -She has hematuria UA positive for >100 RBC   - holding anticoagulation for now, would defer to outpatient whether to resume.      Chronic atrial fibrillation  -Hold anticoagulation as she has anemia and hematuria  -She is currently rate controlled     Hypertension-blood pressure soft hold antihypertensives.     Code status: Full  DVT prophylaxis: SCD    Care Plan discussed with: Patient/Family and Nurse  Anticipated Disposition: Home w/Family and SNF/LTC  Anticipated Discharge: Greater than 48 hours     Hospital Problems  Date Reviewed: 5/22/2020          Codes Class Noted POA    Sepsis ([facility] [address] ICD-10-CM: A41.9  ICD-9-CM: 038.9, 995.91  6/30/2020 Unknown                Review of Systems:   Pertinent items are noted in HPI. Ct Head Wo Cont    Result Date: 6/30/2020  IMPRESSION: Volume loss and white matter disease but no acute intracranial abnormality    Us Retroperitoneum Comp    Result Date: 7/2/2020  IMPRESSION: 1. No hydronephrosis. Small perinephric fluid. 2. Increased echogenicity of both kidneys consistent with medical renal parenchymal disease. 3. Urinary bladder not assessed. 4. Incidentally noted cholelithiasis with gallbladder wall thickening and small ascites. Xr Chest Port    Result Date: 7/1/2020  IMPRESSION: 1. Unchanged cardiomegaly. 2.  Mild hazy bilateral opacities are nonspecific but may represent edema. Xr Chest Port    Result Date: 6/30/2020  Impression: Mild parenchymal opacity right middle lobe may represent acute infiltrate. Correlation with frontal and lateral views may be helpful. Vital Signs:    Last 24hrs VS reviewed since prior progress note.  Most recent are:  Visit Vitals  /88 (BP 1 Location: Left arm, BP Patient Position: At rest)   Pulse 85   Temp 98.6 °F (37 °C)   Resp 20   Ht 5' 1\" (1.549 m)   Wt 77.4 kg (170 lb 10.2 oz)   SpO2 100%   BMI 32.24 kg/m²         Intake/Output Summary (Last 24 hours) at 7/3/2020 1642  Last data filed at 7/3/2020 1200  Gross per 24 hour Intake 6081.67 ml   Output 600 ml   Net 5481.67 ml        Physical Examination:             Constitutional:  No acute distress, cooperative, pleasant    ENT:  Oral mucosa moist, oropharynx benign. Poor dentition    Resp:  CTA bilaterally. No wheezing/rhonchi/rales. No accessory muscle use   CV:  Regular rhythm, normal rate, no murmurs, gallops, rubs    GI:  Soft, non distended, non tender. normoactive bowel sounds, no hepatosplenomegaly     Musculoskeletal:  No edema, warm, 2+ pulses throughout  : rivera in place     Neurologic:  Moves all extremities. AAOx3, CN II-XII reviewed     Psych:  Good insight, Not anxious nor agitated. Data Review:    I personally reviewed  Image and labs      Labs:     Recent Labs     07/03/20  0433 07/02/20  0507   WBC 8.4 9.3   HGB 7.4* 7.6*   HCT 27.2* 26.8*    196     Recent Labs     07/03/20  0427 07/02/20  0507 07/01/20  0337    139 141  140   K 3.7 3.7 3.9  3.8    106 106  106   CO2 26 26 26  27   BUN 48* 56* 59*  62*   CREA 2.03* 2.39* 2.65*  2.66*   * 117* 93  103*   CA 8.7 8.5 8.1*  8.3*   MG  --  1.6  --    PHOS 3.3 3.5 3.9  3.9     Recent Labs     07/03/20  0427 07/02/20  0507 07/01/20  0337   ALT  --   --  13   AP  --   --  62   TBILI  --   --  0.8   TP  --   --  7.1   ALB 2.2* 2.1* 2.1*  2.2*   GLOB  --   --  4.9*     No results for input(s): INR, PTP, APTT, INREXT, INREXT in the last 72 hours. No results for input(s): FE, TIBC, PSAT, FERR in the last 72 hours. No results found for: FOL, RBCF   No results for input(s): PH, PCO2, PO2 in the last 72 hours. No results for input(s): CPK, CKNDX, TROIQ in the last 72 hours.     No lab exists for component: CPKMB  Lab Results   Component Value Date/Time    Cholesterol, total 226 (H) 03/14/2019 02:14 PM    HDL Cholesterol 48 03/14/2019 02:14 PM    LDL,Direct 163 (H) 12/05/2019 01:04 PM    LDL, calculated 148 (H) 03/14/2019 02:14 PM    Triglyceride 149 03/14/2019 02:14 PM     No results found for: [facility]  Lab Results   Component Value Date/Time    Color BROWN 06/30/2020 11:03 AM    Appearance TURBID (A) 06/30/2020 11:03 AM    Specific gravity 1.020 06/30/2020 11:03 AM    pH (UA) 6.0 06/30/2020 11:03 AM    Protein >300 (A) 06/30/2020 11:03 AM    Glucose Negative 06/30/2020 11:03 AM    Ketone Negative 06/30/2020 11:03 AM    Urobilinogen 1.0 06/30/2020 11:03 AM    Nitrites Negative 06/30/2020 11:03 AM    Leukocyte Esterase SMALL (A) 06/30/2020 11:03 AM    Epithelial cells FEW 06/30/2020 11:03 AM    Bacteria 1+ (A) 06/30/2020 11:03 AM    WBC >100 (H) 06/30/2020 11:03 AM    RBC >100 (H) 06/30/2020 11:03 AM         Medications Reviewed:     Current Facility-Administered Medications   Medication Dose Route Frequency    meropenem (MERREM) 500 mg in 0.9% sodium chloride (MBP/ADV) 50 mL  500 mg IntraVENous Q12H    carvediloL (COREG) tablet 6.25 mg  6.25 mg Oral BID WITH MEALS    sodium chloride (NS) flush 5-10 mL  5-10 mL IntraVENous PRN    0.9% sodium chloride infusion  100 mL/hr IntraVENous CONTINUOUS     ______________________________________________________________________  EXPECTED LENGTH OF STAY: 4d 19h  ACTUAL LENGTH OF STAY:          3                 Damien Boone MD

## 2020-07-04 LAB
ALBUMIN SERPL-MCNC: 2 G/DL (ref 3.5–5)
ANION GAP SERPL CALC-SCNC: 6 MMOL/L (ref 5–15)
BACTERIA SPEC CULT: ABNORMAL
BACTERIA SPEC CULT: ABNORMAL
BASOPHILS # BLD: 0.1 K/UL (ref 0–0.1)
BASOPHILS NFR BLD: 1 % (ref 0–1)
BUN SERPL-MCNC: 40 MG/DL (ref 6–20)
BUN/CREAT SERPL: 22 (ref 12–20)
CALCIUM SERPL-MCNC: 8.2 MG/DL (ref 8.5–10.1)
CHLORIDE SERPL-SCNC: 108 MMOL/L (ref 97–108)
CO2 SERPL-SCNC: 24 MMOL/L (ref 21–32)
COMMENT, HOLDF: NORMAL
CREAT SERPL-MCNC: 1.84 MG/DL (ref 0.55–1.02)
DIFFERENTIAL METHOD BLD: ABNORMAL
EOSINOPHIL # BLD: 0.1 K/UL (ref 0–0.4)
EOSINOPHIL NFR BLD: 1 % (ref 0–7)
ERYTHROCYTE [DISTWIDTH] IN BLOOD BY AUTOMATED COUNT: 21.6 % (ref 11.5–14.5)
GLUCOSE SERPL-MCNC: 136 MG/DL (ref 65–100)
HCT VFR BLD AUTO: 27.3 % (ref 35–47)
HGB BLD-MCNC: 7.4 G/DL (ref 11.5–16)
IMM GRANULOCYTES # BLD AUTO: 0 K/UL
IMM GRANULOCYTES NFR BLD AUTO: 0 %
LYMPHOCYTES # BLD: 1.4 K/UL (ref 0.8–3.5)
LYMPHOCYTES NFR BLD: 17 % (ref 12–49)
MAGNESIUM SERPL-MCNC: 1.5 MG/DL (ref 1.6–2.4)
MCH RBC QN AUTO: 20.8 PG (ref 26–34)
MCHC RBC AUTO-ENTMCNC: 27.1 G/DL (ref 30–36.5)
MCV RBC AUTO: 76.7 FL (ref 80–99)
MONOCYTES # BLD: 1.3 K/UL (ref 0–1)
MONOCYTES NFR BLD: 16 % (ref 5–13)
NEUTS BAND NFR BLD MANUAL: 2 % (ref 0–6)
NEUTS SEG # BLD: 5.5 K/UL (ref 1.8–8)
NEUTS SEG NFR BLD: 63 % (ref 32–75)
NRBC # BLD: 0.02 K/UL (ref 0–0.01)
NRBC BLD-RTO: 0.2 PER 100 WBC
PHOSPHATE SERPL-MCNC: 2.9 MG/DL (ref 2.6–4.7)
PLATELET # BLD AUTO: 223 K/UL (ref 150–400)
PMV BLD AUTO: 9.3 FL (ref 8.9–12.9)
POTASSIUM SERPL-SCNC: 3.4 MMOL/L (ref 3.5–5.1)
RBC # BLD AUTO: 3.56 M/UL (ref 3.8–5.2)
RBC MORPH BLD: ABNORMAL
SAMPLES BEING HELD,HOLD: NORMAL
SERVICE CMNT-IMP: ABNORMAL
SODIUM SERPL-SCNC: 138 MMOL/L (ref 136–145)
WBC # BLD AUTO: 8.4 K/UL (ref 3.6–11)

## 2020-07-04 PROCEDURE — 83735 ASSAY OF MAGNESIUM: CPT

## 2020-07-04 PROCEDURE — 97161 PT EVAL LOW COMPLEX 20 MIN: CPT | Performed by: PHYSICAL THERAPIST

## 2020-07-04 PROCEDURE — 36415 COLL VENOUS BLD VENIPUNCTURE: CPT

## 2020-07-04 PROCEDURE — 74011250636 HC RX REV CODE- 250/636: Performed by: INTERNAL MEDICINE

## 2020-07-04 PROCEDURE — 74011000258 HC RX REV CODE- 258: Performed by: INTERNAL MEDICINE

## 2020-07-04 PROCEDURE — 80069 RENAL FUNCTION PANEL: CPT

## 2020-07-04 PROCEDURE — 97530 THERAPEUTIC ACTIVITIES: CPT | Performed by: PHYSICAL THERAPIST

## 2020-07-04 PROCEDURE — 74011250637 HC RX REV CODE- 250/637: Performed by: HOSPITALIST

## 2020-07-04 PROCEDURE — 74011250636 HC RX REV CODE- 250/636: Performed by: HOSPITALIST

## 2020-07-04 PROCEDURE — 85025 COMPLETE CBC W/AUTO DIFF WBC: CPT

## 2020-07-04 PROCEDURE — 65270000032 HC RM SEMIPRIVATE

## 2020-07-04 RX ORDER — POTASSIUM CHLORIDE 14.9 MG/ML
10 INJECTION INTRAVENOUS
Status: COMPLETED | OUTPATIENT
Start: 2020-07-04 | End: 2020-07-04

## 2020-07-04 RX ADMIN — CARVEDILOL 6.25 MG: 6.25 TABLET, FILM COATED ORAL at 16:04

## 2020-07-04 RX ADMIN — POTASSIUM CHLORIDE 10 MEQ: 200 INJECTION, SOLUTION INTRAVENOUS at 12:53

## 2020-07-04 RX ADMIN — SODIUM CHLORIDE 100 ML/HR: 900 INJECTION, SOLUTION INTRAVENOUS at 23:57

## 2020-07-04 RX ADMIN — POTASSIUM CHLORIDE 10 MEQ: 200 INJECTION, SOLUTION INTRAVENOUS at 13:00

## 2020-07-04 RX ADMIN — SODIUM CHLORIDE 100 ML/HR: 900 INJECTION, SOLUTION INTRAVENOUS at 12:52

## 2020-07-04 RX ADMIN — CARVEDILOL 6.25 MG: 6.25 TABLET, FILM COATED ORAL at 09:08

## 2020-07-04 RX ADMIN — MEROPENEM 500 MG: 500 INJECTION, POWDER, FOR SOLUTION INTRAVENOUS at 16:04

## 2020-07-04 RX ADMIN — SODIUM CHLORIDE 100 ML/HR: 900 INJECTION, SOLUTION INTRAVENOUS at 01:49

## 2020-07-04 RX ADMIN — MEROPENEM 500 MG: 500 INJECTION, POWDER, FOR SOLUTION INTRAVENOUS at 04:42

## 2020-07-04 NOTE — PROGRESS NOTES
Problem: Mobility Impaired (Adult and Pediatric)  Goal: *Acute Goals and Plan of Care (Insert Text)  Description: FUNCTIONAL STATUS PRIOR TO ADMISSION: Lives in LTC at [facility]. Appears to have ambulated short distances with the RW from what she says. HOME SUPPORT PRIOR TO ADMISSION: LTC    Physical Therapy Goals  Initiated 7/4/2020  1. Patient will move from supine to sit and sit to supine , scoot up and down and roll side to side in bed with minimal assistance/contact guard assist within 7 day(s). 2.  Patient will transfer from bed to chair and chair to bed with minimal assistance/contact guard assist using the least restrictive device within 7 day(s). 3.  Patient will perform sit to stand with minimal assistance/contact guard assist within 7 day(s). 4.  Patient will ambulate with minimal assistance/contact guard assist for 10 feet with the least restrictive device within 7 day(s). Outcome: Progressing Towards Goal  PHYSICAL THERAPY EVALUATION  Patient: Onofre Haque (68 y.o. female)  Date: 7/4/2020  Primary Diagnosis: Sepsis [facility]) [A41.9]        Precautions:   Fall      ASSESSMENT  Based on the objective data described below, the patient presents with impaired mobility and gait. Patient requires minimal assist of 2 to come to sitting EOB. She has a lot of pain in the right ankle/foot and wanted XXL slippers which she was able to put on. She was able to get her feet on the floor but not able to initiate standing up or come to standing with assist using the RW. She states that right foot is hurting and she can't stand on it. She sat EOB x 10 minutes without difficulty. Recommend she be up with the lift team.  Not able to stand or ambulate today. Current Level of Function Impacting Discharge (mobility/balance): none    Functional Outcome Measure: The patient scored 15 on the Barthel outcome measure which is indicative of 85% debility.       Other factors to consider for discharge: none     Patient will benefit from skilled therapy intervention to address the above noted impairments. PLAN :  Recommendations and Planned Interventions: bed mobility training, transfer training, gait training, and therapeutic exercises      Frequency/Duration: Patient will be followed by physical therapy:  5 times a week to address goals. Recommendation for discharge: (in order for the patient to meet his/her long term goals)  To be determined: Recommend patient get therapy at [facility] when she returns. If she needs to be ambulatory she may need SNF rehab. This discharge recommendation:  Has not yet been discussed the attending provider and/or case management    IF patient discharges home will need the following DME: none         SUBJECTIVE:   Patient stated That right foot is stopping me from standing up.     OBJECTIVE DATA SUMMARY:   HISTORY:    Past Medical History:   Diagnosis Date    [facility]fib ([facility]     Anemia     Chronic kidney disease     CKD (chronic kidney disease)     Hypercholesterolemia     Hyperlipidemia     Hypertension     MI (myocardial infarction) ([facility]    History reviewed. No pertinent surgical history. Personal factors and/or comorbidities impacting plan of care: obesity and arthritis    Home Situation  Home Environment: Long term care  # Steps to Enter: 0  One/Two Story Residence: One story  Living Alone: No  Support Systems: Skilled nursing facility, Other (comments)  Patient Expects to be Discharged to[de-identified] Other (comment)(long term care)  Current DME Used/Available at Home: None(per [facility])    EXAMINATION/PRESENTATION/DECISION MAKING:   Critical Behavior:  Neurologic State: Alert  Orientation Level: Oriented X4  Cognition: Follows commands  Safety/Judgement: Fall prevention  Hearing: Auditory  Auditory Impairment: Hard of hearing, bilateral  Skin:  per nursing. Edema: per nursing.    Range Of Motion:  AROM: Generally decreased, functional           PROM: Generally decreased, functional           Strength:    Strength: Generally decreased, functional                    Tone & Sensation:   Tone: Normal              Sensation: Intact               Coordination:  Coordination: Within functional limits  Vision:      Functional Mobility:  Bed Mobility:  Rolling: Contact guard assistance;Assist x2  Supine to Sit: Contact guard assistance;Assist x2; Additional time  Sit to Supine: Moderate assistance;Assist x2; Additional time  Scooting: Maximum assistance;Assist x2; Additional time  Transfers:                             Balance:   Sitting: Intact  Ambulation/Gait Training:                                                      Patient is not able to stand or ambulate today. Functional Measure:  Barthel Index:    Bathin  Bladder: 0  Bowels: 5  Groomin  Dressin  Feedin  Mobility: 0  Stairs: 0  Toilet Use: 0  Transfer (Bed to Chair and Back): 0  Total: 15/100       The Barthel ADL Index: Guidelines  1. The index should be used as a record of what a patient does, not as a record of what a patient could do. 2. The main aim is to establish degree of independence from any help, physical or verbal, however minor and for whatever reason. 3. The need for supervision renders the patient not independent. 4. A patient's performance should be established using the best available evidence. Asking the patient, friends/relatives and nurses are the usual sources, but direct observation and common sense are also important. However direct testing is not needed. 5. Usually the patient's performance over the preceding 24-48 hours is important, but occasionally longer periods will be relevant. 6. Middle categories imply that the patient supplies over 50 per cent of the effort. 7. Use of aids to be independent is allowed. Neela Briseno., Barthel, D.W. (0115). Functional evaluation: the Barthel Index. [address] [facility] (14)2.   KAZ Rich, Austin Fields., Akua Bains, Petros Stewart.Lilia. (1999). Measuring the change indisability after inpatient rehabilitation; comparison of the responsiveness of the Barthel Index and Functional [place] Measure. Journal of Neurology, Neurosurgery, and Psychiatry, 66(4), 917-189. LEANA Burns, KALEY Herman, & Cayla Carl M.A. (2004.) Assessment of post-stroke quality of life in cost-effectiveness studies: The usefulness of the Barthel Index and the EuroQoL-5D. Quality of Life Research, 15, 679-32           Physical Therapy Evaluation Charge Determination   History Examination Presentation Decision-Making   LOW Complexity : Zero comorbidities / personal factors that will impact the outcome / POC LOW Complexity : 1-2 Standardized tests and measures addressing body structure, function, activity limitation and / or participation in recreation  LOW Complexity : Stable, uncomplicated  LOW Complexity : FOTO score of       Based on the above components, the patient evaluation is determined to be of the following complexity level: LOW         Activity Tolerance:   Fair  Please refer to the flowsheet for vital signs taken during this treatment. After treatment patient left in no apparent distress:   Supine in bed and Call bell within reach    COMMUNICATION/EDUCATION:   The patients plan of care was discussed with: Registered nurse and Certified nursing assistant/patient care technician. Fall prevention education was provided and the patient/caregiver indicated understanding., Patient/family have participated as able in goal setting and plan of care. , and Patient/family agree to work toward stated goals and plan of care.     Thank you for this referral.  Hardik Sheppard, PT   Time Calculation: 30 mins

## 2020-07-04 NOTE — PROGRESS NOTES
Problem: Infection - Risk of, Urinary Catheter-Associated Urinary Tract Infection  Goal: *Absence of infection signs and symptoms  Outcome: Progressing Towards Goal     Problem: Infection - Risk of, Urinary Catheter-Associated Urinary Tract Infection  Goal: *Absence of infection signs and symptoms  Outcome: Progressing Towards Goal

## 2020-07-04 NOTE — PROGRESS NOTES
[facility]  Hospitalist Group                                                                                          Hospitalist Progress Note  Dl Garcia MD  Answering service: [phone] OR [phone] from in house phone        Date of Service:  2020  NAME:  Maryjane Gregory  :  1940  MRN:  [id number]      Admission Summary:   Maryjane Gregory is a 68 y.o. female with PMH of atrial fib,CKD, anemia, MI and other comorbidities was sent to the hospital from a facility because of altered mental status and fever.       Per chart review, patient had fever of 101 and was recently moved to COVID unit at the facility. They also noted that patient's mental status was not at baseline. Blood pressure was in systolic 47D. She was sent to emergency room for further evaluation. Interval history / Subjective:      Patient pleasant today, no complaints. Denies any abdominal pain, no nausea or vomiting   Assessment & Plan:     Sepsis (POA Fever, WBC): With ESBL, UTI likely source, improved. - Continue IV meropenem at current time  - F/U repeat blood culture so far negative  - Urine growing ESBL as well   - ID consulted. - Will need PICC and 14 days of antibiotics. ?if ok with renal given her kidney function. Potential PNA vs. Fluid overload   -SARS-CoV-2 negative  -Continue IV fluids.  -Procalcitonin 34.9, downtrending      Acute metabolic encephalopathy due to infection, uremia, improved.    -Tx as above improving  -CT head- Volume loss and white matter disease but no acute intracranial abnormality.     Acute renal failure stage 2 Cr up to 2.6 from baseline of 1.2 on CKD: Likely secondary to sepsis, urinary retention   - Slowly improving   - Continue rivera catheter  - I and O and continue to monitor   - nephrology following ?if ok for patient to get PICC    Chronic blood loss anemia possibly in the setting of using anticoagulant:  -She has hematuria UA positive for >100 RBC - holding anticoagulation for now, would defer to outpatient whether to resume.      Chronic atrial fibrillation  -Hold anticoagulation as she has anemia and hematuria  -She is currently rate controlled     Hypertension-blood pressure soft hold antihypertensives.     Code status: Full  DVT prophylaxis: SCD    Care Plan discussed with: Patient/Family and Nurse  Anticipated Disposition: Home w/Family and SNF/LTC  Anticipated Discharge: Greater than 48 hours     Hospital Problems  Date Reviewed: 5/22/2020          Codes Class Noted POA    Sepsis ([facility] [address] ICD-10-CM: A41.9  ICD-9-CM: 038.9, 995.91  6/30/2020 Unknown                Review of Systems:   Pertinent items are noted in HPI. Ct Head Wo Cont    Result Date: 6/30/2020  IMPRESSION: Volume loss and white matter disease but no acute intracranial abnormality    Us Retroperitoneum Comp    Result Date: 7/2/2020  IMPRESSION: 1. No hydronephrosis. Small perinephric fluid. 2. Increased echogenicity of both kidneys consistent with medical renal parenchymal disease. 3. Urinary bladder not assessed. 4. Incidentally noted cholelithiasis with gallbladder wall thickening and small ascites. Xr Chest Port    Result Date: 7/1/2020  IMPRESSION: 1. Unchanged cardiomegaly. 2.  Mild hazy bilateral opacities are nonspecific but may represent edema. Xr Chest Port    Result Date: 6/30/2020  Impression: Mild parenchymal opacity right middle lobe may represent acute infiltrate. Correlation with frontal and lateral views may be helpful. Vital Signs:    Last 24hrs VS reviewed since prior progress note.  Most recent are:  Visit Vitals  /78 (BP 1 Location: Right arm, BP Patient Position: At rest)   Pulse 87   Temp 98.9 °F (37.2 °C)   Resp 20   Ht 5' 1\" (1.549 m)   Wt 83.2 kg (183 lb 6.8 oz)   SpO2 95%   BMI 34.66 kg/m²         Intake/Output Summary (Last 24 hours) at 7/4/2020 1648  Last data filed at 7/4/2020 0911  Gross per 24 hour   Intake 1058.33 ml   Output 950 ml   Net 108.33 ml        Physical Examination:             Constitutional:  No acute distress, cooperative, pleasant    ENT:  Oral mucosa moist, oropharynx benign. Poor dentition    Resp:  CTA bilaterally. No wheezing/rhonchi/rales. No accessory muscle use   CV:  Regular rhythm, normal rate, no murmurs, gallops, rubs    GI:  Soft, non distended, non tender. normoactive bowel sounds, no hepatosplenomegaly     Musculoskeletal:  No edema, warm, 2+ pulses throughout  : rivera in place     Neurologic:  Moves all extremities. AAOx3, CN II-XII reviewed     Psych:  Good insight, Not anxious nor agitated. Data Review:    I personally reviewed  Image and labs      Labs:     Recent Labs     07/04/20 0155 07/03/20  0433   WBC 8.4 8.4   HGB 7.4* 7.4*   HCT 27.3* 27.2*    223     Recent Labs     07/04/20 0155 07/03/20  0427 07/02/20  0507    139 139   K 3.4* 3.7 3.7    107 106   CO2 24 26 26   BUN 40* 48* 56*   CREA 1.84* 2.03* 2.39*   * 125* 117*   CA 8.2* 8.7 8.5   MG 1.5*  --  1.6   PHOS 2.9 3.3 3.5     Recent Labs     07/04/20 0155 07/03/20  0427 07/02/20  0507   ALB 2.0* 2.2* 2.1*     No results for input(s): INR, PTP, APTT, INREXT, INREXT in the last 72 hours. No results for input(s): FE, TIBC, PSAT, FERR in the last 72 hours. No results found for: FOL, RBCF   No results for input(s): PH, PCO2, PO2 in the last 72 hours. No results for input(s): CPK, CKNDX, TROIQ in the last 72 hours.     No lab exists for component: CPKMB  Lab Results   Component Value Date/Time    Cholesterol, total 226 (H) 03/14/2019 02:14 PM    HDL Cholesterol 48 03/14/2019 02:14 PM    LDL,Direct 163 (H) 12/05/2019 01:04 PM    LDL, calculated 148 (H) 03/14/2019 02:14 PM    Triglyceride 149 03/14/2019 02:14 PM     No results found for: GLUCPOC  Lab Results   Component Value Date/Time    Color BROWN 06/30/2020 11:03 AM    Appearance TURBID (A) 06/30/2020 11:03 AM    Specific gravity 1.020 06/30/2020 11:03 AM    pH (UA) 6.0 06/30/2020 11:03 AM    Protein >300 (A) 06/30/2020 11:03 AM    Glucose Negative 06/30/2020 11:03 AM    Ketone Negative 06/30/2020 11:03 AM    Urobilinogen 1.0 06/30/2020 11:03 AM    Nitrites Negative 06/30/2020 11:03 AM    Leukocyte Esterase SMALL (A) 06/30/2020 11:03 AM    Epithelial cells FEW 06/30/2020 11:03 AM    Bacteria 1+ (A) 06/30/2020 11:03 AM    WBC >100 (H) 06/30/2020 11:03 AM    RBC >100 (H) 06/30/2020 11:03 AM         Medications Reviewed:     Current Facility-Administered Medications   Medication Dose Route Frequency    acetaminophen (TYLENOL) tablet 650 mg  650 mg Oral Q4H PRN    meropenem (MERREM) 500 mg in 0.9% sodium chloride (MBP/ADV) 50 mL  500 mg IntraVENous Q12H    carvediloL (COREG) tablet 6.25 mg  6.25 mg Oral BID WITH MEALS    sodium chloride (NS) flush 5-10 mL  5-10 mL IntraVENous PRN    0.9% sodium chloride infusion  100 mL/hr IntraVENous CONTINUOUS     ______________________________________________________________________  EXPECTED LENGTH OF STAY: 4d 19h  ACTUAL LENGTH OF STAY:          4                 Domingo Sanchez MD

## 2020-07-04 NOTE — PROGRESS NOTES
Problem: Falls - Risk of  Goal: *Absence of Falls  Description: Document Dallinsenait Jacob Fall Risk and appropriate interventions in the flowsheet. Outcome: Progressing Towards Goal  Note: Fall Risk Interventions:  Mobility Interventions: Bed/chair exit alarm, Communicate number of staff needed for ambulation/transfer, OT consult for ADLs, Patient to call before getting OOB, PT Consult for mobility concerns, PT Consult for assist device competence    Mentation Interventions: Adequate sleep, hydration, pain control, Bed/chair exit alarm    Medication Interventions: Bed/chair exit alarm, Evaluate medications/consider consulting pharmacy, Patient to call before getting OOB, Teach patient to arise slowly    Elimination Interventions: Bed/chair exit alarm, Call light in reach, Patient to call for help with toileting needs, Toileting schedule/hourly rounds(rivera in place)    History of Falls Interventions: Bed/chair exit alarm, Door open when patient unattended         Problem: Pressure Injury - Risk of  Goal: *Prevention of pressure injury  Description: Document Pratik Scale and appropriate interventions in the flowsheet.   Outcome: Progressing Towards Goal  Note: Pressure Injury Interventions:  Sensory Interventions: Assess changes in LOC, Float heels, Minimize linen layers, Pressure redistribution bed/mattress (bed type), Assess need for specialty bed    Moisture Interventions: Absorbent underpads, Apply protective barrier, creams and emollients, Check for incontinence Q2 hours and as needed, Internal/External urinary devices, Maintain skin hydration (lotion/cream), Minimize layers, Assess need for specialty bed    Activity Interventions: Assess need for specialty bed, Pressure redistribution bed/mattress(bed type), PT/OT evaluation, Increase time out of bed    Mobility Interventions: Assess need for specialty bed, Float heels, HOB 30 degrees or less, Pressure redistribution bed/mattress (bed type), PT/OT evaluation    Nutrition Interventions: Document food/fluid/supplement intake, Offer support with meals,snacks and hydration    Friction and Shear Interventions: Lift sheet, Minimize layers                Problem: Infection - Risk of, Urinary Catheter-Associated Urinary Tract Infection  Goal: *Absence of infection signs and symptoms  Outcome: Progressing Towards Goal     Problem: Risk for Spread of Infection  Goal: Prevent transmission of infectious organism to others  Description: Prevent the transmission of infectious organisms to other patients, staff members, and visitors.   Outcome: Progressing Towards Goal     Problem: Patient Education:  Go to Education Activity  Goal: Patient/Family Education  Outcome: Progressing Towards Goal     Problem: Patient Education: Go to Patient Education Activity  Goal: Patient/Family Education  Outcome: Progressing Towards Goal

## 2020-07-05 LAB
ALBUMIN SERPL-MCNC: 2 G/DL (ref 3.5–5)
ANION GAP SERPL CALC-SCNC: 8 MMOL/L (ref 5–15)
BASOPHILS # BLD: 0 K/UL (ref 0–0.1)
BASOPHILS NFR BLD: 0 % (ref 0–1)
BUN SERPL-MCNC: 38 MG/DL (ref 6–20)
BUN/CREAT SERPL: 23 (ref 12–20)
CALCIUM SERPL-MCNC: 8.6 MG/DL (ref 8.5–10.1)
CHLORIDE SERPL-SCNC: 108 MMOL/L (ref 97–108)
CO2 SERPL-SCNC: 21 MMOL/L (ref 21–32)
CREAT SERPL-MCNC: 1.68 MG/DL (ref 0.55–1.02)
DIFFERENTIAL METHOD BLD: ABNORMAL
EOSINOPHIL # BLD: 0.1 K/UL (ref 0–0.4)
EOSINOPHIL NFR BLD: 1 % (ref 0–7)
ERYTHROCYTE [DISTWIDTH] IN BLOOD BY AUTOMATED COUNT: 21.9 % (ref 11.5–14.5)
GLUCOSE SERPL-MCNC: 110 MG/DL (ref 65–100)
HCT VFR BLD AUTO: 27.7 % (ref 35–47)
HGB BLD-MCNC: 7.5 G/DL (ref 11.5–16)
IMM GRANULOCYTES # BLD AUTO: 0 K/UL
IMM GRANULOCYTES NFR BLD AUTO: 0 %
LYMPHOCYTES # BLD: 1.6 K/UL (ref 0.8–3.5)
LYMPHOCYTES NFR BLD: 13 % (ref 12–49)
MAGNESIUM SERPL-MCNC: 1.5 MG/DL (ref 1.6–2.4)
MCH RBC QN AUTO: 20.8 PG (ref 26–34)
MCHC RBC AUTO-ENTMCNC: 27.1 G/DL (ref 30–36.5)
MCV RBC AUTO: 76.9 FL (ref 80–99)
MONOCYTES # BLD: 1.5 K/UL (ref 0–1)
MONOCYTES NFR BLD: 12 % (ref 5–13)
NEUTS BAND NFR BLD MANUAL: 2 % (ref 0–6)
NEUTS SEG # BLD: 9.2 K/UL (ref 1.8–8)
NEUTS SEG NFR BLD: 72 % (ref 32–75)
NRBC # BLD: 0.02 K/UL (ref 0–0.01)
NRBC BLD-RTO: 0.2 PER 100 WBC
PHOSPHATE SERPL-MCNC: 2.8 MG/DL (ref 2.6–4.7)
PLATELET # BLD AUTO: 302 K/UL (ref 150–400)
PMV BLD AUTO: 10.1 FL (ref 8.9–12.9)
POTASSIUM SERPL-SCNC: 4.6 MMOL/L (ref 3.5–5.1)
RBC # BLD AUTO: 3.6 M/UL (ref 3.8–5.2)
RBC MORPH BLD: ABNORMAL
SODIUM SERPL-SCNC: 137 MMOL/L (ref 136–145)
WBC # BLD AUTO: 12.4 K/UL (ref 3.6–11)

## 2020-07-05 PROCEDURE — 74011250636 HC RX REV CODE- 250/636: Performed by: INTERNAL MEDICINE

## 2020-07-05 PROCEDURE — 74011250636 HC RX REV CODE- 250/636: Performed by: HOSPITALIST

## 2020-07-05 PROCEDURE — 80069 RENAL FUNCTION PANEL: CPT

## 2020-07-05 PROCEDURE — 87040 BLOOD CULTURE FOR BACTERIA: CPT

## 2020-07-05 PROCEDURE — 51798 US URINE CAPACITY MEASURE: CPT

## 2020-07-05 PROCEDURE — 94760 N-INVAS EAR/PLS OXIMETRY 1: CPT

## 2020-07-05 PROCEDURE — 77010033678 HC OXYGEN DAILY

## 2020-07-05 PROCEDURE — 74011250637 HC RX REV CODE- 250/637: Performed by: HOSPITALIST

## 2020-07-05 PROCEDURE — 74011250637 HC RX REV CODE- 250/637: Performed by: NURSE PRACTITIONER

## 2020-07-05 PROCEDURE — 74011000258 HC RX REV CODE- 258: Performed by: INTERNAL MEDICINE

## 2020-07-05 PROCEDURE — 65270000032 HC RM SEMIPRIVATE

## 2020-07-05 PROCEDURE — 83735 ASSAY OF MAGNESIUM: CPT

## 2020-07-05 PROCEDURE — 85025 COMPLETE CBC W/AUTO DIFF WBC: CPT

## 2020-07-05 PROCEDURE — 36415 COLL VENOUS BLD VENIPUNCTURE: CPT

## 2020-07-05 PROCEDURE — 97535 SELF CARE MNGMENT TRAINING: CPT

## 2020-07-05 PROCEDURE — 97165 OT EVAL LOW COMPLEX 30 MIN: CPT

## 2020-07-05 RX ORDER — ATORVASTATIN CALCIUM 40 MG/1
40 TABLET, FILM COATED ORAL
Status: CANCELLED | OUTPATIENT
Start: 2020-07-05

## 2020-07-05 RX ORDER — MAGNESIUM SULFATE HEPTAHYDRATE 40 MG/ML
2 INJECTION, SOLUTION INTRAVENOUS ONCE
Status: COMPLETED | OUTPATIENT
Start: 2020-07-05 | End: 2020-07-05

## 2020-07-05 RX ADMIN — SODIUM CHLORIDE 100 ML/HR: 900 INJECTION, SOLUTION INTRAVENOUS at 13:51

## 2020-07-05 RX ADMIN — MEROPENEM 500 MG: 500 INJECTION, POWDER, FOR SOLUTION INTRAVENOUS at 16:03

## 2020-07-05 RX ADMIN — CARVEDILOL 6.25 MG: 6.25 TABLET, FILM COATED ORAL at 16:02

## 2020-07-05 RX ADMIN — ACETAMINOPHEN 650 MG: 325 TABLET ORAL at 10:58

## 2020-07-05 RX ADMIN — ACETAMINOPHEN 650 MG: 325 TABLET ORAL at 16:03

## 2020-07-05 RX ADMIN — MEROPENEM 500 MG: 500 INJECTION, POWDER, FOR SOLUTION INTRAVENOUS at 04:09

## 2020-07-05 RX ADMIN — CARVEDILOL 6.25 MG: 6.25 TABLET, FILM COATED ORAL at 08:11

## 2020-07-05 RX ADMIN — MAGNESIUM SULFATE IN WATER 2 G: 40 INJECTION, SOLUTION INTRAVENOUS at 09:29

## 2020-07-05 RX ADMIN — MEROPENEM 500 MG: 500 INJECTION, POWDER, FOR SOLUTION INTRAVENOUS at 22:06

## 2020-07-05 NOTE — PROGRESS NOTES
OCCUPATIONAL THERAPY EVALUATION  Patient: Norberto Moya (68 y.o. female)  Date: 7/5/2020  Primary Diagnosis: Sepsis ([facility] [address] [A41.9]        Precautions:   Fall, Bed Alarm, DNR, Contact    ASSESSMENT  Based on the objective data described below, the patient presents with new L foot pain, 8/10 (with PROM and nearly intolerant of AROM) that is limiting for all LE ADLs and functional mobility after being admitted to ER 6-30 for fever 101 at [facility] with [address]. Current Level of Function Impacting Discharge (ADLs/self-care): set up UE ADLs, Mod-max LE ADLs and max-D functional mobility, with 8/10 L foot pain and edema limiting factor. Functional Outcome Measure: The patient scored Total: 25/100 on the Barthel Index outcome measure which is indicative of 75% impaired ability to care for basic self needs/dependency on others; inferred 95% dependency on others for instrumental ADLs. Other factors to consider for discharge: new to LTC x 2-3 months at ambulatory level     Patient will benefit from skilled therapy intervention to address the above noted impairments. PLAN :  Recommendations and Planned Interventions: self care training, functional mobility training, therapeutic exercise, balance training, therapeutic activities, cognitive retraining, endurance activities, neuromuscular re-education, patient education, home safety training, and family training/education    Frequency/Duration: Patient will be followed by occupational therapy 5 times a week to address goals.     Recommendation for discharge: (in order for the patient to meet his/her long term goals)  Therapy up to 5 days/week in SNF setting    This discharge recommendation:  A follow-up discussion with the attending provider and/or case management is planned    IF patient discharges home will need the following DME: bedside commode, walker: rolling, and wheelchair       SUBJECTIVE:   Patient stated My left foot feels too heavy to move it; heavy like a block of ice; even my R leg feels a little too heavy too.     OBJECTIVE DATA SUMMARY:   HISTORY:   Past Medical History:   Diagnosis Date    A-fib ([facility] [address]     Anemia     Chronic kidney disease     CKD (chronic kidney disease)     Hypercholesterolemia     Hyperlipidemia     Hypertension     MI (myocardial infarction) ([facility] [address]    History reviewed. No pertinent surgical history. Expanded or extensive additional review of patient history:     Home Situation  Home Environment: Long term care([facility])  # Steps to Enter: 0  One/Two Story Residence: One story  Living Alone: No  Support Systems: Long term acute care  Patient Expects to be Discharged to[de-identified] (LTC vs SNF)  Current DME Used/Available at Home: (no RW used PTA)  Tub or Shower Type: Shower(has staff assist PTA)    Hand dominance: Right    EXAMINATION OF PERFORMANCE DEFICITS:  Cognitive/Behavioral Status:  Neurologic State: Alert  Orientation Level: Disoriented to situation;Disoriented to time;Disoriented to place;Oriented to person(\"January\" )  Cognition: Decreased command following; Follows commands; Impaired decision making;Memory loss  Perception: Appears intact  Perseveration: Perseverates during conversation  Safety/Judgement: Fall prevention;Decreased insight into deficits    Skin: intact, foot not red but patient presents very tender    Edema: + L LE    Hearing: Auditory  Auditory Impairment: Hard of hearing, bilateral    Vision/Perceptual:                           Acuity: Impaired near vision; Impaired far vision(WFL' I need to get glasses)         Range of Motion:  B UE WFL  AROM: (B UE; L ankle with poor AROM and PROM) most intolerant of PROM dorsiflexion and inversion/eversion; can tolerate PROM plantar flexion:  \"It hurts still but not like it does the other way\"                         Strength:  WFL B UE with decreased endurance  Strength: (unable to tolerate L LE standing due to pain)                Coordination:  Coordination: Generally decreased, functional  Fine Motor Skills-Upper: Left Intact; Right Intact    Gross Motor Skills-Upper: Left Intact; Right Intact    Tone & Sensation:    Tone: Normal  Sensation: Intact                      Balance:  Sitting: Impaired  Sitting - Static: Good (unsupported)  Sitting - Dynamic: Fair (occasional); Other (comment)(appears limited by LE weakness and L LE pain)  Standing: Impaired  Standing - Static: Constant support(unable to stand due to L LE pain)  Standing - Dynamic : Constant support;Poor; Not tested(not able to tolerate standing due to foot pain on L)    Functional Mobility and Transfers for ADLs:  Bed Mobility:  Rolling: Minimum assistance  Supine to Sit: Moderate assistance  Sit to Supine: Maximum assistance  Scooting: Maximum assistance    Transfers:  Bed to Chair: Total assistance(inferred-unable to tolerate standing/L pain in foot)  Toilet Transfer : Total assistance(inferred; intolerant of L foot pain in standing)    ADL Assessment:  Feeding: Modified independent    Oral Facial Hygiene/Grooming: Setup    Bathing: Moderate assistance;Maximum assistance(long sitting in bed)    Upper Body Dressing: Setup    Lower Body Dressing: Moderate assistance;Maximum assistance(unable to don socks which she did yesterday)    Toileting: Total assistance                ADL Intervention and task modifications:     Initiated training of fall prevention, use of call bell and pain management for L LE, need for increased activity level and SROM L LE as tolerated; unable to don socks today which she was able to do yesterday; she reports, \"My legs are too heavy to move them. \"    Cognitive Retraining  Safety/Judgement: Fall prevention;Decreased insight into deficits    Functional Measure:  Barthel Index:    Bathin  Bladder: 0  Bowels: 5  Groomin  Dressin  Feeding: 10  Mobility: 0  Stairs: 0  Toilet Use: 0  Transfer (Bed to Chair and Back): 0  Total: 25/100        The Barthel ADL Index: Guidelines  1.  The index should be used as a record of what a patient does, not as a record of what a patient could do. 2. The main aim is to establish degree of independence from any help, physical or verbal, however minor and for whatever reason. 3. The need for supervision renders the patient not independent. 4. A patient's performance should be established using the best available evidence. Asking the patient, friends/relatives and nurses are the usual sources, but direct observation and common sense are also important. However direct testing is not needed. 5. Usually the patient's performance over the preceding 24-48 hours is important, but occasionally longer periods will be relevant. 6. Middle categories imply that the patient supplies over 50 per cent of the effort. 7. Use of aids to be independent is allowed. Poornima Taylor., Barthel, D.W. ([id number]). Functional evaluation: the Barthel Index. [address] [facility] (14)2. KAZ Martinez, Kristi Terrazas., Obed Agrawal., [place], [address] [place] [address] (1999). Measuring the change indisability after inpatient rehabilitation; comparison of the responsiveness of the Barthel Index and Functional [place] Measure. Journal of Neurology, Neurosurgery, and Psychiatry, 66(4), 819-928. Frank Keith, N.J.A, KALEY Herman, & Randi Villegas MRAGHU. (2004.) Assessment of post-stroke quality of life in cost-effectiveness studies: The usefulness of the Barthel Index and the EuroQoL-5D. Quality of Life Research, 15, 372-96         Occupational Therapy Evaluation Charge Determination   History Examination Decision-Making   LOW Complexity : Brief history review  HIGH Complexity : 5 or more performance deficits relating to physical, cognitive , or psychosocial skils that result in activity limitations and / or participation restrictions HIGH Complexity : Patient presents with comorbidities that affect occupational performance.  Signifigant modification of tasks or assistance (eg, physical or verbal) with assessment (s) is necessary to enable patient to complete evaluation       Based on the above components, the patient evaluation is determined to be of the following complexity level: LOW   Pain Ratin/10, nsg notified, L foot    Activity Tolerance:   Fair and requires frequent rest breaks  Please refer to the flowsheet for vital signs taken during this treatment. After treatment patient left in no apparent distress:    Supine in bed, Heels elevated for pressure relief, Call bell within reach, Bed / chair alarm activated, and Side rails x 3    COMMUNICATION/EDUCATION:   The patients plan of care was discussed with: Registered nurse and Certified nursing assistant/patient care technician. Home safety education was provided and the patient/caregiver indicated understanding., Patient/family have participated as able in goal setting and plan of care. , and Patient/family agree to work toward stated goals and plan of care. This patients plan of care is appropriate for delegation to [facility].     Thank you for this referral.  Kassy Lofton OTR/L  Time Calculation: 43 mins

## 2020-07-05 NOTE — PROGRESS NOTES
Day #4 of meropenem  Indication:  ESBL E.coli Bacteremia/UTI   Current regimen:  500 mg IV Q 12 hours  Recent Labs     20  0146 20  0145 20  0155 20  0433 20  0427   WBC  --  12.4* 8.4 8.4  --    CREA 1.68*  --  1.84*  --  2.03*   BUN 38*  --  40*  --  48*   Est CrCl: 27.1 ml/min  Temp (24hrs), Av °F (37.2 °C), Min:98.4 °F (36.9 °C), Max:100.1 °F (37.8 °C)    Cultures:    Urine- >100k ESBL E.coli (S to Merrem) - Final   Blood - ESBL E.coli  (S to Merrem) - Final    Blood - GNRs  bottles - pending   Blood - in process    Plan: Change to 500 mg IV Q 8 hours with respect to indication and renal status (CrCl 26-49 mL/min) per MEC/P&T-approved Renal Dosing Adjustment protocol. Pharmacy will continue to monitor this patient daily for changes in clinical status and renal function.     John Shea, PharmD  Clinical Pharmacist, Orthopedics and Med/Surg () [id number] [address] [phone]

## 2020-07-05 NOTE — PROGRESS NOTES
[address] [facility] Adult  Hospitalist Group                                                                                          Hospitalist Progress Note  Dl Garcia MD  Answering service: [phone] OR [phone] from in house phone        Date of Service:  2020  NAME:  Maryjane Gregory  :  1940  MRN:  [id number]      Admission Summary:   Maryjane Gregory is a 68 y.o. female with PMH of atrial fib,CKD, anemia, MI and other comorbidities was sent to the hospital from a facility because of altered mental status and fever.       Per chart review, patient had fever of 101 and was recently moved to COVID unit at the facility. They also noted that patient's mental status was not at baseline. Blood pressure was in systolic 01R. She was sent to emergency room for further evaluation. Interval history / Subjective:      Complaining of some pain over her right heel, otherwise denies any abdominal pain nausea or vomiting. Denies any fevers. Assessment & Plan:     Sepsis (POA Fever, WBC): With ESBL, UTI likely source, improved. - Continue IV meropenem at current time  -Blood culture obtained  resulted +, repeat today  -We will need to await on PICC line placement until   - Urine growing ESBL as well   - ID consulted  - Will need PICC and 14 days of antibiotics from first negative culture. ?if ok with renal given her kidney function. Potential PNA vs. Fluid overload   -SARS-CoV-2 negative  -Continue IV fluids.  -Procalcitonin 34.9, downtrending      Acute metabolic encephalopathy due to infection, uremia, improved.   Now alert and oriented x3  -Tx as above improving  -CT head- Volume loss and white matter disease but no acute intracranial abnormality.     Acute renal failure stage 2 Cr up to 2.6 from baseline of 1.2 on CKD: Likely secondary to sepsis, urinary retention   - Slowly improving   - Continue rivera catheter  - I and O and continue to monitor    - nephrology following ?if ok for patient to get PICC    Chronic blood loss anemia possibly in the setting of using anticoagulant:  -She has hematuria UA positive for >100 RBC   - holding anticoagulation for now, would defer to outpatient whether to resume.      Chronic atrial fibrillation  -Hold anticoagulation as she has anemia and hematuria  -She is currently rate controlled     Hypertension-blood pressure soft hold antihypertensives. Hypomagnesemiareplete as needed, monitor    Code status: Full  DVT prophylaxis: SCD    Care Plan discussed with: Patient/Family and Nurse  Anticipated Disposition: Home w/Family and SNF/LTC  Anticipated Discharge: Greater than 48 hours     Hospital Problems  Date Reviewed: 5/22/2020          Codes Class Noted POA    Sepsis ([facility] [address] ICD-10-CM: A41.9  ICD-9-CM: 038.9, 995.91  6/30/2020 Unknown                Review of Systems:   Pertinent items are noted in HPI. Ct Head Wo Cont    Result Date: 6/30/2020  IMPRESSION: Volume loss and white matter disease but no acute intracranial abnormality    Us Retroperitoneum Comp    Result Date: 7/2/2020  IMPRESSION: 1. No hydronephrosis. Small perinephric fluid. 2. Increased echogenicity of both kidneys consistent with medical renal parenchymal disease. 3. Urinary bladder not assessed. 4. Incidentally noted cholelithiasis with gallbladder wall thickening and small ascites. Xr Chest Port    Result Date: 7/1/2020  IMPRESSION: 1. Unchanged cardiomegaly. 2.  Mild hazy bilateral opacities are nonspecific but may represent edema. Xr Chest Port    Result Date: 6/30/2020  Impression: Mild parenchymal opacity right middle lobe may represent acute infiltrate. Correlation with frontal and lateral views may be helpful. Vital Signs:    Last 24hrs VS reviewed since prior progress note.  Most recent are:  Visit Vitals  /69 (BP 1 Location: Right arm, BP Patient Position: At rest)   Pulse 75   Temp 98.4 °F (36.9 °C)   Resp 18   Ht 5' 1\" (1.549 m)   Wt 84 kg (185 lb 3 oz)   SpO2 93%   BMI 34.99 kg/m²         Intake/Output Summary (Last 24 hours) at 7/5/2020 1527  Last data filed at 7/5/2020 1146  Gross per 24 hour   Intake 2682 ml   Output 1150 ml   Net 1532 ml        Physical Examination:             Constitutional:  No acute distress, cooperative, pleasant    ENT:  Oral mucosa moist, oropharynx benign. Poor dentition    Resp:  CTA bilaterally. No wheezing/rhonchi/rales. No accessory muscle use   CV:  Regular rhythm, normal rate, no murmurs, gallops, rubs    GI:  Soft, non distended, non tender. normoactive bowel sounds, no hepatosplenomegaly     Musculoskeletal:  No edema, warm, 2+ pulses throughout  : rivera in place     Neurologic:  Moves all extremities. AAOx3, CN II-XII reviewed     Psych:  Good insight, Not anxious nor agitated. Data Review:    I personally reviewed  Image and labs      Labs:     Recent Labs     07/05/20 0145 07/04/20 0155   WBC 12.4* 8.4   HGB 7.5* 7.4*   HCT 27.7* 27.3*    223     Recent Labs     07/05/20 0146 07/04/20 0155 07/03/20  0427    138 139   K 4.6 3.4* 3.7    108 107   CO2 21 24 26   BUN 38* 40* 48*   CREA 1.68* 1.84* 2.03*   * 136* 125*   CA 8.6 8.2* 8.7   MG 1.5* 1.5*  --    PHOS 2.8 2.9 3.3     Recent Labs     07/05/20 0146 07/04/20 0155 07/03/20  0427   ALB 2.0* 2.0* 2.2*     No results for input(s): INR, PTP, APTT, INREXT, INREXT in the last 72 hours. No results for input(s): FE, TIBC, PSAT, FERR in the last 72 hours. No results found for: FOL, RBCF   No results for input(s): PH, PCO2, PO2 in the last 72 hours. No results for input(s): CPK, CKNDX, TROIQ in the last 72 hours.     No lab exists for component: CPKMB  Lab Results   Component Value Date/Time    Cholesterol, total 226 (H) 03/14/2019 02:14 PM    HDL Cholesterol 48 03/14/2019 02:14 PM    LDL,Direct 163 (H) 12/05/2019 01:04 PM    LDL, calculated 148 (H) 03/14/2019 02:14 PM    Triglyceride 149 03/14/2019 02:14 PM     No results found for: [facility]  Lab Results   Component Value Date/Time    Color BROWN 06/30/2020 11:03 AM    Appearance TURBID (A) 06/30/2020 11:03 AM    Specific gravity 1.020 06/30/2020 11:03 AM    pH (UA) 6.0 06/30/2020 11:03 AM    Protein >300 (A) 06/30/2020 11:03 AM    Glucose Negative 06/30/2020 11:03 AM    Ketone Negative 06/30/2020 11:03 AM    Urobilinogen 1.0 06/30/2020 11:03 AM    Nitrites Negative 06/30/2020 11:03 AM    Leukocyte Esterase SMALL (A) 06/30/2020 11:03 AM    Epithelial cells FEW 06/30/2020 11:03 AM    Bacteria 1+ (A) 06/30/2020 11:03 AM    WBC >100 (H) 06/30/2020 11:03 AM    RBC >100 (H) 06/30/2020 11:03 AM         Medications Reviewed:     Current Facility-Administered Medications   Medication Dose Route Frequency    meropenem (MERREM) 500 mg in 0.9% sodium chloride (MBP/ADV) 50 mL  500 mg IntraVENous Q8H    acetaminophen (TYLENOL) tablet 650 mg  650 mg Oral Q4H PRN    carvediloL (COREG) tablet 6.25 mg  6.25 mg Oral BID WITH MEALS    sodium chloride (NS) flush 5-10 mL  5-10 mL IntraVENous PRN    0.9% sodium chloride infusion  100 mL/hr IntraVENous CONTINUOUS     ______________________________________________________________________  EXPECTED LENGTH OF STAY: 4d 19h  ACTUAL LENGTH OF STAY:          5                 Norm Damico MD

## 2020-07-05 NOTE — PROGRESS NOTES
Problem: Falls - Risk of  Goal: *Absence of Falls  Description: Document Melida Bowden Fall Risk and appropriate interventions in the flowsheet. Outcome: Progressing Towards Goal  Note: Fall Risk Interventions:  Mobility Interventions: Bed/chair exit alarm    Mentation Interventions: Adequate sleep, hydration, pain control    Medication Interventions: Bed/chair exit alarm    Elimination Interventions: Bed/chair exit alarm    History of Falls Interventions: Bed/chair exit alarm         Problem: Pressure Injury - Risk of  Goal: *Prevention of pressure injury  Description: Document Pratik Scale and appropriate interventions in the flowsheet. Outcome: Progressing Towards Goal  Note: Pressure Injury Interventions:  Sensory Interventions: Assess changes in LOC    Moisture Interventions: Absorbent underpads    Activity Interventions: Assess need for specialty bed    Mobility Interventions: Assess need for specialty bed    Nutrition Interventions: Document food/fluid/supplement intake    Friction and Shear Interventions: Lift sheet                Problem: Infection - Risk of, Urinary Catheter-Associated Urinary Tract Infection  Goal: *Absence of infection signs and symptoms  Outcome: Progressing Towards Goal     Problem: Risk for Spread of Infection  Goal: Prevent transmission of infectious organism to others  Description: Prevent the transmission of infectious organisms to other patients, staff members, and visitors.   Outcome: Progressing Towards Goal

## 2020-07-06 LAB
ALBUMIN SERPL-MCNC: 1.9 G/DL (ref 3.5–5)
ANION GAP SERPL CALC-SCNC: 7 MMOL/L (ref 5–15)
ANION GAP SERPL CALC-SCNC: 7 MMOL/L (ref 5–15)
BASOPHILS # BLD: 0 K/UL (ref 0–0.1)
BASOPHILS NFR BLD: 0 % (ref 0–1)
BUN SERPL-MCNC: 38 MG/DL (ref 6–20)
BUN SERPL-MCNC: 38 MG/DL (ref 6–20)
BUN/CREAT SERPL: 23 (ref 12–20)
BUN/CREAT SERPL: 23 (ref 12–20)
CALCIUM SERPL-MCNC: 8.6 MG/DL (ref 8.5–10.1)
CALCIUM SERPL-MCNC: 8.9 MG/DL (ref 8.5–10.1)
CHLORIDE SERPL-SCNC: 107 MMOL/L (ref 97–108)
CHLORIDE SERPL-SCNC: 107 MMOL/L (ref 97–108)
CO2 SERPL-SCNC: 22 MMOL/L (ref 21–32)
CO2 SERPL-SCNC: 23 MMOL/L (ref 21–32)
CREAT SERPL-MCNC: 1.66 MG/DL (ref 0.55–1.02)
CREAT SERPL-MCNC: 1.68 MG/DL (ref 0.55–1.02)
DIFFERENTIAL METHOD BLD: ABNORMAL
EOSINOPHIL # BLD: 0 K/UL (ref 0–0.4)
EOSINOPHIL NFR BLD: 0 % (ref 0–7)
ERYTHROCYTE [DISTWIDTH] IN BLOOD BY AUTOMATED COUNT: 22.1 % (ref 11.5–14.5)
GLUCOSE SERPL-MCNC: 122 MG/DL (ref 65–100)
GLUCOSE SERPL-MCNC: 126 MG/DL (ref 65–100)
HCT VFR BLD AUTO: 28.6 % (ref 35–47)
HGB BLD-MCNC: 7.7 G/DL (ref 11.5–16)
IMM GRANULOCYTES # BLD AUTO: 0 K/UL
IMM GRANULOCYTES NFR BLD AUTO: 0 %
LYMPHOCYTES # BLD: 1.5 K/UL (ref 0.8–3.5)
LYMPHOCYTES NFR BLD: 12 % (ref 12–49)
MAGNESIUM SERPL-MCNC: 1.9 MG/DL (ref 1.6–2.4)
MCH RBC QN AUTO: 21 PG (ref 26–34)
MCHC RBC AUTO-ENTMCNC: 26.9 G/DL (ref 30–36.5)
MCV RBC AUTO: 78.1 FL (ref 80–99)
MONOCYTES # BLD: 1.2 K/UL (ref 0–1)
MONOCYTES NFR BLD: 10 % (ref 5–13)
NEUTS BAND NFR BLD MANUAL: 1 % (ref 0–6)
NEUTS SEG # BLD: 9.4 K/UL (ref 1.8–8)
NEUTS SEG NFR BLD: 77 % (ref 32–75)
NRBC # BLD: 0 K/UL (ref 0–0.01)
NRBC BLD-RTO: 0 PER 100 WBC
PHOSPHATE SERPL-MCNC: 3.4 MG/DL (ref 2.6–4.7)
PHOSPHATE SERPL-MCNC: 3.4 MG/DL (ref 2.6–4.7)
PLATELET # BLD AUTO: 328 K/UL (ref 150–400)
PLATELET COMMENTS,PCOM: ABNORMAL
PMV BLD AUTO: 9.8 FL (ref 8.9–12.9)
POTASSIUM SERPL-SCNC: 3.8 MMOL/L (ref 3.5–5.1)
POTASSIUM SERPL-SCNC: 3.8 MMOL/L (ref 3.5–5.1)
RBC # BLD AUTO: 3.66 M/UL (ref 3.8–5.2)
RBC MORPH BLD: ABNORMAL
SODIUM SERPL-SCNC: 136 MMOL/L (ref 136–145)
SODIUM SERPL-SCNC: 137 MMOL/L (ref 136–145)
WBC # BLD AUTO: 12.1 K/UL (ref 3.6–11)

## 2020-07-06 PROCEDURE — 77030020365 HC SOL INJ SOD CL 0.9% 50ML

## 2020-07-06 PROCEDURE — C1751 CATH, INF, PER/CENT/MIDLINE: HCPCS

## 2020-07-06 PROCEDURE — 76937 US GUIDE VASCULAR ACCESS: CPT

## 2020-07-06 PROCEDURE — 74011250636 HC RX REV CODE- 250/636: Performed by: INTERNAL MEDICINE

## 2020-07-06 PROCEDURE — 85025 COMPLETE CBC W/AUTO DIFF WBC: CPT

## 2020-07-06 PROCEDURE — 80048 BASIC METABOLIC PNL TOTAL CA: CPT

## 2020-07-06 PROCEDURE — 36415 COLL VENOUS BLD VENIPUNCTURE: CPT

## 2020-07-06 PROCEDURE — 83735 ASSAY OF MAGNESIUM: CPT

## 2020-07-06 PROCEDURE — 84100 ASSAY OF PHOSPHORUS: CPT

## 2020-07-06 PROCEDURE — 02HV33Z INSERTION OF INFUSION DEVICE INTO SUPERIOR VENA CAVA, PERCUTANEOUS APPROACH: ICD-10-PCS | Performed by: INTERNAL MEDICINE

## 2020-07-06 PROCEDURE — 77030018786 HC NDL GD F/USND BARD -B

## 2020-07-06 PROCEDURE — 65270000032 HC RM SEMIPRIVATE

## 2020-07-06 PROCEDURE — 74011000258 HC RX REV CODE- 258: Performed by: INTERNAL MEDICINE

## 2020-07-06 PROCEDURE — 74011250637 HC RX REV CODE- 250/637: Performed by: HOSPITALIST

## 2020-07-06 PROCEDURE — 97535 SELF CARE MNGMENT TRAINING: CPT

## 2020-07-06 PROCEDURE — 80069 RENAL FUNCTION PANEL: CPT

## 2020-07-06 PROCEDURE — 74011250636 HC RX REV CODE- 250/636: Performed by: HOSPITALIST

## 2020-07-06 PROCEDURE — C1894 INTRO/SHEATH, NON-LASER: HCPCS

## 2020-07-06 RX ORDER — SODIUM CHLORIDE 0.9 % (FLUSH) 0.9 %
5-40 SYRINGE (ML) INJECTION EVERY 8 HOURS
Status: DISCONTINUED | OUTPATIENT
Start: 2020-07-06 | End: 2020-07-08 | Stop reason: HOSPADM

## 2020-07-06 RX ADMIN — MEROPENEM 500 MG: 500 INJECTION, POWDER, FOR SOLUTION INTRAVENOUS at 23:10

## 2020-07-06 RX ADMIN — CARVEDILOL 6.25 MG: 6.25 TABLET, FILM COATED ORAL at 16:22

## 2020-07-06 RX ADMIN — MEROPENEM 500 MG: 500 INJECTION, POWDER, FOR SOLUTION INTRAVENOUS at 05:58

## 2020-07-06 RX ADMIN — Medication 10 ML: at 16:11

## 2020-07-06 RX ADMIN — CARVEDILOL 6.25 MG: 6.25 TABLET, FILM COATED ORAL at 09:24

## 2020-07-06 RX ADMIN — SODIUM CHLORIDE 100 ML/HR: 900 INJECTION, SOLUTION INTRAVENOUS at 00:37

## 2020-07-06 RX ADMIN — MEROPENEM 500 MG: 500 INJECTION, POWDER, FOR SOLUTION INTRAVENOUS at 16:19

## 2020-07-06 RX ADMIN — SODIUM CHLORIDE 50 ML/HR: 900 INJECTION, SOLUTION INTRAVENOUS at 16:22

## 2020-07-06 NOTE — PROGRESS NOTES
Problem: Self Care Deficits Care Plan (Adult)  Goal: *Acute Goals and Plan of Care (Insert Text)  Description:   FUNCTIONAL STATUS PRIOR TO ADMISSION: Patient was supervision for basic ADLs in LTC, walking with no device and A &O x 3.. HOME SUPPORT: [address]    Occupational Therapy Goals  Initiated 7/5/2020  1. Patient will perform grooming S in standing min A  within 7 day(s). 2.  Patient will perform upper body dressing with modified independence within 7 day(s). 3.  Patient will perform lower body dressing with moderate assistance  within 7 day(s). 4.  Patient will perform toilet transfers with moderate assistance  within 7 day(s). 5.  Patient will perform all aspects of toileting with moderate assistance  within 7 day(s). 6.  Patient will participate in upper extremity therapeutic exercise/activities with supervision/set-up for 5 minutes within 7 day(s). 7.  Patient will utilize energy conservation, fall prevention and pain management techniques during functional activities with verbal cues within 7 day(s). Outcome: Progressing Towards Goal  OCCUPATIONAL THERAPY TREATMENT  Patient: Carol Ann Huynh (68 y.o. female)  Date: 7/6/2020  Diagnosis: [facility] [A41.9]   <principal problem not specified>       Precautions: Fall, Bed Alarm, DNR, Contact  Chart, occupational therapy assessment, plan of care, and goals were reviewed. ASSESSMENT  Patient continues with skilled OT services and is progressing towards goals. Current Level of Function Impacting Discharge (ADLs): moderate assist upper body ADLs; total A lower body ADLs; max A bed mobility, sitting EOB 10 mins. Other factors to consider for discharge: LTC         PLAN :  Patient continues to benefit from skilled intervention to address the above impairments. Continue treatment per established plan of care. to address goals.     Recommend with staff: patient completing upper body ADLs with setup; bed in chair position, foot board on to increase endurance and alertness to the day    Recommend next OT session: continue EOB sitting ADLs    Recommendation for discharge: (in order for the patient to meet his/her long term goals)  Therapy up to 5 days/week in SNF setting    This discharge recommendation:  Has not yet been discussed the attending provider and/or case management    IF patient discharges home will need the following DME: TBD       SUBJECTIVE:   Patient stated Is it 11 at night or day (patient looked out the window as well). .. I am not doing anything else today now.     OBJECTIVE DATA SUMMARY:   Cognitive/Behavioral Status:                      Functional Mobility and Transfers for ADLs:  Bed Mobility:  Supine to Sit: Maximum assistance(A trunk and LEs)  Sit to Supine: Maximum assistance(trunk and B LEs assist)  Scooting: Total assistance(trendelenburg; max A LEs flexed)    Transfers:             Balance:  Sitting: Impaired; Without support(posterior lean)  Sitting - Static: Fair (occasional)  Sitting - Dynamic: Fair (occasional)    ADL Intervention:  Feeding  Food to Mouth: Modified independent  Drink to Mouth: Modified independent    Grooming  Washing Face: Supervision  Brushing Teeth: Moderate assistance(sitting EOB, total A brush teeth; required sitting on R side to encourage R UE use with tactile cues; setup all items and then in R hand)     Received with IV out and leaking on patient, paused IV pump, informed nurse. Patient with setup cloth to increase orientation to day and place. Agreed to sit EOB to brush teeth. Hair: total A                        Therapeutic Exercises:       Pain:      Activity Tolerance:   Fair  Please refer to the flowsheet for vital signs taken during this treatment.     After treatment patient left in no apparent distress:   Supine in bed, Call bell within reach, and Side rails x 3    COMMUNICATION/COLLABORATION:   The patients plan of care was discussed with: Physical therapy assistant and Registered nurse.      Mariusz Lees  Time Calculation: 25 mins

## 2020-07-06 NOTE — PROGRESS NOTES
Nephrology Progress Note  Jos Arthur  Date of Admission : 6/30/2020    CC: Follow up for DEVON on CKD       Assessment and Plan     DEVON on CKD:  - likely 2/2 ATN from sepsis + urinary retention  - renal U/S unremarkable  - reduced rate of IVF   - Ok for d/c from renal stand point     CKD III:  - baseline Cr 1.2     Urosepsis:  - cultues + ESBL E. Coli  - on meropenem     COVID-19 neg     Chronic anemia:     Hx of afib       Interval History:  Seen and examined. She is asking about d/c. Voiding ok since rivera removal. But UOP not charted. Feeling better. Cr stable at 1.6   No cp, sob, n/v/d reported at this time. Current Medications: all current  Medications have been eviewed in EPIC  Review of Systems: Pertinent items are noted in HPI. Objective:  Vitals:    Vitals:    07/05/20 1935 07/06/20 0217 07/06/20 0629 07/06/20 0755   BP: 110/74 146/81  145/75   Pulse: 88 74  100   Resp: 18 18  18   Temp: 98.2 °F (36.8 °C) 97.7 °F (36.5 °C)  98 °F (36.7 °C)   SpO2: 92% 94%  94%   Weight:   86.5 kg (190 lb 11.2 oz)    Height:         Intake and Output:  No intake/output data recorded. 07/04 1901 - 07/06 0700  In: 2682 [I.V.:2682]  Out: 950 [Urine:950]    Physical Exam     General:no distress   HEENT: EOMI  Lungs : stable oxygenation, clear lungs  ABD: obese, NT, + bowel sounds  CVS: RRR on monitor, no edema   Neurologic: non focal, alert   : rivera in place    []    High complexity decision making was performed  []    Patient is at high-risk of decompensation with multiple organ involvement    Lab Data Personally Reviewed: I have reviewed all the pertinent labs, microbiology data and radiology studies during assessment.     Recent Labs     07/06/20  0040 07/06/20  0039 07/05/20  0146 07/04/20  0155    137 137 138   K 3.8 3.8 4.6 3.4*    107 108 108   CO2 22 23 21 24   * 126* 110* 136*   BUN 38* 38* 38* 40*   CREA 1.68* 1.66* 1.68* 1.84*   CA 8.6 8.9 8.6 8.2*   MG 1.9  --  1.5* 1.5*   PHOS 3.4 3.4 2.8 2.9   ALB 1.9*  --  2.0* 2.0*     Recent Labs     07/06/20  0040 07/05/20  0145 07/04/20  0155   WBC 12.1* 12.4* 8.4   HGB 7.7* 7.5* 7.4*   HCT 28.6* 27.7* 27.3*    302 223     No results found for: SDES  Lab Results   Component Value Date/Time    Culture result: NO GROWTH AFTER 17 HOURS 07/05/2020 11:00 AM    Culture result: (A) 07/02/2020 11:26 AM     GRAM NEGATIVE RODS GROWING IN 1 OF 4 BOTTLES DRAWN SITE= ( LAC) PLEASE REFER TO [facility] [id number] FOR FURTHER IDENTIFICATION AND SENSITIVITIES    Culture result: (A) 07/02/2020 11:26 AM     PRELIMINARY REPORT OF GRAM NEGATIVE RODS GROWING IN 1 OF 4 BOTTLES DRAWN CALLED TO AND READ BACK BY MS BERTRAND MCNAIR AT 1940 ON 7/4/20.   PJ    Culture result: REMAINING BOTTLE(S) HAS/HAVE NO GROWTH SO FAR 07/02/2020 11:26 AM     Recent Results (from the past 24 hour(s))   CULTURE, BLOOD    Collection Time: 07/05/20 11:00 AM   Result Value Ref Range    Special Requests: NO SPECIAL REQUESTS      Culture result: NO GROWTH AFTER 17 HOURS     METABOLIC PANEL, BASIC    Collection Time: 07/06/20 12:39 AM   Result Value Ref Range    Sodium 137 136 - 145 mmol/L    Potassium 3.8 3.5 - 5.1 mmol/L    Chloride 107 97 - 108 mmol/L    CO2 23 21 - 32 mmol/L    Anion gap 7 5 - 15 mmol/L    Glucose 126 (H) 65 - 100 mg/dL    BUN 38 (H) 6 - 20 MG/DL    Creatinine 1.66 (H) 0.55 - 1.02 MG/DL    BUN/Creatinine ratio 23 (H) 12 - 20      GFR est AA 36 (L) >60 ml/min/1.73m2    GFR est non-AA 30 (L) >60 ml/min/1.73m2    Calcium 8.9 8.5 - 10.1 MG/DL   PHOSPHORUS    Collection Time: 07/06/20 12:39 AM   Result Value Ref Range    Phosphorus 3.4 2.6 - 4.7 MG/DL   RENAL FUNCTION PANEL    Collection Time: 07/06/20 12:40 AM   Result Value Ref Range    Sodium 136 136 - 145 mmol/L    Potassium 3.8 3.5 - 5.1 mmol/L    Chloride 107 97 - 108 mmol/L    CO2 22 21 - 32 mmol/L    Anion gap 7 5 - 15 mmol/L    Glucose 122 (H) 65 - 100 mg/dL    BUN 38 (H) 6 - 20 MG/DL    Creatinine 1.68 (H) 0.55 - 1.02 MG/DL BUN/Creatinine ratio 23 (H) 12 - 20      GFR est AA 36 (L) >60 ml/min/1.73m2    GFR est non-AA 29 (L) >60 ml/min/1.73m2    Calcium 8.6 8.5 - 10.1 MG/DL    Phosphorus 3.4 2.6 - 4.7 MG/DL    Albumin 1.9 (L) 3.5 - 5.0 g/dL   MAGNESIUM    Collection Time: 07/06/20 12:40 AM   Result Value Ref Range    Magnesium 1.9 1.6 - 2.4 mg/dL   CBC WITH AUTOMATED DIFF    Collection Time: 07/06/20 12:40 AM   Result Value Ref Range    WBC 12.1 (H) 3.6 - 11.0 K/uL    RBC 3.66 (L) 3.80 - 5.20 M/uL    HGB 7.7 (L) 11.5 - 16.0 g/dL    HCT 28.6 (L) 35.0 - 47.0 %    MCV 78.1 (L) 80.0 - 99.0 FL    MCH 21.0 (L) 26.0 - 34.0 PG    MCHC 26.9 (L) 30.0 - 36.5 g/dL    RDW 22.1 (H) 11.5 - 14.5 %    PLATELET 097 099 - 873 K/uL    MPV 9.8 8.9 - 12.9 FL    NRBC 0.0 0  WBC    ABSOLUTE NRBC 0.00 0.00 - 0.01 K/uL    NEUTROPHILS 77 (H) 32 - 75 %    BAND NEUTROPHILS 1 0 - 6 %    LYMPHOCYTES 12 12 - 49 %    MONOCYTES 10 5 - 13 %    EOSINOPHILS 0 0 - 7 %    BASOPHILS 0 0 - 1 %    IMMATURE GRANULOCYTES 0 %    ABS. NEUTROPHILS 9.4 (H) 1.8 - 8.0 K/UL    ABS. LYMPHOCYTES 1.5 0.8 - 3.5 K/UL    ABS. MONOCYTES 1.2 (H) 0.0 - 1.0 K/UL    ABS. EOSINOPHILS 0.0 0.0 - 0.4 K/UL    ABS. BASOPHILS 0.0 0.0 - 0.1 K/UL    ABS. IMM. GRANS. 0.0 K/UL    DF MANUAL      PLATELET COMMENTS Large Platelets      RBC COMMENTS ANISOCYTOSIS  2+        RBC COMMENTS HYPOCHROMIA  2+        RBC COMMENTS MICROCYTOSIS  1+        RBC COMMENTS OVALOCYTES  PRESENT        RBC COMMENTS PHANI CELLS  PRESENT        RBC COMMENTS TEARDROP CELLS  PRESENT        RBC COMMENTS POLYCHROMASIA  PRESENT        RBC COMMENTS SCHISTOCYTES  PRESENT                     Calvin Vivas MD  [facility]   [address] [address]  Phone - [phone]   Fax - [phone]  [email or web address]

## 2020-07-06 NOTE — PROGRESS NOTES
Spiritual Care Assessment/Progress Note  [facility]      NAME: Jonathan Smith      MRN: [id number]  AGE: 68 y.o. SEX: female  Orthodox Affiliation: Jordan Moran   Language: English     7/6/2020     Total Time (in minutes): 5     Spiritual Assessment begun in [facility] through conversation with:         []Patient        [] Family    [] Friend(s)        Reason for Consult: Initial/Spiritual assessment, patient floor     Spiritual beliefs: (Please include comment if needed)     [] Identifies with a angeline tradition:         [] Supported by a angeline community:            [] Claims no spiritual orientation:           [] Seeking spiritual identity:                [] Adheres to an individual form of spirituality:           [x] Not able to assess:                           Identified resources for coping:      [] Prayer                               [] Music                  [] Guided Imagery     [] Family/friends                 [] Pet visits     [] Devotional reading                         [x] Unknown     [] Other:                                               Interventions offered during this visit: (See comments for more details)                Plan of Care:     [] Support spiritual and/or cultural needs    [] Support AMD and/or advance care planning process      [] Support grieving process   [] Coordinate Rites and/or Rituals    [] Coordination with community clergy   [] No spiritual needs identified at this time   [] Detailed Plan of Care below (See Comments)  [] Make referral to Music Therapy  [] Make referral to Pet Therapy     [] Make referral to Addiction services  [] Make referral to [facility]  [] Make referral to Spiritual Care Partner  [] No future visits requested        [x] Follow up visits as needed     Comments:  visit for initial spiritual assessment. Patient asleep in bed, did not awaken to knock at door or verbal greeting x 2.   Will continue to follow up as needed and upon request as able. Visited by .  Lucía Burnham MDiv, [facility], [facility]   paging service: [phone])

## 2020-07-06 NOTE — PROGRESS NOTES
Problem: Falls - Risk of  Goal: *Absence of Falls  Description: Document En Chacko Fall Risk and appropriate interventions in the flowsheet. Outcome: Progressing Towards Goal  Note: Fall Risk Interventions:  Mobility Interventions: Bed/chair exit alarm, Communicate number of staff needed for ambulation/transfer, OT consult for ADLs, Patient to call before getting OOB, PT Consult for mobility concerns, PT Consult for assist device competence, Strengthening exercises (ROM-active/passive)    Mentation Interventions: Adequate sleep, hydration, pain control, Bed/chair exit alarm, Door open when patient unattended, Reorient patient, Toileting rounds, Update white board    Medication Interventions: Bed/chair exit alarm, Patient to call before getting OOB, Teach patient to arise slowly    Elimination Interventions: Bed/chair exit alarm, Call light in reach, Patient to call for help with toileting needs, Toileting schedule/hourly rounds(purewick in place)    History of Falls Interventions: Bed/chair exit alarm, Consult care management for discharge planning, Door open when patient unattended         Problem: Pressure Injury - Risk of  Goal: *Prevention of pressure injury  Description: Document Pratik Scale and appropriate interventions in the flowsheet. Outcome: Progressing Towards Goal  Note: Pressure Injury Interventions:  Sensory Interventions: Assess changes in LOC, Assess need for specialty bed, Float heels, Keep linens dry and wrinkle-free, Minimize linen layers, Monitor skin under medical devices, Turn and reposition approx.  every two hours (pillows and wedges if needed), Pressure redistribution bed/mattress (bed type)    Moisture Interventions: Absorbent underpads, Apply protective barrier, creams and emollients, Check for incontinence Q2 hours and as needed, Internal/External urinary devices, Minimize layers, Moisture barrier, Assess need for specialty bed    Activity Interventions: Increase time out of bed, Pressure redistribution bed/mattress(bed type), PT/OT evaluation, Assess need for specialty bed    Mobility Interventions: Assess need for specialty bed, Float heels, Pressure redistribution bed/mattress (bed type), PT/OT evaluation, Turn and reposition approx. every two hours(pillow and wedges), HOB 30 degrees or less    Nutrition Interventions: Document food/fluid/supplement intake, Offer support with meals,snacks and hydration    Friction and Shear Interventions: Feet elevated on foot rest, Lift sheet, Minimize layers                Problem: Risk for Spread of Infection  Goal: Prevent transmission of infectious organism to others  Description: Prevent the transmission of infectious organisms to other patients, staff members, and visitors.   Outcome: Progressing Towards Goal     Problem: Patient Education:  Go to Education Activity  Goal: Patient/Family Education  Outcome: Progressing Towards Goal     Problem: Infection - Risk of, Urinary Catheter-Associated Urinary Tract Infection  Goal: *Absence of infection signs and symptoms  Outcome: Resolved/Met  Note: Manley was removed

## 2020-07-06 NOTE — CONSULTS
Infectious Disease Consult Note    Reason for Consult: ESBL ecoli bacteremia   Date of Consultation: July 6, 2020  Date of Admission: 6/30/2020  Referring Physician: Dr Shelby Hernandez       HPI:      Ms Kaci Magallanes is a 53-year-old lady with a history of atrial fibrillation, hypertension who was admitted due to altered mental status. Patient is unable to give me much history and most history obtained from chart review. She was sent from [facility] with fever altered mental status and there were also concerns for hypotension. Patient was admitted with sepsis. On 6/30/2020 she had leukocytosis 15.4, anemia and DEVON with creatinine greater than 2.5 on admission. Urine analysis on 6/30/2020 showed pyuria with greater than 100 WBC, small leukocyte Estrace. Urine and blood culture was positive for ESBL E. Coli. Repeat blood culture on 7/2/2020 is positive for gram-negative rods with identification pending. Blood culture sent from 7/5/2020. Her antibiotics adjusted to meropenem on 7/5/2020. She was on Zosyn prior to meropenem. Renal ultrasound has been done during this admission. Of note SARS-CoV-2 undetected on 6/30 and 7/1/2020. Consulted today for antibiotic recommendations    Patient denied any chest pain, shortness of breath, cough, fevers, chills. She does admit that at times her feet hurt when touched. She denied any nausea or vomiting. She denied any dysuria or back pain. She denies any particular joint pain. . She denies any headache or vision symptoms. Past Medical History:  Past Medical History:   Diagnosis Date    A-fib ([facility]     Anemia     Chronic kidney disease     CKD (chronic kidney disease)     Hypercholesterolemia     Hyperlipidemia     Hypertension     MI (myocardial infarction) ([facility]          Surgical History:  History reviewed. No pertinent surgical history.       Family History:   Family History   Problem Relation Age of Onset    No Known Problems Mother     No Known Problems Father          Social History:  Patient presents from [facility]  Denies smoking illicit drugs or alcohol use    Allergies:  No Known Allergies      Review of Systems:  10 point review of systems attempted and obtain  Pertinent positives per HPI   all others negative        Medications:  No current facility-administered medications on file prior to encounter. Current Outpatient Medications on File Prior to Encounter   Medication Sig Dispense Refill    atorvastatin (LIPITOR) 40 mg tablet Take 40 mg by mouth nightly.  bumetanide (BUMEX) 0.5 mg tablet Take 0.5 mg by mouth daily.  Eliquis 5 mg tablet Take 5 mg by mouth every twelve (12) hours.  hydrALAZINE (APRESOLINE) 10 mg tablet Take 10 mg by mouth three (3) times daily.  isosorbide dinitrate (ISORDIL) 10 mg tablet Take 10 mg by mouth three (3) times daily.  metOLazone (ZAROXOLYN) 2.5 mg tablet Take 2.5 mg by mouth every Monday, Wednesday, Friday.  carvediloL (COREG) 6.25 mg tablet Take 6.25 mg by mouth two (2) times daily (with meals).  potassium chloride (KLOR-CON) 10 mEq tablet Take 20 mEq by mouth daily.  cholecalciferol (VITAMIN D3) (50,000 UNITS /1250 MCG) capsule Take 50,000 Units by mouth every seven (7) days.  acetaminophen (TylenoL) 325 mg cap Take 2 Tabs by mouth every four to six (4-6) hours as needed.              Physical Exam:    Vitals:   Patient Vitals for the past 24 hrs:   Temp Pulse Resp BP SpO2   07/06/20 0755 98 °F (36.7 °C) 100 18 145/75 94 %   07/06/20 0217 97.7 °F (36.5 °C) 74 18 146/81 94 %   07/05/20 1935 98.2 °F (36.8 °C) 88 18 110/74 92 %   07/05/20 1605  80  118/69    07/05/20 1416 98.4 °F (36.9 °C) 75 18 108/69 93 %   ·   · GEN: NAD  · HEENT: No scleral icterus, very poor dentition, missing many teeth, no thrush   · CV: S1, S2 heard irregular   · Lungs: Clear to auscultation bilaterally  · Abdomen: soft, non distended, non tender, no CVA tenderness   · Extremities: no edema  · Neuro: Alert, oriented to self, minimally verbal, answers questions, follows commands  · Skin: no rash  · Psych non tearful   · Musculoskeletal no edema feet and no  tenderness to palpation of her back      Labs:   Recent Results (from the past 24 hour(s))   METABOLIC PANEL, BASIC    Collection Time: 07/06/20 12:39 AM   Result Value Ref Range    Sodium 137 136 - 145 mmol/L    Potassium 3.8 3.5 - 5.1 mmol/L    Chloride 107 97 - 108 mmol/L    CO2 23 21 - 32 mmol/L    Anion gap 7 5 - 15 mmol/L    Glucose 126 (H) 65 - 100 mg/dL    BUN 38 (H) 6 - 20 MG/DL    Creatinine 1.66 (H) 0.55 - 1.02 MG/DL    BUN/Creatinine ratio 23 (H) 12 - 20      GFR est AA 36 (L) >60 ml/min/1.73m2    GFR est non-AA 30 (L) >60 ml/min/1.73m2    Calcium 8.9 8.5 - 10.1 MG/DL   PHOSPHORUS    Collection Time: 07/06/20 12:39 AM   Result Value Ref Range    Phosphorus 3.4 2.6 - 4.7 MG/DL   RENAL FUNCTION PANEL    Collection Time: 07/06/20 12:40 AM   Result Value Ref Range    Sodium 136 136 - 145 mmol/L    Potassium 3.8 3.5 - 5.1 mmol/L    Chloride 107 97 - 108 mmol/L    CO2 22 21 - 32 mmol/L    Anion gap 7 5 - 15 mmol/L    Glucose 122 (H) 65 - 100 mg/dL    BUN 38 (H) 6 - 20 MG/DL    Creatinine 1.68 (H) 0.55 - 1.02 MG/DL    BUN/Creatinine ratio 23 (H) 12 - 20      GFR est AA 36 (L) >60 ml/min/1.73m2    GFR est non-AA 29 (L) >60 ml/min/1.73m2    Calcium 8.6 8.5 - 10.1 MG/DL    Phosphorus 3.4 2.6 - 4.7 MG/DL    Albumin 1.9 (L) 3.5 - 5.0 g/dL   MAGNESIUM    Collection Time: 07/06/20 12:40 AM   Result Value Ref Range    Magnesium 1.9 1.6 - 2.4 mg/dL   CBC WITH AUTOMATED DIFF    Collection Time: 07/06/20 12:40 AM   Result Value Ref Range    WBC 12.1 (H) 3.6 - 11.0 K/uL    RBC 3.66 (L) 3.80 - 5.20 M/uL    HGB 7.7 (L) 11.5 - 16.0 g/dL    HCT 28.6 (L) 35.0 - 47.0 %    MCV 78.1 (L) 80.0 - 99.0 FL    MCH 21.0 (L) 26.0 - 34.0 PG    MCHC 26.9 (L) 30.0 - 36.5 g/dL    RDW 22.1 (H) 11.5 - 14.5 %    PLATELET 881 430 - 086 K/uL    MPV 9.8 8.9 - 12.9 FL    NRBC 0.0 0  WBC    ABSOLUTE NRBC 0.00 0.00 - 0.01 K/uL    NEUTROPHILS 77 (H) 32 - 75 %    BAND NEUTROPHILS 1 0 - 6 %    LYMPHOCYTES 12 12 - 49 %    MONOCYTES 10 5 - 13 %    EOSINOPHILS 0 0 - 7 %    BASOPHILS 0 0 - 1 %    IMMATURE GRANULOCYTES 0 %    ABS. NEUTROPHILS 9.4 (H) 1.8 - 8.0 K/UL    ABS. LYMPHOCYTES 1.5 0.8 - 3.5 K/UL    ABS. MONOCYTES 1.2 (H) 0.0 - 1.0 K/UL    ABS. EOSINOPHILS 0.0 0.0 - 0.4 K/UL    ABS. BASOPHILS 0.0 0.0 - 0.1 K/UL    ABS. IMM. GRANS. 0.0 K/UL    DF MANUAL      PLATELET COMMENTS Large Platelets      RBC COMMENTS ANISOCYTOSIS  2+        RBC COMMENTS HYPOCHROMIA  2+        RBC COMMENTS MICROCYTOSIS  1+        RBC COMMENTS OVALOCYTES  PRESENT        RBC COMMENTS PHANI CELLS  PRESENT        RBC COMMENTS TEARDROP CELLS  PRESENT        RBC COMMENTS POLYCHROMASIA  PRESENT        RBC COMMENTS SCHISTOCYTES  PRESENT           Microbiology Data:       Blood: 6/30/20  Blood         Component Value Ref Range & Units Status   Special Requests: NO SPECIAL REQUESTS    Final   Culture result: Abnormal     Final   ESCHERICHIA COLI ** (EXTENDED SPECTRUM BETA LACTAMASE ) ** GROWING IN ALL 4 BOTTLES DRAWN (SITES = [facility] AND )   Culture result: Abnormal     Final   PRELIMINARY REPORT OF GRAM NEGATIVE RODS GROWING IN 2 OF 4 BOTTLES DRAWN CALLED TO AND READ BACK BY PAULA BROWN RN ON 6/30/20 AT 2340 ([facility]).  MS   Susceptibility      Escherichia coli     LADY    Amikacin ($) 8 ug/mL S    Ampicillin ($) >=32 ug/mL R    Ampicillin/sulbactam ($) >=32 ug/mL R    Cefazolin ($) >=64 ug/mL R    Cefepime ($$)  R    Cefoxitin <=4 ug/mL S    Ceftazidime ($)  R    Ceftriaxone ($) >=64 ug/mL R    Ciprofloxacin ($) >=4 ug/mL R    Gentamicin ($) >=16 ug/mL R    Levofloxacin ($) >=8 ug/mL R    Meropenem ($$) <=0.25 ug/mL S    Piperacillin/Tazobac ($) <=4 ug/mL S    Tobramycin ($) >=16 ug/mL R    Trimeth/Sulfa >=320 ug/mL R          Result History         Blood 7/2/20  Component Value Ref Range & Units Status Special Requests: NO SPECIAL REQUESTS    Preliminary   Culture result: Abnormal     Preliminary   GRAM NEGATIVE RODS GROWING IN 1 OF 4 BOTTLES DRAWN SITE= ( [facility]) PLEASE REFER TO [facility] [id number] FOR FURTHER IDENTIFICATION AND SENSITIVITIES   Culture result: Abnormal     Preliminary   PRELIMINARY REPORT OF GRAM NEGATIVE RODS GROWING IN 1 OF 4 BOTTLES DRAWN CALLED TO AND READ BACK BY MS BERTRAND MCNAIR AT 1940 ON 7/4/20.  PJ   Culture result:    Preliminary   REMAINING BOTTLE(S) HAS/HAVE NO GROWTH SO FAR    Result History           Urine: 6/30/20  Clean catch; Urine         Component Value Ref Range & Units Status   Special Requests: NO SPECIAL REQUESTS    Final   [place] Count >100,000   COLONIES/mL    Final   Culture result: Abnormal     Final   ESCHERICHIA COLI ** (EXTENDED SPECTRUM BETA LACTAMASE ) **    Susceptibility      Escherichia coli     LADY    Amikacin ($) 4 ug/mL S    Ampicillin ($) >=32 ug/mL R    Ampicillin/sulbactam ($) >=32 ug/mL R    Cefazolin ($) >=64 ug/mL R    Cefepime ($$)  R    Cefoxitin <=4 ug/mL S    Ceftazidime ($)  R    Ceftriaxone ($)  R    Ciprofloxacin ($) >=4 ug/mL R    Gentamicin ($) >=16 ug/mL R    Levofloxacin ($) >=8 ug/mL R    Meropenem ($$) <=0.25 ug/mL S    Nitrofurantoin 64 ug/mL I    Piperacillin/Tazobac ($) <=4 ug/mL S    Tobramycin ($) >=16 ug/mL R    Trimeth/Sulfa >=320 ug/mL R              Imaging:   Renal US  RIGHT KIDNEY: measures 10.3 cm in length, normal for age. Echogenicity is  abnormally increased. . No hydronephrosis, large shadowing calculus, or solid  contour-deforming renal mass. Incidental cyst in the interpolar region 2.2 cm. Tiny echogenic foci which may represent nonobstructing calculi. LEFT KIDNEY: measures 10.1 cm in length, normal for age. Renal echogenicity is  abnormally increased. No hydronephrosis, large shadowing calculus, or solid  contour-deforming renal mass. Probable small cyst interpolar.  Tiny echogenic  foci which may represent nonshadowing calculi. AORTA: Normal caliber in its visualized portions. COMMON ILIAC ARTERIES: Normal caliber proximally. IVC: Normal caliber in its visualized portions. BLADDER: Decompressed by a Manley balloon catheter and not assessed. Incidentally noted gallbladder wall thickening with intraluminal calculi. Common  bile duct upper normal. Tiny ascites and perinephric fluid.     IMPRESSION  IMPRESSION:      1. No hydronephrosis. Small perinephric fluid. 2. Increased echogenicity of both kidneys consistent with medical renal  parenchymal disease. 3. Urinary bladder not assessed. 4. Incidentally noted cholelithiasis with gallbladder wall thickening and small  Ascites. Assessment / Plan:     Problem List as of 7/6/2020 Date Reviewed: 5/22/2020          Codes Class Noted - Resolved    Sepsis [facility]) ICD-10-CM: A41.9  ICD-9-CM: 038.9, 995.91  6/30/2020 - Present        Infiltrating ductal carcinoma of breast, left ([facility] ICD-10-CM: C50.912  ICD-9-CM: 174.9  11/5/2018 - Present    Overview Addendum 12/28/2018  2:38 PM by Daniel MOORE     11/05/18: LEFT breast biopsies. PATH at LEFT site A: IDC, colloid type, nuclear grade 2, tumor involves entire tissue sample, ER+(100%)/OH+(100%)/HER2-. PATH at LEFT side B: Fragments of atypical papillary neoplasm. RIGHT breast bx PATH: Flat epithelial atypia involving dilated duct.              Obesity, morbid ([facility] ICD-10-CM: E66.01  ICD-9-CM: 278.01  2/28/2018 - Present        Mixed hyperlipidemia ICD-10-CM: E78.2  ICD-9-CM: 272.2  3/19/2015 - Present        Valvular disease ICD-10-CM: I38  ICD-9-CM: 424.90  3/19/2015 - Present        Paroxysmal A-fib (HCC) ICD-10-CM: I48.0  ICD-9-CM: 427.31  3/19/2015 - Present        Biventricular failure ([facility] ICD-10-CM: I50.82  ICD-9-CM: 428.9  3/19/2015 - Present        HTN (hypertension) ICD-10-CM: I10  ICD-9-CM: 401.9  2/18/2015 - Present        GERD (gastroesophageal reflux disease) ICD-10-CM: K21.9  ICD-9-CM: 530.81  2/18/2015 - Present        RESOLVED: HLD (hyperlipidemia) ICD-10-CM: E78.5  ICD-9-CM: 272.4  2/18/2015 - 3/19/2015              Ms Koko Wu is a 63-year-old lady with a history of atrial fibrillation, hypertension with ESBL Ecoli bactermia. 1) ESBL coli bacteremia  Probably source urine   On Meropenem IV  Await repeat cultures   May need CT scan of the abdomen if blood cx 7/5 do not clear   TTE   Antibiotic side effects/adverse effects/toxicities discussed including gastrointestinal, renal, hematological , ability to lower siezure threshold, risk for C diff infection. Probiotics, daily yogurt encouraged. 2) Afib    3) HTN    4) Hx of breast cancer     5) DEVON on CDK renally dose antibiotics     6) DVT Px           Thank for the opportunity to participate in the care of this patient. Please contact with questions or concerns.        Lelia Monterroso,   1:48 PM

## 2020-07-06 NOTE — PROGRESS NOTES
[address] [facility]  Hospitalist Group                                                                                          Hospitalist Progress Note  Ladonna Disla MD  Answering service: [phone] OR [phone] from in house phone        Date of Service:  2020  NAME:  Juan Jose Montana  :  1940  MRN:  [id number]      Admission Summary:   Juan Jose Montana is a 68 y.o. female with PMH of atrial fib,CKD, anemia, MI and other comorbidities was sent to the hospital from a facility because of altered mental status and fever.       Per chart review, patient had fever of 101 and was recently moved to COVID unit at the facility. They also noted that patient's mental status was not at baseline. Blood pressure was in systolic 98C. She was sent to emergency room for further evaluation. Interval history / Subjective:      No complaints today doing well. Cultures from  so far negative. Assessment & Plan:     Sepsis (POA Fever, WBC): With ESBL, UTI likely source, improved. - Continue IV meropenem   -Blood culture obtained  resulted +,  pending   -We will need to await on PICC line placement until   - Urine growing ESBL as well   - ID consulted  - Will need PICC and 14 days of antibiotics from first negative culture. ?if ok with renal given her kidney function. Potential PNA vs. Fluid overload   -SARS-CoV-2 negative  -Continue IV fluids.  -Procalcitonin 34.9, downtrending      Acute metabolic encephalopathy due to infection, uremia, improved.   Now alert and oriented x3  -Tx as above improving  -CT head- Volume loss and white matter disease but no acute intracranial abnormality.     Acute renal failure stage 2 Cr up to 2.6 from baseline of 1.2 on CKD: Likely secondary to sepsis, urinary retention   - Slowly improving   - Manley catheter removed and voiding.   - I and O and continue to monitor    - nephrology following ?if ok for patient to get PICC    Chronic blood loss anemia possibly in the setting of using anticoagulant:  -She has hematuria UA positive for >100 RBC   - holding anticoagulation for now, would defer to outpatient whether to resume.      Chronic atrial fibrillation  -Hold anticoagulation as she has anemia and hematuria  -She is currently rate controlled     Hypertension-blood pressure soft hold antihypertensives. Hypomagnesemiareplete as needed, monitor    Code status: Full  DVT prophylaxis: SCD    Care Plan discussed with: Patient/Family and Nurse  Anticipated Disposition: Home w/Family and SNF/LTC  Anticipated Discharge: Greater than 48 hours     Hospital Problems  Date Reviewed: 5/22/2020          Codes Class Noted POA    Sepsis ([facility] ICD-10-CM: A41.9  ICD-9-CM: 038.9, 995.91  6/30/2020 Unknown                Review of Systems:   Pertinent items are noted in HPI. Ct Head Wo Cont    Result Date: 6/30/2020  IMPRESSION: Volume loss and white matter disease but no acute intracranial abnormality    Us Retroperitoneum Comp    Result Date: 7/2/2020  IMPRESSION: 1. No hydronephrosis. Small perinephric fluid. 2. Increased echogenicity of both kidneys consistent with medical renal parenchymal disease. 3. Urinary bladder not assessed. 4. Incidentally noted cholelithiasis with gallbladder wall thickening and small ascites. Xr Chest Port    Result Date: 7/1/2020  IMPRESSION: 1. Unchanged cardiomegaly. 2.  Mild hazy bilateral opacities are nonspecific but may represent edema. Xr Chest Port    Result Date: 6/30/2020  Impression: Mild parenchymal opacity right middle lobe may represent acute infiltrate. Correlation with frontal and lateral views may be helpful. Vital Signs:    Last 24hrs VS reviewed since prior progress note.  Most recent are:  Visit Vitals  /82 (BP 1 Location: Left arm, BP Patient Position: At rest)   Pulse (!) 102   Temp 98.1 °F (36.7 °C)   Resp 18   Ht 5' 1\" (1.549 m)   Wt 86.5 kg (190 lb 11.2 oz)   SpO2 91%   BMI 36.03 kg/m² Intake/Output Summary (Last 24 hours) at 7/6/2020 1711  Last data filed at 7/6/2020 1624  Gross per 24 hour   Intake 2475.8 ml   Output 0 ml   Net 2475.8 ml        Physical Examination:             Constitutional:  No acute distress, cooperative, pleasant    ENT:  Oral mucosa moist, oropharynx benign. Poor dentition    Resp:  CTA bilaterally. No wheezing/rhonchi/rales. No accessory muscle use   CV:  Regular rhythm, normal rate, no murmurs, gallops, rubs    GI:  Soft, non distended, non tender. normoactive bowel sounds, no hepatosplenomegaly     Musculoskeletal:  No edema, warm, 2+ pulses throughout  : rivera in place     Neurologic:  Moves all extremities. AAOx3, CN II-XII reviewed     Psych:  Good insight, Not anxious nor agitated. Data Review:    Review and/or order of clinical lab test  Review and/or order of tests in the radiology section of CPT  Review and/or order of tests in the medicine section of CPT      Labs:     Recent Labs     07/06/20 0040 07/05/20  0145   WBC 12.1* 12.4*   HGB 7.7* 7.5*   HCT 28.6* 27.7*    302     Recent Labs     07/06/20  0040 07/06/20  0039 07/05/20  0146 07/04/20  0155    137 137 138   K 3.8 3.8 4.6 3.4*    107 108 108   CO2 22 23 21 24   BUN 38* 38* 38* 40*   CREA 1.68* 1.66* 1.68* 1.84*   * 126* 110* 136*   CA 8.6 8.9 8.6 8.2*   MG 1.9  --  1.5* 1.5*   PHOS 3.4 3.4 2.8 2.9     Recent Labs     07/06/20  0040 07/05/20  0146 07/04/20  0155   ALB 1.9* 2.0* 2.0*     No results for input(s): INR, PTP, APTT, INREXT, INREXT in the last 72 hours. No results for input(s): FE, TIBC, PSAT, FERR in the last 72 hours. No results found for: FOL, RBCF   No results for input(s): PH, PCO2, PO2 in the last 72 hours. No results for input(s): CPK, CKNDX, TROIQ in the last 72 hours.     No lab exists for component: CPKMB  Lab Results   Component Value Date/Time    Cholesterol, total 226 (H) 03/14/2019 02:14 PM    HDL Cholesterol 48 03/14/2019 02:14 PM LDL,Direct 163 (H) 12/05/2019 01:04 PM    LDL, calculated 148 (H) 03/14/2019 02:14 PM    Triglyceride 149 03/14/2019 02:14 PM     No results found for: [facility]  Lab Results   Component Value Date/Time    Color BROWN 06/30/2020 11:03 AM    Appearance TURBID (A) 06/30/2020 11:03 AM    Specific gravity 1.020 06/30/2020 11:03 AM    pH (UA) 6.0 06/30/2020 11:03 AM    Protein >300 (A) 06/30/2020 11:03 AM    Glucose Negative 06/30/2020 11:03 AM    Ketone Negative 06/30/2020 11:03 AM    Urobilinogen 1.0 06/30/2020 11:03 AM    Nitrites Negative 06/30/2020 11:03 AM    Leukocyte Esterase SMALL (A) 06/30/2020 11:03 AM    Epithelial cells FEW 06/30/2020 11:03 AM    Bacteria 1+ (A) 06/30/2020 11:03 AM    WBC >100 (H) 06/30/2020 11:03 AM    RBC >100 (H) 06/30/2020 11:03 AM         Medications Reviewed:     Current Facility-Administered Medications   Medication Dose Route Frequency    SALINE PERIPHERAL FLUSH Q8H soln 5-40 mL  5-40 mL InterCATHeter Q8H    meropenem (MERREM) 500 mg in 0.9% sodium chloride (MBP/ADV) 50 mL  500 mg IntraVENous Q8H    acetaminophen (TYLENOL) tablet 650 mg  650 mg Oral Q4H PRN    carvediloL (COREG) tablet 6.25 mg  6.25 mg Oral BID WITH MEALS    sodium chloride (NS) flush 5-10 mL  5-10 mL IntraVENous PRN    0.9% sodium chloride infusion  50 mL/hr IntraVENous CONTINUOUS     ______________________________________________________________________  EXPECTED LENGTH OF STAY: 4d 19h  ACTUAL LENGTH OF STAY:          6                 Qian Rosenbaum MD

## 2020-07-06 NOTE — PROGRESS NOTES
Bedside and Verbal shift change report given to [employer], RN (oncoming nurse) by Hieu Herr RN (offgoing nurse). Report included the following information SBAR, Kardex, ED Summary, Intake/Output, MAR and Recent Results.

## 2020-07-06 NOTE — PROGRESS NOTES
Physical Therapy  7/6/2020    Chart reviewed. Attempted to see pt this afternoon for PT, however pt currently getting midline placed. Will defer and follow up tomorrow as able and appropriate.      Shruthi Means, PTA

## 2020-07-06 NOTE — PROCEDURES
Midline Insertion and Progress Note. Patient tolerated a midline being placed well. PRE-PROCEDURE VERIFICATION  Correct Procedure: yes  Correct Site:  yes  Temperature: Temp: 98.1 °F (36.7 °C), Temperature Source: Temp Source: Oral  Recent Labs     07/06/20  0040   BUN 38*   CREA 1.68*      WBC 12.1*     Allergies: Patient has no known allergies. PROCEDURE DETAIL  A single lumen midline IV catheter was started for desire for reliable access to receive her antibiotic treatments. The following documentation is in addition to the Midline properties in the lines/airways flowsheet :  Lot #: [id number]  Catheter Total Length: 12 (cm)  External Catheter Length: 0.5 (cm)  Circumference: 34 (cm)  Vein Selection for Midline :left brachial  Complication Related to Insertion: other: 2 attempts to place midline. Report given to Gene Coppola is okay to use.

## 2020-07-07 ENCOUNTER — APPOINTMENT (OUTPATIENT)
Dept: NON INVASIVE DIAGNOSTICS | Age: 80
DRG: 871 | End: 2020-07-07
Attending: INTERNAL MEDICINE
Payer: MEDICARE

## 2020-07-07 LAB
ALBUMIN SERPL-MCNC: 1.8 G/DL (ref 3.5–5)
ANION GAP SERPL CALC-SCNC: 9 MMOL/L (ref 5–15)
BACTERIA SPEC CULT: ABNORMAL
BASOPHILS # BLD: 0 K/UL (ref 0–0.1)
BASOPHILS NFR BLD: 0 % (ref 0–1)
BUN SERPL-MCNC: 35 MG/DL (ref 6–20)
BUN/CREAT SERPL: 23 (ref 12–20)
CALCIUM SERPL-MCNC: 8.9 MG/DL (ref 8.5–10.1)
CHLORIDE SERPL-SCNC: 108 MMOL/L (ref 97–108)
CO2 SERPL-SCNC: 21 MMOL/L (ref 21–32)
CREAT SERPL-MCNC: 1.51 MG/DL (ref 0.55–1.02)
DIFFERENTIAL METHOD BLD: ABNORMAL
ECHO AO ROOT DIAM: 3.56 CM
ECHO AV AREA PEAK VELOCITY: 1.41 CM2
ECHO AV AREA/BSA PEAK VELOCITY: 0.8 CM2/M2
ECHO AV PEAK GRADIENT: 4.95 MMHG
ECHO AV PEAK VELOCITY: 111.29 CM/S
ECHO EST RA PRESSURE: 10 MMHG
ECHO IVC PROX: 2.32 CM
ECHO LA AREA 4C: 31.1 CM2
ECHO LA MAJOR AXIS: 5.9 CM
ECHO LA MINOR AXIS: 3.19 CM
ECHO LA VOL 2C: 150.22 ML (ref 22–52)
ECHO LA VOL 4C: 117.65 ML (ref 22–52)
ECHO LA VOL BP: 138.58 ML (ref 22–52)
ECHO LA VOL/BSA BIPLANE: 74.97 ML/M2 (ref 16–28)
ECHO LA VOLUME INDEX A2C: 81.27 ML/M2 (ref 16–28)
ECHO LA VOLUME INDEX A4C: 63.65 ML/M2 (ref 16–28)
ECHO LV EDV A2C: 169.06 ML
ECHO LV EDV A4C: 129.1 ML
ECHO LV EDV BP: 153.21 ML (ref 56–104)
ECHO LV EDV INDEX A4C: 69.8 ML/M2
ECHO LV EDV INDEX BP: 82.9 ML/M2
ECHO LV EDV NDEX A2C: 91.5 ML/M2
ECHO LV EJECTION FRACTION A2C: 30 PERCENT
ECHO LV EJECTION FRACTION A4C: 26 PERCENT
ECHO LV EJECTION FRACTION BIPLANE: 26.9 PERCENT (ref 55–100)
ECHO LV ESV A2C: 118.38 ML
ECHO LV ESV A4C: 95.52 ML
ECHO LV ESV BP: 112.06 ML (ref 19–49)
ECHO LV ESV INDEX A2C: 64 ML/M2
ECHO LV ESV INDEX A4C: 51.7 ML/M2
ECHO LV ESV INDEX BP: 60.6 ML/M2
ECHO LV INTERNAL DIMENSION DIASTOLIC: 5.39 CM (ref 3.9–5.3)
ECHO LV INTERNAL DIMENSION SYSTOLIC: 4.7 CM
ECHO LV IVSD: 1.14 CM (ref 0.6–0.9)
ECHO LV MASS 2D: 207.5 G (ref 67–162)
ECHO LV MASS INDEX 2D: 112.2 G/M2 (ref 43–95)
ECHO LV POSTERIOR WALL DIASTOLIC: 0.87 CM (ref 0.6–0.9)
ECHO LVOT DIAM: 1.66 CM
ECHO LVOT PEAK GRADIENT: 2.09 MMHG
ECHO LVOT PEAK VELOCITY: 72.27 CM/S
ECHO MV E VELOCITY: 94.89 CM/S
ECHO MV EROA PISA: 0.35 CM2
ECHO MV REGURGITANT RADIUS PISA: 0.92 CM
ECHO MV REGURGITANT VOLUME: 43.76 ML
ECHO MV REGURGITANT VTIA: 124.16 CM
ECHO PV PEAK INSTANTANEOUS GRADIENT SYSTOLIC: 1.65 MMHG
ECHO PV REGURGITANT MAX VELOCITY: 425.14 CM/S
ECHO RIGHT VENTRICULAR SYSTOLIC PRESSURE (RVSP): 51.16 MMHG
ECHO RV INTERNAL DIMENSION: 5.02 CM
ECHO RV TAPSE: 1.48 CM (ref 1.5–2)
ECHO TV REGURGITANT MAX VELOCITY: 319.73 CM/S
ECHO TV REGURGITANT PEAK GRADIENT: 41.16 MMHG
EOSINOPHIL # BLD: 0.1 K/UL (ref 0–0.4)
EOSINOPHIL NFR BLD: 1 % (ref 0–7)
ERYTHROCYTE [DISTWIDTH] IN BLOOD BY AUTOMATED COUNT: 22.5 % (ref 11.5–14.5)
GLUCOSE SERPL-MCNC: 118 MG/DL (ref 65–100)
HCT VFR BLD AUTO: 26.4 % (ref 35–47)
HGB BLD-MCNC: 7.1 G/DL (ref 11.5–16)
IMM GRANULOCYTES # BLD AUTO: 0.2 K/UL (ref 0–0.04)
IMM GRANULOCYTES NFR BLD AUTO: 2 % (ref 0–0.5)
LYMPHOCYTES # BLD: 1.3 K/UL (ref 0.8–3.5)
LYMPHOCYTES NFR BLD: 11 % (ref 12–49)
MAGNESIUM SERPL-MCNC: 1.9 MG/DL (ref 1.6–2.4)
MCH RBC QN AUTO: 20.9 PG (ref 26–34)
MCHC RBC AUTO-ENTMCNC: 26.9 G/DL (ref 30–36.5)
MCV RBC AUTO: 77.6 FL (ref 80–99)
MONOCYTES # BLD: 1.3 K/UL (ref 0–1)
MONOCYTES NFR BLD: 11 % (ref 5–13)
NEUTS SEG # BLD: 8.5 K/UL (ref 1.8–8)
NEUTS SEG NFR BLD: 75 % (ref 32–75)
NRBC # BLD: 0.03 K/UL (ref 0–0.01)
NRBC BLD-RTO: 0.3 PER 100 WBC
PHOSPHATE SERPL-MCNC: 3.6 MG/DL (ref 2.6–4.7)
PLATELET # BLD AUTO: 331 K/UL (ref 150–400)
PMV BLD AUTO: 9.1 FL (ref 8.9–12.9)
POTASSIUM SERPL-SCNC: 4.3 MMOL/L (ref 3.5–5.1)
RBC # BLD AUTO: 3.4 M/UL (ref 3.8–5.2)
RBC MORPH BLD: ABNORMAL
SERVICE CMNT-IMP: ABNORMAL
SODIUM SERPL-SCNC: 138 MMOL/L (ref 136–145)
WBC # BLD AUTO: 11.4 K/UL (ref 3.6–11)

## 2020-07-07 PROCEDURE — 74011000258 HC RX REV CODE- 258: Performed by: INTERNAL MEDICINE

## 2020-07-07 PROCEDURE — 80069 RENAL FUNCTION PANEL: CPT

## 2020-07-07 PROCEDURE — 65270000032 HC RM SEMIPRIVATE

## 2020-07-07 PROCEDURE — 97535 SELF CARE MNGMENT TRAINING: CPT

## 2020-07-07 PROCEDURE — 83735 ASSAY OF MAGNESIUM: CPT

## 2020-07-07 PROCEDURE — 74011250636 HC RX REV CODE- 250/636: Performed by: INTERNAL MEDICINE

## 2020-07-07 PROCEDURE — 36415 COLL VENOUS BLD VENIPUNCTURE: CPT

## 2020-07-07 PROCEDURE — 93306 TTE W/DOPPLER COMPLETE: CPT

## 2020-07-07 PROCEDURE — 85025 COMPLETE CBC W/AUTO DIFF WBC: CPT

## 2020-07-07 PROCEDURE — 74011250637 HC RX REV CODE- 250/637: Performed by: HOSPITALIST

## 2020-07-07 PROCEDURE — 36573 INSJ PICC RS&I 5 YR+: CPT | Performed by: INTERNAL MEDICINE

## 2020-07-07 RX ADMIN — CARVEDILOL 6.25 MG: 6.25 TABLET, FILM COATED ORAL at 07:07

## 2020-07-07 RX ADMIN — Medication 10 ML: at 14:54

## 2020-07-07 RX ADMIN — MEROPENEM 500 MG: 500 INJECTION, POWDER, FOR SOLUTION INTRAVENOUS at 14:53

## 2020-07-07 RX ADMIN — Medication 10 ML: at 23:23

## 2020-07-07 RX ADMIN — MEROPENEM 500 MG: 500 INJECTION, POWDER, FOR SOLUTION INTRAVENOUS at 22:32

## 2020-07-07 RX ADMIN — CARVEDILOL 6.25 MG: 6.25 TABLET, FILM COATED ORAL at 16:44

## 2020-07-07 RX ADMIN — Medication 10 ML: at 06:59

## 2020-07-07 RX ADMIN — Medication 10 ML: at 22:32

## 2020-07-07 RX ADMIN — MEROPENEM 500 MG: 500 INJECTION, POWDER, FOR SOLUTION INTRAVENOUS at 06:55

## 2020-07-07 NOTE — PROGRESS NOTES
Chart checked, pt cleared by nursing. Spoke with PALMA who had just left pt's room and pt had declined all of mobility agreeable only to grooming that did not involve any mobility, refused bed mobility. Given above, PT will defer at this time, follow and see as able and appropriate.  Thank you, Tierra Golden, PT

## 2020-07-07 NOTE — PROGRESS NOTES
Nephrology Progress Note  Karol Michelle  Date of Admission : 6/30/2020    CC: Follow up for DEVON on CKD       Assessment and Plan     DEVON on CKD:  - likely 2/2 ATN from sepsis + urinary retention  - renal U/S unremarkable  - STOPPED IVF   - Ok for d/c from renal stand point     CKD III:  - baseline Cr 1.2     Urosepsis:  - cultues + ESBL E. Coli  - on meropenem     COVID-19 neg     Chronic anemia:     Hx of afib    HTN : stable        Interval History:  Seen and examined. Feeling better. Cr back to baseline   No cp, sob, n/v/d reported at this time. Current Medications: all current  Medications have been eviewed in EPIC  Review of Systems: Pertinent items are noted in HPI. Objective:  Vitals:    Vitals:    07/06/20 1417 07/06/20 1951 07/07/20 0219 07/07/20 0703   BP: 158/82 128/66 148/78 130/80   Pulse: (!) 102 78 96 93   Resp: 18 18 18 19   Temp: 98.1 °F (36.7 °C) 99.8 °F (37.7 °C) 98.4 °F (36.9 °C) 98.8 °F (37.1 °C)   SpO2: 91% 92% 94% 94%   Weight:       Height:         Intake and Output:  No intake/output data recorded. 07/05 1901 - 07/07 0700  In: 2475.8 [I.V.:2475.8]  Out: 550 [Urine:550]    Physical Exam     General:no distress   HEENT: EOMI  Lungs : stable oxygenation, clear lungs  ABD: obese, NT, + bowel sounds  CVS: RRR on monitor, no edema   Neurologic: AAO X 3   : no rivera     []    High complexity decision making was performed  []    Patient is at high-risk of decompensation with multiple organ involvement    Lab Data Personally Reviewed: I have reviewed all the pertinent labs, microbiology data and radiology studies during assessment.     Recent Labs     07/07/20  0424 07/06/20  0040 07/06/20  0039 07/05/20  0146    136 137 137   K 4.3 3.8 3.8 4.6    107 107 108   CO2 21 22 23 21   * 122* 126* 110*   BUN 35* 38* 38* 38*   CREA 1.51* 1.68* 1.66* 1.68*   CA 8.9 8.6 8.9 8.6   MG 1.9 1.9  --  1.5*   PHOS 3.6 3.4 3.4 2.8   ALB 1.8* 1.9*  --  2.0*     Recent Labs 07/07/20  0424 07/06/20  0040 07/05/20  0145   WBC 11.4* 12.1* 12.4*   HGB 7.1* 7.7* 7.5*   HCT 26.4* 28.6* 27.7*    328 302     No results found for: SDES  Lab Results   Component Value Date/Time    Culture result: NO GROWTH 2 DAYS 07/05/2020 11:00 AM    Culture result: (A) 07/02/2020 11:26 AM     GRAM NEGATIVE RODS GROWING IN 1 OF 4 BOTTLES DRAWN SITE= ( LAC) PLEASE REFER TO [facility] [id number] FOR FURTHER IDENTIFICATION AND SENSITIVITIES    Culture result: (A) 07/02/2020 11:26 AM     PRELIMINARY REPORT OF GRAM NEGATIVE RODS GROWING IN 1 OF 4 BOTTLES DRAWN CALLED TO AND READ BACK BY MS BERTRAND MCNAIR AT 1940 ON 7/4/20.   PJ    Culture result: REMAINING BOTTLE(S) HAS/HAVE NO GROWTH SO FAR 07/02/2020 11:26 AM     Recent Results (from the past 24 hour(s))   RENAL FUNCTION PANEL    Collection Time: 07/07/20  4:24 AM   Result Value Ref Range    Sodium 138 136 - 145 mmol/L    Potassium 4.3 3.5 - 5.1 mmol/L    Chloride 108 97 - 108 mmol/L    CO2 21 21 - 32 mmol/L    Anion gap 9 5 - 15 mmol/L    Glucose 118 (H) 65 - 100 mg/dL    BUN 35 (H) 6 - 20 MG/DL    Creatinine 1.51 (H) 0.55 - 1.02 MG/DL    BUN/Creatinine ratio 23 (H) 12 - 20      GFR est AA 40 (L) >60 ml/min/1.73m2    GFR est non-AA 33 (L) >60 ml/min/1.73m2    Calcium 8.9 8.5 - 10.1 MG/DL    Phosphorus 3.6 2.6 - 4.7 MG/DL    Albumin 1.8 (L) 3.5 - 5.0 g/dL   MAGNESIUM    Collection Time: 07/07/20  4:24 AM   Result Value Ref Range    Magnesium 1.9 1.6 - 2.4 mg/dL   CBC WITH AUTOMATED DIFF    Collection Time: 07/07/20  4:24 AM   Result Value Ref Range    WBC 11.4 (H) 3.6 - 11.0 K/uL    RBC 3.40 (L) 3.80 - 5.20 M/uL    HGB 7.1 (L) 11.5 - 16.0 g/dL    HCT 26.4 (L) 35.0 - 47.0 %    MCV 77.6 (L) 80.0 - 99.0 FL    MCH 20.9 (L) 26.0 - 34.0 PG    MCHC 26.9 (L) 30.0 - 36.5 g/dL    RDW 22.5 (H) 11.5 - 14.5 %    PLATELET 758 414 - 877 K/uL    MPV 9.1 8.9 - 12.9 FL    NRBC 0.3 (H) 0  WBC    ABSOLUTE NRBC 0.03 (H) 0.00 - 0.01 K/uL    NEUTROPHILS 75 32 - 75 %    LYMPHOCYTES 11 (L) 12 - 49 %    MONOCYTES 11 5 - 13 %    EOSINOPHILS 1 0 - 7 %    BASOPHILS 0 0 - 1 %    IMMATURE GRANULOCYTES 2 (H) 0.0 - 0.5 %    ABS. NEUTROPHILS 8.5 (H) 1.8 - 8.0 K/UL    ABS. LYMPHOCYTES 1.3 0.8 - 3.5 K/UL    ABS. MONOCYTES 1.3 (H) 0.0 - 1.0 K/UL    ABS. EOSINOPHILS 0.1 0.0 - 0.4 K/UL    ABS. BASOPHILS 0.0 0.0 - 0.1 K/UL    ABS. IMM. GRANS. 0.2 (H) 0.00 - 0.04 K/UL    DF SMEAR SCANNED      RBC COMMENTS ANISOCYTOSIS  2+        RBC COMMENTS PHANI CELLS  PRESENT        RBC COMMENTS TARGET CELLS  PRESENT        RBC COMMENTS HYPOCHROMIA  3+        RBC COMMENTS POLYCHROMASIA  PRESENT                     Solo Zelaya MD  [facility]   [id number] [place] [address]  Phone - [phone]   Fax - [phone]  [email or web address]

## 2020-07-07 NOTE — PROGRESS NOTES
ADIS:  RUR: 17%    Patient admitted to [facility] 6/30/20 with dx of Sepsis. Patient is a LTC resident at [facility].      Patient transferred to [address] [place] [address] from  on 7/3/20. Patient tested COVID neg on 6/30 and 7/1/20. D/C Plan:  Return to [facility] when medically stable. CM checking with facility re need for an additional COVID Neg test.  Patient will also need DDNR document completed. Plans for PICC line today for continuation of IV abx. Awaiting repeat cultures. ID following.     Jose Vance, [address] [facility], [address] [facility]

## 2020-07-07 NOTE — PROGRESS NOTES
Infectious Disease Progress Note        HPI:      Ms Anusha Street seen  She denied pain, nausea, vomiting, fever, chills   Tolerating antibiotics       Physical Exam:    Vitals:   Patient Vitals for the past 24 hrs:   Temp Pulse Resp BP SpO2   07/07/20 0703 98.8 °F (37.1 °C) 93 19 130/80 94 %   07/07/20 0219 98.4 °F (36.9 °C) 96 18 148/78 94 %   07/06/20 1951 99.8 °F (37.7 °C) 78 18 128/66 92 %   07/06/20 1417 98.1 °F (36.7 °C) (!) 102 18 158/82 91 %     · GEN: NAD  · HEENT: No scleral icterus, very poor dentition, missing many teeth, no thrush   · CV: S1, S2 heard irregular   · Lungs: Clear to auscultation bilaterally  · Abdomen: soft, non distended, non tender, no CVA tenderness   · Extremities: no edema  · Neuro: Alert, oriented to self  · Skin: no rash  · Psych non tearful   · Musculoskeletal no tenderness to palpation of her back      Labs:   Recent Results (from the past 24 hour(s))   RENAL FUNCTION PANEL    Collection Time: 07/07/20  4:24 AM   Result Value Ref Range    Sodium 138 136 - 145 mmol/L    Potassium 4.3 3.5 - 5.1 mmol/L    Chloride 108 97 - 108 mmol/L    CO2 21 21 - 32 mmol/L    Anion gap 9 5 - 15 mmol/L    Glucose 118 (H) 65 - 100 mg/dL    BUN 35 (H) 6 - 20 MG/DL    Creatinine 1.51 (H) 0.55 - 1.02 MG/DL    BUN/Creatinine ratio 23 (H) 12 - 20      GFR est AA 40 (L) >60 ml/min/1.73m2    GFR est non-AA 33 (L) >60 ml/min/1.73m2    Calcium 8.9 8.5 - 10.1 MG/DL    Phosphorus 3.6 2.6 - 4.7 MG/DL    Albumin 1.8 (L) 3.5 - 5.0 g/dL   MAGNESIUM    Collection Time: 07/07/20  4:24 AM   Result Value Ref Range    Magnesium 1.9 1.6 - 2.4 mg/dL   CBC WITH AUTOMATED DIFF    Collection Time: 07/07/20  4:24 AM   Result Value Ref Range    WBC 11.4 (H) 3.6 - 11.0 K/uL    RBC 3.40 (L) 3.80 - 5.20 M/uL    HGB 7.1 (L) 11.5 - 16.0 g/dL    HCT 26.4 (L) 35.0 - 47.0 %    MCV 77.6 (L) 80.0 - 99.0 FL    MCH 20.9 (L) 26.0 - 34.0 PG    MCHC 26.9 (L) 30.0 - 36.5 g/dL    RDW 22.5 (H) 11.5 - 14.5 %    PLATELET 229 880 - 419 K/uL    MPV 9.1 8.9 - 12.9 FL    NRBC 0.3 (H) 0  WBC    ABSOLUTE NRBC 0.03 (H) 0.00 - 0.01 K/uL    NEUTROPHILS 75 32 - 75 %    LYMPHOCYTES 11 (L) 12 - 49 %    MONOCYTES 11 5 - 13 %    EOSINOPHILS 1 0 - 7 %    BASOPHILS 0 0 - 1 %    IMMATURE GRANULOCYTES 2 (H) 0.0 - 0.5 %    ABS. NEUTROPHILS 8.5 (H) 1.8 - 8.0 K/UL    ABS. LYMPHOCYTES 1.3 0.8 - 3.5 K/UL    ABS. MONOCYTES 1.3 (H) 0.0 - 1.0 K/UL    ABS. EOSINOPHILS 0.1 0.0 - 0.4 K/UL    ABS. BASOPHILS 0.0 0.0 - 0.1 K/UL    ABS. IMM. GRANS. 0.2 (H) 0.00 - 0.04 K/UL    DF SMEAR SCANNED      RBC COMMENTS ANISOCYTOSIS  2+        RBC COMMENTS PHANI CELLS  PRESENT        RBC COMMENTS TARGET CELLS  PRESENT        RBC COMMENTS HYPOCHROMIA  3+        RBC COMMENTS POLYCHROMASIA  PRESENT           Microbiology Data:       Blood: 6/30/20  Blood         Component Value Ref Range & Units Status   Special Requests: NO SPECIAL REQUESTS    Final   Culture result: Abnormal     Final   ESCHERICHIA COLI ** (EXTENDED SPECTRUM BETA LACTAMASE ) ** GROWING IN ALL 4 BOTTLES DRAWN (SITES = [facility] AND )   Culture result: Abnormal     Final   PRELIMINARY REPORT OF GRAM NEGATIVE RODS GROWING IN 2 OF 4 BOTTLES DRAWN CALLED TO AND READ BACK BY PAULA BROWN RN ON 6/30/20 AT 2340 ([facility]).  MS   Susceptibility      Escherichia coli     LADY    Amikacin ($) 8 ug/mL S    Ampicillin ($) >=32 ug/mL R    Ampicillin/sulbactam ($) >=32 ug/mL R    Cefazolin ($) >=64 ug/mL R    Cefepime ($$)  R    Cefoxitin <=4 ug/mL S    Ceftazidime ($)  R    Ceftriaxone ($) >=64 ug/mL R    Ciprofloxacin ($) >=4 ug/mL R    Gentamicin ($) >=16 ug/mL R    Levofloxacin ($) >=8 ug/mL R    Meropenem ($$) <=0.25 ug/mL S    Piperacillin/Tazobac ($) <=4 ug/mL S    Tobramycin ($) >=16 ug/mL R    Trimeth/Sulfa >=320 ug/mL R          Result History         Blood 7/2/20  Component Value Ref Range & Units Status   Special Requests: NO SPECIAL REQUESTS    Preliminary   Culture result: Abnormal     Preliminary   GRAM NEGATIVE RODS GROWING IN 1 OF 4 BOTTLES DRAWN SITE= ( LAC) PLEASE REFER TO [facility] [id number] FOR FURTHER IDENTIFICATION AND SENSITIVITIES   Culture result: Abnormal     Preliminary   PRELIMINARY REPORT OF GRAM NEGATIVE RODS GROWING IN 1 OF 4 BOTTLES DRAWN CALLED TO AND READ BACK BY MS BERTRAND MCNAIR AT 1940 ON 7/4/20.  PJ   Culture result:    Preliminary   REMAINING BOTTLE(S) HAS/HAVE NO GROWTH SO FAR    Result History           Urine: 6/30/20  Clean catch; Urine         Component Value Ref Range & Units Status   Special Requests: NO SPECIAL REQUESTS    Final   Rosanky Count >100,000   COLONIES/mL    Final   Culture result: Abnormal     Final   ESCHERICHIA COLI ** (EXTENDED SPECTRUM BETA LACTAMASE ) **    Susceptibility      Escherichia coli     LADY    Amikacin ($) 4 ug/mL S    Ampicillin ($) >=32 ug/mL R    Ampicillin/sulbactam ($) >=32 ug/mL R    Cefazolin ($) >=64 ug/mL R    Cefepime ($$)  R    Cefoxitin <=4 ug/mL S    Ceftazidime ($)  R    Ceftriaxone ($)  R    Ciprofloxacin ($) >=4 ug/mL R    Gentamicin ($) >=16 ug/mL R    Levofloxacin ($) >=8 ug/mL R    Meropenem ($$) <=0.25 ug/mL S    Nitrofurantoin 64 ug/mL I    Piperacillin/Tazobac ($) <=4 ug/mL S    Tobramycin ($) >=16 ug/mL R    Trimeth/Sulfa >=320 ug/mL R        Blood 7/5/20  Component Value Ref Range & Units Status   Special Requests: NO SPECIAL REQUESTS    Preliminary   Culture result: NO GROWTH 2 DAYS    Preliminary   Result History           Imaging:   Renal US  RIGHT KIDNEY: measures 10.3 cm in length, normal for age. Echogenicity is  abnormally increased. . No hydronephrosis, large shadowing calculus, or solid  contour-deforming renal mass. Incidental cyst in the interpolar region 2.2 cm. Tiny echogenic foci which may represent nonobstructing calculi. LEFT KIDNEY: measures 10.1 cm in length, normal for age. Renal echogenicity is  abnormally increased. No hydronephrosis, large shadowing calculus, or solid  contour-deforming renal mass.  Probable small cyst interpolar. Tiny echogenic  foci which may represent nonshadowing calculi. AORTA: Normal caliber in its visualized portions. COMMON ILIAC ARTERIES: Normal caliber proximally. IVC: Normal caliber in its visualized portions. BLADDER: Decompressed by a Manley balloon catheter and not assessed. Incidentally noted gallbladder wall thickening with intraluminal calculi. Common  bile duct upper normal. Tiny ascites and perinephric fluid.     IMPRESSION  IMPRESSION:      1. No hydronephrosis. Small perinephric fluid. 2. Increased echogenicity of both kidneys consistent with medical renal  parenchymal disease. 3. Urinary bladder not assessed. 4. Incidentally noted cholelithiasis with gallbladder wall thickening and small  Ascites. Assessment / Plan:     Problem List as of 7/7/2020 Date Reviewed: 5/22/2020          Codes Class Noted - Resolved    Sepsis [facility]) ICD-10-CM: A41.9  ICD-9-CM: 038.9, 995.91  6/30/2020 - Present        Infiltrating ductal carcinoma of breast, left ([facility] ICD-10-CM: C50.912  ICD-9-CM: 174.9  11/5/2018 - Present    Overview Addendum 12/28/2018  2:38 PM by Socrates MOORE     11/05/18: LEFT breast biopsies. PATH at LEFT site A: IDC, colloid type, nuclear grade 2, tumor involves entire tissue sample, ER+(100%)/W[place]+(100%)/HER2-. PATH at LEFT side B: Fragments of atypical papillary neoplasm. RIGHT breast bx PATH: Flat epithelial atypia involving dilated duct.              Obesity, morbid ([facility] ICD-10-CM: E66.01  ICD-9-CM: 278.01  2/28/2018 - Present        Mixed hyperlipidemia ICD-10-CM: E78.2  ICD-9-CM: 272.2  3/19/2015 - Present        Valvular disease ICD-10-CM: I38  ICD-9-CM: 424.90  3/19/2015 - Present        Paroxysmal A-fib (HCC) ICD-10-CM: I48.0  ICD-9-CM: 427.31  3/19/2015 - Present        Biventricular failure ([facility] ICD-10-CM: I50.82  ICD-9-CM: 428.9  3/19/2015 - Present        HTN (hypertension) ICD-10-CM: I10  ICD-9-CM: 401.9  2/18/2015 - Present        GERD (gastroesophageal reflux disease) ICD-10-CM: K21.9  ICD-9-CM: 530.81  2/18/2015 - Present        RESOLVED: HLD (hyperlipidemia) ICD-10-CM: E78.5  ICD-9-CM: 272.4  2/18/2015 - 3/19/2015              Ms Roderick García is a 37-year-old lady with a history of atrial fibrillation, hypertension with ESBL Ecoli bactermia. 1) ESBL coli bacteremia (6/30-7/2/20 ), blood cx negative 7/5/20   Probable source urine   On Meropenem IV , renally dosed by pharmacy  TTE pending   If TTE negative, plan for antibiotics till 7/19/20  Can transition to IV Ertapenem 1gm daily on discharge . This dose needs to be adjusted to 500 mg if CrCL < 30. Please have pharmacy dose based on renal function   Check weekly CBC wt diff, CMP   Monitor for seizures and other adverse effects  F/U in 2 weeks  DC picc line once IV antibiotics completed    Antibiotic side effects/adverse effects/toxicities discussed including gastrointestinal, renal, hematological , ability to lower siezure threshold, risk for C diff infection. Probiotics, daily yogurt encouraged. 2) Afib    3) HTN    4) Hx of breast cancer     5) DEVON on CDK renally dose antibiotics     6) DVT Px           Thank for the opportunity to participate in the care of this patient. Please contact with questions or concerns.        Lelia Monterroso DO  11:08 AM

## 2020-07-07 NOTE — PROGRESS NOTES
Problem: Falls - Risk of  Goal: *Absence of Falls  Description: Document Natividad Bowen Fall Risk and appropriate interventions in the flowsheet. Outcome: Progressing Towards Goal  Note: Fall Risk Interventions:  Mobility Interventions: Bed/chair exit alarm    Mentation Interventions: Adequate sleep, hydration, pain control    Medication Interventions: Patient to call before getting OOB    Elimination Interventions: Call light in reach, Toileting schedule/hourly rounds    History of Falls Interventions: Bed/chair exit alarm         Problem: Patient Education: Go to Patient Education Activity  Goal: Patient/Family Education  Outcome: Progressing Towards Goal     Problem: Pressure Injury - Risk of  Goal: *Prevention of pressure injury  Description: Document Pratik Scale and appropriate interventions in the flowsheet.   Outcome: Progressing Towards Goal  Note: Pressure Injury Interventions:  Sensory Interventions: Assess changes in LOC, Assess need for specialty bed    Moisture Interventions: Apply protective barrier, creams and emollients, Absorbent underpads    Activity Interventions: Increase time out of bed, Pressure redistribution bed/mattress(bed type)    Mobility Interventions: HOB 30 degrees or less, Float heels    Nutrition Interventions: Document food/fluid/supplement intake    Friction and Shear Interventions: Feet elevated on foot rest, HOB 30 degrees or less                Problem: Patient Education: Go to Patient Education Activity  Goal: Patient/Family Education  Outcome: Progressing Towards Goal     Problem: Discharge Planning  Goal: *Discharge to safe environment  Description: See CM Notes  CRM: Danielle Castelan, MPH, CHES; Z: [phone]     Outcome: Progressing Towards Goal  Goal: *Knowledge of medication management  Outcome: Progressing Towards Goal  Goal: *Knowledge of discharge instructions  Outcome: Progressing Towards Goal     Problem: Chronic Renal Failure  Goal: *Fluid and electrolytes stabilized  Outcome: Progressing Towards Goal     Problem: Activity Intolerance  Goal: *Oxygen saturation during activity within specified parameters  Outcome: Progressing Towards Goal  Goal: *Able to remain out of bed as prescribed  Outcome: Progressing Towards Goal     Problem: Hypertension  Goal: *Blood pressure within specified parameters  Outcome: Progressing Towards Goal  Goal: *Fluid volume balance  Outcome: Progressing Towards Goal  Goal: *Labs within defined limits  Outcome: Progressing Towards Goal     Problem: Fluid Volume - Risk of, Imbalanced  Goal: *Balanced intake and output  Outcome: Progressing Towards Goal     Problem: Risk for Spread of Infection  Goal: Prevent transmission of infectious organism to others  Description: Prevent the transmission of infectious organisms to other patients, staff members, and visitors.   Outcome: Progressing Towards Goal     Problem: Patient Education:  Go to Education Activity  Goal: Patient/Family Education  Outcome: Progressing Towards Goal     Problem: Patient Education: Go to Patient Education Activity  Goal: Patient/Family Education  Outcome: Progressing Towards Goal     Problem: Patient Education: Go to Patient Education Activity  Goal: Patient/Family Education  Outcome: Progressing Towards Goal

## 2020-07-08 ENCOUNTER — APPOINTMENT (OUTPATIENT)
Dept: GENERAL RADIOLOGY | Age: 80
DRG: 871 | End: 2020-07-08
Attending: INTERNAL MEDICINE
Payer: MEDICARE

## 2020-07-08 VITALS
HEART RATE: 86 BPM | HEIGHT: 61 IN | DIASTOLIC BLOOD PRESSURE: 70 MMHG | BODY MASS INDEX: 39.04 KG/M2 | OXYGEN SATURATION: 95 % | RESPIRATION RATE: 18 BRPM | WEIGHT: 206.79 LBS | SYSTOLIC BLOOD PRESSURE: 146 MMHG | TEMPERATURE: 97.3 F

## 2020-07-08 LAB
ALBUMIN SERPL-MCNC: 1.7 G/DL (ref 3.5–5)
ANION GAP SERPL CALC-SCNC: 5 MMOL/L (ref 5–15)
BUN SERPL-MCNC: 34 MG/DL (ref 6–20)
BUN/CREAT SERPL: 25 (ref 12–20)
CALCIUM SERPL-MCNC: 8.6 MG/DL (ref 8.5–10.1)
CHLORIDE SERPL-SCNC: 108 MMOL/L (ref 97–108)
CO2 SERPL-SCNC: 26 MMOL/L (ref 21–32)
CREAT SERPL-MCNC: 1.36 MG/DL (ref 0.55–1.02)
GLUCOSE SERPL-MCNC: 126 MG/DL (ref 65–100)
PHOSPHATE SERPL-MCNC: 3.7 MG/DL (ref 2.6–4.7)
POTASSIUM SERPL-SCNC: 4 MMOL/L (ref 3.5–5.1)
SODIUM SERPL-SCNC: 139 MMOL/L (ref 136–145)

## 2020-07-08 PROCEDURE — 74011000258 HC RX REV CODE- 258: Performed by: INTERNAL MEDICINE

## 2020-07-08 PROCEDURE — 74011250636 HC RX REV CODE- 250/636: Performed by: INTERNAL MEDICINE

## 2020-07-08 PROCEDURE — C1751 CATH, INF, PER/CENT/MIDLINE: HCPCS

## 2020-07-08 PROCEDURE — 02HV33Z INSERTION OF INFUSION DEVICE INTO SUPERIOR VENA CAVA, PERCUTANEOUS APPROACH: ICD-10-PCS | Performed by: INTERNAL MEDICINE

## 2020-07-08 PROCEDURE — 76937 US GUIDE VASCULAR ACCESS: CPT

## 2020-07-08 PROCEDURE — 36415 COLL VENOUS BLD VENIPUNCTURE: CPT

## 2020-07-08 PROCEDURE — 77030020365 HC SOL INJ SOD CL 0.9% 50ML

## 2020-07-08 PROCEDURE — 74011250637 HC RX REV CODE- 250/637: Performed by: HOSPITALIST

## 2020-07-08 PROCEDURE — C1894 INTRO/SHEATH, NON-LASER: HCPCS

## 2020-07-08 PROCEDURE — 80069 RENAL FUNCTION PANEL: CPT

## 2020-07-08 PROCEDURE — 71045 X-RAY EXAM CHEST 1 VIEW: CPT

## 2020-07-08 RX ADMIN — CARVEDILOL 6.25 MG: 6.25 TABLET, FILM COATED ORAL at 07:20

## 2020-07-08 RX ADMIN — MEROPENEM 500 MG: 500 INJECTION, POWDER, FOR SOLUTION INTRAVENOUS at 14:15

## 2020-07-08 RX ADMIN — CARVEDILOL 6.25 MG: 6.25 TABLET, FILM COATED ORAL at 16:47

## 2020-07-08 RX ADMIN — Medication 10 ML: at 14:16

## 2020-07-08 RX ADMIN — Medication 10 ML: at 06:21

## 2020-07-08 RX ADMIN — MEROPENEM 500 MG: 500 INJECTION, POWDER, FOR SOLUTION INTRAVENOUS at 06:21

## 2020-07-08 NOTE — PROGRESS NOTES
Problem: Falls - Risk of  Goal: *Absence of Falls  Description: Document Michael Abdul Fall Risk and appropriate interventions in the flowsheet. Outcome: Progressing Towards Goal  Note: Fall Risk Interventions:  Mobility Interventions: Bed/chair exit alarm    Mentation Interventions: Adequate sleep, hydration, pain control    Medication Interventions: Patient to call before getting OOB    Elimination Interventions: Call light in reach    History of Falls Interventions: Bed/chair exit alarm         Problem: Patient Education: Go to Patient Education Activity  Goal: Patient/Family Education  Outcome: Progressing Towards Goal     Problem: Pressure Injury - Risk of  Goal: *Prevention of pressure injury  Description: Document Pratik Scale and appropriate interventions in the flowsheet. Outcome: Progressing Towards Goal  Note: Pressure Injury Interventions:  Sensory Interventions: Assess changes in LOC    Moisture Interventions: Apply protective barrier, creams and emollients    Activity Interventions: Increase time out of bed, Pressure redistribution bed/mattress(bed type)    Mobility Interventions: HOB 30 degrees or less, Pressure redistribution bed/mattress (bed type)    Nutrition Interventions: Document food/fluid/supplement intake    Friction and Shear Interventions: HOB 30 degrees or less                Problem: Patient Education: Go to Patient Education Activity  Goal: Patient/Family Education  Outcome: Progressing Towards Goal     Problem: Discharge Planning  Goal: *Discharge to safe environment  Description: See CM Notes  CRM: Reed Moreno, MPH, CHES; Z: [phone]     Outcome: Progressing Towards Goal  Goal: *Knowledge of medication management  Outcome: Progressing Towards Goal  Goal: *Knowledge of discharge instructions  Outcome: Progressing Towards Goal     Problem: Chronic Renal Failure  Goal: *Fluid and electrolytes stabilized  Outcome: Progressing Towards Goal     Problem:  Activity Intolerance  Goal: *Oxygen saturation during activity within specified parameters  Outcome: Progressing Towards Goal  Goal: *Able to remain out of bed as prescribed  Outcome: Progressing Towards Goal     Problem: Hypertension  Goal: *Blood pressure within specified parameters  Outcome: Progressing Towards Goal  Goal: *Fluid volume balance  Outcome: Progressing Towards Goal  Goal: *Labs within defined limits  Outcome: Progressing Towards Goal     Problem: Fluid Volume - Risk of, Imbalanced  Goal: *Balanced intake and output  Outcome: Progressing Towards Goal     Problem: Risk for Spread of Infection  Goal: Prevent transmission of infectious organism to others  Description: Prevent the transmission of infectious organisms to other patients, staff members, and visitors.   Outcome: Progressing Towards Goal     Problem: Patient Education:  Go to Education Activity  Goal: Patient/Family Education  Outcome: Progressing Towards Goal     Problem: Patient Education: Go to Patient Education Activity  Goal: Patient/Family Education  Outcome: Progressing Towards Goal     Problem: Patient Education: Go to Patient Education Activity  Goal: Patient/Family Education  Outcome: Progressing Towards Goal

## 2020-07-08 NOTE — PROGRESS NOTES
ADIS:  RUR: 17%    Discharge Plan:  D/C to [facility] with IV abx. Orders obtained in CM consult. CM alerting referring facility of orders and will fax if requested. SAIRA Martin,, CRM    CM has spoken with  Francy Kapoor (liaison at [facility]). CM faxed ID IV abx orders as requested earlier today. CM faxed most recent COVID 19 results (7/1/20). Facility is requesting 7 days of temps.     CM requesting [facility] for BLS transport at 5pm

## 2020-07-08 NOTE — PROCEDURES
PICC Placement Note    PRE-PROCEDURE VERIFICATION  Correct Procedure: yes  Correct Site:  yes  Temperature: Temp: 97.5 °F (36.4 °C), Temperature Source: Temp Source: Axillary  Recent Labs     07/07/20  0424   BUN 35*   CREA 1.51*      WBC 11.4*     Allergies: Patient has no known allergies. Education materials, including PICC Booklet, for PICC Care given to patient: yes. See Patient Education activity for further details. PROCEDURE DETAIL  A single lumen PICC line was started for Home IV Therapy. The following documentation is in addition to the PICC properties in the lines/airways flowsheet :  Lot #: [id number]  Was xylocaine 1% used intradermally:  yes  Catheter Length: 40 (cm)  Vein Selection for PICC:right brachial  Central Line Bundle followed yes  Complication Related to Insertion: Basilic accessed and fully wired but unable to pass PICC pass the axilla. The placement was verified by CXR technology: The  tip location is on the right side and the tip is in the  superior vena cava. See CXR results for PICC tip placement. Report given to nurse Yvonne. Line is okay to use.     Celena Chambers RN

## 2020-07-08 NOTE — PROGRESS NOTES
TRANSFER - OUT REPORT:    Verbal report given to Lai Olivares LPN(name) on Bashir Pro  being transferred to [facility](unit) for routine progression of care       Report consisted of patients Situation, Background, Assessment and   Recommendations(SBAR). Information from the following report(s) SBAR, MAR, Recent Results and Med Rec Status was reviewed with the receiving nurse. Lines:   PICC Single Lumen 07/08/20 Right;Brachial (Active)   Criteria for Appropriate Use Limited/no vessel suitable for conventional peripheral access 7/8/2020 10:51 AM   Site Assessment Clean, dry, & intact 7/8/2020 10:51 AM   Phlebitis Assessment 0 7/8/2020 10:51 AM   Infiltration Assessment 0 7/8/2020 10:51 AM   Arm Circumference (cm) 37 cm 7/8/2020 10:51 AM   Date of Last Dressing Change 07/08/20 7/8/2020 10:51 AM   Dressing Status Clean, dry, & intact 7/8/2020 10:51 AM   External Catheter Length (cm) 0 centimeters 7/8/2020 10:51 AM   Dressing Type Transparent;Disk with Chlorhexadine gluconate (CHG); Stabilization/securement device 7/8/2020 10:51 AM   Positive Blood Return (Site #1) Yes 7/8/2020 10:51 AM   Alcohol Cap Used Yes 7/8/2020 10:51 AM        Opportunity for questions and clarification was provided.       Patient transported with:   [employer]

## 2020-07-08 NOTE — PROGRESS NOTES
[facility]  Hospitalist Group                                                                                          Hospitalist Progress Note  Faraz Gann MD  Answering service: [phone] from in house phone        Date of Service:  2020  NAME:  Allen Loera  :  1940  MRN:  [id number]      Admission Summary:   Allen Loera is a 68 y.o. female with PMH of atrial fib,CKD, anemia, MI and other comorbidities was sent to the hospital from a facility because of altered mental status and fever.       Per chart review, patient had fever of 101 and was recently moved to COVID unit at the facility. They also noted that patient's mental status was not at baseline. Blood pressure was in systolic 95O. She was sent to emergency room for further evaluation. Interval history / Subjective:      Patient seen in the afternoon. No event reported      Assessment & Plan:     Sepsis (POA Fever, WBC): With ESBL, UTI likely source, improved. - Continue IV meropenem   -Blood culture obtained  resulted +,  pending   -We will need to await on PICC line placement ordered   - Urine growing ESBL as well   - ID consulted  - Will need PICC and 14 days of antibiotics from first negative culture. ?if ok with renal given her kidney function. Potential PNA vs. Fluid overload   -SARS-CoV-2 negative  -Continue IV fluids.  -Procalcitonin 34.9, downtrending      Acute metabolic encephalopathy due to infection, uremia, improved.   Now alert and oriented x3  -Tx as above improving  -CT head- Volume loss and white matter disease but no acute intracranial abnormality.     Acute renal failure stage 2 Cr up to 2.6 from baseline of 1.2 on CKD: Likely secondary to sepsis, urinary retention   - Slowly improving   - Manley catheter removed and voiding.   - I and O and continue to monitor    - nephrology following ?if ok for patient to get PICC    Chronic blood loss anemia possibly in the setting of using anticoagulant:  -She has hematuria UA positive for >100 RBC   - holding anticoagulation for now, would defer to outpatient whether to resume.      Chronic atrial fibrillation  -Hold anticoagulation as she has anemia and hematuria  -She is currently rate controlled     Hypertension-blood pressure soft hold antihypertensives. Hypomagnesemiareplete as needed, monitor    Code status: Full  DVT prophylaxis: SCD    Care Plan discussed with: Patient/Family and Nurse  Anticipated Disposition: Home w/Family and SNF/LTC  Anticipated Discharge: Greater than 48 hours     Hospital Problems  Date Reviewed: 5/22/2020          Codes Class Noted POA    Sepsis ([facility] [address] ICD-10-CM: A41.9  ICD-9-CM: 038.9, 995.91  6/30/2020 Unknown                Review of Systems:   Pertinent items are noted in HPI. Ct Head Wo Cont    Result Date: 6/30/2020  IMPRESSION: Volume loss and white matter disease but no acute intracranial abnormality    Us Retroperitoneum Comp    Result Date: 7/2/2020  IMPRESSION: 1. No hydronephrosis. Small perinephric fluid. 2. Increased echogenicity of both kidneys consistent with medical renal parenchymal disease. 3. Urinary bladder not assessed. 4. Incidentally noted cholelithiasis with gallbladder wall thickening and small ascites. Xr Chest Port    Result Date: 7/1/2020  IMPRESSION: 1. Unchanged cardiomegaly. 2.  Mild hazy bilateral opacities are nonspecific but may represent edema. Xr Chest Port    Result Date: 6/30/2020  Impression: Mild parenchymal opacity right middle lobe may represent acute infiltrate. Correlation with frontal and lateral views may be helpful. Vital Signs:    Last 24hrs VS reviewed since prior progress note.  Most recent are:  Visit Vitals  /83   Pulse 100   Temp 98.6 °F (37 °C)   Resp 18   Ht 5' 1\" (1.549 m)   Wt 86.2 kg (190 lb)   SpO2 90%   BMI 35.90 kg/m²         Intake/Output Summary (Last 24 hours) at 7/7/2020 2123  Last data filed at 7/7/2020 2634  Gross per 24 hour   Intake    Output 200 ml   Net -200 ml        Physical Examination:             Constitutional:  No acute distress, cooperative, pleasant    ENT:  Oral mucosa moist, oropharynx benign. Poor dentition    Resp:  CTA bilaterally. No wheezing/rhonchi/rales. No accessory muscle use   CV:  Regular rhythm, normal rate, no murmurs, gallops, rubs    GI:  Soft, non distended, non tender. normoactive bowel sounds, no hepatosplenomegaly     Musculoskeletal:  No edema, warm, 2+ pulses throughout  : rivera in place     Neurologic:  Moves all extremities. AAOx3, CN II-XII reviewed     Psych:  Good insight, Not anxious nor agitated. Data Review:    Review and/or order of clinical lab test  Review and/or order of tests in the radiology section of CPT  Review and/or order of tests in the medicine section of CPT      Labs:     Recent Labs     07/07/20 0424 07/06/20  0040   WBC 11.4* 12.1*   HGB 7.1* 7.7*   HCT 26.4* 28.6*    328     Recent Labs     07/07/20 0424 07/06/20  0040 07/06/20  0039 07/05/20  0146    136 137 137   K 4.3 3.8 3.8 4.6    107 107 108   CO2 21 22 23 21   BUN 35* 38* 38* 38*   CREA 1.51* 1.68* 1.66* 1.68*   * 122* 126* 110*   CA 8.9 8.6 8.9 8.6   MG 1.9 1.9  --  1.5*   PHOS 3.6 3.4 3.4 2.8     Recent Labs     07/07/20 0424 07/06/20  0040 07/05/20  0146   ALB 1.8* 1.9* 2.0*     No results for input(s): INR, PTP, APTT, INREXT, INREXT in the last 72 hours. No results for input(s): FE, TIBC, PSAT, FERR in the last 72 hours. No results found for: FOL, RBCF   No results for input(s): PH, PCO2, PO2 in the last 72 hours. No results for input(s): CPK, CKNDX, TROIQ in the last 72 hours.     No lab exists for component: CPKMB  Lab Results   Component Value Date/Time    Cholesterol, total 226 (H) 03/14/2019 02:14 PM    HDL Cholesterol 48 03/14/2019 02:14 PM    LDL,Direct 163 (H) 12/05/2019 01:04 PM    LDL, calculated 148 (H) 03/14/2019 02:14 PM    Triglyceride 149 03/14/2019 02:14 PM     No results found for: Yelena Roger  Lab Results   Component Value Date/Time    Color BROWN 06/30/2020 11:03 AM    Appearance TURBID (A) 06/30/2020 11:03 AM    Specific gravity 1.020 06/30/2020 11:03 AM    pH (UA) 6.0 06/30/2020 11:03 AM    Protein >300 (A) 06/30/2020 11:03 AM    Glucose Negative 06/30/2020 11:03 AM    Ketone Negative 06/30/2020 11:03 AM    Urobilinogen 1.0 06/30/2020 11:03 AM    Nitrites Negative 06/30/2020 11:03 AM    Leukocyte Esterase SMALL (A) 06/30/2020 11:03 AM    Epithelial cells FEW 06/30/2020 11:03 AM    Bacteria 1+ (A) 06/30/2020 11:03 AM    WBC >100 (H) 06/30/2020 11:03 AM    RBC >100 (H) 06/30/2020 11:03 AM         Medications Reviewed:     Current Facility-Administered Medications   Medication Dose Route Frequency    SALINE PERIPHERAL FLUSH Q8H soln 5-40 mL  5-40 mL InterCATHeter Q8H    meropenem (MERREM) 500 mg in 0.9% sodium chloride (MBP/ADV) 50 mL  500 mg IntraVENous Q8H    acetaminophen (TYLENOL) tablet 650 mg  650 mg Oral Q4H PRN    carvediloL (COREG) tablet 6.25 mg  6.25 mg Oral BID WITH MEALS    sodium chloride (NS) flush 5-10 mL  5-10 mL IntraVENous PRN     ______________________________________________________________________  EXPECTED LENGTH OF STAY: 4d 19h  ACTUAL LENGTH OF STAY:          7                 Ninoska Breaux MD

## 2020-07-08 NOTE — PROGRESS NOTES
Nephrology Progress Note  Maryjane Gregory  Date of Admission : 6/30/2020    CC: Follow up for DEVON on CKD       Assessment and Plan     DEVON on CKD:  - likely 2/2 ATN from sepsis + urinary retention  - renal U/S unremarkable  - cr close to baseline today   - labs daily     CKD III:  - baseline Cr 1.2     Urosepsis:  - cultues + ESBL E. Coli  - on IV ertapenem for d/c      COVID-19 neg     Chronic anemia:     Hx of afib    HTN : stable        Interval History:  Seen and examined. PICC line placed. Cr down to 1.3. Not drinking much fluids and urine dark. No cp, sob, n/v/d reported at this time. Current Medications: all current  Medications have been eviewed in EPIC  Review of Systems: Pertinent items are noted in HPI. Objective:  Vitals:    Vitals:    07/07/20 2019 07/08/20 0344 07/08/20 0812 07/08/20 1300   BP: 150/83 145/72 132/75    Pulse: 100 86 85    Resp: 18 18 18    Temp: 98.6 °F (37 °C) 97.6 °F (36.4 °C) 97.5 °F (36.4 °C)    SpO2: 90% 100% 97%    Weight:    93.8 kg (206 lb 12.7 oz)   Height:         Intake and Output:  07/08 0701 - 07/08 1900  In: 240 [P.O.:240]  Out: -   07/06 1901 - 07/08 0700  In: -   Out: 1150 [Urine:1150]    Physical Exam     General:no distress   HEENT: EOMI  Lungs : stable oxygenation, clear lungs  ABD: obese, NT, + bowel sounds  CVS: RRR on monitor, no edema   Neurologic: AAO X 3   : no irvera     []    High complexity decision making was performed  []    Patient is at high-risk of decompensation with multiple organ involvement    Lab Data Personally Reviewed: I have reviewed all the pertinent labs, microbiology data and radiology studies during assessment.     Recent Labs     07/08/20  0155 07/07/20  0424 07/06/20  0040    138 136   K 4.0 4.3 3.8    108 107   CO2 26 21 22   * 118* 122*   BUN 34* 35* 38*   CREA 1.36* 1.51* 1.68*   CA 8.6 8.9 8.6   MG  --  1.9 1.9   PHOS 3.7 3.6 3.4   ALB 1.7* 1.8* 1.9*     Recent Labs     07/07/20  0424 07/06/20  0040   WBC 11.4* 12.1*   HGB 7.1* 7.7*   HCT 26.4* 28.6*    328     No results found for: SDES  Lab Results   Component Value Date/Time    Culture result: NO GROWTH 3 DAYS 07/05/2020 11:00 AM    Culture result: (A) 07/02/2020 11:26 AM     GRAM NEGATIVE RODS GROWING IN 1 OF 4 BOTTLES DRAWN SITE= ( LAC) PLEASE REFER TO [facility] [id number] FOR FURTHER IDENTIFICATION AND SENSITIVITIES    Culture result: (A) 07/02/2020 11:26 AM     PRELIMINARY REPORT OF GRAM NEGATIVE RODS GROWING IN 1 OF 4 BOTTLES DRAWN CALLED TO AND READ BACK BY MS BERTRAND MCNAIR AT 1940 ON 7/4/20. PJ    Culture result: REMAINING BOTTLE(S) HAS/HAVE NO GROWTH IN 5 DAYS 07/02/2020 11:26 AM     Recent Results (from the past 24 hour(s))   RENAL FUNCTION PANEL    Collection Time: 07/08/20  1:55 AM   Result Value Ref Range    Sodium 139 136 - 145 mmol/L    Potassium 4.0 3.5 - 5.1 mmol/L    Chloride 108 97 - 108 mmol/L    CO2 26 21 - 32 mmol/L    Anion gap 5 5 - 15 mmol/L    Glucose 126 (H) 65 - 100 mg/dL    BUN 34 (H) 6 - 20 MG/DL    Creatinine 1.36 (H) 0.55 - 1.02 MG/DL    BUN/Creatinine ratio 25 (H) 12 - 20      GFR est AA 46 (L) >60 ml/min/1.73m2    GFR est non-AA 38 (L) >60 ml/min/1.73m2    Calcium 8.6 8.5 - 10.1 MG/DL    Phosphorus 3.7 2.6 - 4.7 MG/DL    Albumin 1.7 (L) 3.5 - 5.0 g/dL                 Fatmata Chavez MD  [address]  Phone - [phone]   Fax - [phone]  [email or web address]

## 2020-07-08 NOTE — DISCHARGE SUMMARY
Discharge Summary       PATIENT ID: Cristofer Soria  MRN: [id number]   YOB: 1940    DATE OF ADMISSION: 6/30/2020 10:46 AM    DATE OF DISCHARGE: 07/08/20  PRIMARY CARE PROVIDER: Clarence Chaudhry MD       DISCHARGING PROVIDER: Amor Carrasco MD    To contact this individual call [phone] and ask the  to page. If unavailable ask to be transferred the Adult Hospitalist Department. CONSULTATIONS: IP CONSULT TO HOSPITALIST  IP CONSULT TO NEPHROLOGY  IP CONSULT TO INFECTIOUS DISEASES      PROCEDURES/SURGERIES: * No surgery found *    IMAGING  Ct Head Wo Cont    Result Date: 6/30/2020  IMPRESSION: Volume loss and white matter disease but no acute intracranial abnormality    Us Retroperitoneum Comp    Result Date: 7/2/2020  IMPRESSION: 1. No hydronephrosis. Small perinephric fluid. 2. Increased echogenicity of both kidneys consistent with medical renal parenchymal disease. 3. Urinary bladder not assessed. 4. Incidentally noted cholelithiasis with gallbladder wall thickening and small ascites. Xr Chest Port    Result Date: 7/8/2020  IMPRESSION: Right-sided PICC line overlies the SVC    Xr Chest Port    Result Date: 7/1/2020  IMPRESSION: 1. Unchanged cardiomegaly. 2.  Mild hazy bilateral opacities are nonspecific but may represent edema. Xr Chest Port    Result Date: 6/30/2020  Impression: Mild parenchymal opacity right middle lobe may represent acute infiltrate. Correlation with frontal and lateral views may be helpful. [id number] [facility] COURSE:   # Sepsis (POA Fever, WBC) with ESBL E coli UTI   # Bacteremia due to ESBL E coli   - Continue IV meropenem   -Blood culture obtained 7/2 resulted +7/4, 7/5 pending   -We will need to await on PICC line placement ordered   - Urine growing ESBL as well   - ID consulted  - Will need PICC and 14 days of antibiotics from first negative culture.  ?if ok with renal given her kidney function.     # Possible pneumonia   - SARS-CoV-2 negative  - Continue IV fluids.  -Procalcitonin 34.9, downtrending   - antibiotic as above      Acute metabolic encephalopathy due to infection, uremia, improved. Now alert and oriented x3  - Tx as above improving  -CT head- Volume loss and white matter disease but no acute intracranial abnormality.     Acute renal failure stage 2 Cr up to 2.6 from baseline of 1.2 on CKD: Likely secondary to sepsis, urinary retention   - Slowly improving   - Manley catheter removed and voiding.   - I and O and continue to monitor    - nephrology following ?if ok for patient to get PICC     Chronic blood loss anemia possibly in the setting of using anticoagulant:  -She has hematuria UA positive for >100 RBC   - holding anticoagulation for now, would defer to outpatient whether to resume.      Chronic atrial fibrillation  -Hold anticoagulation as she has anemia and hematuria  -She is currently rate controlled     Hypertension-blood pressure soft hold antihypertensives. Hypomagnesemiareplete as needed, monitor           DISCHARGE DIAGNOSES / PLAN:    Sepsis- (Resolved) due to ESBL E coli UTI   Bacteremia due to gram negative [place]  - On Meropenem in the hospital. She will be discharged on Ertapenem 1000mg daily( and for CrCl<30 reduce to 500mg daily) till 7/19/2020.   - ID Dr Dereje Ramachandran ordered medication   Check weekly CBC wt diff, CMP   Monitor for seizures and other adverse effects  F/U in 2 weeks  DC picc line once IV antibiotics completed    Antibiotic side effects/adverse effects/toxicities discussed including gastrointestinal, renal, hematological , ability to lower siezure threshold, risk for C diff infection. Probiotics, daily yogurt encouraged     # Possible pneumonia   - SARS-CoV-2 negative  - Procalcitonin 34.9, downtrending   - on antibiotic as above      Acute metabolic encephalopathy due to infection, uremia, improved.   Now alert and oriented x3  - Tx as above improving  - CT head- Volume loss and white matter disease but no acute intracranial abnormality.     Acute renal failure stage 2 Cr up to 2.6 from baseline of 1.2 on CKD: Likely secondary to sepsis, urinary retention   - Manley catheter removed and voiding.   - Nephrology following      Chronic blood loss anemia possibly in the setting of using anticoagulant:  - resume anticoagulation on discharge      Chronic atrial fibrillation  - She is currently rate controlled  - restart Eliquis      Hypertension- blood pressure soft hold antihypertensives. Hypomagnesemia replete as needed, monitor      Patient Active Problem List   Diagnosis Code    HTN (hypertension) I10    GERD (gastroesophageal reflux disease) K21.9    Mixed hyperlipidemia E78.2    Valvular disease I38    Paroxysmal A-fib ([facility] I48.0    Biventricular failure (HCC) I50.82    Obesity, morbid ([facility] E66.01    Infiltrating ductal carcinoma of breast, left ([facility] C50.912    Sepsis ([facility] A41.9               PENDING TEST RESULTS:   At the time of discharge the following test results are still pending: None     FOLLOW UP APPOINTMENTS:    Follow-up Information     Follow up With Specialties Details Why Contact Info    Jing Bolivar MD Internal Medicine Schedule an appointment as soon as possible for a visit in 1 week Follow up post hospital discharge  [address]  [address] [facility]  [phone]      Jocelyne Singh DO Infectious Diseases Schedule an appointment as soon as possible for a visit in 2 weeks Follow up  [address] [facility]  [phone]             ADDITIONAL CARE RECOMMENDATIONS:   · It is important that you take the medication exactly as they are prescribed. · Keep your medication in the bottles provided by the pharmacist and keep a list of the medication names, dosages, and times to be taken in your wallet. · Do not take other medications without consulting your doctor.    · No drinking alcohol or driving car or operating machinery if you are on narcotic pain medications. Donot take sedating mediations if you are sleepy or confused. · Fall Precautions  · Keep Well Hydrated  · Report to your medical provider if you feel you have  developed allergies to medications  · Follow up with your PCP or Consultant for medication adjustments and refills  · Monitor for signs of fevers,chills,bleeding,chest pain and seek medical attention if you do so. ADDITIONAL CARE RECOMMENDATIONS:     DIET: Regular diet     TUBE FEEDING INSTRUCTIONS: None     OXYGEN / BiPAP SETTINGS: None     ACTIVITY: As tolerated     WOUND CARE: None     EQUIPMENT needed: None     DISCHARGE MEDICATIONS:  Current Discharge Medication List      START taking these medications    Details   ertapenem 1 gram 1 g IVPB 1 g by IntraVENous route every twenty-four (24) hours for 10 days. For CrCl <30 adjust dose to 500mg daily. Ordered by ID Dr Real Chain: 10 Dose, Refills: 0         CONTINUE these medications which have NOT CHANGED    Details   atorvastatin (LIPITOR) 40 mg tablet Take 40 mg by mouth nightly. bumetanide (BUMEX) 0.5 mg tablet Take 0.5 mg by mouth daily. Eliquis 5 mg tablet Take 5 mg by mouth every twelve (12) hours. hydrALAZINE (APRESOLINE) 10 mg tablet Take 10 mg by mouth three (3) times daily. isosorbide dinitrate (ISORDIL) 10 mg tablet Take 10 mg by mouth three (3) times daily. metOLazone (ZAROXOLYN) 2.5 mg tablet Take 2.5 mg by mouth every Monday, Wednesday, Friday. carvediloL (COREG) 6.25 mg tablet Take 6.25 mg by mouth two (2) times daily (with meals). potassium chloride (KLOR-CON) 10 mEq tablet Take 20 mEq by mouth daily. cholecalciferol (VITAMIN D3) (50,000 UNITS /1250 MCG) capsule Take 50,000 Units by mouth every seven (7) days. acetaminophen (TylenoL) 325 mg cap Take 2 Tabs by mouth every four to six (4-6) hours as needed.              All Micro Results     Procedure Component Value Units Date/Time    CULTURE, BLOOD [[id number]] Collected:  07/05/20 1100    Order Status:  Completed Specimen:  Blood Updated:  07/08/20 0624     Special Requests: NO SPECIAL REQUESTS        Culture result: NO GROWTH 3 DAYS       CULTURE, BLOOD, PAIRED [[id number]]  (Abnormal) Collected:  07/02/20 1126    Order Status:  Completed Specimen:  Blood Updated:  07/07/20 1643     Special Requests: NO SPECIAL REQUESTS        Culture result:       GRAM NEGATIVE RODS GROWING IN 1 OF 4 BOTTLES DRAWN SITE= ( LAC) PLEASE REFER TO [facility] [id number] FOR FURTHER IDENTIFICATION AND SENSITIVITIES                  PRELIMINARY REPORT OF GRAM NEGATIVE RODS GROWING IN 1 OF 4 BOTTLES DRAWN CALLED TO AND READ BACK BY MS BERTRAND MCNAIR AT 1940 ON 7/4/20. PJ                  REMAINING BOTTLE(S) HAS/HAVE NO GROWTH IN 5 DAYS          CULTURE, BLOOD, PAIRED [[id number]]  (Abnormal)  (Susceptibility) Collected:  06/30/20 1103    Order Status:  Completed Specimen:  Blood Updated:  07/04/20 0725     Special Requests: NO SPECIAL REQUESTS        Culture result:       ESCHERICHIA COLI ** (EXTENDED SPECTRUM BETA LACTAMASE ) ** GROWING IN ALL 4 BOTTLES DRAWN (SITES = [facility] AND )                  PRELIMINARY REPORT OF GRAM NEGATIVE RODS GROWING IN 2 OF 4 BOTTLES DRAWN CALLED TO AND READ BACK BY PAULA BROWN RN ON 6/30/20 AT 2340 ([facility]). MS          LEGIONELLA PNEUMOPHILA AG, URINE [[id number]] Collected:  07/01/20 1405    Order Status:  Completed Specimen:  Urine Updated:  07/03/20 1438     Source URINE        L pneumophila S1 Ag, urine Negative        Comment: (NOTE)  Presumptive negative for L. pneumophila serogroup 1 antigen in urine,  suggesting no recent or current infection. Legionnaires' disease  cannot be ruled out since other serogroups and species may also  cause disease. Performed At: [address] [id number]  Tammy Reynoso MD SJ:[id number]         GAGE Harper, UR/CSF [[id number]] Collected:  07/01/20 1405    Order Status:  Completed Specimen:  Miscellaneous sample Updated:  07/03/20 1438     Source URINE        Specimen Urine     Streptococcus pneumoniae Ag Negative        Fluid culture Not indicated. Organism ID Not indicated. Please note Comment        Comment: (NOTE)  College [employer] standards require a culture to be  performed on CSF specimens submitted for bacterial antigen testing. (CAP [id number]) Urine specimens will not be cultured. Performed At: [address] [id number]  Kirsty Woodard MD IK:[id number]         CULTURE, URINE [[id number]]  (Abnormal)  (Susceptibility) Collected:  06/30/20 1103    Order Status:  Completed Specimen:  Urine from Clean catch Updated:  07/02/20 1318     Special Requests: NO SPECIAL REQUESTS        [place] Count --        >100,000  COLONIES/mL       Culture result:       ESCHERICHIA COLI ** (EXTENDED SPECTRUM BETA LACTAMASE ) **          URINE CULTURE HOLD SAMPLE [[id number]] Collected:  06/30/20 1103    Order Status:  Completed Specimen:  Urine from Serum Updated:  06/30/20 1126     Urine culture hold       Urine on hold in Microbiology dept for 2 days. If unpreserved urine is submitted, it cannot be used for addtional testing after 24 hours, recollection will be required.           CULTURE, BLOOD [[id number]]     Order Status:  Canceled Specimen:  Blood     CULTURE, BLOOD [[id number]]     Order Status:  Canceled Specimen:  Blood           Recent Results (from the past 24 hour(s))   RENAL FUNCTION PANEL    Collection Time: 07/08/20  1:55 AM   Result Value Ref Range    Sodium 139 136 - 145 mmol/L    Potassium 4.0 3.5 - 5.1 mmol/L    Chloride 108 97 - 108 mmol/L    CO2 26 21 - 32 mmol/L    Anion gap 5 5 - 15 mmol/L    Glucose 126 (H) 65 - 100 mg/dL    BUN 34 (H) 6 - 20 MG/DL    Creatinine 1.36 (H) 0.55 - 1.02 MG/DL    BUN/Creatinine ratio 25 (H) 12 - 20      GFR est AA 46 (L) >60 ml/min/1.73m2    GFR est non-AA 38 (L) >60 ml/min/1.73m2    Calcium 8.6 8.5 - 10.1 MG/DL    Phosphorus 3.7 2.6 - 4.7 MG/DL    Albumin 1.7 (L) 3.5 - 5.0 g/dL           NOTIFY YOUR PHYSICIAN FOR ANY OF THE FOLLOWING:   Fever over 101 degrees for 24 hours. Chest pain, shortness of breath, fever, chills, nausea, vomiting, diarrhea, change in mentation, falling, weakness, bleeding. Severe pain or pain not relieved by medications. Or, any other signs or symptoms that you may have questions about. DISPOSITION:    Home With:   OT  PT  HH  RN      x Long term SNF/Inpatient Rehab    Independent/assisted living    Hospice    Other:       PATIENT CONDITION AT DISCHARGE:     Functional status    Poor    x Deconditioned     Independent      Cognition     Lucid     Forgetful     Dementia      Catheters/lines (plus indication)    Manley    x PICC     PEG     None      Code status     Full code    x DNR      PHYSICAL EXAMINATION AT DISCHARGE:  Gen:  No distress, alert, elderly   HEENT:  Normal cephalic atraumatic, extra-occular movements are intact. Neck:  Supple, No JVD  Lungs:  Clear bilaterally, no wheeze, no rales, normal effort  Heart:  Regular Rate and Rhythm, normal S1 and S2, no edema  Abdomen:  Soft, non tender, normal bowel sounds, no guarding. Extremities:  Well perfused, no cyanosis or edema  Neurological:  Awake and alert, CN's are intact  Skin:  No rashes or moles  Mental Status:  Normal thought process, does not appear anxious      CHRONIC MEDICAL DIAGNOSES:  Problem List as of 7/8/2020 Date Reviewed: 5/22/2020          Codes Class Noted - Resolved    Sepsis ([facility] ICD-10-CM: A41.9  ICD-9-CM: 038.9, 995.91  6/30/2020 - Present        Infiltrating ductal carcinoma of breast, left ([facility] ICD-10-CM: C50.912  ICD-9-CM: 174.9  11/5/2018 - Present    Overview Addendum 12/28/2018  2:38 PM by Radha MOORE     11/05/18: LEFT breast biopsies.  PATH at LEFT site A: IDC, colloid type, nuclear grade 2, tumor involves entire tissue sample, ER+(100%)/NE+(100%)/HER2-. PATH at LEFT side B: Fragments of atypical papillary neoplasm. RIGHT breast bx PATH: Flat epithelial atypia involving dilated duct. Obesity, morbid ([facility] ICD-10-CM: E66.01  ICD-9-CM: 278.01  2/28/2018 - Present        Mixed hyperlipidemia ICD-10-CM: E78.2  ICD-9-CM: 272.2  3/19/2015 - Present        Valvular disease ICD-10-CM: I38  ICD-9-CM: 424.90  3/19/2015 - Present        Paroxysmal A-fib (HCC) ICD-10-CM: I48.0  ICD-9-CM: 427.31  3/19/2015 - Present        Biventricular failure ([facility] ICD-10-CM: I50.82  ICD-9-CM: 428.9  3/19/2015 - Present        HTN (hypertension) ICD-10-CM: I10  ICD-9-CM: 401.9  2/18/2015 - Present        GERD (gastroesophageal reflux disease) ICD-10-CM: K21.9  ICD-9-CM: 530.81  2/18/2015 - Present        RESOLVED: HLD (hyperlipidemia) ICD-10-CM: E78.5  ICD-9-CM: 272.4  2/18/2015 - 3/19/2015                Discussed with patient and family. Explained the importance of following up, Compliance with medications and recommendations on discharge,Side effect profile of medications were explained. Safety precautions at home and while taking pain medications also explained. All questions answered to the satisfaction of the patient/family. Discussed with consultant(s) who are agreeable to the discharge. Verbal and written instructions on discharge given. Explained about Discharge medications and side effect profile. Advised patient/family to followup with their pcp for medication refills and preauthorization of medications, Home health orders. checkups,screenign programs as appropriate for age. Thank you Amelia Hendrix MD for taking care of your patient, Please call with any questions.       Greater than 35 minutes were spent with the patient on counseling and coordination of care    Signed:   Halle Joseph MD  7/8/2020  3:28 PM

## 2020-07-08 NOTE — DISCHARGE INSTRUCTIONS
Discharge SNF/Rehab Instructions/LTAC       PATIENT ID: Norberto Moya  MRN: [id number]   YOB: 1940    DATE OF ADMISSION: 6/30/2020 10:46 AM    DATE OF DISCHARGE: 7/8/2020    PRIMARY CARE PROVIDER: Anny Parker MD       ATTENDING PHYSICIAN: Bhargavi Johnson MD  DISCHARGING PROVIDER: Pia Villagran MD     To contact this individual call [phone] and ask the  to page. If unavailable ask to be transferred the Adult Hospitalist Department. CONSULTATIONS: IP CONSULT TO HOSPITALIST  IP CONSULT TO NEPHROLOGY  IP CONSULT TO INFECTIOUS DISEASES    PROCEDURES/SURGERIES: * No surgery found *      DISCHARGE DIAGNOSES / PLAN:      Sepsis- (Resolved) due to ESBL E coli UTI   Bacteremia due to gram negative [place]  - On Meropenem in the hospital. She will be discharged on Ertapenem 1000mg daily( and for CrCl<30 reduce to 500mg daily)  Check weekly CBC wt diff, CMP   Monitor for seizures and other adverse effects  F/U in 2 weeks Dr Lanny Schultz picc line once IV antibiotics completed    Antibiotic side effects/adverse effects/toxicities discussed including gastrointestinal, renal, hematological , ability to lower siezure threshold, risk for C diff infection. Probiotics, daily yogurt encouraged      Possible pneumonia   - SARS-CoV-2 negative  - Procalcitonin 34.9, downtrending   - on antibiotic as above      Acute metabolic encephalopathy due to infection, uremia, improved.   Now alert and oriented x3  - Tx as above improving  - CT head- Volume loss and white matter disease but no acute intracranial abnormality.     Acute renal failure stage 2 Cr up to 2.6 from baseline of 1.2 on CKD: Likely secondary to sepsis, urinary retention   - Manley catheter removed and voiding.   - Nephrology following      Chronic blood loss anemia possibly in the setting of using anticoagulant:  - resume anticoagulation on discharge      Chronic atrial fibrillation  - She is currently rate controlled  - restart Eliquis    Hypertension- blood pressure soft hold antihypertensives. Hypomagnesemia replete as needed, monitor              PENDING TEST RESULTS:   At the time of discharge the following test results are still pending: None     FOLLOW UP APPOINTMENTS:    Follow-up Information     Follow up With Specialties Details Why Contact Info    Mandy Lim MD Internal Medicine Schedule an appointment as soon as possible for a visit in 1 week Follow up post hospital discharge  [address]  [address] [facility]  [phone]      Guzman Barker DO Infectious Diseases Schedule an appointment as soon as possible for a visit in 2 weeks Follow up  [facility]  [phone]             ADDITIONAL CARE RECOMMENDATIONS:     DIET: Regular diet     TUBE FEEDING INSTRUCTIONS: None     OXYGEN / BiPAP SETTINGS: None     ACTIVITY: As tolerated     WOUND CARE: None     EQUIPMENT needed: None       DISCHARGE MEDICATIONS:   See Medication Reconciliation Form      NOTIFY YOUR PHYSICIAN FOR ANY OF THE FOLLOWING:   Fever over 101 degrees for 24 hours. Chest pain, shortness of breath, fever, chills, nausea, vomiting, diarrhea, change in mentation, falling, weakness, bleeding. Severe pain or pain not relieved by medications. Or, any other signs or symptoms that you may have questions about.     DISPOSITION:    Home With:   OT  PT  HH  RN      x SNF/Inpatient Rehab/LTAC    Independent/assisted living    Hospice    Other:       PATIENT CONDITION AT DISCHARGE:     Functional status    Poor    x Deconditioned     Independent      Cognition     Lucid     Forgetful     Dementia      Catheters/lines (plus indication)    Manley    x PICC     PEG     None      Code status     Full code    x DNR      PHYSICAL EXAMINATION AT DISCHARGE:   Refer to Progress Note***      CHRONIC MEDICAL DIAGNOSES:  Problem List as of 7/8/2020 Date Reviewed: 5/22/2020          Codes Class Noted - Resolved    Sepsis ([facility] ICD-10-CM: A41.9  ICD-9-CM: 038.9, 995.91  6/30/2020 - Present        Infiltrating ductal carcinoma of breast, left ([facility] ICD-10-CM: C50.912  ICD-9-CM: 174.9  11/5/2018 - Present    Overview Addendum 12/28/2018  2:38 PM by Mega MOORE     11/05/18: LEFT breast biopsies. PATH at LEFT site A: IDC, colloid type, nuclear grade 2, tumor involves entire tissue sample, ER+(100%)/NC+(100%)/HER2-. PATH at LEFT side B: Fragments of atypical papillary neoplasm. RIGHT breast bx PATH: Flat epithelial atypia involving dilated duct.              Obesity, morbid ([facility] ICD-10-CM: E66.01  ICD-9-CM: 278.01  2/28/2018 - Present        Mixed hyperlipidemia ICD-10-CM: E78.2  ICD-9-CM: 272.2  3/19/2015 - Present        Valvular disease ICD-10-CM: I38  ICD-9-CM: 424.90  3/19/2015 - Present        Paroxysmal A-fib (HCC) ICD-10-CM: I48.0  ICD-9-CM: 427.31  3/19/2015 - Present        Biventricular failure ([facility] ICD-10-CM: I50.82  ICD-9-CM: 428.9  3/19/2015 - Present        HTN (hypertension) ICD-10-CM: I10  ICD-9-CM: 401.9  2/18/2015 - Present        GERD (gastroesophageal reflux disease) ICD-10-CM: K21.9  ICD-9-CM: 530.81  2/18/2015 - Present        RESOLVED: HLD (hyperlipidemia) ICD-10-CM: E78.5  ICD-9-CM: 272.4  2/18/2015 - 3/19/2015                CDMP Checked:   Yes ***     PROBLEM LIST Updated:  Yes ***         Signed:   Greg Blanchard MD  7/8/2020  3:04 PM

## 2020-07-08 NOTE — PROGRESS NOTES
Infectious Disease Progress Note        HPI:      Ms Agustin Floyd seen  denied pain, nausea, vomiting, fever, chills   Asking for drinks, food, relayed to nursing team   Tolerating antibiotics so far       Physical Exam:    Vitals:   Patient Vitals for the past 24 hrs:   Temp Pulse Resp BP SpO2   07/08/20 0812 97.5 °F (36.4 °C) 85 18 132/75 97 %   07/08/20 0344 97.6 °F (36.4 °C) 86 18 145/72 100 %   07/07/20 2019 98.6 °F (37 °C) 100 18 150/83 90 %   07/07/20 1509 97.9 °F (36.6 °C) 90 18 122/62 96 %     · GEN: NAD  · HEENT: No scleral icterus, very poor dentition, missing many teeth, no thrush   · CV: S1, S2 heard irregular   · Lungs: Clear to auscultation bilaterally  · Abdomen: soft, non distended, non tender, no CVA tenderness   · Extremities: no edema  · Neuro: Alert, oriented to self        Labs:   Recent Results (from the past 24 hour(s))   RENAL FUNCTION PANEL    Collection Time: 07/08/20  1:55 AM   Result Value Ref Range    Sodium 139 136 - 145 mmol/L    Potassium 4.0 3.5 - 5.1 mmol/L    Chloride 108 97 - 108 mmol/L    CO2 26 21 - 32 mmol/L    Anion gap 5 5 - 15 mmol/L    Glucose 126 (H) 65 - 100 mg/dL    BUN 34 (H) 6 - 20 MG/DL    Creatinine 1.36 (H) 0.55 - 1.02 MG/DL    BUN/Creatinine ratio 25 (H) 12 - 20      GFR est AA 46 (L) >60 ml/min/1.73m2    GFR est non-AA 38 (L) >60 ml/min/1.73m2    Calcium 8.6 8.5 - 10.1 MG/DL    Phosphorus 3.7 2.6 - 4.7 MG/DL    Albumin 1.7 (L) 3.5 - 5.0 g/dL       Microbiology Data:       Blood: 6/30/20  Blood         Component Value Ref Range & Units Status   Special Requests: NO SPECIAL REQUESTS    Final   Culture result: Abnormal     Final   ESCHERICHIA COLI ** (EXTENDED SPECTRUM BETA LACTAMASE ) ** GROWING IN ALL 4 BOTTLES DRAWN (SITES = [facility] AND )   Culture result: Abnormal     Final   PRELIMINARY REPORT OF GRAM NEGATIVE RODS GROWING IN 2 OF 4 BOTTLES DRAWN CALLED TO AND READ BACK BY PAULA BROWN RN ON 6/30/20 AT 2340 ([facility]).  MS   Susceptibility      Escherichia coli     LADY    Amikacin ($) 8 ug/mL S    Ampicillin ($) >=32 ug/mL R    Ampicillin/sulbactam ($) >=32 ug/mL R    Cefazolin ($) >=64 ug/mL R    Cefepime ($$)  R    Cefoxitin <=4 ug/mL S    Ceftazidime ($)  R    Ceftriaxone ($) >=64 ug/mL R    Ciprofloxacin ($) >=4 ug/mL R    Gentamicin ($) >=16 ug/mL R    Levofloxacin ($) >=8 ug/mL R    Meropenem ($$) <=0.25 ug/mL S    Piperacillin/Tazobac ($) <=4 ug/mL S    Tobramycin ($) >=16 ug/mL R    Trimeth/Sulfa >=320 ug/mL R          Result History         Blood 7/2/20  Component Value Ref Range & Units Status   Special Requests: NO SPECIAL REQUESTS    Preliminary   Culture result: Abnormal     Preliminary   GRAM NEGATIVE RODS GROWING IN 1 OF 4 BOTTLES DRAWN SITE= ( [facility]) PLEASE REFER TO [facility] [id number] FOR FURTHER IDENTIFICATION AND SENSITIVITIES   Culture result: Abnormal     Preliminary   PRELIMINARY REPORT OF GRAM NEGATIVE RODS GROWING IN 1 OF 4 BOTTLES DRAWN CALLED TO AND READ BACK BY MS BERTRAND MCNAIR AT 1940 ON 7/4/20.  PJ   Culture result:    Preliminary   REMAINING BOTTLE(S) HAS/HAVE NO GROWTH SO FAR    Result History           Urine: 6/30/20  Clean catch; Urine         Component Value Ref Range & Units Status   Special Requests: NO SPECIAL REQUESTS    Final   [place] Count >100,000   COLONIES/mL    Final   Culture result: Abnormal     Final   ESCHERICHIA COLI ** (EXTENDED SPECTRUM BETA LACTAMASE ) **    Susceptibility      Escherichia coli     LADY    Amikacin ($) 4 ug/mL S    Ampicillin ($) >=32 ug/mL R    Ampicillin/sulbactam ($) >=32 ug/mL R    Cefazolin ($) >=64 ug/mL R    Cefepime ($$)  R    Cefoxitin <=4 ug/mL S    Ceftazidime ($)  R    Ceftriaxone ($)  R    Ciprofloxacin ($) >=4 ug/mL R    Gentamicin ($) >=16 ug/mL R    Levofloxacin ($) >=8 ug/mL R    Meropenem ($$) <=0.25 ug/mL S    Nitrofurantoin 64 ug/mL I    Piperacillin/Tazobac ($) <=4 ug/mL S    Tobramycin ($) >=16 ug/mL R    Trimeth/Sulfa >=320 ug/mL R        Blood 7/5/20  Component Value Ref Range & Units Status   Special Requests: NO SPECIAL REQUESTS    Preliminary   Culture result: NO GROWTH 2 DAYS    Preliminary   Result History           Imaging:   Renal US  RIGHT KIDNEY: measures 10.3 cm in length, normal for age. Echogenicity is  abnormally increased. . No hydronephrosis, large shadowing calculus, or solid  contour-deforming renal mass. Incidental cyst in the interpolar region 2.2 cm. Tiny echogenic foci which may represent nonobstructing calculi. LEFT KIDNEY: measures 10.1 cm in length, normal for age. Renal echogenicity is  abnormally increased. No hydronephrosis, large shadowing calculus, or solid  contour-deforming renal mass. Probable small cyst interpolar. Tiny echogenic  foci which may represent nonshadowing calculi. AORTA: Normal caliber in its visualized portions. COMMON ILIAC ARTERIES: Normal caliber proximally. IVC: Normal caliber in its visualized portions. BLADDER: Decompressed by a Manley balloon catheter and not assessed. Incidentally noted gallbladder wall thickening with intraluminal calculi. Common  bile duct upper normal. Tiny ascites and perinephric fluid.     IMPRESSION  IMPRESSION:      1. No hydronephrosis. Small perinephric fluid. 2. Increased echogenicity of both kidneys consistent with medical renal  parenchymal disease. 3. Urinary bladder not assessed. 4. Incidentally noted cholelithiasis with gallbladder wall thickening and small  Ascites. TTE 7/7/20  Interpretation Summary     · Normal cavity size. Borderline hypertrophy. Moderate-to-severe global systolic function. Estimated left ventricular ejection fraction is 35 - 40%. Visually measured ejection fraction. Left ventricular diastolic dysfunction. · Mitral valve thickening. Moderate mitral valve regurgitation is present. · Aortic valve sclerosis. · Moderate tricuspid valve regurgitation is present. · Moderate pulmonary hypertension. Pulmonary arterial systolic pressure is 60 mmHg.   · Mild pulmonic valve regurgitation is present. · Moderately dilated left atrium. · Dilated right ventricle. Reduced systolic function. Assessment / Plan:     Problem List as of 7/8/2020 Date Reviewed: 5/22/2020          Codes Class Noted - Resolved    Sepsis [facility]) ICD-10-CM: A41.9  ICD-9-CM: 038.9, 995.91  6/30/2020 - Present        Infiltrating ductal carcinoma of breast, left ([facility] ICD-10-CM: C50.912  ICD-9-CM: 174.9  11/5/2018 - Present    Overview Addendum 12/28/2018  2:38 PM by Chapo MOORE     11/05/18: LEFT breast biopsies. PATH at LEFT site A: IDC, colloid type, nuclear grade 2, tumor involves entire tissue sample, ER+(100%)/CO+(100%)/HER2-. PATH at LEFT side B: Fragments of atypical papillary neoplasm. RIGHT breast bx PATH: Flat epithelial atypia involving dilated duct. Obesity, morbid ([facility] ICD-10-CM: E66.01  ICD-9-CM: 278.01  2/28/2018 - Present        Mixed hyperlipidemia ICD-10-CM: E78.2  ICD-9-CM: 272.2  3/19/2015 - Present        Valvular disease ICD-10-CM: I38  ICD-9-CM: 424.90  3/19/2015 - Present        Paroxysmal A-fib (HCC) ICD-10-CM: I48.0  ICD-9-CM: 427.31  3/19/2015 - Present        Biventricular failure ([facility] ICD-10-CM: I50.82  ICD-9-CM: 428.9  3/19/2015 - Present        HTN (hypertension) ICD-10-CM: I10  ICD-9-CM: 401.9  2/18/2015 - Present        GERD (gastroesophageal reflux disease) ICD-10-CM: K21.9  ICD-9-CM: 530.81  2/18/2015 - Present        RESOLVED: HLD (hyperlipidemia) ICD-10-CM: E78.5  ICD-9-CM: 272.4  2/18/2015 - 3/19/2015              Ms Moose Valente is a 61-year-old lady with a history of atrial fibrillation, hypertension with ESBL Ecoli bactermia. 1) ESBL coli bacteremia (6/30-7/2/20 ), blood cx negative 7/5/20   Probable source urine   On Meropenem IV , renally dosed by pharmacy  TTE with MV thickening and regurgitation, TV regurgitation, pulm HTN   Can transition to IV Ertapenem 1gm daily on discharge . This dose needs to be adjusted to 500 mg if CrCL < 30.  Please have pharmacy dose based on renal function   Check weekly CBC wt diff, CMP   Monitor for seizures and other adverse effects  F/U in 2 weeks  DC picc line once IV antibiotics completed    Antibiotic side effects/adverse effects/toxicities discussed including gastrointestinal, renal, hematological , ability to lower siezure threshold, risk for C diff infection. Probiotics, daily yogurt encouraged. 2) Afib/valvular regurgitation   Plan for primary team for echo findings of regurgitation, pulm HTN     3) HTN    4) Hx of breast cancer     5) DEVON on CDK renally dose antibiotics   Improving     6) DVT Px           Thank for the opportunity to participate in the care of this patient. Please contact with questions or concerns.        Lelia Monterroso,   1:48 PM

## 2020-07-10 ENCOUNTER — EXTERNAL NURSING HOME DOCUMENTATION (OUTPATIENT)
Dept: INTERNAL MEDICINE CLINIC | Age: 80
End: 2020-07-10

## 2020-07-10 DIAGNOSIS — I10 ESSENTIAL HYPERTENSION WITH GOAL BLOOD PRESSURE LESS THAN 130/80: ICD-10-CM

## 2020-07-10 DIAGNOSIS — I50.82 BIVENTRICULAR CONGESTIVE HEART FAILURE (HCC): ICD-10-CM

## 2020-07-10 DIAGNOSIS — N18.30 STAGE 3 CHRONIC KIDNEY DISEASE (HCC): ICD-10-CM

## 2020-07-10 DIAGNOSIS — N39.0 URINARY TRACT INFECTION WITHOUT HEMATURIA, SITE UNSPECIFIED: Primary | ICD-10-CM

## 2020-07-10 DIAGNOSIS — Z16.12 ESBL (EXTENDED SPECTRUM BETA-LACTAMASE) PRODUCING BACTERIA INFECTION: ICD-10-CM

## 2020-07-10 DIAGNOSIS — A49.9 ESBL (EXTENDED SPECTRUM BETA-LACTAMASE) PRODUCING BACTERIA INFECTION: ICD-10-CM

## 2020-07-10 LAB
BACTERIA SPEC CULT: NORMAL
SERVICE CMNT-IMP: NORMAL

## 2020-07-10 NOTE — PROGRESS NOTES
History and Physical    History of Present Illness:  Ms. Wendi Mayes is a 61-year-old -American female, with past medical history significant for atrial fibrillation, CKD, congestive heart failure, hypertension and CKD, presented to the hospital with a fever of 101. She came with a blood pressure in the 80s. She was sent to the ER for further evaluation. She was found to have UTI. Urine culture showed ESBL E. Coli UTI. She also had bacteremia due to gram negative rods. She was started on Meropenum in the hospital and was discharged to [facility] with Ertapenem 1,000 mg IV daily. ID specialist has seen her. Her UTI subsided. Fever subsided also. COVID test was negative. She was also found to have possible pneumonia. Procalcitonin level was high. She continued with antibiotics. She was stabilized and transferred to [facility] rehab. She is doing well in the rehab. No fever, no confusion right now. Past Medical History:  Atrial fibrillation, congestive heart failure, CKD, hyperlipidemia, hypertension, MI. Past Surgical History:  Not known. Social History:  Patient is living independently. She is a former smoker, quit two years back. She never drank alcohol. She has been residing in [facility] for the last several months. Family History:  Not contributory. Allergies:  NKDA. Medications:  Patient is on Ertapenem 1 gram IV once a day for ten days, Lipitor 10 mg once a day, Bumex 0.5 mg once a day, Coreg 6.25 mg twice a day, vitamin D 50,000 units once a week, Eliquis 5 mg twice a day, Hydralazine 10 mg three times a day, Isosorbide Dinitrate 10 mg three times a day, Metolazone 2.5 mg [id number], potassium chloride 10 mEq, two tablets once a day, Tylenol 650 every six hours as needed. Review of Systems:  Complete review of systems done, right now essentially negative.     Objective:    GENERAL:  This is a pleasant, -American female, not in any apparent distress. VITALS:  T:  96 degrees Fahrenheit. P: 71 per minute. BP:   144/87 mmHg. SaO2:  99% on room air. WT:  175 pounds. She has lost some weight. She was 201 pounds. HEENT:  No abnormality detected. NECK:  Supple, no JVD, no carotid bruits, no thyromegaly. CHEST:  Chest is clear to auscultation bilaterally. No rales, no rhonchi. CARDIOVASCULAR:  S1, S2 normal.  Regular rate and rhythm. ABDOMEN:  Soft, nontender, nondistended, bowel sounds present. EXTREMITIES:  1+ leg edema present. Dorsalis pedis pulse normal.    NEUROLOGICAL:  Alert and oriented x3. Cranial nerves II-XII grossly intact. Motor 5/5 bilaterally. Sensory within normal limits. Assessment and Plan:  1. Urosepsis due to ESBL E.Coli UTI, along with bacteremia. Patient finished up Meropenum in the hospital.  Right now she is on Ertapenem 1 gram IV once a day for ten days. She is getting better. We will repeat CBC and CMP. She is going to follow up with ID specialist.  2. Pneumonia. COVID test was negative. Pneumonia has improved. Patient is on IV antibiotics. 3. Acute metabolic encephalopathy from infection, which has improved. CT head was done and negative. 4. Acute renal failure. Creatinine was 2.6, now came down to baseline 1.3. Probably due to infection, will monitor closely. 5. Chronic atrial fibrillation. She is on Coreg and Eliquis, will continue it. 6. CHF, right now compensated. She is on Bumex 0.5 mg once a day. We will monitor CMP closely. 7. CAD. Patient is on statin, Coreg and Isosorbide, along with Hydralazine. She is doing well. THIS IS NOT A COMPLETE MEDICAL RECORD ON THIS PATIENT.    THIS IS A PATIENT AT [facility].    PLEASE CONTACT THE FACILITY LISTED BELOW FOR MORE INFORMATION ON THIS PATIENT.    THE COMPLETE RECORD RESIDES WITH THIS LONG TERM CARE FACILITY

## 2020-07-13 ENCOUNTER — APPOINTMENT (OUTPATIENT)
Dept: CT IMAGING | Age: 80
DRG: 871 | End: 2020-07-13
Attending: EMERGENCY MEDICINE
Payer: MEDICARE

## 2020-07-13 ENCOUNTER — HOSPITAL ENCOUNTER (INPATIENT)
Age: 80
LOS: 10 days | Discharge: NURSING, REHAB OR LONG-TERM CARE | DRG: 871 | End: 2020-07-23
Attending: EMERGENCY MEDICINE | Admitting: FAMILY MEDICINE
Payer: MEDICARE

## 2020-07-13 ENCOUNTER — APPOINTMENT (OUTPATIENT)
Dept: GENERAL RADIOLOGY | Age: 80
DRG: 871 | End: 2020-07-13
Attending: EMERGENCY MEDICINE
Payer: MEDICARE

## 2020-07-13 DIAGNOSIS — G93.40 ACUTE ENCEPHALOPATHY: ICD-10-CM

## 2020-07-13 DIAGNOSIS — I38 VALVULAR DISEASE: ICD-10-CM

## 2020-07-13 DIAGNOSIS — Z87.898 HISTORY OF BACTEREMIA: ICD-10-CM

## 2020-07-13 DIAGNOSIS — Z71.89 DNR (DO NOT RESUSCITATE) DISCUSSION: ICD-10-CM

## 2020-07-13 DIAGNOSIS — G45.9 TIA (TRANSIENT ISCHEMIC ATTACK): ICD-10-CM

## 2020-07-13 DIAGNOSIS — R53.81 DEBILITY: ICD-10-CM

## 2020-07-13 DIAGNOSIS — I48.0 PAROXYSMAL A-FIB (HCC): ICD-10-CM

## 2020-07-13 DIAGNOSIS — Z71.89 ADVANCED CARE PLANNING/COUNSELING DISCUSSION: ICD-10-CM

## 2020-07-13 DIAGNOSIS — Z16.12 ESBL (EXTENDED SPECTRUM BETA-LACTAMASE) PRODUCING BACTERIA INFECTION: ICD-10-CM

## 2020-07-13 DIAGNOSIS — I63.9 CEREBROVASCULAR ACCIDENT (CVA), UNSPECIFIED MECHANISM (HCC): ICD-10-CM

## 2020-07-13 DIAGNOSIS — A49.9 ESBL (EXTENDED SPECTRUM BETA-LACTAMASE) PRODUCING BACTERIA INFECTION: ICD-10-CM

## 2020-07-13 DIAGNOSIS — T68.XXXA HYPOTHERMIA, INITIAL ENCOUNTER: ICD-10-CM

## 2020-07-13 DIAGNOSIS — R47.81 SLURRED SPEECH: Primary | ICD-10-CM

## 2020-07-13 DIAGNOSIS — R47.01 APHASIA: ICD-10-CM

## 2020-07-13 DIAGNOSIS — Z71.89 GOALS OF CARE, COUNSELING/DISCUSSION: ICD-10-CM

## 2020-07-13 DIAGNOSIS — I15.9 SECONDARY HYPERTENSION: ICD-10-CM

## 2020-07-13 LAB
ALBUMIN SERPL-MCNC: 2 G/DL (ref 3.5–5)
ALBUMIN/GLOB SERPL: 0.4 {RATIO} (ref 1.1–2.2)
ALP SERPL-CCNC: 98 U/L (ref 45–117)
ALT SERPL-CCNC: 13 U/L (ref 12–78)
ANION GAP SERPL CALC-SCNC: 8 MMOL/L (ref 5–15)
APPEARANCE UR: CLEAR
ARTERIAL PATENCY WRIST A: NO
AST SERPL-CCNC: 20 U/L (ref 15–37)
BACTERIA URNS QL MICRO: NEGATIVE /HPF
BASE DEFICIT BLD-SCNC: 3 MMOL/L
BASOPHILS # BLD: 0 K/UL (ref 0–0.1)
BASOPHILS NFR BLD: 1 % (ref 0–1)
BDY SITE: ABNORMAL
BILIRUB SERPL-MCNC: 0.4 MG/DL (ref 0.2–1)
BILIRUB UR QL: NEGATIVE
BUN SERPL-MCNC: 49 MG/DL (ref 6–20)
BUN/CREAT SERPL: 30 (ref 12–20)
CA-I BLD-SCNC: 1.36 MMOL/L (ref 1.12–1.32)
CALCIUM SERPL-MCNC: 9.4 MG/DL (ref 8.5–10.1)
CHLORIDE SERPL-SCNC: 109 MMOL/L (ref 97–108)
CO2 SERPL-SCNC: 24 MMOL/L (ref 21–32)
COLOR UR: NORMAL
COMMENT, HOLDF: NORMAL
CREAT SERPL-MCNC: 1.66 MG/DL (ref 0.55–1.02)
DIFFERENTIAL METHOD BLD: ABNORMAL
EOSINOPHIL # BLD: 0 K/UL (ref 0–0.4)
EOSINOPHIL NFR BLD: 1 % (ref 0–7)
EPITH CASTS URNS QL MICRO: NORMAL /LPF
ERYTHROCYTE [DISTWIDTH] IN BLOOD BY AUTOMATED COUNT: 22.5 % (ref 11.5–14.5)
GAS FLOW.O2 O2 DELIVERY SYS: ABNORMAL L/MIN
GLOBULIN SER CALC-MCNC: 5.4 G/DL (ref 2–4)
GLUCOSE SERPL-MCNC: 86 MG/DL (ref 65–100)
GLUCOSE UR STRIP.AUTO-MCNC: NEGATIVE MG/DL
HCO3 BLD-SCNC: 23.5 MMOL/L (ref 22–26)
HCT VFR BLD AUTO: 23.6 % (ref 35–47)
HCT VFR BLD AUTO: 25.1 % (ref 35–47)
HEMOCCULT STL QL: NEGATIVE
HGB BLD-MCNC: 6.4 G/DL (ref 11.5–16)
HGB BLD-MCNC: 7 G/DL (ref 11.5–16)
HGB UR QL STRIP: NEGATIVE
IMM GRANULOCYTES # BLD AUTO: 0 K/UL (ref 0–0.04)
IMM GRANULOCYTES NFR BLD AUTO: 1 % (ref 0–0.5)
INR PPP: 1.7 (ref 0.9–1.1)
KETONES UR QL STRIP.AUTO: NEGATIVE MG/DL
LACTATE SERPL-SCNC: 1.6 MMOL/L (ref 0.4–2)
LEUKOCYTE ESTERASE UR QL STRIP.AUTO: NEGATIVE
LYMPHOCYTES # BLD: 0.8 K/UL (ref 0.8–3.5)
LYMPHOCYTES NFR BLD: 21 % (ref 12–49)
MCH RBC QN AUTO: 20.8 PG (ref 26–34)
MCHC RBC AUTO-ENTMCNC: 27.1 G/DL (ref 30–36.5)
MCV RBC AUTO: 76.9 FL (ref 80–99)
MONOCYTES # BLD: 0.4 K/UL (ref 0–1)
MONOCYTES NFR BLD: 10 % (ref 5–13)
NEUTS SEG # BLD: 2.8 K/UL (ref 1.8–8)
NEUTS SEG NFR BLD: 66 % (ref 32–75)
NITRITE UR QL STRIP.AUTO: NEGATIVE
NRBC # BLD: 0.04 K/UL (ref 0–0.01)
NRBC BLD-RTO: 1 PER 100 WBC
PCO2 BLD: 47.5 MMHG (ref 35–45)
PH BLD: 7.3 [PH] (ref 7.35–7.45)
PH UR STRIP: 5 [PH] (ref 5–8)
PLATELET # BLD AUTO: 332 K/UL (ref 150–400)
PMV BLD AUTO: 9.5 FL (ref 8.9–12.9)
PO2 BLD: 63 MMHG (ref 80–100)
POTASSIUM SERPL-SCNC: 4.4 MMOL/L (ref 3.5–5.1)
PROT SERPL-MCNC: 7.4 G/DL (ref 6.4–8.2)
PROT UR STRIP-MCNC: NEGATIVE MG/DL
PROTHROMBIN TIME: 16.5 SEC (ref 9–11.1)
RBC # BLD AUTO: 3.07 M/UL (ref 3.8–5.2)
RBC #/AREA URNS HPF: NORMAL /HPF (ref 0–5)
RBC MORPH BLD: ABNORMAL
SAMPLES BEING HELD,HOLD: NORMAL
SAO2 % BLD: 89 % (ref 92–97)
SODIUM SERPL-SCNC: 141 MMOL/L (ref 136–145)
SP GR UR REFRACTOMETRY: 1.02 (ref 1–1.03)
SPECIMEN TYPE: ABNORMAL
TROPONIN I SERPL-MCNC: <0.05 NG/ML
TROPONIN I SERPL-MCNC: <0.05 NG/ML
TSH SERPL DL<=0.05 MIU/L-ACNC: 3.4 UIU/ML (ref 0.36–3.74)
UR CULT HOLD, URHOLD: NORMAL
UROBILINOGEN UR QL STRIP.AUTO: 1 EU/DL (ref 0.2–1)
WBC # BLD AUTO: 4 K/UL (ref 3.6–11)
WBC URNS QL MICRO: NORMAL /HPF (ref 0–4)

## 2020-07-13 PROCEDURE — 74011000250 HC RX REV CODE- 250: Performed by: FAMILY MEDICINE

## 2020-07-13 PROCEDURE — 74011636320 HC RX REV CODE- 636/320: Performed by: RADIOLOGY

## 2020-07-13 PROCEDURE — 96361 HYDRATE IV INFUSION ADD-ON: CPT

## 2020-07-13 PROCEDURE — 87086 URINE CULTURE/COLONY COUNT: CPT

## 2020-07-13 PROCEDURE — 71045 X-RAY EXAM CHEST 1 VIEW: CPT

## 2020-07-13 PROCEDURE — 74011250636 HC RX REV CODE- 250/636: Performed by: EMERGENCY MEDICINE

## 2020-07-13 PROCEDURE — 99291 CRITICAL CARE FIRST HOUR: CPT

## 2020-07-13 PROCEDURE — 85018 HEMOGLOBIN: CPT

## 2020-07-13 PROCEDURE — 86900 BLOOD TYPING SEROLOGIC ABO: CPT

## 2020-07-13 PROCEDURE — 70496 CT ANGIOGRAPHY HEAD: CPT

## 2020-07-13 PROCEDURE — 86923 COMPATIBILITY TEST ELECTRIC: CPT

## 2020-07-13 PROCEDURE — 96375 TX/PRO/DX INJ NEW DRUG ADDON: CPT

## 2020-07-13 PROCEDURE — 0042T CT CODE NEURO PERF W CBF: CPT

## 2020-07-13 PROCEDURE — 87635 SARS-COV-2 COVID-19 AMP PRB: CPT

## 2020-07-13 PROCEDURE — 99218 HC RM OBSERVATION: CPT

## 2020-07-13 PROCEDURE — 74011000258 HC RX REV CODE- 258: Performed by: RADIOLOGY

## 2020-07-13 PROCEDURE — 74011000258 HC RX REV CODE- 258: Performed by: EMERGENCY MEDICINE

## 2020-07-13 PROCEDURE — 85610 PROTHROMBIN TIME: CPT

## 2020-07-13 PROCEDURE — 70450 CT HEAD/BRAIN W/O DYE: CPT

## 2020-07-13 PROCEDURE — 74011250636 HC RX REV CODE- 250/636: Performed by: FAMILY MEDICINE

## 2020-07-13 PROCEDURE — 36600 WITHDRAWAL OF ARTERIAL BLOOD: CPT

## 2020-07-13 PROCEDURE — 96367 TX/PROPH/DG ADDL SEQ IV INF: CPT

## 2020-07-13 PROCEDURE — 96365 THER/PROPH/DIAG IV INF INIT: CPT

## 2020-07-13 PROCEDURE — C9113 INJ PANTOPRAZOLE SODIUM, VIA: HCPCS | Performed by: FAMILY MEDICINE

## 2020-07-13 PROCEDURE — 82803 BLOOD GASES ANY COMBINATION: CPT

## 2020-07-13 PROCEDURE — 96360 HYDRATION IV INFUSION INIT: CPT

## 2020-07-13 PROCEDURE — 83605 ASSAY OF LACTIC ACID: CPT

## 2020-07-13 PROCEDURE — 81001 URINALYSIS AUTO W/SCOPE: CPT

## 2020-07-13 PROCEDURE — 87040 BLOOD CULTURE FOR BACTERIA: CPT

## 2020-07-13 PROCEDURE — 36415 COLL VENOUS BLD VENIPUNCTURE: CPT

## 2020-07-13 PROCEDURE — 74011000258 HC RX REV CODE- 258: Performed by: FAMILY MEDICINE

## 2020-07-13 PROCEDURE — 82272 OCCULT BLD FECES 1-3 TESTS: CPT

## 2020-07-13 PROCEDURE — 84484 ASSAY OF TROPONIN QUANT: CPT

## 2020-07-13 PROCEDURE — 65270000029 HC RM PRIVATE

## 2020-07-13 PROCEDURE — 93005 ELECTROCARDIOGRAM TRACING: CPT

## 2020-07-13 PROCEDURE — 80053 COMPREHEN METABOLIC PANEL: CPT

## 2020-07-13 PROCEDURE — 85025 COMPLETE CBC W/AUTO DIFF WBC: CPT

## 2020-07-13 PROCEDURE — 84443 ASSAY THYROID STIM HORMONE: CPT

## 2020-07-13 RX ORDER — LEVOFLOXACIN 5 MG/ML
750 INJECTION, SOLUTION INTRAVENOUS
Status: COMPLETED | OUTPATIENT
Start: 2020-07-13 | End: 2020-07-13

## 2020-07-13 RX ORDER — SODIUM CHLORIDE 9 MG/ML
75 INJECTION, SOLUTION INTRAVENOUS CONTINUOUS
Status: DISCONTINUED | OUTPATIENT
Start: 2020-07-13 | End: 2020-07-14

## 2020-07-13 RX ORDER — SODIUM CHLORIDE 0.9 % (FLUSH) 0.9 %
10 SYRINGE (ML) INJECTION
Status: COMPLETED | OUTPATIENT
Start: 2020-07-13 | End: 2020-07-13

## 2020-07-13 RX ORDER — SODIUM CHLORIDE 9 MG/ML
250 INJECTION, SOLUTION INTRAVENOUS AS NEEDED
Status: DISCONTINUED | OUTPATIENT
Start: 2020-07-13 | End: 2020-07-23 | Stop reason: HOSPADM

## 2020-07-13 RX ORDER — ACETAMINOPHEN 650 MG/1
650 SUPPOSITORY RECTAL
Status: DISCONTINUED | OUTPATIENT
Start: 2020-07-13 | End: 2020-07-23 | Stop reason: HOSPADM

## 2020-07-13 RX ORDER — CARVEDILOL 6.25 MG/1
6.25 TABLET ORAL 2 TIMES DAILY WITH MEALS
Status: DISCONTINUED | OUTPATIENT
Start: 2020-07-14 | End: 2020-07-23 | Stop reason: HOSPADM

## 2020-07-13 RX ORDER — ATORVASTATIN CALCIUM 40 MG/1
40 TABLET, FILM COATED ORAL
Status: DISCONTINUED | OUTPATIENT
Start: 2020-07-13 | End: 2020-07-23 | Stop reason: HOSPADM

## 2020-07-13 RX ORDER — ACETAMINOPHEN 325 MG/1
650 TABLET ORAL
Status: DISCONTINUED | OUTPATIENT
Start: 2020-07-13 | End: 2020-07-23 | Stop reason: HOSPADM

## 2020-07-13 RX ADMIN — FAMOTIDINE 20 MG: 10 INJECTION, SOLUTION INTRAVENOUS at 23:02

## 2020-07-13 RX ADMIN — SODIUM CHLORIDE 100 ML: 900 INJECTION, SOLUTION INTRAVENOUS at 13:31

## 2020-07-13 RX ADMIN — IOPAMIDOL 120 ML: 755 INJECTION, SOLUTION INTRAVENOUS at 13:31

## 2020-07-13 RX ADMIN — SODIUM CHLORIDE 1000 ML: 900 INJECTION, SOLUTION INTRAVENOUS at 14:30

## 2020-07-13 RX ADMIN — CEFEPIME HYDROCHLORIDE 2 G: 2 INJECTION, POWDER, FOR SOLUTION INTRAVENOUS at 15:03

## 2020-07-13 RX ADMIN — SODIUM CHLORIDE 75 ML/HR: 900 INJECTION, SOLUTION INTRAVENOUS at 22:43

## 2020-07-13 RX ADMIN — LEVOFLOXACIN 750 MG: 750 INJECTION, SOLUTION INTRAVENOUS at 15:03

## 2020-07-13 RX ADMIN — Medication 10 ML: at 13:31

## 2020-07-13 RX ADMIN — MEROPENEM 500 MG: 500 INJECTION, POWDER, FOR SOLUTION INTRAVENOUS at 23:02

## 2020-07-13 RX ADMIN — SODIUM CHLORIDE 40 MG: 9 INJECTION INTRAMUSCULAR; INTRAVENOUS; SUBCUTANEOUS at 19:32

## 2020-07-13 NOTE — PROGRESS NOTES
Spiritual Care Assessment/Progress Note  [facility]      NAME: Maryjane Gregory      MRN: [id number]  AGE: 68 y.o. SEX: female  Taoism Affiliation: Alevism   Language: English     7/13/2020           Spiritual Assessment begun in [address] [facility] [address] DEP through conversation with:         []Patient        [] Family    [] Friend(s)        Reason for Consult: Crisis     Spiritual beliefs: (Please include comment if needed)     [] Identifies with a angeline tradition:         [] Supported by a angeline community:            [] Claims no spiritual orientation:           [] Seeking spiritual identity:                [] Adheres to an individual form of spirituality:           [x] Not able to assess:                           Identified resources for coping:      [] Prayer                               [] Music                  [] Guided Imagery     [] Family/friends                 [] Pet visits     [] Devotional reading                         [] Unknown     [] Other:                                              Interventions offered during this visit: (See comments for more details)    Patient Interventions: Crisis           Plan of Care:     [] Support spiritual and/or cultural needs    [] Support AMD and/or advance care planning process      [] Support grieving process   [] Coordinate Rites and/or Rituals    [] Coordination with community clergy   [] No spiritual needs identified at this time   [] Detailed Plan of Care below (See Comments)  [] Make referral to Music Therapy  [] Make referral to Pet Therapy     [] Make referral to Addiction services  [] Make referral to [facility]  [] Make referral to Spiritual Care Partner  [] No future visits requested        [] Follow up visits as needed     Comments: Responded to Code Stroke called for Ms Villalpando in ED. Patient was taken directly to CT by EMS and no family was with her at that time. Please notify [phone] Maykel Medeiros if  support desired.  : Gama Boyd.  Karen Victoria; [facility], to contact [phone] Maykel Medeiros call: [phone]

## 2020-07-13 NOTE — PROGRESS NOTES
Admission Medication Reconciliation:    Information obtained from:  Medication list from [facility]    Comments/Recommendations: Reviewed PTA medications and patient's allergies. No changes made. Ertapenem until 20 per medication list.    [facility] benefit data reflects medications filled and processed through the patient's insurance, however   this data does NOT capture whether the medication was picked up or is currently being taken by the patient. Allergies:  Patient has no known allergies. Significant PMH/Disease States:   Past Medical History:   Diagnosis Date    A-fib ([address]     Anemia     Chronic kidney disease     CKD (chronic kidney disease)     Hypercholesterolemia     Hyperlipidemia     Hypertension     MI [facility])      Chief Complaint for this Admission:    Chief Complaint   Patient presents with    Dysarthria     Prior to Admission Medications:   Prior to Admission Medications   Prescriptions Last Dose Informant Patient Reported? Taking? Eliquis 5 mg tablet   Yes yes   Sig: Take 5 mg by mouth every twelve (12) hours. acetaminophen (TylenoL) 325 mg cap   Yes yes   Sig: Take 2 Tabs by mouth every four to six (4-6) hours as needed. atorvastatin (LIPITOR) 40 mg tablet   Yes yes   Sig: Take 40 mg by mouth nightly. bumetanide (BUMEX) 0.5 mg tablet   Yes yes   Sig: Take 0.5 mg by mouth daily. carvediloL (COREG) 6.25 mg tablet   Yes yes   Sig: Take 6.25 mg by mouth two (2) times daily (with meals). cholecalciferol (VITAMIN D3) (50,000 UNITS /1250 MCG) capsule   Yes yes   Sig: Take 50,000 Units by mouth every seven (7) days. ertapenem 1 gram 1 g IVPB   No yes   Si g by IntraVENous route every twenty-four (24) hours for 10 days. For CrCl <30 adjust dose to 500mg daily. Ordered by ID Dr Zahira Castano   hydrALAZINE (APRESOLINE) 10 mg tablet   Yes yes   Sig: Take 10 mg by mouth three (3) times daily.    isosorbide dinitrate (ISORDIL) 10 mg tablet   Yes yes Sig: Take 10 mg by mouth three (3) times daily. metOLazone (ZAROXOLYN) 2.5 mg tablet   Yes yes   Sig: Take 2.5 mg by mouth every Monday, Wednesday, Friday. potassium chloride (KLOR-CON) 10 mEq tablet   Yes yes   Sig: Take 20 mEq by mouth daily.       Facility-Administered Medications: None

## 2020-07-13 NOTE — ED NOTES
Attempted to call Mena Bender x 2, (pt's medical contact) to obtain consent for blood transfusion. Unable to get through will number on SNF paperwork and chart. Will notify Dr Long Ballesteros.

## 2020-07-13 NOTE — ED NOTES
IV abx and fluids finished infusing, pt remains on jennifer hugger and remains hypothermic at 33.2 celsius, awaiting blood to be ready from blood bank. All other vitals WNL. No change to initial neuro assessment.

## 2020-07-13 NOTE — ED NOTES
Unable to obtain oral temp, rectal temp 92.1. MD aware. Lactic and blood cultures obtained. Pt placed on jennifer [place] [employer].

## 2020-07-13 NOTE — ED TRIAGE NOTES
Pt comes in via EMS from [facility] for slurred speech and L sided weakness. Code S level 1 called PTA. Per EMS pt was working with PT and at 12:20 they noticed slurred speech and weakness in L arm. Pt usually speaks clear,  per EMS. Pt has PICC in R brachial and has been getting abx for uti. Pt takes eliquis for chronic afib.     RN and MD in CT with pt upon arrival.

## 2020-07-13 NOTE — H&P
Spoke with Dr Ievtte Swann, he will be  obtaining a hemoccult and sending it to lab, will await results.

## 2020-07-13 NOTE — ED NOTES
Pt placed on 2L O2 via NC per ABG. No change in intial neuro assessment, pt remains on jennifer hugger. Will continue to monitor.

## 2020-07-13 NOTE — ED PROVIDER NOTES
Ms. Villalpando is a 70yo female who presents to the ER as a code S. She apparently was at a nursing facility and was working with PT. She suddenly developed slurred speech and left sided weakness while with PT.  EMS was called. Pt. Was found by EMS to have incomprehensible speech but no longer had left sided weakness. Pt. Reportedly has a history of dementia and is on Eliquis. Per EMS, the patient is on IV abx for UTI through a PICC line. No other reported complaints. Pt's history is limited by the patient's ability to speak. The history was obtained by EMS. Past Medical History:   Diagnosis Date    A-fib ([facility]     Anemia     Chronic kidney disease     CKD (chronic kidney disease)     Hypercholesterolemia     Hyperlipidemia     Hypertension     MI (my[facility] infarction) ([facility]        No past surgical history on file.       Family History:   Problem Relation Age of Onset    No Known Problems Mother     No Known Problems Father        Social History     Socioeconomic History    Marital status: SINGLE     Spouse name: Not on file    Number of children: Not on file    Years of education: Not on file    Highest education level: Not on file   Occupational History    Not on file   Social Needs    Financial resource strain: Not on file    Food insecurity     Worry: Not on file     Inability: Not on file    Transportation needs     Medical: Not on file     Non-medical: Not on file   Tobacco Use    Smoking status: Former Smoker     Last attempt to quit: 2013     Years since quittin.5    Smokeless tobacco: Never Used   Substance and Sexual Activity    Alcohol use: No     Alcohol/week: 0.0 standard drinks    Drug use: No    Sexual activity: Never   Lifestyle    Physical activity     Days per week: Not on file     Minutes per session: Not on file    Stress: Not on file   Relationships    Social connections     Talks on phone: Not on file     Gets together: Not on file Attends Moravian service: Not on file     Active member of club or organization: Not on file     Attends meetings of clubs or organizations: Not on file     Relationship status: Not on file    Intimate partner violence     Fear of current or ex partner: Not on file     Emotionally abused: Not on file     Physically abused: Not on file     Forced sexual activity: Not on file   Other Topics Concern    Not on file   Social History Narrative    Not on file         ALLERGIES: Patient has no known allergies. Review of Systems   Unable to perform ROS: Mental status change   Neurological: Positive for speech difficulty and weakness. There were no vitals filed for this visit. Physical Exam     Vital signs reviewed. Nursing notes reviewed. Const:  No acute distress, well developed, well nourished  Head:  Atraumatic, normocephalic  Eyes:  PERRL, conjunctiva normal, no scleral icterus  Neck:  Supple, trachea midline  Cardiovascular:  Regular rate  Resp:  No resp distress, no increased work of breathing  Abd:  non-distended  :  Deferred  MSK:  No pedal edema, normal ROM  Neuro:  Alert and oriented x3, no cranial nerve defect, equal 5/5 strength in the bilateral upper extremities, no facial droop, bilateral diminished strength to the lower extremities, pt. Cannot lift her leg off the bed with either leg  Skin:  Warm, dry, intact  Psych: normal mood and affect, behavior is normal, judgement and thought content is normal          MDM  Number of Diagnoses or Management Options  Acute encephalopathy:   Hypothermia, initial encounter:   Slurred speech:      Amount and/or Complexity of Data Reviewed  Clinical lab tests: ordered and reviewed  Tests in the radiology section of CPT®: ordered and reviewed  Review and summarize past medical records: yes    Critical Care  Total time providing critical care: 30-74 minutes (35 min.  )    Patient Progress  Patient progress: stable          Ms. Villalpando is a 70yo male who presents to the ER with what was reported as sudden onset slurred speech. Pt. Also found to be hypothermic. She had recently been admitted with urosepsis. Pt. Was seen by telenueoro and thought not to be a candidate for tpa and thought her sx were likely metabolic/infectious rather than stroke. However, they also thought that seizures could cause her sx. Pt.  To be evaluated for admission by the hospitalist.      Procedures

## 2020-07-13 NOTE — ED NOTES
This RN received blood from blood bank and proceeded to administer it per [facility] policy with two RN's. Error kept coming up as RN attempted to dual verify blood to pt. Blood was allocated correctly to pt and was pt's blood type. This RN called blood bank several times to ask what RN should do, blood bank informed RN that since blood has left blood bank there is nothing they can do on their part. Multiple RN's tried to troubleshoot error, blood ultimately had to be returned to blood bank (less than 30 minutes from receiving from blood bank). Blood bank tech informed RN that blood temp was too high and must be discarded. Provider aware that blood unable to be given at this time.

## 2020-07-14 LAB
ATRIAL RATE: 92 BPM
CALCULATED R AXIS, ECG10: -20 DEGREES
CALCULATED T AXIS, ECG11: -87 DEGREES
CHOLEST SERPL-MCNC: 90 MG/DL
DIAGNOSIS, 93000: NORMAL
ERYTHROCYTE [DISTWIDTH] IN BLOOD BY AUTOMATED COUNT: 22.7 % (ref 11.5–14.5)
EST. AVERAGE GLUCOSE BLD GHB EST-MCNC: 137 MG/DL
FLUAV AG NPH QL IA: NEGATIVE
FLUBV AG NOSE QL IA: NEGATIVE
GLUCOSE BLD STRIP.AUTO-MCNC: 65 MG/DL (ref 65–100)
GLUCOSE BLD STRIP.AUTO-MCNC: 70 MG/DL (ref 65–100)
GLUCOSE BLD STRIP.AUTO-MCNC: 77 MG/DL (ref 65–100)
HBA1C MFR BLD: 6.4 % (ref 4–5.6)
HCT VFR BLD AUTO: 27.6 % (ref 35–47)
HDLC SERPL-MCNC: 27 MG/DL
HDLC SERPL: 3.3 {RATIO} (ref 0–5)
HGB BLD-MCNC: 7.6 G/DL (ref 11.5–16)
LACTATE SERPL-SCNC: 1.8 MMOL/L (ref 0.4–2)
LDLC SERPL CALC-MCNC: 49.8 MG/DL (ref 0–100)
LIPID PROFILE,FLP: NORMAL
MCH RBC QN AUTO: 20.6 PG (ref 26–34)
MCHC RBC AUTO-ENTMCNC: 27.5 G/DL (ref 30–36.5)
MCV RBC AUTO: 74.8 FL (ref 80–99)
NRBC # BLD: 0.09 K/UL (ref 0–0.01)
NRBC BLD-RTO: 1.8 PER 100 WBC
P-R INTERVAL, ECG05: 312 MS
PLATELET # BLD AUTO: 388 K/UL (ref 150–400)
PMV BLD AUTO: 9 FL (ref 8.9–12.9)
Q-T INTERVAL, ECG07: 342 MS
QRS DURATION, ECG06: 108 MS
QTC CALCULATION (BEZET), ECG08: 384 MS
RBC # BLD AUTO: 3.69 M/UL (ref 3.8–5.2)
SARS-COV-2, COV2: NOT DETECTED
SERVICE CMNT-IMP: NORMAL
SOURCE, COVRS: NORMAL
SPECIMEN SOURCE, FCOV2M: NORMAL
TRIGL SERPL-MCNC: 66 MG/DL (ref ?–150)
VENTRICULAR RATE, ECG03: 76 BPM
VLDLC SERPL CALC-MCNC: 13.2 MG/DL
WBC # BLD AUTO: 5 K/UL (ref 3.6–11)

## 2020-07-14 PROCEDURE — 74011000250 HC RX REV CODE- 250: Performed by: FAMILY MEDICINE

## 2020-07-14 PROCEDURE — 85027 COMPLETE CBC AUTOMATED: CPT

## 2020-07-14 PROCEDURE — 83036 HEMOGLOBIN GLYCOSYLATED A1C: CPT

## 2020-07-14 PROCEDURE — 65270000029 HC RM PRIVATE

## 2020-07-14 PROCEDURE — 83605 ASSAY OF LACTIC ACID: CPT

## 2020-07-14 PROCEDURE — 74011250636 HC RX REV CODE- 250/636: Performed by: FAMILY MEDICINE

## 2020-07-14 PROCEDURE — 87635 SARS-COV-2 COVID-19 AMP PRB: CPT

## 2020-07-14 PROCEDURE — 80061 LIPID PANEL: CPT

## 2020-07-14 PROCEDURE — 74011000258 HC RX REV CODE- 258: Performed by: FAMILY MEDICINE

## 2020-07-14 PROCEDURE — 36415 COLL VENOUS BLD VENIPUNCTURE: CPT

## 2020-07-14 PROCEDURE — 82962 GLUCOSE BLOOD TEST: CPT

## 2020-07-14 PROCEDURE — 74011000250 HC RX REV CODE- 250: Performed by: INTERNAL MEDICINE

## 2020-07-14 PROCEDURE — 74011250636 HC RX REV CODE- 250/636: Performed by: INTERNAL MEDICINE

## 2020-07-14 PROCEDURE — 87804 INFLUENZA ASSAY W/OPTIC: CPT

## 2020-07-14 PROCEDURE — 74011250637 HC RX REV CODE- 250/637: Performed by: FAMILY MEDICINE

## 2020-07-14 RX ORDER — ALBUTEROL SULFATE 90 UG/1
2 AEROSOL, METERED RESPIRATORY (INHALATION)
Status: DISCONTINUED | OUTPATIENT
Start: 2020-07-14 | End: 2020-07-23 | Stop reason: HOSPADM

## 2020-07-14 RX ORDER — DEXTROSE, SODIUM CHLORIDE, AND POTASSIUM CHLORIDE 5; .9; .15 G/100ML; G/100ML; G/100ML
50 INJECTION INTRAVENOUS CONTINUOUS
Status: DISCONTINUED | OUTPATIENT
Start: 2020-07-14 | End: 2020-07-17

## 2020-07-14 RX ADMIN — FAMOTIDINE 20 MG: 10 INJECTION, SOLUTION INTRAVENOUS at 09:57

## 2020-07-14 RX ADMIN — POTASSIUM CHLORIDE, DEXTROSE MONOHYDRATE AND SODIUM CHLORIDE 50 ML/HR: 150; 5; 900 INJECTION, SOLUTION INTRAVENOUS at 12:13

## 2020-07-14 RX ADMIN — FAMOTIDINE 20 MG: 10 INJECTION, SOLUTION INTRAVENOUS at 21:24

## 2020-07-14 RX ADMIN — MEROPENEM 500 MG: 500 INJECTION, POWDER, FOR SOLUTION INTRAVENOUS at 07:23

## 2020-07-14 RX ADMIN — MEROPENEM 500 MG: 500 INJECTION, POWDER, FOR SOLUTION INTRAVENOUS at 14:38

## 2020-07-14 RX ADMIN — CARVEDILOL 6.25 MG: 6.25 TABLET, FILM COATED ORAL at 09:58

## 2020-07-14 RX ADMIN — SODIUM CHLORIDE 75 ML/HR: 900 INJECTION, SOLUTION INTRAVENOUS at 10:00

## 2020-07-14 RX ADMIN — CARVEDILOL 6.25 MG: 6.25 TABLET, FILM COATED ORAL at 16:53

## 2020-07-14 NOTE — PROGRESS NOTES
Bedside and Verbal shift change report given to [address] [place] [address] & [address] (oncoming nurse) by Fred Mccloud (offgoing nurse). Report included the following information SBAR, Kardex, Intake/Output, MAR, Recent Results, Cardiac Rhythm Afib.         Problem: Isolation Precautions - Risk of Spread of Infection  Goal: Prevent transmission of infectious organism to others  Outcome: Progressing Towards Goal  Note- covid and influenza labs processing. Droplet plus precautions in place, staff wearing appropriate PPE    Problem: Dysphagia  Goal: Speech Goal: bedside swallow screening needed prior to PO intake  Outcome: Progressing Towards Goal  Note- pt unable to follow commands/complete swallow screen. Will continue to monitor    Problem: Afib Pathway: Day 1  Goal: Respiratory  Outcome: Progressing Towards Goal   Note- O2 sats within defined limits 2L NC    Problem: Anemia Care Plan  Goal: Labs within defined limits  Outcome: Progressing Towards Goal  Note- 0040- repeat Hgb 7.0. Provider notified, told to hold blood transfusion until am labs/transfuse then if necessary.  Repeat Hgb 7.6

## 2020-07-14 NOTE — PROGRESS NOTES
Occupational Therapy    Orders acknowledged, chart reviewed and patient is currently being ruled out for COVID-19. Pt noted to be poorly alert, does not follow commands, does not respond to interaction.  Pt unable to verbalize or attempt to make specific sounds.      In attempts to have only essential personnel enter the room and conserve PPE, we will confer with nursing and/or the referring provider to determine the most appropriate timing of our therapy intervention.  We are following the patient peripherally to support nursing staff on her care plan.       Thank you for your assistance.   Allan Crenshaw, OT

## 2020-07-14 NOTE — CONSULTS
Consult dictated. 74-year-old admitted with inability to speak. Being worked up for sepsis, hypothermia, acute hypoxic respiratory failure and has chronic renal insufficiency. Clinically she is awake, follows some commands but does not verbalize,  has some incomprehensible mumbling, moves both arms, less movement seen in legs bilaterally. Agree with MRI brain to rule out stroke as a cause of expressive aphasia. CTA shows 60% stenosis of the left ICA, 50% on the right. Will check carotid duplex.    Brianda Kumar MD

## 2020-07-14 NOTE — PROGRESS NOTES
Orders acknowledged, chart reviewed and patient is currently being ruled out for COVID-19. Pt noted to be poorly alert, does not follow commands, does not respond to interaction. Pt unable to verbalize or attempt to make specific sounds. In attempts to have only essential personnel enter the room and conserve PPE, we will confer with nursing and/or the referring provider to determine the most appropriate timing of our therapy intervention.  We are following the patient peripherally to support nursing staff on her care plan.       Thank you for your assistance.      Kassy Petty, DPT, PT

## 2020-07-14 NOTE — PROGRESS NOTES
Hospitalist Progress Note  Bretta Sandhoff, MD  Answering service: [phone] from in house phone      Date of Service:  2020  NAME:  Nyasia Loja  :  1940  MRN:  [id number]    Admission Summary:   80F p/w suspected CVA/TIA- hypothermia  Interval history / Subjective:   Patient seen and examined at bedside, not much verbal, appears confused, no signs of distress, on 3L NC. Attempted to call family 'the number not in service?'. Assessment & Plan:     #. Suspect TIA  - CT head: NAD, CTA head/neck: R ICA 50% stenosis, L ICA 60%. - TTE pending, MRI pending, Neuro Cs pending, PT/OT eval. On Eliquis. LDL 50. A1c 6.4.  - looks debilitated, will consult Palliative care    #. Sepsis: POA, with hypothermia (possible dehydration/age vs infectious) and tachypnea. - noted recent GNR bacteremia  - Empiric iv abx 'meropenem', CXR: b/l lower lobe infiltrates/atelects. BCs NGTD, UA -ve. Lactic wnl    #. Acute metabolic Encephalopathy: likely 2nd to above, monitor, supportive Mg. #. Acute hypoxic Resp failure: supplemental O2 as needed, COVID test -ve. #. Anemia: chronic, Hemoccult pending, monitor HB. Transfuse if hb <7. #. CKD3: monitor renal function, avoid nephrotoxics    Code status: DNR  DVT prophylaxis: SCDs  Care Plan discussed with: Patient/Family and Nurse  Disposition: TBD     Hospital Problems  Date Reviewed: 2020          Codes Class Noted POA    CVA (cerebral vascular accident) [facility]) ICD-10-CM: I63.9  ICD-9-CM: 434.91  2020 Unknown        TIA (transient ischemic attack) ICD-10-CM: G45.9  ICD-9-CM: 435.9  2020 Unknown            Review of Systems:   Pertinent items are mentioned in interval history. Vital Signs:    Last 24hrs VS reviewed since prior progress note.  Most recent are:  Visit Vitals  /79 (BP 1 Location: Left arm, BP Patient Position: At rest)   Pulse 98   Temp 97.9 °F (36.6 °C)   Resp 28   Ht 5' 1\" (1.549 m)   Wt 92.1 kg (203 lb 0.7 oz)   SpO2 100%   BMI 38.36 kg/m²         Intake/Output Summary (Last 24 hours) at 7/14/2020 0855  Last data filed at 7/14/2020 0330  Gross per 24 hour   Intake 71.25 ml   Output 600 ml   Net -528.75 ml        Physical Examination:   General:  Alert, confused, No acute distress, obese  Card:  S1, S2 without murmurs, good peripheral perfusion  Resp:  No accessory muscle use, Good AE, no wheezes, few rhonchi  Abd:  Soft, non-tender, non-distended, BS+  Extremities:  No cyanosis or clubbing, no significant edema  Neuro:   no focal neuro deficits, follows commands   Psych:   not agitated. Data Review:    Review and/or order of clinical lab test  Review and/or order of tests in the radiology section of CPT  Review and/or order of tests in the medicine section of [facility]  Labs:     Recent Labs     07/14/20  0318 07/13/20 1937 07/13/20  1350   WBC 5.0  --  4.0   HGB 7.6* 7.0* 6.4*   HCT 27.6* 25.1* 23.6*     --  332     Recent Labs     07/13/20  1350      K 4.4   *   CO2 24   BUN 49*   CREA 1.66*   GLU 86   CA 9.4     Recent Labs     07/13/20  1350   ALT 13   AP 98   TBILI 0.4   TP 7.4   ALB 2.0*   GLOB 5.4*     Recent Labs     07/13/20  1350   INR 1.7*   PTP 16.5*      No results for input(s): FE, TIBC, PSAT, FERR in the last 72 hours. No results found for: FOL, RBCF   No results for input(s): PH, PCO2, PO2 in the last 72 hours.   Recent Labs     07/13/20 1937 07/13/20  1350   TROIQ <0.05 <0.05     Lab Results   Component Value Date/Time    Cholesterol, total 90 07/14/2020 03:18 AM    HDL Cholesterol 27 07/14/2020 03:18 AM    LDL,Direct 163 (H) 12/05/2019 01:04 PM    LDL, calculated 49.8 07/14/2020 03:18 AM    Triglyceride 66 07/14/2020 03:18 AM    CHOL/HDL Ratio 3.3 07/14/2020 03:18 AM     Lab Results   Component Value Date/Time    Glucose (POC) 70 07/14/2020 08:38 AM     Lab Results   Component Value Date/Time    Color YELLOW/STRAW 07/13/2020 02:08 PM Appearance CLEAR 07/13/2020 02:08 PM    Specific gravity 1.018 07/13/2020 02:08 PM    pH (UA) 5.0 07/13/2020 02:08 PM    Protein Negative 07/13/2020 02:08 PM    Glucose Negative 07/13/2020 02:08 PM    Ketone Negative 07/13/2020 02:08 PM    Bilirubin Negative 07/13/2020 02:08 PM    Urobilinogen 1.0 07/13/2020 02:08 PM    Nitrites Negative 07/13/2020 02:08 PM    Leukocyte Esterase Negative 07/13/2020 02:08 PM    Epithelial cells FEW 07/13/2020 02:08 PM    Bacteria Negative 07/13/2020 02:08 PM    WBC 0-4 07/13/2020 02:08 PM    RBC 0-5 07/13/2020 02:08 PM     Medications Reviewed:     Current Facility-Administered Medications   Medication Dose Route Frequency    albuterol (PROVENTIL HFA, VENTOLIN HFA, PROAIR HFA) inhaler 2 Puff  2 Puff Inhalation Q4H PRN    atorvastatin (LIPITOR) tablet 40 mg  40 mg Oral QHS    carvediloL (COREG) tablet 6.25 mg  6.25 mg Oral BID WITH MEALS    acetaminophen (TYLENOL) tablet 650 mg  650 mg Oral Q4H PRN    Or    acetaminophen (TYLENOL) solution 650 mg  650 mg Per NG tube Q4H PRN    Or    acetaminophen (TYLENOL) suppository 650 mg  650 mg Rectal Q4H PRN    0.9% sodium chloride infusion  75 mL/hr IntraVENous CONTINUOUS    0.9% sodium chloride infusion  75 mL/hr IntraVENous CONTINUOUS    famotidine (PF) (PEPCID) 20 mg in 0.9% sodium chloride 10 mL injection  20 mg IntraVENous Q12H    0.9% sodium chloride infusion 250 mL  250 mL IntraVENous PRN    0.9% sodium chloride infusion 250 mL  250 mL IntraVENous PRN    meropenem (MERREM) 500 mg in 0.9% sodium chloride (MBP/ADV) 50 mL  500 mg IntraVENous Q8H   ______________________________________________________________________  EXPECTED LENGTH OF STAY: - - -  ACTUAL LENGTH OF STAY:          1               Sita Peter MD

## 2020-07-14 NOTE — ROUTINE PROCESS
TRANSFER - OUT REPORT:    Verbal report given to TABBY Cortes on Norberto Moya  being transferred to [facility] for routine progression of care       Report consisted of patients Situation, Background, Assessment and   Recommendations(SBAR). Information from the following report(s) SBAR, ED Summary and Recent Results was reviewed with the receiving nurse. Lines:   PICC Single Lumen 07/08/20 Right;Brachial (Active)       Peripheral IV 07/13/20 Left Antecubital (Active)   Site Assessment Clean, dry, & intact 07/13/20 1319   Phlebitis Assessment 0 07/13/20 1319   Infiltration Assessment 0 07/13/20 1319   Dressing Status Clean, dry, & intact 07/13/20 1319   Hub Color/Line Status Green 07/13/20 1319       Peripheral IV 07/13/20 Right Forearm (Active)   Site Assessment Clean, dry, & intact 07/13/20 1418   Phlebitis Assessment 0 07/13/20 1418   Infiltration Assessment 0 07/13/20 1418   Dressing Status Clean, dry, & intact 07/13/20 1418   Hub Color/Line Status Pink 07/13/20 1418   Action Taken Blood drawn 07/13/20 1418        Opportunity for questions and clarification was provided.       Patient transported with:   itzat

## 2020-07-14 NOTE — PROGRESS NOTES
Spiritual Care Assessment/Progress Note  [facility]      NAME: Valentina Acosta      MRN: [id number]  AGE: 68 y.o. SEX: female  Church Affiliation: [employer]   Language: English     7/14/2020     Total Time (in minutes): 5     Spiritual Assessment begun in [facility] IMCU through conversation with:         []Patient        [] Family    [] Friend(s)        Reason for Consult: Palliative Care, Initial/Spiritual Assessment     Spiritual beliefs: (Please include comment if needed)     [] Identifies with a angeline tradition:         [] Supported by a angeline community:            [] Claims no spiritual orientation:           [] Seeking spiritual identity:                [] Adheres to an individual form of spirituality:           [x] Not able to assess:                           Identified resources for coping:      [] Prayer                               [] Music                  [] Guided Imagery     [] Family/friends                 [] Pet visits     [] Devotional reading                         [x] Unknown     [] Other:                                             Interventions offered during this visit: (See comments for more details)    Patient Interventions: Crisis           Plan of Care:     [] Support spiritual and/or cultural needs    [] Support AMD and/or advance care planning process      [] Support grieving process   [] Coordinate Rites and/or Rituals    [] Coordination with community clergy   [] No spiritual needs identified at this time   [] Detailed Plan of Care below (See Comments)  [] Make referral to Music Therapy  [] Make referral to Pet Therapy     [] Make referral to Addiction services  [] Make referral to [facility]  [] Make referral to Spiritual Care Partner  [] No future visits requested        [x] Follow up visits as needed     Comments:  visit for initial spiritual assessment. Patient is on Covid-19 isolation precautions.   Patient's nurse was providing care to another patient and unavailable for information at this time. Met a provider on his way into the room and spoke briefly with him concerning the patient. Unable to complete visit at this time. Will continue to follow up as needed and upon request as able. Visited by Rev. Mele Childers MDiv, [facility], [facility]: [phone])

## 2020-07-14 NOTE — ACP (ADVANCE CARE PLANNING)
Advance Care Planning     Advance Care Planning Activator (Inpatient)  Conversation Note      Date of ACP Conversation: 07/14/20     Conversation Conducted with:   Patient with Khoa 51: Named in Advance Directive or Healthcare Power of Faye (name) Yuliana Schroeder    ACP Activator: Don 788 makes decisions on behalf of the incapacitated patient: Decision Maker is asked to consider and make decisions based on patient values, known preferences, or best interests. Health Care Decision Maker:    Current Designated Health Care Decision Maker:   Primary Decision Maker: Pal Christie - Other Relative - [phone]    Care Preferences    Ventilation: \"If you were in your present state of health and suddenly became very ill and were unable to breathe on your own, what would your preference be about the use of a ventilator (breathing machine) if it were available to you? \"  NO    \"If your health worsens and it becomes clear that your chance of recovery is unlikely, what would your preference be about the use of a ventilator (breathing machine) if it were available to you? \" NO    Resuscitation  \"CPR works best to restart the heart when there is a sudden event, like a heart attack, in someone who is otherwise healthy. Unfortunately, CPR does not typically restart the heart for people who have serious health conditions or who are very sick. \"     \"In the event your heart stopped as a result of an underlying serious health condition, would you want attempts to be made to restart your heart? NO    [] Yes  [] No   Educated Patient / Decision Maker regarding differences between Advance Directives and portable DNR orders.     Length of ACP Conversation in minutes:      Conversation Outcomes:  [x] ACP discussion completed  [x] Existing advance directive reviewed with patient; no changes to patient's previously recorded wishes     [] New Advance Directive completed   [] Portable Do Not Resuscitate prepared for Provider review and signature  [] POLST/POST/MOLST/MOST prepared for Provider review and signature      Follow-up plan:    [] Schedule follow-up conversation to continue planning  [] Referred individual to Provider for additional questions/concerns   [] Advised patient/agent/surrogate to review completed ACP document and update if needed with changes in condition, patient preferences or care setting     [x] This note routed to one or more involved healthcare providers

## 2020-07-14 NOTE — PROGRESS NOTES
Palliative attending    Chart reviewed patient readmitted to the hospital overnight with suspected CVA. Patient had been in the hospital from 6/30 to 7/8 secondary to sepsis/ESBL bacteremia. Patient currently a COVID-19 rule out but also in reviewing the chart, unable to fully participate in a goals of care discussion. Reviewed case management note and they have talked with patient's uzairew Cely Bland.  I attempted to call Mr. Jaz Gordillo but did not answer the phone and unable to leave a voicemail. Patient ultimately will need an MRI but this is waiting until COVID testing has returned. Our team will follow up tomorrow and hopefully COVID testing has returned negative and further testing can be done. We will also try to contact patient's medical POA/diana Bland again tomorrow.

## 2020-07-14 NOTE — PROGRESS NOTES
7/14/2020; 10:30 -   This CM is familiar with patient from her previous admission. Patient's listed emergency contact [place] Whick: [phone]) is her cousin and only living relative. Above listed phone number did not connect x 3. CM contacted [facility] Zackery C: [phone]) and determined correct phone number of: [phone]. TRANSITIONS OF CARE PLAN:   1. DESTINATION: [facility] LT  2. TRANSPORT: BLS  3. ADDITIONAL SUPPORT: cousin/mPOA, [facility] staff  4. DME: wheelchair, walker  5. HOME HEALTH: [facility]  6. CODE STATUS/AMD STATUS: DNR  7. FOLLOW UP APPOINTMENTS: PCP  8. STILL NEEDS: TTE, MRI, Neuro Consult, PT and OT evals, ABX, COVID pending    Reason for Readmission:     CVA, TIA         RUR Score/Risk Level:     20%; moderate    PCP: First and Last name:  Dr. Addi Hollis   Name of Practice: [facility]   Are you a current patient: Yes/No: YES   Approximate date of last visit: Recent   Can you participate in a virtual visit with your PCP: NO    Is a Care Conference indicated: Not at this time      Did you attend your follow up appointment (s): If not, why not: PCP is at [facility]         Resources/supports as identified by patient/family:    LTC at [address] [place] [address] facing patient (as identified by patient/family and CM):   Declining health      Finances/Medication cost?    Francine Conchita Barriga [address]   Transportation      BLS  Support system or lack thereof? Only living relative is her cousin/mPOA      Living arrangements? [address]  Self-care/ADLs/Cognition? Has become mostly dependent          Current Advanced Directive/Advance Care Plan:  DNR; on file           Plan for utilizing home health:   LTC             Transition of Care Plan:    Based on readmission, the patient's previous Plan of Care   has been evaluated and/or modified.  The current Transition of Care Plan is:       CM spoke with patient's Rob Patches, by phone. Plan will be for patient to return to [facility] once medically stable. CM updated attending and nursing of correct phone number. CM discussed IM Letter with mPOA and placed letter on hard chart. CM submitted referral to [facility] via [facility]. FOC is on hard chart. Medicare pt's mPOA has received, reviewed, and signed first IM letter informing them of their right to appeal the discharge. Signed copy has been placed on pt bedside chart. The Plan for Transition of Care is related to the following treatment goals: LTC    The Patient and/or patient representative mPOA Romayne Cloud was provided with a choice of provider and agrees   with the discharge plan. [x] Yes [] No    Freedom of choice list was provided with basic dialogue that supports the patient's individualized plan of care/goals, treatment preferences and shares the quality data associated with the providers.  [x] Yes [] No    Care Management Interventions  PCP Verified by CM: Yes(Dr. Abraham Dumont)  Palliative Care Criteria Met (RRAT>21 & CHF Dx)?: No  Mode of Transport at Discharge: \[facility]\""  Transition of Care Consult (CM Consult): [facility], Discharge Planning  MyChart Signup: Yes  Discharge Durable Medical Equipment: No(wheel chair, walker)  Health Maintenance Reviewed: Yes(CM spoke with patient's mPOA)  Physical Therapy Consult: Yes  Occupational Therapy Consult: Yes  Speech Therapy Consult: Yes  Current Support Network: Nursing Facility([address])  Confirm Follow Up Transport: Other (see comment)([facility])  The Patient and/or Patient Representative was Provided with a Choice of Provider and Agrees with the Discharge Plan?: Yes  Name of the Patient Representative Who was Provided with a Choice of Provider and Agrees with the Discharge Plan: [address] of Choice List was Provided with Basic Dialogue that Supports the Patient's Individualized Plan of Care/Goals, Treatment Preferences and Shares the Quality Data Associated with the Providers?: Yes   Resource Information Provided?: No  Discharge [facility]  [facility]  CRM: Ardyce Severance, MPH, [address] [place] [address]; Z: [phone]

## 2020-07-14 NOTE — PROGRESS NOTES
Renal Dosing/Monitoring  Medication: Famotidine   Current regimen:  20 mg IV every 12 hr  Recent Labs     07/13/20  1350   CREA 1.66*   BUN 49*     Estimated CrCl:  28.9 ml/min  Plan: Change to 20 mg IV every 24 hours  per [facility] P&T Committee Protocol with respect to renal function. Pharmacy will continue to monitor patient daily and will make dosage adjustments based upon changing renal function.     Guy Stockton, PharmD, BCPS

## 2020-07-14 NOTE — CONSULTS
[address]    Name:  Elliott Esparza  MR#:  [id number]  :  1940  ACCOUNT #:  [de-identified]  DATE OF SERVICE:  2020    REQUESTING PHYSICIAN:  Adelia    REASON FOR EVALUATION:  Stroke. HISTORY OF PRESENT ILLNESS:  The patient is a 68-year-old female who was recently admitted to the hospital and she was treated for sepsis and gram-negative [place] bacteremia. She was discharged to rehab facility on 2020. The patient was at [address] [place] [address] where she suddenly was noted to have slurring of speech and weakness on the left side. EMS were called and she was brought into the hospital.  She was found to be hypothermic with a temperature of 91.6 and was noted to have a hemoglobin of 6.4. She has been on Eliquis for atrial fibrillation and has chronic blood loss anemia and this has been held due to drop in hemoglobin. There were concerns of metabolic encephalopathy and underlying pneumonia on her recent admission as well. Stroke workup was also initiated to rule out stroke as a cause of speech disturbance. Detailed review of systems was difficult. PAST MEDICAL HISTORY:  As mentioned above. HOME MEDICATIONS:  Lipitor, hydralazine, Coreg, Tylenol, Bumex, Eliquis, Isordil. ALLERGIES:  NONE. SOCIAL HISTORY:  Former smoker. No alcohol or drug use. Lives at home. REVIEW OF SYSTEMS:  Difficult to obtain. PHYSICAL EXAMINATION:  GENERAL:  The patient is sitting in bed and in no acute distress. VITAL SIGNS:  Blood pressure 131/79, temperature 97.9, pulse is 98. NEUROLOGIC:  She appears to be sleeping but opens her eyes promptly to verbal command. Does not answer any questions. She appears to mouth some words but mumbles incomprehensibly. She was able to follow some commands such as raising her arms up and moved her toes. She was able to squeeze with both hands. Arm strength appears normal and was able to sustain her arms in the air.   She moans when her legs and feet are touched and was only weakly able to flex her knees. Sensory exam was difficult. Deep tendon reflexes hypoactive. Toes downgoing. Gait exam is deferred. HEART:  Regular rate. CHEST:  Clear. ABDOMEN:  Soft, nontender. Positive bowel sounds. EXTREMITIES:  Show edema distally in lower extremities, left worse than right. LABORATORY DATA:  CBC:  WBC 5.0, hemoglobin 7.6, hematocrit 27.6, platelets 701. Urinalysis is negative for infection. Chemistry:  Sodium 141, potassium 4.4, BUN 49, creatinine 1.66, AST 20, ALT 13. Lipid Profile:  Triglycerides 66, HDL 27, LDL 49.8. CTA of the head and neck showed 60% stenosis of the left internal carotid artery and 50% on the right. Echocardiogram shows moderate-to-severe global systolic dysfunction. EF 35%-40%. Aortic valve stenosis. Left atrium is moderately dilated. ASSESSMENT AND PLAN:  A 73-year-old female with history of atrial fibrillation, dyslipidemia, hypertension, recent hospitalization for sepsis and metabolic encephalopathy. She is admitted from rehab facility with inability to speak and left-sided weakness. The weakness seems to have resolved, but she still does not verbalize, has some incomprehensible mumbling. Agree with MRI brain to rule out stroke as a cause of expressive aphasia. CTA shows 60% stenosis of the left ICA and 50% on the right. We will check carotid duplex to correlate with the flow velocities. She is also being worked up for sepsis, hypothermia, acute hypoxic respiratory failure and has chronic renal insufficiency which may cause ongoing metabolic encephalopathy. We will follow up once imaging is completed. Her anticoagulation is currently on hold because of severe anemia. Thank you for this consultation.       MD YOON Chisholm/[id number]  D:  07/14/2020 12:16  T:  07/14/2020 13:44  JOB #:  [id number]

## 2020-07-14 NOTE — PROGRESS NOTES
Orders received, chart reviewed and patient is currently under investigation for COVID-19. In attempts to have only essential personnel enter the room and conserve PPE, we will confer with nursing and/or the referring provider to determine the most appropriate timing of our therapy intervention. discussed case with PT who reports patient following no commands with no meaningful interactions. Not currently appropriate for evaluation. Until this time, we will follow the patient peripherally to support nursing staff on an appropriate care plan. Thank you for your assistance. Ros Santana M.CD.  CCC-SLP

## 2020-07-14 NOTE — PROGRESS NOTES
2130: TRANSFER - IN REPORT:    Verbal report received from Mike RN(name) on Bull Pedro  being received from ED(unit) for routine progression of care      Report consisted of patients Situation, Background, Assessment and   Recommendations(SBAR). Information from the following report(s) SBAR, Kardex, ED Summary, Intake/Output, MAR, Recent Results, Cardiac Rhythm NSR and Alarm Parameters  was reviewed with the receiving nurse. Opportunity for questions and clarification was provided. Assessment completed upon patients arrival to unit and care assumed. 2215: Pt arrived to [facility]. Pt is unable to answer orientation questions, exhibits decreased command following, LUE weaker than RUE. 2330: Bedside and Verbal shift change report given to [address] [place] [address],[address] (oncoming nurse) by Kayla Romero RN (offgoing nurse). Report included the following information SBAR, Kardex, ED Summary, Procedure Summary, Intake/Output, MAR, Recent Results, Cardiac Rhythm NSR and Alarm Parameters .

## 2020-07-15 LAB
ANION GAP SERPL CALC-SCNC: 7 MMOL/L (ref 5–15)
BACTERIA SPEC CULT: NORMAL
BUN SERPL-MCNC: 48 MG/DL (ref 6–20)
BUN/CREAT SERPL: 25 (ref 12–20)
CALCIUM SERPL-MCNC: 9.3 MG/DL (ref 8.5–10.1)
CHLORIDE SERPL-SCNC: 114 MMOL/L (ref 97–108)
CO2 SERPL-SCNC: 23 MMOL/L (ref 21–32)
CREAT SERPL-MCNC: 1.89 MG/DL (ref 0.55–1.02)
ERYTHROCYTE [DISTWIDTH] IN BLOOD BY AUTOMATED COUNT: 22.5 % (ref 11.5–14.5)
GLUCOSE BLD STRIP.AUTO-MCNC: 100 MG/DL (ref 65–100)
GLUCOSE BLD STRIP.AUTO-MCNC: 90 MG/DL (ref 65–100)
GLUCOSE BLD STRIP.AUTO-MCNC: 97 MG/DL (ref 65–100)
GLUCOSE SERPL-MCNC: 86 MG/DL (ref 65–100)
HCT VFR BLD AUTO: 26.4 % (ref 35–47)
HGB BLD-MCNC: 7.4 G/DL (ref 11.5–16)
MAGNESIUM SERPL-MCNC: 2.1 MG/DL (ref 1.6–2.4)
MCH RBC QN AUTO: 20.7 PG (ref 26–34)
MCHC RBC AUTO-ENTMCNC: 28 G/DL (ref 30–36.5)
MCV RBC AUTO: 73.9 FL (ref 80–99)
NRBC # BLD: 0.07 K/UL (ref 0–0.01)
NRBC BLD-RTO: 1 PER 100 WBC
PLATELET # BLD AUTO: 342 K/UL (ref 150–400)
PMV BLD AUTO: 8.8 FL (ref 8.9–12.9)
POTASSIUM SERPL-SCNC: 4.8 MMOL/L (ref 3.5–5.1)
RBC # BLD AUTO: 3.57 M/UL (ref 3.8–5.2)
SARS-COV-2, COV2: NOT DETECTED
SERVICE CMNT-IMP: NORMAL
SODIUM SERPL-SCNC: 144 MMOL/L (ref 136–145)
SOURCE, COVRS: NORMAL
SPECIMEN SOURCE, FCOV2M: NORMAL
WBC # BLD AUTO: 7.3 K/UL (ref 3.6–11)

## 2020-07-15 PROCEDURE — 80048 BASIC METABOLIC PNL TOTAL CA: CPT

## 2020-07-15 PROCEDURE — 97161 PT EVAL LOW COMPLEX 20 MIN: CPT

## 2020-07-15 PROCEDURE — 65660000000 HC RM CCU STEPDOWN

## 2020-07-15 PROCEDURE — 74011000258 HC RX REV CODE- 258: Performed by: FAMILY MEDICINE

## 2020-07-15 PROCEDURE — 92610 EVALUATE SWALLOWING FUNCTION: CPT

## 2020-07-15 PROCEDURE — 74011250636 HC RX REV CODE- 250/636: Performed by: INTERNAL MEDICINE

## 2020-07-15 PROCEDURE — 82962 GLUCOSE BLOOD TEST: CPT

## 2020-07-15 PROCEDURE — 97530 THERAPEUTIC ACTIVITIES: CPT

## 2020-07-15 PROCEDURE — 65270000029 HC RM PRIVATE

## 2020-07-15 PROCEDURE — 85027 COMPLETE CBC AUTOMATED: CPT

## 2020-07-15 PROCEDURE — 74011250637 HC RX REV CODE- 250/637: Performed by: FAMILY MEDICINE

## 2020-07-15 PROCEDURE — 36415 COLL VENOUS BLD VENIPUNCTURE: CPT

## 2020-07-15 PROCEDURE — 83735 ASSAY OF MAGNESIUM: CPT

## 2020-07-15 PROCEDURE — 74011000250 HC RX REV CODE- 250: Performed by: INTERNAL MEDICINE

## 2020-07-15 PROCEDURE — 77010033678 HC OXYGEN DAILY

## 2020-07-15 PROCEDURE — 97165 OT EVAL LOW COMPLEX 30 MIN: CPT

## 2020-07-15 PROCEDURE — 74011250636 HC RX REV CODE- 250/636: Performed by: FAMILY MEDICINE

## 2020-07-15 RX ORDER — SODIUM CHLORIDE 0.9 % (FLUSH) 0.9 %
SYRINGE (ML) INJECTION
Status: COMPLETED
Start: 2020-07-15 | End: 2020-07-15

## 2020-07-15 RX ADMIN — MEROPENEM 500 MG: 500 INJECTION, POWDER, FOR SOLUTION INTRAVENOUS at 14:33

## 2020-07-15 RX ADMIN — MEROPENEM 500 MG: 500 INJECTION, POWDER, FOR SOLUTION INTRAVENOUS at 00:07

## 2020-07-15 RX ADMIN — Medication 10 ML: at 09:24

## 2020-07-15 RX ADMIN — ATORVASTATIN CALCIUM 40 MG: 40 TABLET, FILM COATED ORAL at 23:14

## 2020-07-15 RX ADMIN — MEROPENEM 500 MG: 500 INJECTION, POWDER, FOR SOLUTION INTRAVENOUS at 07:33

## 2020-07-15 RX ADMIN — FAMOTIDINE 20 MG: 10 INJECTION, SOLUTION INTRAVENOUS at 23:14

## 2020-07-15 RX ADMIN — POTASSIUM CHLORIDE, DEXTROSE MONOHYDRATE AND SODIUM CHLORIDE 50 ML/HR: 150; 5; 900 INJECTION, SOLUTION INTRAVENOUS at 11:28

## 2020-07-15 RX ADMIN — CARVEDILOL 6.25 MG: 6.25 TABLET, FILM COATED ORAL at 16:48

## 2020-07-15 RX ADMIN — MEROPENEM 500 MG: 500 INJECTION, POWDER, FOR SOLUTION INTRAVENOUS at 23:14

## 2020-07-15 RX ADMIN — CARVEDILOL 6.25 MG: 6.25 TABLET, FILM COATED ORAL at 07:35

## 2020-07-15 NOTE — PROGRESS NOTES
Orders received, chart reviewed and patient evaluated by physical therapy. Pending progression with skilled acute physical therapy, recommend:  Therapy up to 5 days/week in SNF setting    Recommend with nursing patient to complete as able in order to maintain strength, endurance and independence: OOB to chair 3x/day with 2 person A. Thank you for your assistance. Full evaluation to follow.

## 2020-07-15 NOTE — PROGRESS NOTES
1200: Patient has a PICC line that she came in with, never been used here. Paged Dr. Jolanta Collazo via [employer] to see if this can be removed. 1210: Spoke with Dr. Jolanta Collazo, he said to keep it in for now and it's okay to be used. 1218: Spoke with Harley Waller from [facility] team about the protocol on using the PICC line that the patient came in with. She confirmed the placement by looking at the X-ray and said if it's flushing fine and giving good blood return, it can be used. 1300: Patient's PICC line flushing fine and giving good blood return.

## 2020-07-15 NOTE — PROGRESS NOTES
1630: Patient was reswabbed for COVID19 yesterday morning and the final result has not been back yet. Spoke with the lab to follow up on this. He said we will get the final result in an hour and 15 minutes. 1810: Patient's second COVID swab came back negative. Notified Dr. Lady Ortiz, he said he will put the transfer order in.    [phone]: Patient is confused, has expressive aphasia, unable to answer questions for MRI screening. Called patient's mPOA Mr. Lucero Hussein, he said he has access to patient's health history but he is having hard time logging in to the system right now. He said he will try to get everything ready for us tomorrow if we call back tomorrow. Notified Beau Cruz at MRI that the screening couldn't be done today. Patient's mPOA also wanted an update from the doctor, Dr. Ishan Quiroz notified, he said he already left for the day so his colleague will call the mPOA for an update tomorrow. 1945: Verbal shift change report given to Michel Buckner (oncoming nurse) by Boo Dyer (offgoing nurse). Report included the following information SBAR.

## 2020-07-15 NOTE — PROGRESS NOTES
Hospitalist Progress Note  Odessa Boucher MD  Answering service: [phone] from in house phone      Date of Service:  7/15/2020  NAME:  Preet Shi  :  1940  MRN:  [id number]    Admission Summary:   80F p/w suspected CVA/TIA- hypothermia  Interval history / Subjective:   Patient seen and examined at bedside, looks better today, more alert, still not talking much though. Assessment & Plan:     #. Suspect TIA  - CT head: NAD, CTA head/neck: R ICA 50% stenosis, L ICA 60%. - TTE pending, MRI brain pending, Neuro following, , PT/OT/SLP eval. On Eliquis. LDL 50. A1c 6.4.  - looks debilitated, Palliative care following. #. Sepsis: POA, with hypothermia (possible dehydration/age vs infectious) and tachypnea. - noted recent ESBL bacteremia- has picc on meropenem. - Empiric iv abx 'meropenem', CXR: b/l lower lobe infiltrates/atelects. BCs NGTD, UA -ve. Lactic wnl    #. Acute metabolic Encephalopathy: likely 2nd to above, monitor, supportive Mg. #. Acute hypoxic Resp failure: supplemental O2 as needed, COVID test -ve. #. Anemia: chronic, Hemoccult -ve, monitor HB. Transfuse if hb <7. Check folate, vit B12. Iron profile  #. CKD3: monitor renal function, avoid nephrotoxics    Code status: DNR  DVT prophylaxis: SCDs  Care Plan discussed with: Patient/Family and Nurse  Disposition: TBD     Hospital Problems  Date Reviewed: 2020          Codes Class Noted POA    CVA (cerebral vascular accident) [facility]) ICD-10-CM: I63.9  ICD-9-CM: 434.91  2020 Unknown        TIA (transient ischemic attack) ICD-10-CM: G45.9  ICD-9-CM: 435.9  2020 Unknown            Review of Systems:   Pertinent items are mentioned in interval history. Vital Signs:    Last 24hrs VS reviewed since prior progress note.  Most recent are:  Visit Vitals  /71 (BP 1 Location: Left arm, BP Patient Position: At rest)   Pulse 73   Temp 97.2 °F (36.2 °C) Resp 22   Ht 5' 1\" (1.549 m)   Wt 94.4 kg (208 lb 1.8 oz)   SpO2 99%   BMI 39.32 kg/m²         Intake/Output Summary (Last 24 hours) at 7/15/2020 1212  Last data filed at 7/15/2020 1128  Gross per 24 hour   Intake 1362.5 ml   Output 400 ml   Net 962.5 ml        Physical Examination:   General:  Alert, confused, No acute distress, obese  Resp:  No accessory muscle use, no wheezes, few rhonchi  Abd:  Soft, non-tender, non-distended  Extremities:  No cyanosis or clubbing, no significant edema  Neuro:   no focal neuro deficits, follows commands   Psych:   not agitated. Data Review:    Review and/or order of clinical lab test  Review and/or order of tests in the radiology section of CPT  Review and/or order of tests in the medicine section of CPT  Labs:     Recent Labs     07/15/20  0450 07/14/20  0318   WBC 7.3 5.0   HGB 7.4* 7.6*   HCT 26.4* 27.6*    388     Recent Labs     07/15/20  0450 07/13/20  1350    141   K 4.8 4.4   * 109*   CO2 23 24   BUN 48* 49*   CREA 1.89* 1.66*   GLU 86 86   CA 9.3 9.4   MG 2.1  --      Recent Labs     07/13/20  1350   ALT 13   AP 98   TBILI 0.4   TP 7.4   ALB 2.0*   GLOB 5.4*     Recent Labs     07/13/20  1350   INR 1.7*   PTP 16.5*      No results for input(s): FE, TIBC, PSAT, FERR in the last 72 hours. No results found for: FOL, RBCF   No results for input(s): PH, PCO2, PO2 in the last 72 hours.   Recent Labs     07/13/20  1937 07/13/20  1350   TROIQ <0.05 <0.05     Lab Results   Component Value Date/Time    Cholesterol, total 90 07/14/2020 03:18 AM    HDL Cholesterol 27 07/14/2020 03:18 AM    LDL,Direct 163 (H) 12/05/2019 01:04 PM    LDL, calculated 49.8 07/14/2020 03:18 AM    Triglyceride 66 07/14/2020 03:18 AM    CHOL/HDL Ratio 3.3 07/14/2020 03:18 AM     Lab Results   Component Value Date/Time    Glucose (POC) 100 07/15/2020 12:06 AM    Glucose (POC) 77 07/14/2020 04:54 PM    Glucose (POC) 65 07/14/2020 12:39 PM    Glucose (POC) 70 07/14/2020 08:38 AM Lab Results   Component Value Date/Time    Color YELLOW/STRAW 07/13/2020 02:08 PM    Appearance CLEAR 07/13/2020 02:08 PM    Specific gravity 1.018 07/13/2020 02:08 PM    pH (UA) 5.0 07/13/2020 02:08 PM    Protein Negative 07/13/2020 02:08 PM    Glucose Negative 07/13/2020 02:08 PM    Ketone Negative 07/13/2020 02:08 PM    Bilirubin Negative 07/13/2020 02:08 PM    Urobilinogen 1.0 07/13/2020 02:08 PM    Nitrites Negative 07/13/2020 02:08 PM    Leukocyte Esterase Negative 07/13/2020 02:08 PM    Epithelial cells FEW 07/13/2020 02:08 PM    Bacteria Negative 07/13/2020 02:08 PM    WBC 0-4 07/13/2020 02:08 PM    RBC 0-5 07/13/2020 02:08 PM     Medications Reviewed:     Current Facility-Administered Medications   Medication Dose Route Frequency    albuterol (PROVENTIL HFA, VENTOLIN HFA, PROAIR HFA) inhaler 2 Puff  2 Puff Inhalation Q4H PRN    famotidine (PF) (PEPCID) 20 mg in 0.9% sodium chloride 10 mL injection  20 mg IntraVENous Q24H    dextrose 5% - 0.9% NaCl with KCl 20 mEq/L infusion  50 mL/hr IntraVENous CONTINUOUS    atorvastatin (LIPITOR) tablet 40 mg  40 mg Oral QHS    carvediloL (COREG) tablet 6.25 mg  6.25 mg Oral BID WITH MEALS    acetaminophen (TYLENOL) tablet 650 mg  650 mg Oral Q4H PRN    Or    acetaminophen (TYLENOL) solution 650 mg  650 mg Per NG tube Q4H PRN    Or    acetaminophen (TYLENOL) suppository 650 mg  650 mg Rectal Q4H PRN    0.9% sodium chloride infusion 250 mL  250 mL IntraVENous PRN    0.9% sodium chloride infusion 250 mL  250 mL IntraVENous PRN    meropenem (MERREM) 500 mg in 0.9% sodium chloride (MBP/ADV) 50 mL  500 mg IntraVENous Q8H   ______________________________________________________________________  EXPECTED LENGTH OF STAY: 2d 0h  ACTUAL LENGTH OF STAY:          2               Marion Knight MD

## 2020-07-15 NOTE — PROGRESS NOTES
Bedside shift change report given to Mesha Boland RN (oncoming nurse) by Dhara Gillespie (offgoing nurse). Report included the following information SBAR, Kardex, ED Summary, Intake/Output, MAR, Accordion, Recent Results and Cardiac Rhythm a fib. Last 3 Recorded Weights in this Encounter    07/13/20 1515 07/14/20 0045 07/15/20 0426   Weight: 94 kg (207 lb 3.7 oz) 92.1 kg (203 lb 0.7 oz) 94.4 kg (208 lb 1.8 oz)       0650: 102 beat run of v-tac. Levorn So, NP notified. No new orders received. Advised to give 0800 Coreg at 0730. Will continue to monitor. 2020: 10 beat run of v-tac observed. Levorn So, NP notified. Order received to continue to monitor, morning labs ordered. Problem: Falls - Risk of  Goal: *Absence of Falls  Description: Document Elsi Vivian Fall Risk and appropriate interventions in the flowsheet. Outcome: Progressing Towards Goal  Note: Fall Risk Interventions:  Mobility Interventions: Communicate number of staff needed for ambulation/transfer    Mentation Interventions: Adequate sleep, hydration, pain control, Evaluate medications/consider consulting pharmacy, Reorient patient, More frequent rounding, Toileting rounds, Update white board    Medication Interventions: Evaluate medications/consider consulting pharmacy, Teach patient to arise slowly    Elimination Interventions: Call light in reach, Patient to call for help with toileting needs, Toileting schedule/hourly rounds    Problem: Pressure Injury - Risk of  Goal: *Prevention of pressure injury  Description: Document Pratik Scale and appropriate interventions in the flowsheet. Outcome: Progressing Towards Goal  Note: Pressure Injury Interventions:  Sensory Interventions: Assess changes in LOC, Assess need for specialty bed, Check visual cues for pain, Float heels, Keep linens dry and wrinkle-free, Minimize linen layers, Turn and reposition approx.  every two hours (pillows and wedges if needed)    Moisture Interventions: Absorbent underpads, Apply protective barrier, creams and emollients, Check for incontinence Q2 hours and as needed, Internal/External urinary devices, Limit adult briefs, Minimize layers    Activity Interventions: Assess need for specialty bed, Chair cushion, PT/OT evaluation    Mobility Interventions: Assess need for specialty bed, Float heels, HOB 30 degrees or less, PT/OT evaluation, Turn and reposition approx. every two hours(pillow and wedges)    Nutrition Interventions: Document food/fluid/supplement intake, Discuss nutritional consult with provider    Friction and Shear Interventions: Apply protective barrier, creams and emollients, Feet elevated on foot rest, Minimize layers    Problem: Afib Pathway: Day 1  Goal: Activity/Safety  Outcome: Progressing Towards Goal  Goal: Consults, if ordered  Outcome: Progressing Towards Goal  Goal: Diagnostic Test/Procedures  Outcome: Progressing Towards Goal  Goal: Medications  Outcome: Progressing Towards Goal  Goal: Respiratory  Outcome: Progressing Towards Goal  Goal: *Hemodynamically stable  Outcome: Progressing Towards Goal  Goal: *Stable cardiac rhythm  Outcome: Progressing Towards Goal  Goal: *Lungs clear or at baseline  Outcome: Progressing Towards Goal     Problem: Gas Exchange - Impaired  Goal: Absence of hypoxia  Outcome: Progressing Towards Goal  Goal: Promote optimal lung function  Outcome: Progressing Towards Goal     Problem:  Body Temperature -  Risk of, Imbalanced  Goal: Ability to maintain a body temperature within defined limits  Outcome: Progressing Towards Goal     Problem: Isolation Precautions - Risk of Spread of Infection  Goal: Prevent transmission of infectious organism to others  Outcome: Progressing Towards Goal     Problem: Risk for Fluid Volume Deficit  Goal: Maintain normal serum potassium, sodium, calcium, phosphorus, and pH  Outcome: Progressing Towards Goal     Problem: Risk for Spread of Infection  Goal: Prevent transmission of infectious organism to others  Description: Prevent the transmission of infectious organisms to other patients, staff members, and visitors.   Outcome: Progressing Towards Goal

## 2020-07-15 NOTE — PROGRESS NOTES
Problem: Dysphagia (Adult)  Goal: *Acute Goals and Plan of Care (Insert Text)  Description: Speech pathology goals  Initiated 7/15/2020  1. Patient will tolerate meds crushed in puree with no overt s/s aspiration within 7 days  2. Patient will participate in re-evaluation of swallow function within 7 days  Outcome: [facility] EVALUATION  Patient: Federico Be (68 y.o. female)  Date: 7/15/2020  Primary Diagnosis: CVA (cerebral vascular accident) ([facility] [address] [I63.9]  TIA (transient ischemic attack) [G45.9]        Precautions: swallow       ASSESSMENT :  Based on the objective data described below, the patient presents with moderate-severe oral and moderate pharyngeal dysphagia characterized by impaired bolus acceptance with poor lip closure around both spoon and straw, slow oral prep, delayed posterior propulsion, anterior oral residue that passively spilled from anterior oral cavity, suspected delayed swallow initiation and decreased laryngeal elevation, and delayed throat clear with increased RR with thin liquids. Patient is at risk for aspiration secondary to severity of dysphagia in the setting of possible CVA and cognitive status/alertness. Patient will benefit from skilled intervention to address the above impairments. Patients rehabilitation potential is considered to be Fair     PLAN :  Recommendations and Planned Interventions:  -NPO except meds crushed in puree  -SLP to follow up tomorrow for swallowing re-evaluation as medically appropriate  Frequency/Duration: Patient will be followed by speech-language pathology 3 times a week to address goals. Discharge Recommendations: To Be Determined     SUBJECTIVE:   Patient stated her name, however intelligibility is poor. Oriented to person only. Poor command following.     OBJECTIVE:     Past Medical History:   Diagnosis Date    [facility]     Anemia     Chronic kidney disease     CKD (chronic kidney disease)     Hypercholesterolemia     Hyperlipidemia     Hypertension     MI (myocardial infarction) ([facility] [address]    No past surgical history on file. Prior Level of Function/Home Situation:      Diet prior to admission: regular/thin per chart review and NH records  Current Diet:  NPO   Cognitive and Communication Status:  Neurologic State: Alert  Orientation Level: Oriented to person, Disoriented to time, Disoriented to situation, Disoriented to place  Cognition: Decreased command following, Decreased attention/concentration           Oral Assessment:  Oral Assessment  Labial: Other (comment)(unable to assess; poor command following)  Dentition: Natural;Limited  Oral Hygiene: pooled anterior oral secretions  P.O. Trials:  Patient Position: upright in bed  Vocal quality prior to P.O.: Other (comment)(minimal verbalizations)  Consistency Presented: Ice chips; Thin liquid;Puree  How Presented: Spoon;Straw;Successive swallows     Bolus Acceptance: Impaired  Bolus Formation/Control: Impaired  Type of Impairment: Delayed;Premature spillage; Anterior;Spillage;Drooling  Propulsion: Delayed (# of seconds)  Oral Residue: Other (comment)(unable to assess due to poor command following)  Initiation of Swallow: Delayed (# of seconds)  Laryngeal Elevation: Decreased  Aspiration Signs/Symptoms: Delayed cough/throat clear; Increase in RR  Pharyngeal Phase Characteristics: Multiple swallows; Suspected pharyngeal residue  Effective Modifications: None  Cues for Modifications: None       Oral Phase Severity: Moderate-severe  Pharyngeal Phase Severity : Moderate    NOMS:   The NOMS functional outcome measure was used to quantify this patient's level of swallowing impairment.   Based on the NOMS, the patient was determined to be at level 2 for swallow function       NOMS Swallowing Levels:  Level 1 (CN): NPO  Level 2 (CM): NPO but takes consistency in therapy  Level 3 (CL): Takes less than 50% of nutrition p.o. and continues with nonoral feedings; and/or safe with mod cues; and/or max diet restriction  Level 4 (CK): Safe swallow but needs mod cues; and/or mod diet restriction; and/or still requires some nonoral feeding/supplements  Level 5 (CJ): Safe swallow with min diet restriction; and/or needs min cues  Level 6 (CI): Independent with p.o.; rare cues; usually self cues; may need to avoid some foods or needs extra time  Level 7 ([facility]): Independent for all p.o.  MOHAN. (2003). National Outcomes Measurement System (NOMS): Adult Speech-Language Pathology User's Guide. Pain:  Pain Scale 1: Adult Nonverbal Pain Scale  Pain Intensity 1: 0       After treatment:   Patient left in no apparent distress in bed, Call bell within reach and Nursing notified    COMMUNICATION/EDUCATION:   The patient's plan of care including recommendations, planned interventions, and recommended diet changes were discussed with: Registered nurse. Patient is unable to participate in goal setting and plan of care.     Thank you for this referral.  Mercy Jensen, AMRIT  Time Calculation: 12 mins

## 2020-07-15 NOTE — PROGRESS NOTES
Problem: Mobility Impaired (Adult and Pediatric)  Goal: *Acute Goals and Plan of Care (Insert Text)  Description:   FUNCTIONAL STATUS PRIOR TO ADMISSION: Pt poor historian. Receives assistance with ADLs and mobility. HOME SUPPORT PRIOR TO ADMISSION: Pt lives at [facility]. Physical Therapy Goals  Initiated 7/15/2020  1. Patient will move from supine to sit and sit to supine , scoot up and down, and roll side to side in bed with supervision/set-up within 7 day(s). 2.  Patient will transfer from bed to chair and chair to bed with minimal assistance/contact guard assist using the least restrictive device within 7 day(s). 3.  Patient will perform sit to stand with minimal assistance/contact guard assist within 7 day(s). 4.  Patient will ambulate with minimal assistance/contact guard assist for 15 feet with the least restrictive device within 7 day(s). 5.  Patient will improve Rubio Balance score by 7 points within 7 days. Outcome: Not Met   PHYSICAL THERAPY EVALUATION- NEURO POPULATION  Patient: Devaughn Gunter (68 y.o. female)  Date: 7/15/2020  Primary Diagnosis: CVA (cerebral vascular accident) ([facility] [address] [I63.9]  TIA (transient ischemic attack) [G45.9]        Precautions:   Fall, Skin(Droplet+)      ASSESSMENT  Based on the objective data described below, the patient presents with decreased activity tolerance, balance deficits, weakness, and impaired command following. MD requested to trial RA. Pt required max A to transfer toward EOB and tolerated sitting for approx 5 minutes. Pt initially required mod A for sitting balance 2/2 posterior trunk leaning and progressed to CGA. Pt prompted to stand and then began to lean excessively posterior requiring total A to return to supine for safety. Noted decrease in SpO2 to 87-90% requiring 1 L O2 to recover to 92-97%. RN aware.      Current Level of Function Impacting Discharge (mobility/balance): max A  x 2 persons for bed mobility    Functional Outcome Measure: The patient scored Total: 2/56 on the [facility] Assessment which is indicative of high fall risk. Other factors to consider for discharge: fall risk, from LTC, PUI     Patient will benefit from skilled therapy intervention to address the above noted impairments. PLAN :  Recommendations and Planned Interventions: bed mobility training, transfer training, gait training, therapeutic exercises, neuromuscular re-education, patient and family training/education, and therapeutic activities      Frequency/Duration: Patient will be followed by physical therapy:  3 times a week to address goals. Recommendation for discharge: (in order for the patient to meet his/her long term goals)  Therapy up to 5 days/week in SNF setting or return to LTC  Pt would require 2 person A if d/c home and HHPT    This discharge recommendation:  Has not yet been discussed the attending provider and/or case management    IF patient discharges home will need the following DME: hospital bed         SUBJECTIVE:   Patient stated Leonora Fleet.  pt giggling throughout session    OBJECTIVE DATA SUMMARY:   HISTORY:    Past Medical History:   Diagnosis Date    A-fib ([facility] [address]     Anemia     Chronic kidney disease     CKD (chronic kidney disease)     Hypercholesterolemia     Hyperlipidemia     Hypertension     MI (myocardial infarction) ([facility] [address]    No past surgical history on file.     Personal factors and/or comorbidities impacting plan of care: PMH    Home Situation  Home Environment: Long term care([facility])  Current DME Used/Available at Home: Wheelchair, Walker, rolling    EXAMINATION/PRESENTATION/DECISION MAKING:   Critical Behavior:  Neurologic State: Alert  Orientation Level: Oriented to person, Disoriented to place, Disoriented to time, Disoriented to situation  Cognition: Decreased attention/concentration, Decreased command following, Impaired decision making, Poor safety awareness  Safety/Judgement: Decreased awareness of environment, Decreased awareness of need for assistance, Decreased awareness of need for safety, Lack of insight into deficits  Hearing:     Skin:  intact  Edema: none noted  Range Of Motion:  AROM: Generally decreased, functional    Strength:    Strength: Generally decreased, functional    Tone & Sensation:     Sensation: (unable to assess appropriately d/t impaired cognition)    Coordination:  Coordination: Generally decreased, functional  Vision:   Tracking: Unable to test secondary due to decreased visual attention  Functional Mobility:  Bed Mobility:     Supine to Sit: Maximum assistance;Assist x2  Sit to Supine: Total assistance;Assist x2  Scooting:  Total assistance;Assist x2       Balance:   Sitting: Impaired  Sitting - Static: Poor (constant support)  Sitting - Dynamic: Poor (constant support)      Functional Measure  Rubio Balance Test:    Sitting to Standin  Standing Unsupported: 0  Sitting with Back Unsupported: 2  Standing to Sittin  Transfers: 0  Standing Unsupported with Eyes Closed: 0  Standing Unsupported with Feet Together: 0  Reach Forward with Outstretched Arm: 0   Object: 0  Turn to Look Over Shoulders: 0  Turn 360 Degrees: 0  Alternate Foot on Step/Stool: 0  Standing Unsupported One Foot in Front: 0  Stand on One Le  Total:          56=Maximum possible score;   0-20=High fall risk  21-40=Moderate fall risk   41-56=Low fall risk        Physical Therapy Evaluation Charge Determination   History Examination Presentation Decision-Making   HIGH Complexity :3+ comorbidities / personal factors will impact the outcome/ POC  LOW Complexity : 1-2 Standardized tests and measures addressing body structure, function, activity limitation and / or participation in recreation  MEDIUM Complexity : Evolving with changing characteristics  LOW Complexity : FOTO score of       Based on the above components, the patient evaluation is determined to be of the following complexity level: LOW       Activity Tolerance:   Poor  Please refer to the flowsheet for vital signs taken during this treatment. After treatment patient left in no apparent distress:   Supine in bed, Call bell within reach, and Side rails x 3    COMMUNICATION/EDUCATION:   The patients plan of care was discussed with: Occupational therapist and Registered nurse. Patient was educated regarding her deficit(s) of balance and weakness as this relates to her diagnosis of possible CVA. She demonstrated Guarded understanding as evidenced by head nodding. Patient and/or family was verbally educated on the BE FAST acronym for signs/symptoms of CVA and TIA. BE FAST was written on patient's communication board  for visual education and reinforcement. All questions answered with patient indicating guarded understanding. Fall prevention education was provided and the patient/caregiver indicated understanding., Patient/family have participated as able in goal setting and plan of care. , and Patient/family agree to work toward stated goals and plan of care.     Thank you for this referral.  Makayla Rhodes, PT, DPT   Time Calculation: 11 mins

## 2020-07-15 NOTE — PROGRESS NOTES
Problem: Falls - Risk of  Goal: *Absence of Falls  Description: Document Giselle Alcantara Fall Risk and appropriate interventions in the flowsheet. Outcome: Progressing Towards Goal  Note: Fall Risk Interventions:  Mobility Interventions: Patient to call before getting OOB, OT consult for ADLs, PT Consult for mobility concerns, PT Consult for assist device competence, Communicate number of staff needed for ambulation/transfer    Mentation Interventions: Door open when patient unattended, More frequent rounding, Reorient patient, Update white board    Medication Interventions: Evaluate medications/consider consulting pharmacy    Elimination Interventions: Toileting schedule/hourly rounds       Problem: Pressure Injury - Risk of  Goal: *Prevention of pressure injury  Description: Document Pratik Scale and appropriate interventions in the flowsheet. Outcome: Progressing Towards Goal  Note: Pressure Injury Interventions:  Sensory Interventions: Assess changes in LOC, Assess need for specialty bed, Check visual cues for pain, Float heels, Keep linens dry and wrinkle-free, Maintain/enhance activity level, Minimize linen layers, Turn and reposition approx. every two hours (pillows and wedges if needed)    Moisture Interventions: Absorbent underpads, Internal/External urinary devices, Check for incontinence Q2 hours and as needed, Minimize layers    Activity Interventions: Pressure redistribution bed/mattress(bed type), PT/OT evaluation    Mobility Interventions: HOB 30 degrees or less, PT/OT evaluation, Turn and reposition approx.  every two hours(pillow and wedges)    Nutrition Interventions: Document food/fluid/supplement intake, Offer support with meals,snacks and hydration    Friction and Shear Interventions: HOB 30 degrees or less, Lift sheet       Problem: Anemia Care Plan (Adult and Pediatric)  Goal: *Labs within defined limits  Outcome: Progressing Towards Goal  Goal: *Tolerates increased activity  Outcome: Progressing Towards Goal     Problem: Risk for Spread of Infection  Goal: Prevent transmission of infectious organism to others  Description: Prevent the transmission of infectious organisms to other patients, staff members, and visitors.   Outcome: Progressing Towards Goal

## 2020-07-15 NOTE — PROGRESS NOTES
Neurology Progress Note    Patient ID:  Karol Michelle  [id number]  68 y.o.  1940    Chief Complaint:    Subjective:     72-year-old female with history of atrial fibrillation, dyslipidemia, hypertension, recent hospitalization for sepsis and metabolic encephalopathy whoi presented with inability to speak and left side weakness. mentation  unchanged     Objective: All records in [facility] reviewed and noted    ROS:  Unable to obtain    Meds:  Current Facility-Administered Medications   Medication Dose Route Frequency    albuterol (PROVENTIL HFA, VENTOLIN HFA, PROAIR HFA) inhaler 2 Puff  2 Puff Inhalation Q4H PRN    famotidine (PF) (PEPCID) 20 mg in 0.9% sodium chloride 10 mL injection  20 mg IntraVENous Q24H    dextrose 5% - 0.9% NaCl with KCl 20 mEq/L infusion  50 mL/hr IntraVENous CONTINUOUS    atorvastatin (LIPITOR) tablet 40 mg  40 mg Oral QHS    carvediloL (COREG) tablet 6.25 mg  6.25 mg Oral BID WITH MEALS    acetaminophen (TYLENOL) tablet 650 mg  650 mg Oral Q4H PRN    Or    acetaminophen (TYLENOL) solution 650 mg  650 mg Per NG tube Q4H PRN    Or    acetaminophen (TYLENOL) suppository 650 mg  650 mg Rectal Q4H PRN    0.9% sodium chloride infusion 250 mL  250 mL IntraVENous PRN    0.9% sodium chloride infusion 250 mL  250 mL IntraVENous PRN    meropenem (MERREM) 500 mg in 0.9% sodium chloride (MBP/ADV) 50 mL  500 mg IntraVENous Q8H       Imaging:  CT Results (most recent):  Results from Hospital Encounter encounter on 07/13/20   CT CODE NEURO PERF W CBF    Narrative *PRELIMINARY REPORT*    CT perfusion imaging demonstrates no asymmetric defect. CTA of the head and neck mild calcific and soft tissue plaquing right internal  carotid artery at the bifurcation. There is moderate calcific plaquing origin of the left internal carotid artery. Intracranial circulation is intact. Internal carotid arteries traverse aerated  temporal air cells    Basilar artery is intact.  No intracranial aneurysm or occlusion identified. Preliminary report was provided by Dr. Jaguar Oreilly, the on-call radiologist, at 2:00 PM    Final report to follow. *END PRELIMINARY REPORT*    CLINICAL HISTORY: Left-sided weakness, slurred speech. EXAMINATION:  CT ANGIOGRAPHY HEAD AND NECK, CT PERFUSION    COMPARISON: CT head June 30, 2020    TECHNIQUE:  Following the uneventful administration of iodinated contrast  material, axial CT angiography of the head and neck was performed. Delayed axial  images through the head were also obtained. Coronal and sagittal reconstructions  were obtained. Manual postprocessing of images was performed. 3-D  Sagittal  maximal intensity projection images were obtained. 3-D Coronal maximal  intensity projections were obtained. CT brain perfusion was performed with  generation of hemodynamic maps of multiple parameters, including cerebral blood  flow, cerebral blood volume, mean transit time, and TMAX. CT dose reduction was  achieved through use of a standardized protocol tailored for this examination  and automatic exposure control for dose modulation. Adaptive statistical  iterative reconstruction (ASIR) was utilized. FINDINGS:    Delayed contrast-enhanced head CT:    The ventricles are midline without hydrocephalus. There is no acute intra or  extra-axial hemorrhage. Moderate periventricular white matter disease. Prominent  ventricles and sulci indicative of involutional changes. The basal cisterns are  clear. The paranasal sinuses are clear. CTA NECK:    Great vessels: Normal arch anatomy with the origins patent. Right subclavian artery: Patent    Left subclavian artery: Patent     Right common carotid artery: Patent     Left common carotid artery: Patent     Cervical right internal carotid artery: Calcified and noncalcified plaque in the  proximal right internal carotid artery with 50% stenosis by NASCET criteria.     Cervical left internal carotid artery: Calcified and noncalcified plaque in the  proximal left internal carotid artery with 60% stenosis by NASCET criteria. Right vertebral artery: Calcified plaque at the origin with at least moderate  stenosis. Left vertebral artery: Patent    Small right pleural effusion, partially imaged. Lung apices demonstrate mosaic  attenuation suggesting small airways disease. No thyroid nodule or cervical  lymphadenopathy. CTA HEAD:    Right cavernous internal carotid artery: Calcified plaque in the cavernous  carotid artery without significant stenosis. Left cavernous internal carotid artery: Patent    Anterior cerebral arteries: Hypoplastic left A1 segment. Patent bilateral A2  segments. Anterior communicating artery: Patent    Right middle cerebral artery: Patent    Left middle cerebral artery: Patent    Posterior communicating arteries: Patent    Posterior cerebral arteries: Patent    Basilar artery: Patent    Distal vertebral arteries: Patent    No evidence for intracranial aneurysm or hemodynamically significant stenosis. CT Perfusion:  No perfusion defect. Perfusion data is somewhat limited by technical factors. Impression IMPRESSION:  1. No evidence of large vessel occlusion. 2.  Chronic parenchymal changes consistent with atrophy and small vessel  ischemia. 3.  50% stenosis of the right internal carotid artery and 60% stenosis of the  left internal carotid artery. 4.  Partially imaged small right pleural effusion.        Lab Review   Recent Results (from the past 24 hour(s))   GLUCOSE, POC    Collection Time: 07/14/20 12:39 PM   Result Value Ref Range    Glucose (POC) 65 65 - 100 mg/dL    Performed by Alexa Caicedo    GLUCOSE, POC    Collection Time: 07/14/20  4:54 PM   Result Value Ref Range    Glucose (POC) 77 65 - 100 mg/dL    Performed by Francine [facility] [address], POC    Collection Time: 07/15/20 12:06 AM   Result Value Ref Range    Glucose (POC) 100 65 - 100 mg/dL    Performed by Mackenzie Mcfadden    CBC W/O DIFF Collection Time: 07/15/20  4:50 AM   Result Value Ref Range    WBC 7.3 3.6 - 11.0 K/uL    RBC 3.57 (L) 3.80 - 5.20 M/uL    HGB 7.4 (L) 11.5 - 16.0 g/dL    HCT 26.4 (L) 35.0 - 47.0 %    MCV 73.9 (L) 80.0 - 99.0 FL    MCH 20.7 (L) 26.0 - 34.0 PG    MCHC 28.0 (L) 30.0 - 36.5 g/dL    RDW 22.5 (H) 11.5 - 14.5 %    PLATELET 908 103 - 175 K/uL    MPV 8.8 (L) 8.9 - 12.9 FL    NRBC 1.0 (H) 0  WBC    ABSOLUTE NRBC 0.07 (H) 0.00 - 8.20 K/uL   METABOLIC PANEL, BASIC    Collection Time: 07/15/20  4:50 AM   Result Value Ref Range    Sodium 144 136 - 145 mmol/L    Potassium 4.8 3.5 - 5.1 mmol/L    Chloride 114 (H) 97 - 108 mmol/L    CO2 23 21 - 32 mmol/L    Anion gap 7 5 - 15 mmol/L    Glucose 86 65 - 100 mg/dL    BUN 48 (H) 6 - 20 MG/DL    Creatinine 1.89 (H) 0.55 - 1.02 MG/DL    BUN/Creatinine ratio 25 (H) 12 - 20      GFR est AA 31 (L) >60 ml/min/1.73m2    GFR est non-AA 26 (L) >60 ml/min/1.73m2    Calcium 9.3 8.5 - 10.1 MG/DL   MAGNESIUM    Collection Time: 07/15/20  4:50 AM   Result Value Ref Range    Magnesium 2.1 1.6 - 2.4 mg/dL       Exam:  Visit Vitals  BP (!) 151/98 (BP 1 Location: Left arm, BP Patient Position: At rest)   Pulse 73   Temp 96.8 °F (36 °C)   Resp 22   Ht 5' 1\" (1.549 m)   Wt 94.4 kg (208 lb 1.8 oz)   SpO2 100%   BMI 39.32 kg/m²     Gen: Well developed  CV: RRR  Neuro:  Responses to verbal command, not following commands. No answering question.  Will mumble and moan   CN II-XII: PERRL, EOMI, face symmetric, tongue/palate midline  Motor: move all extremities   Gait:defered     Assessment:     Patient Active Problem List   Diagnosis Code    HTN (hypertension) I10    GERD (gastroesophageal reflux disease) K21.9    Mixed hyperlipidemia E78.2    Valvular disease I38    Paroxysmal A-fib (HCC) I48.0    Biventricular failure (HCC) I50.82    Obesity, morbid (HCC) E66.01    Infiltrating ductal carcinoma of breast, left ([address] C50.912    Sepsis ([address] A41.9    CVA (cerebral vascular accident) ([address] I63.9    TIA (transient ischemic attack) G45.9     Plan:    1.)Inability to speak and left sided weakness   -MRI of the brain, pending due to COVID rule out    -CUS, pending      Signed:  Magda Bailey NP  7/15/2020  11:08 AM

## 2020-07-15 NOTE — PROGRESS NOTES
Problem: Self Care Deficits Care Plan (Adult)  Goal: *Acute Goals and Plan of Care (Insert Text)  Description:   FUNCTIONAL STATUS PRIOR TO ADMISSION: Patient admitted from [address] [place] [address] where she received assist for ADLs and mobility, poor historian. HOME SUPPORT: Resides at [address] [place] [address] 7/15/2020  1. Patient will perform self-feeding with moderate assistance  within 7 day(s). 2.  Patient will follow simple 1 step commands with 50% accuracy within 7 day(s). 3.  Patient will perform grooming with minimal assistance/contact guard assist within 7 day(s). 4.  Patient will perform rolling in supine for toilet transfers with maximal assistance x1 within 7 day(s). 5.  Patient will participate in upper extremity therapeutic exercise/activities with moderate assistance for 5 minutes within 7 day(s). Outcome: Not Met     OCCUPATIONAL THERAPY EVALUATION  Patient: Carly Moy (68 y.o. female)  Date: 7/15/2020  Primary Diagnosis: CVA (cerebral vascular accident) ([facility] [address] [I63.9]  TIA (transient ischemic attack) [G45.9]        Precautions: Fall, Skin(Droplet+), Bed Alarm    ASSESSMENT  Based on the objective data described below, the patient presents with impaired balance, activity tolerance, command following, coordination, LUE/LUE initiation/ROM/strength, safety awareness, and strength s/p admission for slurred speech & increased L sided weakness, CT negative for acute process, awaiting MRI. PTA, patient resides in LTC where she receives assist for ADLs and mobility. She now presents with 10% command following with MAX multimodal cues for follow through, initially responding with intermittent appropriateness to questions, decreased with activity. She was able to briefly sit EOB with VSS on RA, however with cues for midline sitting balance, patient increasingly resisting therapists, pushing posteriorly.  Returned to supine with Total A x2 d/t safety concern, brief desat to 8-89%, 1L NC donned with VSS. LUE/LLE deficits present, however difficult to assess d/t impaired cognition. Recommend d/c back to LTC when medically cleared. Current Level of Function Impacting Discharge (ADLs/self-care): Total A for ADLs, Max-Total A x2 for bed mobility    Functional Outcome Measure: The patient scored Total: 0/100 on the Barthel Index outcome measure which is indicative of 100% impaired ability to care for basic self needs/dependency on others; inferred 100% dependency on others for instrumental ADLs. Other factors to consider for discharge: impaired cognition, PMH, L sided deficits     Patient will benefit from skilled therapy intervention to address the above noted impairments. PLAN :  Recommendations and Planned Interventions: self care training, functional mobility training, therapeutic exercise, balance training, visual/perceptual training, therapeutic activities, cognitive retraining, endurance activities, neuromuscular re-education, patient education, home safety training, and family training/education    Frequency/Duration: Patient will be followed by occupational therapy 3 times a week to address goals. Recommendation for discharge: (in order for the patient to meet his/her long term goals)  Return to LTC    This discharge recommendation:  Has been made in collaboration with the attending provider and/or case management    IF patient discharges home will need the following DME: To be determined (TBD)         SUBJECTIVE:   Patient stated I'm not at the hospital.     OBJECTIVE DATA SUMMARY:   HISTORY:   Past Medical History:   Diagnosis Date    A-fib ([facility] [address]     Anemia     Chronic kidney disease     CKD (chronic kidney disease)     Hypercholesterolemia     Hyperlipidemia     Hypertension     MI (myocardial infarction) ([facility] [address]    No past surgical history on file.     Expanded or extensive additional review of patient history:     [address] [place] [address] Environment: Long term care([facility])    EXAMINATION OF PERFORMANCE DEFICITS:  Cognitive/Behavioral Status:  Neurologic State: Alert  Orientation Level: Oriented to person;Disoriented to place; Disoriented to time;Disoriented to situation  Cognition: Decreased attention/concentration;Decreased command following; Impaired decision making;Poor safety awareness  Perception: Cues to maintain midline in sitting(posterior lean, increasing w/ patient resistance)  Perseveration: No perseveration noted  Safety/Judgement: Decreased awareness of environment;Decreased awareness of need for assistance;Decreased awareness of need for safety; Lack of insight into deficits    Skin: Appears intact    Edema: None noted    Hearing:       Vision/Perceptual:    Tracking: Unable to test secondary due to decreased visual attention                                Range of Motion:  AROM: Generally decreased, functional                         Strength:  Strength: Generally decreased, functional                Coordination:  Coordination: Generally decreased, functional  Fine Motor Skills-Upper: Left Impaired;Right Impaired(moreso impaired d/t cognition)    Gross Motor Skills-Upper: Left Impaired;Right Impaired(moreso impaired d/t cognition)    Tone & Sensation:     Sensation: (unable to assess appropriately d/t impaired cognition)                      Balance:  Sitting: Impaired  Sitting - Static: Poor (constant support)  Sitting - Dynamic: Poor (constant support)    Functional Mobility and Transfers for ADLs:  Bed Mobility:  Supine to Sit: Maximum assistance;Assist x2  Sit to Supine: Total assistance;Assist x2  Scooting: Total assistance;Assist x2    Transfers: Toilet Transfer : Maximum assistance;Assist x2(infer rolling in supine)    ADL Assessment:  Feeding: Maximum assistance    Oral Facial Hygiene/Grooming: Total assistance    Bathing: Total assistance    Upper Body Dressing: Total assistance    Lower Body Dressing:  Total assistance    Toileting: Total assistance                ADL Intervention and task modifications:     Lower Body Dressing Assistance  Dressing Assistance: Total assistance(dependent)  Socks: Total assistance (dependent)  Leg Crossed Method Used: No  Position Performed: Supine  Cues: Physical assistance;Visual cues provided;Verbal cues provided; Tactile cues provided    Toileting  Toileting Assistance: Total assistance(dependent)(brief, purewick, AMS)    Cognitive Retraining  Safety/Judgement: Decreased awareness of environment;Decreased awareness of need for assistance;Decreased awareness of need for safety; Lack of insight into deficits    Therapeutic Exercise:  Seated EOB x2 minutes for midline orientation & balance tasks, posterior lean throughout, pushing posteriorly & resisting with increased time, hips sliding anteriorly therefore returned to supine with Total A x2. Upon returning supine, O2 sats decreasing to 87-89%, returned to 1L NC     Functional Measure:  Unable to complete Fugl Horowitz UE Assessment d/t impaired cognition, will follow up if/when appropriate    Barthel Index:    Bathin  Bladder: 0  Bowels: 0  Groomin  Dressin  Feedin  Mobility: 0  Stairs: 0  Toilet Use: 0  Transfer (Bed to Chair and Back): 0  Total: 0/100        The Barthel ADL Index: Guidelines  1. The index should be used as a record of what a patient does, not as a record of what a patient could do. 2. The main aim is to establish degree of independence from any help, physical or verbal, however minor and for whatever reason. 3. The need for supervision renders the patient not independent. 4. A patient's performance should be established using the best available evidence. Asking the patient, friends/relatives and nurses are the usual sources, but direct observation and common sense are also important. However direct testing is not needed.   5. Usually the patient's performance over the preceding 24-48 hours is important, but occasionally longer periods will be relevant. 6. Middle categories imply that the patient supplies over 50 per cent of the effort. 7. Use of aids to be independent is allowed. [place] Barthel, D.W. (7326). Functional evaluation: the Barthel Index. [address] [facility] (14)2. KAZ Nelson, [place]., Macie Choudhury., [place], [address] (1999). Measuring the change indisability after inpatient rehabilitation; comparison of the responsiveness of the Barthel Index and Functional [place] Measure. Journal of Neurology, Neurosurgery, and Psychiatry, 66(4), 473-993. Cisco Santana, N.J.A, KALEY Herman, & Ranae Lanes, M.A. (2004.) Assessment of post-stroke quality of life in cost-effectiveness studies: The usefulness of the Barthel Index and the EuroQoL-5D. Quality of Life Research, 15, 920-22         Occupational Therapy Evaluation Charge Determination   History Examination Decision-Making   LOW Complexity : Brief history review  MEDIUM Complexity : 3-5 performance deficits relating to physical, cognitive , or psychosocial skils that result in activity limitations and / or participation restrictions MEDIUM Complexity : Patient may present with comorbidities that affect occupational performnce. Miniml to moderate modification of tasks or assistance (eg, physical or verbal ) with assesment(s) is necessary to enable patient to complete evaluation       Based on the above components, the patient evaluation is determined to be of the following complexity level: LOW   Pain Rating:  Unable to appropriately verbalize    Activity Tolerance:   Poor  Please refer to the flowsheet for vital signs taken during this treatment. After treatment patient left in no apparent distress:    Supine in bed, Call bell within reach, Bed / chair alarm activated, and Side rails x 3    COMMUNICATION/EDUCATION:   The patients plan of care was discussed with: Physical therapist and Registered nurse.      Patient not appropriate for deficits & BE FAST education at this time d/t impaired cognition, will follow up as approrpriate    Patient is unable to participate in goal setting and plan of care. This patients plan of care is appropriate for delegation to DESTINY.     Thank you for this referral.  Timur Hilario, OTD, OTR/L  Time Calculation: 20 mins

## 2020-07-15 NOTE — PROGRESS NOTES
Occupational Therapy  07/15/20    Orders received, chart reviewed and patient evaluated by occupational therapy. Pending progression with skilled acute occupational therapy, recommend:  Therapy up to 5 days/week in SNF setting     Recommend with nursing patient to complete as able in order to maintain strength, endurance and independence: modified bed in chair position and rolling in supine for toileting with 2 assist. Thank you for your assistance. Full evaluation to follow.     Thank you,   [facility], OTD, OTR/L

## 2020-07-16 ENCOUNTER — APPOINTMENT (OUTPATIENT)
Dept: NON INVASIVE DIAGNOSTICS | Age: 80
DRG: 871 | End: 2020-07-16
Attending: FAMILY MEDICINE
Payer: MEDICARE

## 2020-07-16 ENCOUNTER — APPOINTMENT (OUTPATIENT)
Dept: VASCULAR SURGERY | Age: 80
DRG: 871 | End: 2020-07-16
Attending: PSYCHIATRY & NEUROLOGY
Payer: MEDICARE

## 2020-07-16 LAB
ECHO AO ROOT DIAM: 2.98 CM
ECHO AV AREA PEAK VELOCITY: 1.74 CM2
ECHO AV AREA VTI: 1.97 CM2
ECHO AV AREA/BSA PEAK VELOCITY: 0.9 CM2/M2
ECHO AV AREA/BSA VTI: 1 CM2/M2
ECHO AV MEAN GRADIENT: 2.66 MMHG
ECHO AV PEAK GRADIENT: 5.74 MMHG
ECHO AV PEAK VELOCITY: 119.64 CM/S
ECHO AV VTI: 18.78 CM
ECHO EST RA PRESSURE: 15 MMHG
ECHO LA AREA 4C: 23.7 CM2
ECHO LA MAJOR AXIS: 4.54 CM
ECHO LA MINOR AXIS: 2.38 CM
ECHO LA VOL 2C: 122.07 ML (ref 22–52)
ECHO LA VOL 4C: 71.26 ML (ref 22–52)
ECHO LA VOL BP: 101.72 ML (ref 22–52)
ECHO LA VOL/BSA BIPLANE: 53.28 ML/M2 (ref 16–28)
ECHO LA VOLUME INDEX A2C: 63.94 ML/M2 (ref 16–28)
ECHO LA VOLUME INDEX A4C: 37.33 ML/M2 (ref 16–28)
ECHO LV EDV A2C: 149.39 ML
ECHO LV EDV A4C: 131.26 ML
ECHO LV EDV BP: 141.93 ML (ref 56–104)
ECHO LV EDV INDEX A4C: 68.8 ML/M2
ECHO LV EDV INDEX BP: 74.3 ML/M2
ECHO LV EDV NDEX A2C: 78.3 ML/M2
ECHO LV EJECTION FRACTION A2C: 39 PERCENT
ECHO LV EJECTION FRACTION A4C: 38 PERCENT
ECHO LV EJECTION FRACTION BIPLANE: 38.8 PERCENT (ref 55–100)
ECHO LV ESV A2C: 91.44 ML
ECHO LV ESV A4C: 81.05 ML
ECHO LV ESV BP: 86.93 ML (ref 19–49)
ECHO LV ESV INDEX A2C: 47.9 ML/M2
ECHO LV ESV INDEX A4C: 42.5 ML/M2
ECHO LV ESV INDEX BP: 45.5 ML/M2
ECHO LV INTERNAL DIMENSION DIASTOLIC: 5.07 CM (ref 3.9–5.3)
ECHO LV INTERNAL DIMENSION SYSTOLIC: 4.21 CM
ECHO LV IVSD: 1.21 CM (ref 0.6–0.9)
ECHO LV MASS 2D: 233.4 G (ref 67–162)
ECHO LV MASS INDEX 2D: 122.3 G/M2 (ref 43–95)
ECHO LV POSTERIOR WALL DIASTOLIC: 1.15 CM (ref 0.6–0.9)
ECHO LVOT DIAM: 2.01 CM
ECHO LVOT PEAK GRADIENT: 1.72 MMHG
ECHO LVOT PEAK VELOCITY: 65.39 CM/S
ECHO LVOT SV: 37.1 ML
ECHO LVOT VTI: 11.66 CM
ECHO PV PEAK INSTANTANEOUS GRADIENT SYSTOLIC: 3.26 MMHG
ECHO PV REGURGITANT MAX VELOCITY: 152.23 CM/S
ECHO RIGHT VENTRICULAR SYSTOLIC PRESSURE (RVSP): 62.07 MMHG
ECHO RV INTERNAL DIMENSION: 5.19 CM
ECHO RV TAPSE: 1.32 CM (ref 1.5–2)
ECHO TV REGURGITANT MAX VELOCITY: 343.04 CM/S
ECHO TV REGURGITANT PEAK GRADIENT: 47.07 MMHG
FOLATE SERPL-MCNC: 5.4 NG/ML (ref 5–21)
GLUCOSE BLD STRIP.AUTO-MCNC: 110 MG/DL (ref 65–100)
GLUCOSE BLD STRIP.AUTO-MCNC: 82 MG/DL (ref 65–100)
GLUCOSE BLD STRIP.AUTO-MCNC: 93 MG/DL (ref 65–100)
GLUCOSE BLD STRIP.AUTO-MCNC: 98 MG/DL (ref 65–100)
IRON SATN MFR SERPL: 5 % (ref 20–50)
IRON SERPL-MCNC: 10 UG/DL (ref 35–150)
LVOT MG: 0.89 MMHG
SERVICE CMNT-IMP: ABNORMAL
SERVICE CMNT-IMP: NORMAL
TIBC SERPL-MCNC: 221 UG/DL (ref 250–450)
VIT B12 SERPL-MCNC: 1800 PG/ML (ref 193–986)

## 2020-07-16 PROCEDURE — 82746 ASSAY OF FOLIC ACID SERUM: CPT

## 2020-07-16 PROCEDURE — 77010033678 HC OXYGEN DAILY

## 2020-07-16 PROCEDURE — 74011000258 HC RX REV CODE- 258: Performed by: FAMILY MEDICINE

## 2020-07-16 PROCEDURE — 74011250636 HC RX REV CODE- 250/636: Performed by: INTERNAL MEDICINE

## 2020-07-16 PROCEDURE — 82962 GLUCOSE BLOOD TEST: CPT

## 2020-07-16 PROCEDURE — 74011250636 HC RX REV CODE- 250/636: Performed by: FAMILY MEDICINE

## 2020-07-16 PROCEDURE — 92526 ORAL FUNCTION THERAPY: CPT | Performed by: SPEECH-LANGUAGE PATHOLOGIST

## 2020-07-16 PROCEDURE — 36415 COLL VENOUS BLD VENIPUNCTURE: CPT

## 2020-07-16 PROCEDURE — 82607 VITAMIN B-12: CPT

## 2020-07-16 PROCEDURE — 65660000000 HC RM CCU STEPDOWN

## 2020-07-16 PROCEDURE — 83540 ASSAY OF IRON: CPT

## 2020-07-16 PROCEDURE — 96375 TX/PRO/DX INJ NEW DRUG ADDON: CPT

## 2020-07-16 PROCEDURE — 74011250637 HC RX REV CODE- 250/637: Performed by: FAMILY MEDICINE

## 2020-07-16 PROCEDURE — 93880 EXTRACRANIAL BILAT STUDY: CPT

## 2020-07-16 PROCEDURE — 74011250637 HC RX REV CODE- 250/637: Performed by: INTERNAL MEDICINE

## 2020-07-16 RX ORDER — FAMOTIDINE 20 MG/1
20 TABLET, FILM COATED ORAL EVERY 24 HOURS
Status: DISCONTINUED | OUTPATIENT
Start: 2020-07-16 | End: 2020-07-23 | Stop reason: HOSPADM

## 2020-07-16 RX ADMIN — APIXABAN 2.5 MG: 2.5 TABLET, FILM COATED ORAL at 22:26

## 2020-07-16 RX ADMIN — POTASSIUM CHLORIDE, DEXTROSE MONOHYDRATE AND SODIUM CHLORIDE 50 ML/HR: 150; 5; 900 INJECTION, SOLUTION INTRAVENOUS at 09:23

## 2020-07-16 RX ADMIN — CARVEDILOL 6.25 MG: 6.25 TABLET, FILM COATED ORAL at 18:01

## 2020-07-16 RX ADMIN — FAMOTIDINE 20 MG: 20 TABLET ORAL at 22:26

## 2020-07-16 RX ADMIN — APIXABAN 2.5 MG: 2.5 TABLET, FILM COATED ORAL at 12:13

## 2020-07-16 RX ADMIN — MEROPENEM 500 MG: 500 INJECTION, POWDER, FOR SOLUTION INTRAVENOUS at 22:27

## 2020-07-16 RX ADMIN — MEROPENEM 500 MG: 500 INJECTION, POWDER, FOR SOLUTION INTRAVENOUS at 15:12

## 2020-07-16 RX ADMIN — ATORVASTATIN CALCIUM 40 MG: 40 TABLET, FILM COATED ORAL at 22:26

## 2020-07-16 RX ADMIN — CARVEDILOL 6.25 MG: 6.25 TABLET, FILM COATED ORAL at 08:20

## 2020-07-16 RX ADMIN — MEROPENEM 500 MG: 500 INJECTION, POWDER, FOR SOLUTION INTRAVENOUS at 07:08

## 2020-07-16 NOTE — PROGRESS NOTES
Hospitalist Progress Note  Sid Burnette MD  Answering service: [phone] from in house phone      Date of Service:  2020  NAME:  Paula Weathers  :  1940  MRN:  [id number]    Admission Summary:   80F p/w suspected CVA/TIA- hypothermia  Interval history / Subjective:   Patient seen and examined at bedside Patient is awake and alert but difficult to understand her speech. She follows command. Assessment & Plan:     # Suspect TIA vs stroke   - CT head: NAD, CTA head/neck: R ICA 50% stenosis, L ICA 60%. - TTE pending, MRI brain pending  - PT/OT/SLP eval.   - On Eliquis reduced dose due to anemia   - LDL 50. A1c 6.4.  - looks debilitated, Palliative care following.  - Neuro following     # Sepsis: POA, with hypothermia (possible dehydration/age vs infectious) and tachypnea. - noted recent ESBL bacteremia- has picc on meropenem. - Empiric iv abx 'meropenem', CXR: b/l lower lobe infiltrates/atelects. BCs NGTD, UA -ve. Lactic wnl    # Acute metabolic Encephalopathy: likely 2nd to above, monitor, supportive Mg. # Acute hypoxic Resp failure: supplemental O2 as needed, COVID test -ve. # Anemia: chronic, Hemoccult -ve, monitor HB. Transfuse if hb <7. Check folate, vit B12. Iron profile  # CKD3: monitor renal function, avoid nephrotoxics    Code status: DNR  DVT prophylaxis: SCDs  Care Plan discussed with: Patient/Family and Nurse  Disposition: TBD     Hospital Problems  Date Reviewed: 2020          Codes Class Noted POA    CVA (cerebral vascular accident) [facility]) ICD-10-CM: I63.9  ICD-9-CM: 434.91  2020 Unknown        TIA (transient ischemic attack) ICD-10-CM: G45.9  ICD-9-CM: 435.9  2020 Unknown            Review of Systems:   Pertinent items are mentioned in interval history. Vital Signs:    Last 24hrs VS reviewed since prior progress note.  Most recent are:  Visit Vitals  /83   Pulse 88   Temp 97.7 °F (36.5 °C)   Resp 20   Ht 5' 1\" (1.549 m)   Wt 93 kg (205 lb)   SpO2 100%   BMI 38.73 kg/m²         Intake/Output Summary (Last 24 hours) at 7/16/2020 1133  Last data filed at 7/16/2020 8880  Gross per 24 hour   Intake 476.67 ml   Output 900 ml   Net -423.33 ml        Physical Examination:   General:  Alert, confused, No acute distress, obese  Resp:  No accessory muscle use, no wheezes, few rhonchi  Abd:  Soft, non-tender, non-distended  Extremities:  No cyanosis or clubbing, no significant edema  Neuro:   no focal neuro deficits, follows commands, strength symmetric   Psych:   not agitated. Data Review:     I personally reviewed labs and imaging     Labs:     Recent Labs     07/15/20  0450 07/14/20  0318   WBC 7.3 5.0   HGB 7.4* 7.6*   HCT 26.4* 27.6*    388     Recent Labs     07/15/20  0450 07/13/20  1350    141   K 4.8 4.4   * 109*   CO2 23 24   BUN 48* 49*   CREA 1.89* 1.66*   GLU 86 86   CA 9.3 9.4   MG 2.1  --      Recent Labs     07/13/20  1350   ALT 13   AP 98   TBILI 0.4   TP 7.4   ALB 2.0*   GLOB 5.4*     Recent Labs     07/13/20  1350   INR 1.7*   PTP 16.5*      Recent Labs     07/16/20  0500   TIBC 221*   PSAT 5*      Lab Results   Component Value Date/Time    Folate 5.4 07/16/2020 05:00 AM      No results for input(s): PH, PCO2, PO2 in the last 72 hours.   Recent Labs     07/13/20  1937 07/13/20  1350   TROIQ <0.05 <0.05     Lab Results   Component Value Date/Time    Cholesterol, total 90 07/14/2020 03:18 AM    HDL Cholesterol 27 07/14/2020 03:18 AM    LDL,Direct 163 (H) 12/05/2019 01:04 PM    LDL, calculated 49.8 07/14/2020 03:18 AM    Triglyceride 66 07/14/2020 03:18 AM    CHOL/HDL Ratio 3.3 07/14/2020 03:18 AM     Lab Results   Component Value Date/Time    Glucose (POC) 110 (H) 07/16/2020 06:53 AM    Glucose (POC) 93 07/16/2020 03:10 AM    Glucose (POC) 90 07/15/2020 06:15 PM    Glucose (POC) 97 07/15/2020 12:22 PM    Glucose (POC) 100 07/15/2020 12:06 AM     Lab Results Component Value Date/Time    Color YELLOW/STRAW 07/13/2020 02:08 PM    Appearance CLEAR 07/13/2020 02:08 PM    Specific gravity 1.018 07/13/2020 02:08 PM    pH (UA) 5.0 07/13/2020 02:08 PM    Protein Negative 07/13/2020 02:08 PM    Glucose Negative 07/13/2020 02:08 PM    Ketone Negative 07/13/2020 02:08 PM    Bilirubin Negative 07/13/2020 02:08 PM    Urobilinogen 1.0 07/13/2020 02:08 PM    Nitrites Negative 07/13/2020 02:08 PM    Leukocyte Esterase Negative 07/13/2020 02:08 PM    Epithelial cells FEW 07/13/2020 02:08 PM    Bacteria Negative 07/13/2020 02:08 PM    WBC 0-4 07/13/2020 02:08 PM    RBC 0-5 07/13/2020 02:08 PM     Medications Reviewed:     Current Facility-Administered Medications   Medication Dose Route Frequency    albuterol (PROVENTIL HFA, VENTOLIN HFA, PROAIR HFA) inhaler 2 Puff  2 Puff Inhalation Q4H PRN    famotidine (PF) (PEPCID) 20 mg in 0.9% sodium chloride 10 mL injection  20 mg IntraVENous Q24H    dextrose 5% - 0.9% NaCl with KCl 20 mEq/L infusion  50 mL/hr IntraVENous CONTINUOUS    atorvastatin (LIPITOR) tablet 40 mg  40 mg Oral QHS    carvediloL (COREG) tablet 6.25 mg  6.25 mg Oral BID WITH MEALS    acetaminophen (TYLENOL) tablet 650 mg  650 mg Oral Q4H PRN    Or    acetaminophen (TYLENOL) solution 650 mg  650 mg Per NG tube Q4H PRN    Or    acetaminophen (TYLENOL) suppository 650 mg  650 mg Rectal Q4H PRN    0.9% sodium chloride infusion 250 mL  250 mL IntraVENous PRN    0.9% sodium chloride infusion 250 mL  250 mL IntraVENous PRN    meropenem (MERREM) 500 mg in 0.9% sodium chloride (MBP/ADV) 50 mL  500 mg IntraVENous Q8H   ______________________________________________________________________  EXPECTED LENGTH OF STAY: 4d 19h  ACTUAL LENGTH OF STAY:          3               Ninoska Breaux MD

## 2020-07-16 NOTE — PROGRESS NOTES
Pt remains minimally responsive to questions although able to follow simple commands. Per SLP, pt to remain NPO with crushed meds. Unable to complete MRI as POA unsure of pt's history and pt is poor historian, unable to complete checklist. Attempt to wean O2, pt unable to sustain sats while sleeping. 100% on 2L.     Problem: Gas Exchange - Impaired  Goal: Absence of hypoxia  Outcome: Progressing Towards Goal     Problem: Patient Education: Go to Patient Education Activity  Goal: Patient/Family Education  Outcome: Not Progressing Towards Goal

## 2020-07-16 NOTE — PROGRESS NOTES
Clinical Pharmacy Note: IV to PO Automatic Conversion  Please note: Elva Siddiqui medication- famotidine has been changed from IV to PO based on the following critiera:    Patient is taking scheduled oral medications  Patient is tolerating tube feeds at goal rate or a full liquid, soft or regular diet    This IV to PO conversion is based on the [employer] approved automatic conversion policy for eligible patients. Please call with questions.     Osmel Gandhi, PharmD, BCPS

## 2020-07-16 NOTE — PROGRESS NOTES
Problem: Dysphagia (Adult)  Goal: *Acute Goals and Plan of Care (Insert Text)  Description: Speech pathology goals  Initiated 7/15/2020  1. Patient will tolerate meds crushed in puree with no overt s/s aspiration within 7 days  2. Patient will participate in re-evaluation of swallow function within 7 days  Outcome: [id number] TREATMENT  Patient: Preet Shi (68 y.o. female)  Date: 7/16/2020  Diagnosis: CVA (cerebral vascular accident) ([facility] [address] [I63.9]  TIA (transient ischemic attack) [G45.9]   <principal problem not specified>       Precautions: aspiration Fall, Skin, contact    ASSESSMENT:  Patient is tolerating meds crushed in applesauce per discussion with nsg. She presents with continued mod/severe oral and suspected moderate pharyngeal dysphagia characterized by decreased labial seal with anterior spillage, delayed and effortful manipulation and propulsion, decreased bolus formation and control with suspected premature spillage, mild oral residue with purees requiring multiple extra swallows to clear, delayed swallow initiation and reduced hyolaryngeal elevation/excursion via palpation. Delayed weak cough and increased RR noted with thin liquid trials. Rapid fatigue with purees with increased oral holding, increased oral residue and increased effort with acceptance/manipulation after only a few bites. PLAN:  Recommendations and Planned Interventions:  --continue NPO except meds crushed in purees. --will continue to follow for re-assessment of swallow function to determine safety to initiate PO diet as well as for language evaluation (ECHO tech arrived bedside so unable to complete this session)  Patient continues to benefit from skilled intervention to address the above impairments. Continue treatment per established plan of care.   Discharge Recommendations:  Eric Coyle and To Be Determined     SUBJECTIVE:   Patient alert, cooperative. Mumbled a few unintelligible phrases. OBJECTIVE:   Cognitive and Communication Status:  Neurologic State: Alert  Orientation Level: Unable to verbalize  Cognition: Decreased attention/concentration, Decreased command following  Perception: Cues to maintain midline in sitting  Perseveration: No perseveration noted  Safety/Judgement: Not assessed  Dysphagia Treatment:  Oral Assessment:  Oral Assessment  Labial: (unable to assess due to command following)  Dentition: Limited;Natural  Oral Hygiene: pooled anterior oral secretions  P.O. Trials:  Patient Position: upright in bed   Vocal quality prior to P.O.: No impairment(minimal verbalizations)  Consistency Presented: Ice chips;Puree; Thin liquid  How Presented: SLP-fed/presented;Spoon;Straw     Bolus Acceptance: Impaired  Bolus Formation/Control: Impaired  Type of Impairment: Anterior;Spillage;Delayed;Premature spillage;Drooling  Propulsion: Delayed (# of seconds)  Oral Residue: Less than 10% of bolus  Initiation of Swallow: Delayed (# of seconds)  Laryngeal Elevation: Decreased  Aspiration Signs/Symptoms: Weak cough; Increase in RR;Delayed cough/throat clear(with thin liquids)  Pharyngeal Phase Characteristics: Multiple swallows; Suspected pharyngeal residue  Effective Modifications: None  Cues for Modifications: Maximal  Comments: increased effort with purees after only a few bites    Oral Phase Severity: Moderate-severe  Pharyngeal Phase Severity : Moderate  Exercises:  Laryngeal Exercises:                                                                                                                                   Pain:  Pain Scale 1: Adult Nonverbal Pain Scale  Pain Intensity 1: 0       After treatment:   Patient left in no apparent distress in bed, Call bell within reach, and Nursing notified    COMMUNICATION/EDUCATION:   Patient was educated regarding her deficit(s) of dysphaghia as this relates to her diagnosis of CVA workup.   She demonstrated Poor understanding as evidenced by no response to education. The patient's plan of care including recommendations, planned interventions, and recommended diet changes were discussed with: Registered nurse. Dang Banda M.CD.  CCC-SLP  Time Calculation: 12 mins

## 2020-07-16 NOTE — CONSULTS
Palliative Medicine Consult  Isaak: [phone])    Patient Name: Teresa Tillman  YOB: 1940    Date of Initial Consult: 7/16/2020  Reason for Consult: Care decisions  Requesting Provider: Dr. Tracy Mejia   Primary Care Physician: Ambrocio Kendall MD     SUMMARY:   Teresa Tillman is a 68 y.o. with a past history of chronic kidney disease, hypertension, atrial fibrillation atrial fibrillation, hyperlipidemia who was admitted on 7/13/2020 from [facility] with a diagnosis of rule out stroke. Current medical issues leading to Palliative Medicine involvement include: Care decisions. Chart reviewed-patient is a 61-year-old female who had been in the hospital from 6-30-7/8 with debility/weakness. Ihsan Davidson She was sent to [facility] for further rehabilitation. Was readmitted to the hospital secondary to possible stroke. Had to have COVID testing done x2 which has returned negative. Today I was able to actually evaluate the patient who remains pleasantly confused. MRI still pending of the brain. Our team is been asked to see for goals of care. I was able to talk with her [address]  Patient had been living with him last year and seemed to be doing okay but then moved into a \"woman's home \"and then was not as compliant with medication or food. She had been limited to living on the first floor and then ultimately came back to the hospital.  She has been at [facility] for rehab and seem to be doing okay according to [facility]. Unfortunately, Donavan Samano has not really been able to see her much over the last couple months secondary to [place] and limited visitation. Social history patient without any children. Mr. Cesar Elias is one cousin that has remained in contact with her. PALLIATIVE DIAGNOSES:   1. Goals of care discussion  2. DNR discussion  3. Advance care planning  4. Rule out stroke-MRI pending  5. Suspect underlying dementia  6. Debility       PLAN:   1.  Met with patient who is pleasantly confused. Able to tell me her name but really cannot have a full discussion of why she is here. Does not appear in any acute distress. 2. Talked with Ella Shipman via phone. Reviewed the role of palliative medicine and then discussed current medical issues. Explained that the COVID testing has been negative. Explained that we are still waiting for MRI results but do have some concern that she may have had a stroke given her clinical findings. He did feel like she was declining when she was living at the prior home but felt like she was recovering some since being at [facility]. Unfortunately, visitation has been limited so he has not been able to see her. We discussed the possibility of being able to see her in the hospital now that her COVID testing has been negative. 3. Goals of carecontinue restorative measures and further testing. Will await MRI results and have ongoing discussions with Mr. Gauri Casiano  4. Advance care planningadvanced medical directive in the chart. Ella Shipman is primary medical power of . She has listed Beverly Sandovalks is secondaryPal is never met her but thinks this is another cousin. 5. CODE STATUS confirmed no attempts at resuscitation. We will need a durable DO NOT RESUSCITATE form signed by Mr. Gauri Casiano patient unable to have capacity to sign her own paperwork. 6. Symptom managementno acute symptoms for us to manage right now  7. Psychosocialappears to have good support from Mr. Bethany Bernabe other family. No spiritual concerns identified  8. Discussed with bedside nurse  9. Initial consult note routed to primary continuity provider and/or primary health care team members  10.  Communicated plan of care with: Palliative [facility] Team     GOALS OF CARE / TREATMENT PREFERENCES:     GOALS OF CARE:  Patient/Health Care Proxy Stated Goals: Rehabilitation    TREATMENT PREFERENCES:   Code Status: DNR    Advance Care Planning:  [x] [facility] Interdisciplinary Team has updated the ACP Navigator with Health Care Decision Maker and Patient Capacity      Primary Decision Maker: Katina Peguero - Other Relative - [phone]  Advance Care Planning 7/1/2020   Confirm Advance Directive Yes, on file             Other Instructions: Other:    As far as possible, the palliative care team has discussed with patient / health care proxy about goals of care / treatment preferences for patient. HISTORY:     History obtained from: vasiliy, belgica    CHIEF COMPLAINT: Garbled speech    HPI/SUBJECTIVE:    The patient is:   [x] Verbal and participatory  [] Non-participatory due to:   Patient is awake. Smiling. Difficult to understand her speech at times. Clinical Pain Assessment (nonverbal scale for severity on nonverbal patients):   Clinical Pain Assessment  Severity: 0     Activity (Movement): Lying quietly, normal position    Duration: for how long has pt been experiencing pain (e.g., 2 days, 1 month, years)  Frequency: how often pain is an issue (e.g., several times per day, once every few days, constant)     FUNCTIONAL ASSESSMENT:     Palliative Performance Scale (PPS):  PPS: 50       PSYCHOSOCIAL/SPIRITUAL SCREENING:     Palliative IDT has assessed this patient for cultural preferences / practices and a referral made as appropriate to needs (Cultural Services, Patient Advocacy, Ethics, etc.)    Any spiritual / Scientology concerns:  [] Yes /  [x] No    Caregiver Burnout:  [] Yes /  [x] No /  [] No Caregiver Present      Anticipatory grief assessment:   [x] Normal  / [] Maladaptive       ESAS Anxiety: Anxiety: 0    ESAS Depression: Depression: 0        REVIEW OF SYSTEMS:     Positive and pertinent negative findings in ROS are noted above in HPI. The following systems were [x] reviewed / [] unable to be reviewed as noted in HPI  Other findings are noted below.   Systems: constitutional, ears/nose/mouth/throat, respiratory, gastrointestinal, genitourinary, musculoskeletal, integumentary, neurologic, psychiatric, endocrine. Positive findings noted below. Modified ESAS Completed by: provider   Fatigue: 2 Drowsiness: 1   Depression: 0 Pain: 0   Anxiety: 0 Nausea: 0   Anorexia: 0 Dyspnea: 0     Constipation: No     Stool Occurrence(s): 1        PHYSICAL EXAM:     From RN flowsheet:  Wt Readings from Last 3 Encounters:   20 205 lb (93 kg)   20 205 lb 0.4 oz (93 kg)   20 206 lb 12.7 oz (93.8 kg)     Blood pressure 135/81, pulse 78, temperature 97.5 °F (36.4 °C), resp. rate 19, height 5' 0.98\" (1.549 m), weight 205 lb (93 kg), SpO2 100 %. Pain Scale 1: FLACC  Pain Intensity 1: 0                 Last bowel movement, if known:     Constitutional: Alert to person, no acute distress  Eyes: pupils equal, anicteric  ENMT: no nasal discharge, moist mucous membranes  Cardiovascular: regular rhythm, distal pulses intact  Respiratory: breathing not labored, symmetric  Gastrointestinal: soft non-tender, +bowel sounds  Musculoskeletal: no deformity, no tenderness to palpation  Skin: warm, dry  Neurologic: Will follow simple commands. Appears to have equal strength in the upper extremities, moving lower extremities  Psychiatric: full affect, no hallucinations   Other:       HISTORY:     Active Problems:    CVA (cerebral vascular accident) ([facility] [address] (2020)      TIA (transient ischemic attack) (2020)      Past Medical History:   Diagnosis Date    A-fib ([facility] [address]     Anemia     Chronic kidney disease     CKD (chronic kidney disease)     Hypercholesterolemia     Hyperlipidemia     Hypertension     MI (myocardial infarction) ([facility] [address]       No past surgical history on file. Family History   Problem Relation Age of Onset    No Known Problems Mother     No Known Problems Father       History reviewed, no pertinent family history.   Social History     Tobacco Use    Smoking status: Former Smoker     Last attempt to quit:      Years since quittin.5    Smokeless tobacco: Never Used   Substance Use Topics    Alcohol use: No     Alcohol/week: 0.0 standard drinks     No Known Allergies   Current Facility-Administered Medications   Medication Dose Route Frequency    apixaban (ELIQUIS) tablet 2.5 mg  2.5 mg Oral BID    famotidine (PEPCID) tablet 20 mg  20 mg Oral Q24H    albuterol (PROVENTIL HFA, VENTOLIN HFA, PROAIR HFA) inhaler 2 Puff  2 Puff Inhalation Q4H PRN    dextrose 5% - 0.9% NaCl with KCl 20 mEq/L infusion  50 mL/hr IntraVENous CONTINUOUS    atorvastatin (LIPITOR) tablet 40 mg  40 mg Oral QHS    carvediloL (COREG) tablet 6.25 mg  6.25 mg Oral BID WITH MEALS    acetaminophen (TYLENOL) tablet 650 mg  650 mg Oral Q4H PRN    Or    acetaminophen (TYLENOL) solution 650 mg  650 mg Per NG tube Q4H PRN    Or    acetaminophen (TYLENOL) suppository 650 mg  650 mg Rectal Q4H PRN    0.9% sodium chloride infusion 250 mL  250 mL IntraVENous PRN    0.9% sodium chloride infusion 250 mL  250 mL IntraVENous PRN    meropenem (MERREM) 500 mg in 0.9% sodium chloride (MBP/ADV) 50 mL  500 mg IntraVENous Q8H          LAB AND IMAGING FINDINGS:     Lab Results   Component Value Date/Time    WBC 7.3 07/15/2020 04:50 AM    HGB 7.4 (L) 07/15/2020 04:50 AM    PLATELET 670 65/10/7059 04:50 AM     Lab Results   Component Value Date/Time    Sodium 144 07/15/2020 04:50 AM    Potassium 4.8 07/15/2020 04:50 AM    Chloride 114 (H) 07/15/2020 04:50 AM    CO2 23 07/15/2020 04:50 AM    BUN 48 (H) 07/15/2020 04:50 AM    Creatinine 1.89 (H) 07/15/2020 04:50 AM    Calcium 9.3 07/15/2020 04:50 AM    Magnesium 2.1 07/15/2020 04:50 AM    Phosphorus 3.7 07/08/2020 01:55 AM      Lab Results   Component Value Date/Time    Alk.  phosphatase 98 07/13/2020 01:50 PM    Protein, total 7.4 07/13/2020 01:50 PM    Albumin 2.0 (L) 07/13/2020 01:50 PM    Globulin 5.4 (H) 07/13/2020 01:50 PM     Lab Results   Component Value Date/Time    INR 1.7 (H) 07/13/2020 01:50 PM    Prothrombin time 16.5 (H) 07/13/2020 01:50 PM      Lab Results   Component Value Date/Time    Iron 10 (L) 07/16/2020 05:00 AM    TIBC 221 (L) 07/16/2020 05:00 AM    Iron % saturation 5 (L) 07/16/2020 05:00 AM    Ferritin 46 06/30/2020 11:03 AM      No results found for: PH, PCO2, PO2  No components found for: GLPOC   No results found for: CPK, CKMB             Total time: 70  Counseling / coordination time, spent as noted above: 65  > 50% counseling / coordination?: yes    Prolonged service was provided for  []30 min   []75 min in face to face time in the presence of the patient, spent as noted above. Time Start:   Time End:   Note: this can only be billed with 65281 (initial) or 32996 (follow up). If multiple start / stop times, list each separately.

## 2020-07-16 NOTE — PROGRESS NOTES
Neurology Progress Note    Patient ID:  Hetal Gutierrez  [id number]  68 y.o.  1940      Subjective:     81-year-old female with history of atrial fibrillation, dyslipidemia, hypertension, recent hospitalization for sepsis and metabolic encephalopathy whoi presented with inability to speak and left side weakness. She is currently alert and oriented x3 words are still mumbled but able to understand. She is able to  tell me her birthday her name the year where she is at    Objective: All records in [facility] reviewed and noted    ROS:  Unable to obtain    Meds:  Current Facility-Administered Medications   Medication Dose Route Frequency    albuterol (PROVENTIL HFA, VENTOLIN HFA, PROAIR HFA) inhaler 2 Puff  2 Puff Inhalation Q4H PRN    famotidine (PF) (PEPCID) 20 mg in 0.9% sodium chloride 10 mL injection  20 mg IntraVENous Q24H    dextrose 5% - 0.9% NaCl with KCl 20 mEq/L infusion  50 mL/hr IntraVENous CONTINUOUS    atorvastatin (LIPITOR) tablet 40 mg  40 mg Oral QHS    carvediloL (COREG) tablet 6.25 mg  6.25 mg Oral BID WITH MEALS    acetaminophen (TYLENOL) tablet 650 mg  650 mg Oral Q4H PRN    Or    acetaminophen (TYLENOL) solution 650 mg  650 mg Per NG tube Q4H PRN    Or    acetaminophen (TYLENOL) suppository 650 mg  650 mg Rectal Q4H PRN    0.9% sodium chloride infusion 250 mL  250 mL IntraVENous PRN    0.9% sodium chloride infusion 250 mL  250 mL IntraVENous PRN    meropenem (MERREM) 500 mg in 0.9% sodium chloride (MBP/ADV) 50 mL  500 mg IntraVENous Q8H       Imaging:  CT Results (most recent):  Results from Hospital Encounter encounter on 07/13/20   CT CODE NEURO PERF W CBF    Narrative *PRELIMINARY REPORT*    CT perfusion imaging demonstrates no asymmetric defect. CTA of the head and neck mild calcific and soft tissue plaquing right internal  carotid artery at the bifurcation. There is moderate calcific plaquing origin of the left internal carotid artery.     Intracranial circulation is intact. Internal carotid arteries traverse aerated  temporal air cells    Basilar artery is intact. No intracranial aneurysm or occlusion identified. Preliminary report was provided by Dr. Adrian Prieto, the on-call radiologist, at 2:00 PM    Final report to follow. *END PRELIMINARY REPORT*    CLINICAL HISTORY: Left-sided weakness, slurred speech. EXAMINATION:  CT ANGIOGRAPHY HEAD AND NECK, CT PERFUSION    COMPARISON: CT head June 30, 2020    TECHNIQUE:  Following the uneventful administration of iodinated contrast  material, axial CT angiography of the head and neck was performed. Delayed axial  images through the head were also obtained. Coronal and sagittal reconstructions  were obtained. Manual postprocessing of images was performed. 3-D  Sagittal  maximal intensity projection images were obtained. 3-D Coronal maximal  intensity projections were obtained. CT brain perfusion was performed with  generation of hemodynamic maps of multiple parameters, including cerebral blood  flow, cerebral blood volume, mean transit time, and TMAX. CT dose reduction was  achieved through use of a standardized protocol tailored for this examination  and automatic exposure control for dose modulation. Adaptive statistical  iterative reconstruction (ASIR) was utilized. FINDINGS:    Delayed contrast-enhanced head CT:    The ventricles are midline without hydrocephalus. There is no acute intra or  extra-axial hemorrhage. Moderate periventricular white matter disease. Prominent  ventricles and sulci indicative of involutional changes. The basal cisterns are  clear. The paranasal sinuses are clear. CTA NECK:    Great vessels: Normal arch anatomy with the origins patent.     Right subclavian artery: Patent    Left subclavian artery: Patent     Right common carotid artery: Patent     Left common carotid artery: Patent     Cervical right internal carotid artery: Calcified and noncalcified plaque in the  proximal right internal carotid artery with 50% stenosis by NASCET criteria. Cervical left internal carotid artery: Calcified and noncalcified plaque in the  proximal left internal carotid artery with 60% stenosis by NASCET criteria. Right vertebral artery: Calcified plaque at the origin with at least moderate  stenosis. Left vertebral artery: Patent    Small right pleural effusion, partially imaged. Lung apices demonstrate mosaic  attenuation suggesting small airways disease. No thyroid nodule or cervical  lymphadenopathy. CTA HEAD:    Right cavernous internal carotid artery: Calcified plaque in the cavernous  carotid artery without significant stenosis. Left cavernous internal carotid artery: Patent    Anterior cerebral arteries: Hypoplastic left A1 segment. Patent bilateral A2  segments. Anterior communicating artery: Patent    Right middle cerebral artery: Patent    Left middle cerebral artery: Patent    Posterior communicating arteries: Patent    Posterior cerebral arteries: Patent    Basilar artery: Patent    Distal vertebral arteries: Patent    No evidence for intracranial aneurysm or hemodynamically significant stenosis. CT Perfusion:  No perfusion defect. Perfusion data is somewhat limited by technical factors. Impression IMPRESSION:  1. No evidence of large vessel occlusion. 2.  Chronic parenchymal changes consistent with atrophy and small vessel  ischemia. 3.  50% stenosis of the right internal carotid artery and 60% stenosis of the  left internal carotid artery. 4.  Partially imaged small right pleural effusion.        Lab Review   Recent Results (from the past 24 hour(s))   GLUCOSE, POC    Collection Time: 07/15/20 12:22 PM   Result Value Ref Range    Glucose (POC) 97 65 - 100 mg/dL    Performed by Ángle PRECIADO    GLUCOSE, POC    Collection Time: 07/15/20  6:15 PM   Result Value Ref Range    Glucose (POC) 90 65 - 100 mg/dL    Performed by [address] [place] [address] Street, POC    Collection Time: 07/16/20 3:10 AM   Result Value Ref Range    Glucose (POC) 93 65 - 100 mg/dL    Performed by [facility] (CCT)    IRON PROFILE    Collection Time: 07/16/20  5:00 AM   Result Value Ref Range    Iron 10 (L) 35 - 150 ug/dL    TIBC 221 (L) 250 - 450 ug/dL    Iron % saturation 5 (L) 20 - 50 %   FOLATE    Collection Time: 07/16/20  5:00 AM   Result Value Ref Range    Folate 5.4 5.0 - 21.0 ng/mL   VITAMIN B12    Collection Time: 07/16/20  5:00 AM   Result Value Ref Range    Vitamin B12 1,800 (H) 193 - 986 pg/mL   GLUCOSE, POC    Collection Time: 07/16/20  6:53 AM   Result Value Ref Range    Glucose (POC) 110 (H) 65 - 100 mg/dL    Performed by [facility] (CCT)        Exam:  Visit Vitals  /83   Pulse 88   Temp 97.5 °F (36.4 °C)   Resp 23   Ht 5' 1\" (1.549 m)   Wt 93 kg (205 lb 0.4 oz)   SpO2 100%   BMI 38.74 kg/m²     Gen: Well developed  CV: RRR  Neuro: Alert and oriented x3, follows commands,  CN II-XII: PERRL, EOMI, face symmetric, tongue/palate midline  Motor: move all extremities   Gait:defered     Assessment:     Patient Active Problem List   Diagnosis Code    HTN (hypertension) I10    GERD (gastroesophageal reflux disease) K21.9    Mixed hyperlipidemia E78.2    Valvular disease I38    Paroxysmal A-fib (HCC) I48.0    Biventricular failure (HCC) I50.82    Obesity, morbid (HCC) E66.01    Infiltrating ductal carcinoma of breast, left (HCC) C50.912    Sepsis (HCC) A41.9    CVA (cerebral vascular accident) ([facility] [address] I63.9    TIA (transient ischemic attack) G45.9     Plan:    1.)Inability to speak and left sided weakness   This was more than likely metabolic. She has now improved and back to her baseline   -MRI of the brain, pending    -EF 40-45%Moderate concentric hypertrophy.  Mild global systolic function   -Dopplers, pending   Signed:  Saravanan Kaye NP  7/16/2020  11:08 AM

## 2020-07-16 NOTE — PROGRESS NOTES
Pt demos mild following of cues however decreased A&O, difficult to understand, speech impairment, lethargic. RN agrees with defer for today.      Toni Mccabe, DPT, PT

## 2020-07-16 NOTE — PROGRESS NOTES
Bedside shift change report given to Apryl Leggett (oncoming nurse) by Tera Fields RN (offgoing nurse). Report included the following information SBAR, Kardex, ED Summary, Intake/Output, MAR, Accordion and Recent Results. Last 3 Recorded Weights in this Encounter    07/14/20 0045 07/15/20 0426 07/16/20 0416   Weight: 92.1 kg (203 lb 0.7 oz) 94.4 kg (208 lb 1.8 oz) 93 kg (205 lb 0.4 oz)         Problem: Falls - Risk of  Goal: *Absence of Falls  Description: Document Rodolfo Fall Risk and appropriate interventions in the flowsheet. Outcome: Progressing Towards Goal  Note: Fall Risk Interventions:  Mobility Interventions: Communicate number of staff needed for ambulation/transfer, Patient to call before getting OOB    Mentation Interventions: Adequate sleep, hydration, pain control, More frequent rounding, Toileting rounds    Medication Interventions: Evaluate medications/consider consulting pharmacy    Elimination Interventions: Call light in reach, Toilet paper/wipes in reach, Toileting schedule/hourly round    Problem: Pressure Injury - Risk of  Goal: *Prevention of pressure injury  Description: Document Pratik Scale and appropriate interventions in the flowsheet.   Outcome: Progressing Towards Goal  Note: Pressure Injury Interventions:  Sensory Interventions: Assess changes in LOC, Assess need for specialty bed, Check visual cues for pain, Keep linens dry and wrinkle-free, Minimize linen layers    Moisture Interventions: Absorbent underpads, Apply protective barrier, creams and emollients, Internal/External urinary devices, Minimize layers    Activity Interventions: Pressure redistribution bed/mattress(bed type), PT/OT evaluation    Mobility Interventions: HOB 30 degrees or less, Pressure redistribution bed/mattress (bed type)    Nutrition Interventions: Offer support with meals,snacks and hydration    Friction and Shear Interventions: HOB 30 degrees or less, Lift sheet     Problem: Afib Pathway: Day 1  Goal: *Lungs clear or at baseline  Outcome: Progressing Towards Goal     Problem: Isolation Precautions - Risk of Spread of Infection  Goal: Prevent transmission of infectious organism to others  Outcome: Progressing Towards Goal     Problem: Risk for Fluid Volume Deficit  Goal: Maintain normal heart rhythm  Outcome: Progressing Towards Goal

## 2020-07-16 NOTE — PROGRESS NOTES
Comprehensive Nutrition Assessment    Type and Reason for Visit: NPO/clear liquid    Nutrition Recommendations/Plan: Diet advancement per SLP evaluation    Nutrition Assessment:  Pt is a 68year old female admitted on 7/13 with suspected CVA. Pt came from [facility]. Pt was working with PT there, when she developed slurred speech, weakness. Pt was recently admitted to [facility] from 6/30-7/8 with ESBL bacteremia, and has been receiinv antibiotics via PICC. Per last note from SLP this morning, pt continues to have significant dysphagia, and is not ready to be placed on a diet; pt is taking her meds in applesauce. RD will continue to follow pt's progress and plans for nutrition. Estimated Daily Nutrient Needs:  Energy (kcal):  ~ 1500 kcals  Protein (g):  0.8-1 gm pro/kg       Fluid (ml/day):  1 ml/kcal    Nutrition Related Findings:  Pt NPO, may take meds crushed in applesauce      Wounds:    None       Current Nutrition Therapies:  Pt currently NPO since admission 3 days ago    Anthropometric Measures:  · Height:  5' 0.98\" (154.9 cm)  · Current Body Wt:  93 kg (205 lb 0.4 oz)   · Admission Body Wt:  205 lb 0.4 oz    · Ideal Body Wt:  105:  195.3 %   · BMI Categories:  Obese class 2 (BMI 35.0-39. 9)       Nutrition Diagnosis:   · Swallowing difficulty related to cognitive or neurological impairment as evidenced by NPO or clear liquid status due to medical condition      Nutrition Interventions:   Food and/or Nutrient Delivery: Continue current diet  Nutrition Education and Counseling: No recommendations at this time  Coordination of Nutrition Care: Continued inpatient monitoring, Speech therapy    Goals:  Pt will tolerate least restrictive diet over the next 2-4 days       Nutrition Monitoring and Evaluation:   Behavioral-Environmental Outcomes:    Food/Nutrient Intake Outcomes: Diet advancement/tolerance  Physical Signs/Symptoms Outcomes: Biochemical data, Nutrition focused physical findings, Weight    Discharge Planning:     Too soon to determine     Electronically signed by Anita Bruce RD, CSP on 7/16/2020 at 2:22 PM    Contact: [phone]

## 2020-07-17 ENCOUNTER — APPOINTMENT (OUTPATIENT)
Dept: MRI IMAGING | Age: 80
DRG: 871 | End: 2020-07-17
Attending: FAMILY MEDICINE
Payer: MEDICARE

## 2020-07-17 LAB
ABO + RH BLD: NORMAL
ANION GAP SERPL CALC-SCNC: 4 MMOL/L (ref 5–15)
BLD PROD TYP BPU: NORMAL
BLD PROD TYP BPU: NORMAL
BLOOD GROUP ANTIBODIES SERPL: NORMAL
BNP SERPL-MCNC: ABNORMAL PG/ML
BPU ID: NORMAL
BPU ID: NORMAL
BUN SERPL-MCNC: 41 MG/DL (ref 6–20)
BUN/CREAT SERPL: 26 (ref 12–20)
CALCIUM SERPL-MCNC: 8.9 MG/DL (ref 8.5–10.1)
CHLORIDE SERPL-SCNC: 120 MMOL/L (ref 97–108)
CO2 SERPL-SCNC: 26 MMOL/L (ref 21–32)
CREAT SERPL-MCNC: 1.58 MG/DL (ref 0.55–1.02)
CROSSMATCH RESULT,%XM: NORMAL
CROSSMATCH RESULT,%XM: NORMAL
GLUCOSE SERPL-MCNC: 86 MG/DL (ref 65–100)
HCT VFR BLD AUTO: 26.3 % (ref 35–47)
HCT VFR BLD AUTO: 27.5 % (ref 35–47)
HGB BLD-MCNC: 6.9 G/DL (ref 11.5–16)
HGB BLD-MCNC: 7.1 G/DL (ref 11.5–16)
LEFT CCA DIST DIAS: 9 CM/S
LEFT CCA DIST SYS: 44.8 CM/S
LEFT CCA PROX DIAS: 11.3 CM/S
LEFT CCA PROX SYS: 55.3 CM/S
LEFT ECA DIAS: 0 CM/S
LEFT ECA SYS: 31.7 CM/S
LEFT ICA DIST DIAS: 22.1 CM/S
LEFT ICA DIST SYS: 64.8 CM/S
LEFT ICA MID DIAS: 16 CM/S
LEFT ICA MID SYS: 43.9 CM/S
LEFT ICA PROX DIAS: 14.2 CM/S
LEFT ICA PROX SYS: 36.9 CM/S
LEFT ICA/CCA SYS: 1.45
LEFT VERTEBRAL DIAS: 26.74 CM/S
LEFT VERTEBRAL SYS: 64.2 CM/S
POTASSIUM SERPL-SCNC: 4.4 MMOL/L (ref 3.5–5.1)
RIGHT CCA DIST DIAS: 16 CM/S
RIGHT CCA DIST SYS: 48.2 CM/S
RIGHT CCA PROX DIAS: 12.4 CM/S
RIGHT CCA PROX SYS: 56.4 CM/S
RIGHT ECA DIAS: 6.62 CM/S
RIGHT ECA SYS: 37.9 CM/S
RIGHT ICA DIST DIAS: 16.7 CM/S
RIGHT ICA DIST SYS: 76.1 CM/S
RIGHT ICA MID DIAS: 23.4 CM/S
RIGHT ICA MID SYS: 64.1 CM/S
RIGHT ICA PROX DIAS: 18 CM/S
RIGHT ICA PROX SYS: 43.6 CM/S
RIGHT ICA/CCA SYS: 1.6
RIGHT VERTEBRAL DIAS: 13.99 CM/S
RIGHT VERTEBRAL SYS: 47.8 CM/S
SODIUM SERPL-SCNC: 150 MMOL/L (ref 136–145)
SPECIMEN EXP DATE BLD: NORMAL
STATUS OF UNIT,%ST: NORMAL
STATUS OF UNIT,%ST: NORMAL
UNIT DIVISION, %UDIV: 0
UNIT DIVISION, %UDIV: 0

## 2020-07-17 PROCEDURE — 74011250636 HC RX REV CODE- 250/636: Performed by: INTERNAL MEDICINE

## 2020-07-17 PROCEDURE — 74011250637 HC RX REV CODE- 250/637: Performed by: FAMILY MEDICINE

## 2020-07-17 PROCEDURE — 36415 COLL VENOUS BLD VENIPUNCTURE: CPT

## 2020-07-17 PROCEDURE — 65660000000 HC RM CCU STEPDOWN

## 2020-07-17 PROCEDURE — 74011250636 HC RX REV CODE- 250/636: Performed by: FAMILY MEDICINE

## 2020-07-17 PROCEDURE — 70551 MRI BRAIN STEM W/O DYE: CPT

## 2020-07-17 PROCEDURE — 74011000258 HC RX REV CODE- 258: Performed by: FAMILY MEDICINE

## 2020-07-17 PROCEDURE — 83880 ASSAY OF NATRIURETIC PEPTIDE: CPT

## 2020-07-17 PROCEDURE — 92523 SPEECH SOUND LANG COMPREHEN: CPT | Performed by: SPEECH-LANGUAGE PATHOLOGIST

## 2020-07-17 PROCEDURE — 97530 THERAPEUTIC ACTIVITIES: CPT

## 2020-07-17 PROCEDURE — 85018 HEMOGLOBIN: CPT

## 2020-07-17 PROCEDURE — 74011250637 HC RX REV CODE- 250/637: Performed by: INTERNAL MEDICINE

## 2020-07-17 PROCEDURE — 80048 BASIC METABOLIC PNL TOTAL CA: CPT

## 2020-07-17 PROCEDURE — 92526 ORAL FUNCTION THERAPY: CPT | Performed by: SPEECH-LANGUAGE PATHOLOGIST

## 2020-07-17 RX ORDER — DEXTROSE MONOHYDRATE 50 MG/ML
25 INJECTION, SOLUTION INTRAVENOUS CONTINUOUS
Status: DISPENSED | OUTPATIENT
Start: 2020-07-17 | End: 2020-07-18

## 2020-07-17 RX ORDER — SODIUM CHLORIDE 450 MG/100ML
50 INJECTION, SOLUTION INTRAVENOUS CONTINUOUS
Status: DISCONTINUED | OUTPATIENT
Start: 2020-07-17 | End: 2020-07-17

## 2020-07-17 RX ADMIN — MEROPENEM 500 MG: 500 INJECTION, POWDER, FOR SOLUTION INTRAVENOUS at 06:54

## 2020-07-17 RX ADMIN — POTASSIUM CHLORIDE, DEXTROSE MONOHYDRATE AND SODIUM CHLORIDE 50 ML/HR: 150; 5; 900 INJECTION, SOLUTION INTRAVENOUS at 07:01

## 2020-07-17 RX ADMIN — MEROPENEM 500 MG: 500 INJECTION, POWDER, FOR SOLUTION INTRAVENOUS at 15:40

## 2020-07-17 RX ADMIN — ATORVASTATIN CALCIUM 40 MG: 40 TABLET, FILM COATED ORAL at 23:07

## 2020-07-17 RX ADMIN — MEROPENEM 500 MG: 500 INJECTION, POWDER, FOR SOLUTION INTRAVENOUS at 23:17

## 2020-07-17 RX ADMIN — APIXABAN 2.5 MG: 2.5 TABLET, FILM COATED ORAL at 10:32

## 2020-07-17 RX ADMIN — CARVEDILOL 6.25 MG: 6.25 TABLET, FILM COATED ORAL at 10:32

## 2020-07-17 RX ADMIN — APIXABAN 2.5 MG: 2.5 TABLET, FILM COATED ORAL at 23:07

## 2020-07-17 RX ADMIN — FAMOTIDINE 20 MG: 20 TABLET ORAL at 23:07

## 2020-07-17 RX ADMIN — DEXTROSE MONOHYDRATE 50 ML/HR: 5 INJECTION, SOLUTION INTRAVENOUS at 13:09

## 2020-07-17 RX ADMIN — CARVEDILOL 6.25 MG: 6.25 TABLET, FILM COATED ORAL at 17:47

## 2020-07-17 NOTE — PROGRESS NOTES
TRANSFER - OUT REPORT:    Verbal report given to Mary MCNAIR(name) on Preet Shi  being transferred to [facility](unit) for routine progression of care       Report consisted of patients Situation, Background, Assessment and   Recommendations(SBAR). Information from the following report(s) SBAR, Kardex, Intake/Output and MAR was reviewed with the receiving nurse. Lines:   PICC Single Lumen 07/08/20 Right;Brachial (Active)   Central Line Being Utilized Yes 07/17/20 1200   Criteria for Appropriate Use Long term IV/antibiotic administration 07/17/20 1200   Site Assessment Clean, dry, & intact 07/17/20 1200   Phlebitis Assessment 0 07/17/20 1200   Infiltration Assessment 0 07/17/20 1200   Date of Last Dressing Change 07/10/20 07/17/20 1200   Dressing Status Clean, dry, & intact 07/17/20 1200   Dressing Type Disk with Chlorhexadine gluconate (CHG); Transparent 07/17/20 1200   Hub Color/Line Status Purple; Infusing 07/17/20 1200   Action Taken Open ports on tubing capped 07/17/20 1200   Positive Blood Return (Site #1) Yes 07/17/20 1200   Alcohol Cap Used Yes 07/17/20 1200       Peripheral IV 07/13/20 Right Forearm (Active)   Site Assessment Clean, dry, & intact 07/17/20 1200   Phlebitis Assessment 0 07/17/20 1200   Infiltration Assessment 0 07/17/20 1200   Dressing Status Clean, dry, & intact 07/17/20 1200   Dressing Type Transparent;Tape 07/17/20 1200   Hub Color/Line Status Pink;Capped 07/17/20 1200   Action Taken Open ports on tubing capped 07/17/20 1200   Alcohol Cap Used Yes 07/17/20 1200        Opportunity for questions and clarification was provided.       Patient transported with:   [employer]

## 2020-07-17 NOTE — PROGRESS NOTES
Problem: Falls - Risk of  Goal: *Absence of Falls  Description: Document Rebbecca Persons Fall Risk and appropriate interventions in the flowsheet. Outcome: Progressing Towards Goal  Note: Fall Risk Interventions:  Mobility Interventions: Bed/chair exit alarm, Patient to call before getting OOB, PT Consult for mobility concerns    Mentation Interventions: Bed/chair exit alarm, Evaluate medications/consider consulting pharmacy, Reorient patient, Room close to nurse's station    Medication Interventions: Patient to call before getting OOB, Bed/chair exit alarm    Elimination Interventions: Toileting schedule/hourly rounds              Problem: Patient Education: Go to Patient Education Activity  Goal: Patient/Family Education  Outcome: Not Progressing Towards Goal     Problem: Pressure Injury - Risk of  Goal: *Prevention of pressure injury  Description: Document Pratik Scale and appropriate interventions in the flowsheet. Outcome: Progressing Towards Goal  Note: Pressure Injury Interventions:  Sensory Interventions: Assess need for specialty bed, Float heels, Keep linens dry and wrinkle-free    Moisture Interventions: Absorbent underpads, Assess need for specialty bed, Check for incontinence Q2 hours and as needed, Internal/External urinary devices    Activity Interventions: Pressure redistribution bed/mattress(bed type), PT/OT evaluation    Mobility Interventions: Assess need for specialty bed, Pressure redistribution bed/mattress (bed type), PT/OT evaluation, Turn and reposition approx.  every two hours(pillow and wedges)    Nutrition Interventions: Document food/fluid/supplement intake    Friction and Shear Interventions: Apply protective barrier, creams and emollients, Minimize layers                Problem: Afib Pathway: Day 1  Goal: *Hemodynamically stable  Outcome: Progressing Towards Goal  Goal: *Stable cardiac rhythm  Outcome: Progressing Towards Goal     Problem: Airway Clearance - Ineffective  Goal: Achieve or maintain patent airway  Outcome: Progressing Towards Goal     Problem: Gas Exchange - Impaired  Goal: Absence of hypoxia  Outcome: Progressing Towards Goal  Goal: Promote optimal lung function  Outcome: Progressing Towards Goal     Problem: Breathing Pattern - Ineffective  Goal: Ability to achieve and maintain a regular respiratory rate  Outcome: Progressing Towards Goal     Problem:  Body Temperature -  Risk of, Imbalanced  Goal: Ability to maintain a body temperature within defined limits  Outcome: Progressing Towards Goal  Goal: Will regain or maintain usual level of consciousness  Outcome: Progressing Towards Goal  Goal: Complications related to the disease process, condition or treatment will be avoided or minimized  Outcome: Progressing Towards Goal     Problem: Isolation Precautions - Risk of Spread of Infection  Goal: Prevent transmission of infectious organism to others  Outcome: Progressing Towards Goal     Problem: Nutrition Deficits  Goal: Optimize nutrtional status  Outcome: Not Progressing Towards Goal     Problem: Risk for Fluid Volume Deficit  Goal: Maintain normal heart rhythm  Outcome: Progressing Towards Goal

## 2020-07-17 NOTE — PROGRESS NOTES
Verbal shift change report given to Molly Romano RN (oncoming nurse) by Linda Fletcher RN (offgoing nurse).  Report included the following information SBAR, Kardex, Intake/Output, Recent Results and Cardiac Rhythm NSR   Patient Vitals for the past 12 hrs:   Temp Pulse Resp BP SpO2   07/17/20 0703 97.9 °F (36.6 °C) 67 21 126/63 100 %   07/17/20 0325 97.7 °F (36.5 °C) 82 20 93/76 100 %   07/17/20 0030     96 %   07/16/20 2308 97.5 °F (36.4 °C) 77 28 128/66 100 %   07/16/20 2127 97.4 °F (36.3 °C) 69 19 117/66 96 %     Last 3 Recorded Weights in this Encounter    07/15/20 0426 07/16/20 0416 07/16/20 0954   Weight: 94.4 kg (208 lb 1.8 oz) 93 kg (205 lb 0.4 oz) 93 kg (205 lb)

## 2020-07-17 NOTE — PROGRESS NOTES
Palliative Medicine Consult  Isaak: [phone] ([phone])    Patient Name: Ghassan Mosley  YOB: 1940    Date of Initial Consult: 7/16/2020  Reason for Consult: Care decisions  Requesting Provider: Dr. Major Mcdonald   Primary Care Physician: Kristofer Villafana MD     SUMMARY:   Ghassan Mosley is a 68 y.o. with a past history of chronic kidney disease, hypertension, atrial fibrillation atrial fibrillation, hyperlipidemia who was admitted on 7/13/2020 from [facility] with a diagnosis of rule out stroke. Current medical issues leading to Palliative Medicine involvement include: Care decisions. Chart reviewed-patient is a 53-year-old female who had been in the hospital from 6-30-7/8 with debility/weakness. Rosemarie Epps She was sent to [facility] for further rehabilitation. Was readmitted to the hospital secondary to possible stroke. Had to have COVID testing done x2 which has returned negative. Today I was able to actually evaluate the patient who remains pleasantly confused. MRI still pending of the brain. Our team is been asked to see for goals of care. I was able to talk with her [address]  Patient had been living with him last year and seemed to be doing okay but then moved into a \"woman's home \"and then was not as compliant with medication or food. She had been limited to living on the first floor and then ultimately came back to the hospital.  She has been at [facility] for rehab and seem to be doing okay according to [facility]. Unfortunately, Ashley Parker has not really been able to see her much over the last couple months secondary to [place] and limited visitation. Social history patient without any children. Mr. Estela Yo is one cousin that has remained in contact with her. PALLIATIVE DIAGNOSES:   1. Goals of care discussion  2. DNR discussion  3. Advance care planning  4. Rule out stroke-MRI pending  5. Suspect underlying dementia  6. Debility       PLAN:   1.  Met with patient who remains pleasantly confused. She did think she was in her home. Still has not had her MRI. No new issues from overnight. .  2. Goals of carecontinue restorative measures. Awaiting further testing to include MRI of the brain to have a better sense of what has occurred. Patient does appear to have at least left upper extremity weakness compared to the right when I examined her. Once again suspicious for cerebrovascular accident. Did note that her hemoglobin is down to 6.7. Ultimately, POA would like to have as much information as possible before making any definitive decisions on next steps. 3. Advance care planningadvanced medical directive in the chart. Shelia Castano is primary medical power of . She has listed Ernesto Romo is secondaryPal is never met her but thinks this is another cousin. 4. CODE STATUS confirmed no attempts at resuscitation. We will need a durable DO NOT RESUSCITATE form signed by Mr. [facility] patient unable to have capacity to sign her own paperwork. 5. Symptom managementno acute symptoms for us to manage right now  6. Psychosocialappears to have good support from Mr. [facility] other family. No spiritual concerns identified  7. Discussed with bedside nurse  8. Initial consult note routed to primary continuity provider and/or primary health care team members  9. Communicated plan of care with: Palliative [facility] Team     GOALS OF CARE / TREATMENT PREFERENCES:     GOALS OF CARE:  Patient/Health Care Proxy Stated Goals: Rehabilitation    TREATMENT PREFERENCES:   Code Status: DNR    Advance Care Planning:  [x] [facility] Interdisciplinary Team has updated the ACP Navigator with Health Care Decision Maker and Patient Capacity      Primary Decision Maker: Edith Luevanoas - Other Relative - [phone]  Advance Care Planning 7/1/2020   Confirm Advance Directive Yes, on file             Other Instructions:          Other:    As far as possible, the palliative care team has discussed with patient / health care proxy about goals of care / treatment preferences for patient. HISTORY:     History obtained from: vasiliy, belgica    CHIEF COMPLAINT: Garbled speech    HPI/SUBJECTIVE:    The patient is:   [x] Verbal and participatory  [] Non-participatory due to:   Patient is awake. Smiling. Difficult to understand her speech at times. 7/17patient is awake. Able to talk to me a little bit. Denies any pain. She thought she was at her home. Clinical Pain Assessment (nonverbal scale for severity on nonverbal patients):   Clinical Pain Assessment  Severity: 0     Activity (Movement): Lying quietly, normal position    Duration: for how long has pt been experiencing pain (e.g., 2 days, 1 month, years)  Frequency: how often pain is an issue (e.g., several times per day, once every few days, constant)     FUNCTIONAL ASSESSMENT:     Palliative Performance Scale (PPS):  PPS: 50       PSYCHOSOCIAL/SPIRITUAL SCREENING:     Palliative IDT has assessed this patient for cultural preferences / practices and a referral made as appropriate to needs (Cultural Services, Patient Advocacy, Ethics, etc.)    Any spiritual / Baptist concerns:  [] Yes /  [x] No    Caregiver Burnout:  [] Yes /  [x] No /  [] No Caregiver Present      Anticipatory grief assessment:   [x] Normal  / [] Maladaptive       ESAS Anxiety: Anxiety: 0    ESAS Depression: Depression: 0        REVIEW OF SYSTEMS:     Positive and pertinent negative findings in ROS are noted above in HPI. The following systems were [x] reviewed / [] unable to be reviewed as noted in HPI  Other findings are noted below. Systems: constitutional, ears/nose/mouth/throat, respiratory, gastrointestinal, genitourinary, musculoskeletal, integumentary, neurologic, psychiatric, endocrine. Positive findings noted below.   Modified ESAS Completed by: provider   Fatigue: 2 Drowsiness: 1   Depression: 0 Pain: 0   Anxiety: 0 Nausea: 0   Anorexia: 0 Dyspnea: 0 Constipation: No     Stool Occurrence(s): 1        PHYSICAL EXAM:     From RN flowsheet:  Wt Readings from Last 3 Encounters:   20 206 lb 5.6 oz (93.6 kg)   20 205 lb 0.4 oz (93 kg)   20 206 lb 12.7 oz (93.8 kg)     Blood pressure 122/72, pulse 68, temperature 97.9 °F (36.6 °C), resp. rate 14, height 5' 0.98\" (1.549 m), weight 206 lb 5.6 oz (93.6 kg), SpO2 93 %. Pain Scale 1: Numeric (0 - 10)  Pain Intensity 1: 0                 Last bowel movement, if known:     Constitutional: Alert to person, no acute distress  Eyes: pupils equal, anicteric  ENMT: no nasal discharge, moist mucous membranes  Cardiovascular: regular rhythm, distal pulses intact  Respiratory: breathing not labored, symmetric  Gastrointestinal: soft non-tender, +bowel sounds  Musculoskeletal: no deformity, no tenderness to palpation  Skin: warm, dry  Neurologic: Will follow simple commands. Today her left upper extremity appears to be slightly weaker compared to the right. Psychiatric: full affect, no hallucinations   Other:       HISTORY:     Active Problems:    CVA (cerebral vascular accident) ([facility] (2020)      TIA (transient ischemic attack) (2020)      Past Medical History:   Diagnosis Date    [facility]     Anemia     Chronic kidney disease     CKD (chronic kidney disease)     Hypercholesterolemia     Hyperlipidemia     Hypertension     MI [facility]myocardial infarction) [facility]       No past surgical history on file. Family History   Problem Relation Age of Onset    No Known Problems Mother     No Known Problems Father       History reviewed, no pertinent family history.   Social History     Tobacco Use    Smoking status: Former Smoker     Last attempt to quit:      Years since quittin.5    Smokeless tobacco: Never Used   Substance Use Topics    Alcohol use: No     Alcohol/week: 0.0 standard drinks     No Known Allergies   Current Facility-Administered Medications   Medication Dose Route Frequency  dextrose 5% infusion  50 mL/hr IntraVENous CONTINUOUS    apixaban (ELIQUIS) tablet 2.5 mg  2.5 mg Oral BID    famotidine (PEPCID) tablet 20 mg  20 mg Oral Q24H    albuterol (PROVENTIL HFA, VENTOLIN HFA, PROAIR HFA) inhaler 2 Puff  2 Puff Inhalation Q4H PRN    atorvastatin (LIPITOR) tablet 40 mg  40 mg Oral QHS    carvediloL (COREG) tablet 6.25 mg  6.25 mg Oral BID WITH MEALS    acetaminophen (TYLENOL) tablet 650 mg  650 mg Oral Q4H PRN    Or    acetaminophen (TYLENOL) solution 650 mg  650 mg Per NG tube Q4H PRN    Or    acetaminophen (TYLENOL) suppository 650 mg  650 mg Rectal Q4H PRN    0.9% sodium chloride infusion 250 mL  250 mL IntraVENous PRN    0.9% sodium chloride infusion 250 mL  250 mL IntraVENous PRN    meropenem (MERREM) 500 mg in 0.9% sodium chloride (MBP/ADV) 50 mL  500 mg IntraVENous Q8H          LAB AND IMAGING FINDINGS:     Lab Results   Component Value Date/Time    WBC 7.3 07/15/2020 04:50 AM    HGB 6.9 (L) 07/17/2020 03:30 AM    PLATELET 606 18/35/2517 04:50 AM     Lab Results   Component Value Date/Time    Sodium 150 (H) 07/17/2020 03:30 AM    Potassium 4.4 07/17/2020 03:30 AM    Chloride 120 (H) 07/17/2020 03:30 AM    CO2 26 07/17/2020 03:30 AM    BUN 41 (H) 07/17/2020 03:30 AM    Creatinine 1.58 (H) 07/17/2020 03:30 AM    Calcium 8.9 07/17/2020 03:30 AM    Magnesium 2.1 07/15/2020 04:50 AM    Phosphorus 3.7 07/08/2020 01:55 AM      Lab Results   Component Value Date/Time    Alk.  phosphatase 98 07/13/2020 01:50 PM    Protein, total 7.4 07/13/2020 01:50 PM    Albumin 2.0 (L) 07/13/2020 01:50 PM    Globulin 5.4 (H) 07/13/2020 01:50 PM     Lab Results   Component Value Date/Time    INR 1.7 (H) 07/13/2020 01:50 PM    Prothrombin time 16.5 (H) 07/13/2020 01:50 PM      Lab Results   Component Value Date/Time    Iron 10 (L) 07/16/2020 05:00 AM    TIBC 221 (L) 07/16/2020 05:00 AM    Iron % saturation 5 (L) 07/16/2020 05:00 AM    Ferritin 46 06/30/2020 11:03 AM      No results found for: PH, PCO2, PO2  No components found for: GLPOC   No results found for: CPK, CKMB             Total time: 25  Counseling / coordination time, spent as noted above: 20  > 50% counseling / coordination?: yes    Prolonged service was provided for  []30 min   []75 min in face to face time in the presence of the patient, spent as noted above. Time Start:   Time End:   Note: this can only be billed with 51560 (initial) or 72612 (follow up). If multiple start / stop times, list each separately.

## 2020-07-17 NOTE — PROGRESS NOTES
Nutrition Note    Brief Follow Up:    Please see full assessment note on 7/16/20. In the event that pt is NOT safe for a po diet, and tube feeding is warranted:   --- Osmolite 1.5 starting at 25 ml/hr x 4 hours   --- Increase rate by 10 ml/hr every 4 hours until at goal of 45 ml/hr   --- Give 1 packet Liquid Prosource daily   --- Give 120 ml water flush every 4 hours   --- The above goal provides: 1680 kcals, 83 gm pro, 1545 ml free fluid    RD continues to follow plan of care.     Electronically signed by Julián Castellanos, [address], [address] [place] [address],[address] 7/17/2020 at 1:57 PM    Contact: [phone]

## 2020-07-17 NOTE — PROGRESS NOTES
I have reviewed and agree with the charting of Fermín Jiménez. I assumed sole care of this pt at 2000 and continued reassessments. Bedside shift change report given to Eriberto Bashir (oncoming nurse) by Fermín Jiang RN (offgoing nurse). Report included the following information SBAR, Kardex, ED Summary, Procedure Summary, Intake/Output, MAR, Recent Results and Cardiac Rhythm NSR.

## 2020-07-17 NOTE — PROGRESS NOTES
Problem: Falls - Risk of  Goal: *Absence of Falls  Description: Document Arlene Reynoso Fall Risk and appropriate interventions in the flowsheet. Outcome: Progressing Towards Goal  Note: Fall Risk Interventions:  Mobility Interventions: Assess mobility with egress test, Communicate number of staff needed for ambulation/transfer, OT consult for ADLs, Patient to call before getting OOB, PT Consult for mobility concerns, PT Consult for assist device competence, Utilize walker, cane, or other assistive device, Strengthening exercises (ROM-active/passive), Utilize gait belt for transfers/ambulation    Mentation Interventions: Adequate sleep, hydration, pain control, Door open when patient unattended, Evaluate medications/consider consulting pharmacy, Gait belt with transfers/ambulation, Increase mobility, More frequent rounding, Reorient patient, Room close to nurse's station, Toileting rounds, Update white board    Medication Interventions: Assess postural VS orthostatic hypotension, Evaluate medications/consider consulting pharmacy, Patient to call before getting OOB, Teach patient to arise slowly, Utilize gait belt for transfers/ambulation    Elimination Interventions: Call light in reach, Patient to call for help with toileting needs, Stay With Me (per policy), Toilet paper/wipes in reach, Toileting schedule/hourly rounds              Problem: Patient Education: Go to Patient Education Activity  Goal: Patient/Family Education  Outcome: Progressing Towards Goal     Problem: Pressure Injury - Risk of  Goal: *Prevention of pressure injury  Description: Document Pratik Scale and appropriate interventions in the flowsheet.   Outcome: Progressing Towards Goal  Note: Pressure Injury Interventions:  Sensory Interventions: Assess changes in LOC, Assess need for specialty bed, Avoid rigorous massage over bony prominences, Check visual cues for pain, Discuss PT/OT consult with provider, Float heels, Keep linens dry and wrinkle-free, Maintain/enhance activity level, Minimize linen layers, Monitor skin under medical devices, Pressure redistribution bed/mattress (bed type), Sit a 90-degree angle/use footstool if needed, Turn and reposition approx. every two hours (pillows and wedges if needed), Use 30-degree side-lying position    Moisture Interventions: Absorbent underpads, Assess need for specialty bed, Check for incontinence Q2 hours and as needed, Internal/External urinary devices, Maintain skin hydration (lotion/cream), Minimize layers, Offer toileting Q_hr    Activity Interventions: Assess need for specialty bed, Increase time out of bed, Pressure redistribution bed/mattress(bed type), PT/OT evaluation    Mobility Interventions: Assess need for specialty bed, Float heels, HOB 30 degrees or less, Pressure redistribution bed/mattress (bed type), PT/OT evaluation, Turn and reposition approx.  every two hours(pillow and wedges)    Nutrition Interventions: Document food/fluid/supplement intake, Discuss nutritional consult with provider, Offer support with meals,snacks and hydration(NPO)    Friction and Shear Interventions: Apply protective barrier, creams and emollients, Feet elevated on foot rest, HOB 30 degrees or less, Lift sheet, Lift team/patient mobility team, Sit at 90-degree angle, Minimize layers                Problem: Patient Education: Go to Patient Education Activity  Goal: Patient/Family Education  Outcome: Progressing Towards Goal     Problem: Afib Pathway: Day 1  Goal: Activity/Safety  Outcome: Progressing Towards Goal  Goal: Consults, if ordered  Outcome: Progressing Towards Goal  Goal: Diagnostic Test/Procedures  Outcome: Progressing Towards Goal  Goal: Nutrition/Diet  Outcome: Progressing Towards Goal  Goal: Discharge Planning  Outcome: Progressing Towards Goal  Goal: Medications  Outcome: Progressing Towards Goal  Goal: Respiratory  Outcome: Progressing Towards Goal  Goal: Treatments/Interventions/Procedures  Outcome: Progressing Towards Goal  Goal: Psychosocial  Outcome: Progressing Towards Goal  Goal: *Optimal pain control at patient's stated goal  Outcome: Progressing Towards Goal  Goal: *Hemodynamically stable  Outcome: Progressing Towards Goal  Goal: *Stable cardiac rhythm  Outcome: Progressing Towards Goal  Goal: *Lungs clear or at baseline  Outcome: Progressing Towards Goal  Goal: *Labs within defined limits  Outcome: Progressing Towards Goal  Goal: *Describes available resources and support systems  Outcome: Progressing Towards Goal     Problem: Anemia Care Plan (Adult and Pediatric)  Goal: *Labs within defined limits  Outcome: Progressing Towards Goal  Goal: *Tolerates increased activity  Outcome: Progressing Towards Goal     Problem: Patient Education: Go to Patient Education Activity  Goal: Patient/Family Education  Outcome: Progressing Towards Goal     Problem: Gas Exchange - Impaired  Goal: Absence of hypoxia  Outcome: Progressing Towards Goal  Goal: Promote optimal lung function  Outcome: Progressing Towards Goal     Problem: Breathing Pattern - Ineffective  Goal: Ability to achieve and maintain a regular respiratory rate  Outcome: Progressing Towards Goal     Problem:  Body Temperature -  Risk of, Imbalanced  Goal: Ability to maintain a body temperature within defined limits  Outcome: Progressing Towards Goal  Goal: Will regain or maintain usual level of consciousness  Outcome: Progressing Towards Goal  Goal: Complications related to the disease process, condition or treatment will be avoided or minimized  Outcome: Progressing Towards Goal     Problem: Isolation Precautions - Risk of Spread of Infection  Goal: Prevent transmission of infectious organism to others  Outcome: Progressing Towards Goal     Problem: Nutrition Deficits  Goal: Optimize nutrtional status  Outcome: Progressing Towards Goal     Problem: Risk for Fluid Volume Deficit  Goal: Maintain normal heart rhythm  Outcome: Progressing Towards Goal  Goal: Maintain absence of muscle cramping  Outcome: Progressing Towards Goal  Goal: Maintain normal serum potassium, sodium, calcium, phosphorus, and pH  Outcome: Progressing Towards Goal     Problem: Loneliness or Risk for Loneliness  Goal: Demonstrate positive use of time alone when socialization is not possible  Outcome: Progressing Towards Goal     Problem: Fatigue  Goal: Verbalize increase energy and improved vitality  Outcome: Progressing Towards Goal     Problem: Patient Education: Go to Patient Education Activity  Goal: Patient/Family Education  Outcome: Progressing Towards Goal     Problem: Discharge Planning  Goal: *Discharge to safe environment  Description: See CM Notes  CRM: Devi Eagle, MPH, [facility]; Z: [phone]    Outcome: Progressing Towards Goal  Goal: *Knowledge of medication management  Outcome: Progressing Towards Goal  Goal: *Knowledge of discharge instructions  Outcome: Progressing Towards Goal     Problem: Patient Education: Go to Patient Education Activity  Goal: Patient/Family Education  Outcome: Progressing Towards Goal     Problem: Risk for Spread of Infection  Goal: Prevent transmission of infectious organism to others  Description: Prevent the transmission of infectious organisms to other patients, staff members, and visitors.   Outcome: Progressing Towards Goal     Problem: Patient Education:  Go to Education Activity  Goal: Patient/Family Education  Outcome: Progressing Towards Goal     Problem: Patient Education: Go to Patient Education Activity  Goal: Patient/Family Education  Outcome: Progressing Towards Goal     Problem: Heart Failure: Day 5  Goal: Activity/Safety  Outcome: Progressing Towards Goal  Goal: Diagnostic Test/Procedures  Outcome: Progressing Towards Goal  Goal: Nutrition/Diet  Outcome: Progressing Towards Goal  Goal: Discharge Planning  Outcome: Progressing Towards Goal  Goal: Medications  Outcome: Progressing Towards Goal  Goal: Respiratory  Outcome: Progressing Towards Goal  Goal: Treatments/Interventions/Procedures  Outcome: Progressing Towards Goal  Goal: Psychosocial  Outcome: Progressing Towards Goal     Problem: Heart Failure: Discharge Outcomes  Goal: *Demonstrates ability to perform prescribed activity without shortness of breath or discomfort  Outcome: Progressing Towards Goal  Goal: *Left ventricular function assessment completed prior to or during stay, or planned for post-discharge  Outcome: Progressing Towards Goal  Goal: *ACEI prescribed if LVEF less than 40% and no contraindications or ARB prescribed  Outcome: Progressing Towards Goal  Goal: *Verbalizes understanding and describes prescribed diet  Outcome: Progressing Towards Goal  Goal: *Verbalizes understanding/describes prescribed medications  Outcome: Progressing Towards Goal  Goal: *Describes available resources and support systems  Description: (eg: Home Health, Palliative Care, Advanced Medical Directive)  Outcome: Progressing Towards Goal  Goal: *Describes smoking cessation resources  Outcome: Progressing Towards Goal  Goal: *Understands and describes signs and symptoms to report to providers(Stroke Metric)  Outcome: Progressing Towards Goal  Goal: *Describes/verbalizes understanding of follow-up/return appt  Description: (eg: to physicians, diabetes treatment coordinator, and other resources  Outcome: Progressing Towards Goal  Goal: *Describes importance of continuing daily weights and changes to report to physician  Outcome: Progressing Towards Goal     Problem: Nutrition Deficit  Goal: *Optimize nutritional status  Outcome: Progressing Towards Goal     Problem: TIA/CVA Stroke: 0-24 hours  Goal: Activity/Safety  Outcome: Progressing Towards Goal  Goal: Consults, if ordered  Outcome: Progressing Towards Goal  Goal: Diagnostic Test/Procedures  Outcome: Progressing Towards Goal  Goal: Nutrition/Diet  Outcome: Progressing Towards Goal  Goal: Discharge Planning  Outcome: Progressing Towards Goal  Goal: Medications  Outcome: Progressing Towards Goal  Goal: Respiratory  Outcome: Progressing Towards Goal  Goal: Treatments/Interventions/Procedures  Outcome: Progressing Towards Goal  Goal: Minimize risk of bleeding post-thrombolytic infusion  Outcome: Progressing Towards Goal  Goal: Monitor for complications post-thrombolytic infusion  Outcome: Progressing Towards Goal  Goal: Psychosocial  Outcome: Progressing Towards Goal  Goal: *Hemodynamically stable  Outcome: Progressing Towards Goal  Goal: *Neurologically stable  Description: Absence of additional neurological deficits    Outcome: Progressing Towards Goal  Goal: *Verbalizes anxiety and depression are reduced or absent  Outcome: Progressing Towards Goal  Goal: *Absence of Signs of Aspiration on Current Diet  Outcome: Progressing Towards Goal  Goal: *Absence of deep venous thrombosis signs and symptoms(Stroke Metric)  Outcome: Progressing Towards Goal  Goal: *Ability to perform ADLs and demonstrates progressive mobility and function  Outcome: Progressing Towards Goal  Goal: *Stroke education started(Stroke Metric)  Outcome: Progressing Towards Goal  Goal: *Dysphagia screen performed(Stroke Metric)  Outcome: Progressing Towards Goal  Goal: *Rehab consulted(Stroke Metric)  Outcome: Progressing Towards Goal     Problem: TIA/CVA Stroke: Day 2 Until Discharge  Goal: Activity/Safety  Outcome: Progressing Towards Goal  Goal: Diagnostic Test/Procedures  Outcome: Progressing Towards Goal  Goal: Nutrition/Diet  Outcome: Progressing Towards Goal  Goal: Discharge Planning  Outcome: Progressing Towards Goal  Goal: Medications  Outcome: Progressing Towards Goal  Goal: Respiratory  Outcome: Progressing Towards Goal  Goal: Treatments/Interventions/Procedures  Outcome: Progressing Towards Goal  Goal: Psychosocial  Outcome: Progressing Towards Goal  Goal: *Verbalizes anxiety and depression are reduced or absent  Outcome: Progressing Towards Goal  Goal: *Absence of aspiration  Outcome: Progressing Towards Goal  Goal: *Absence of deep venous thrombosis signs and symptoms(Stroke Metric)  Outcome: Progressing Towards Goal  Goal: *Optimal pain control at patient's stated goal  Outcome: Progressing Towards Goal  Goal: *Tolerating diet  Outcome: Progressing Towards Goal  Goal: *Ability to perform ADLs and demonstrates progressive mobility and function  Outcome: Progressing Towards Goal  Goal: *Stroke education continued(Stroke Metric)  Outcome: Progressing Towards Goal     Problem: Ischemic Stroke: Discharge Outcomes  Goal: *Verbalizes anxiety and depression are reduced or absent  Outcome: Progressing Towards Goal  Goal: *Verbalize understanding of risk factor modification(Stroke Metric)  Outcome: Progressing Towards Goal  Goal: *Hemodynamically stable  Outcome: Progressing Towards Goal  Goal: *Absence of aspiration pneumonia  Outcome: Progressing Towards Goal  Goal: *Aware of needed dietary changes  Outcome: Progressing Towards Goal  Goal: *Verbalize understanding of prescribed medications including anti-coagulants, anti-lipid, and/or anti-platelets(Stroke Metric)  Outcome: Progressing Towards Goal  Goal: *Tolerating diet  Outcome: Progressing Towards Goal  Goal: *Aware of follow-up diagnostics related to anticoagulants  Outcome: Progressing Towards Goal  Goal: *Ability to perform ADLs and demonstrates progressive mobility and function  Outcome: Progressing Towards Goal  Goal: *Absence of DVT(Stroke Metric)  Outcome: Progressing Towards Goal  Goal: *Absence of aspiration  Outcome: Progressing Towards Goal  Goal: *Optimal pain control at patient's stated goal  Outcome: Progressing Towards Goal  Goal: *Home safety concerns addressed  Outcome: Progressing Towards Goal  Goal: *Describes available resources and support systems  Outcome: Progressing Towards Goal  Goal: *Verbalizes understanding of activation of EMS(911) for stroke symptoms(Stroke Metric)  Outcome: Progressing Towards Goal  Goal: *Understands and describes signs and symptoms to report to providers(Stroke Metric)  Outcome: Progressing Towards Goal  Goal: *Neurolgocially stable (absence of additional neurological deficits)  Outcome: Progressing Towards Goal  Goal: *Verbalizes importance of follow-up with primary care physician(Stroke Metric)  Outcome: Progressing Towards Goal  Goal: *Smoking cessation discussed,if applicable(Stroke Metric)  Outcome: Progressing Towards Goal  Goal: *Depression screening completed(Stroke Metric)  Outcome: Progressing Towards Goal

## 2020-07-17 NOTE — PROGRESS NOTES
Hospitalist Progress Note  Anil Chance MD  Answering service: [phone] from in house phone      Date of Service:  2020  NAME:  Carol Ann Huynh  :  1940  MRN:  [id number]    Admission Summary:   80F p/w suspected CVA/TIA- hypothermia  Interval history / Subjective:   Patient seen and examined at bedside. She remained confused. More debilitated than last admission. Hgb dropped to 6.9     Assessment & Plan:     # Suspect TIA vs stroke   - CT head: NAD, CTA head/neck: R ICA 50% stenosis, L ICA 60%. - TTE pending, MRI brain pending  - PT/OT/SLP eval.   - On Eliquis reduced dose due to anemia   - LDL 50. A1c 6.4.  - looks debilitated, Palliative care following.  - Neuro following     # Sepsis: POA, with hypothermia (possible dehydration/age vs infectious) and tachypnea. - noted recent ESBL bacteremia- has picc on meropenem. - Empiric iv abx 'meropenem', CXR: b/l lower lobe infiltrates/atelects. BCs NGTD, UA -ve. Lactic wnl    # Acute metabolic Encephalopathy: likely 2nd to above, monitor, supportive Mg. # Acute hypoxic Resp failure: supplemental O2 as needed, COVID test -ve. # Anemia: chronic, Hemoccult -ve, monitor HB. Transfuse if hb <7. Check folate, vit B12. Iron profile  # CKD3: monitor renal function, avoid nephrotoxics    Code status: DNR  DVT prophylaxis: SCDs  Care Plan discussed with: Patient/Family and Nurse  Disposition: TBD     Hospital Problems  Date Reviewed: 2020          Codes Class Noted POA    CVA (cerebral vascular accident) [facility]) ICD-10-CM: I63.9  ICD-9-CM: 434.91  2020 Unknown        TIA (transient ischemic attack) ICD-10-CM: G45.9  ICD-9-CM: 435.9  2020 Unknown            Review of Systems:   Difficult to obtain as patient is confused     Vital Signs:    Last 24hrs VS reviewed since prior progress note.  Most recent are:  Visit Vitals  /72 (BP 1 Location: Left arm, BP Patient Position: At rest)   Pulse 68   Temp 97.9 °F (36.6 °C)   Resp 14   Ht 5' 0.98\" (1.549 m)   Wt 93.6 kg (206 lb 5.6 oz)   SpO2 93%   BMI 39.01 kg/m²         Intake/Output Summary (Last 24 hours) at 7/17/2020 1618  Last data filed at 7/17/2020 1549  Gross per 24 hour   Intake 1184.17 ml   Output 600 ml   Net 584.17 ml        Physical Examination:   General:  Alert, confused, No acute distress, obese  Resp:  No accessory muscle use, no wheezes, few rhonchi  Abd:  Soft, non-tender, non-distended  Extremities:  No cyanosis or clubbing, no significant edema  Neuro:   no focal neuro deficits, follows commands, strength symmetric   Psych:   not agitated. Data Review:     I personally reviewed labs and imaging     Labs:     Recent Labs     07/17/20  0330 07/15/20  0450   WBC  --  7.3   HGB 6.9* 7.4*   HCT 26.3* 26.4*   PLT  --  342     Recent Labs     07/17/20  0330 07/15/20  0450   * 144   K 4.4 4.8   * 114*   CO2 26 23   BUN 41* 48*   CREA 1.58* 1.89*   GLU 86 86   CA 8.9 9.3   MG  --  2.1     No results for input(s): ALT, AP, TBIL, TBILI, TP, ALB, GLOB, GGT, AML, LPSE in the last 72 hours. No lab exists for component: SGOT, GPT, AMYP, HLPSE  No results for input(s): INR, PTP, APTT, INREXT, INREXT in the last 72 hours. Recent Labs     07/16/20  0500   TIBC 221*   PSAT 5*      Lab Results   Component Value Date/Time    Folate 5.4 07/16/2020 05:00 AM      No results for input(s): PH, PCO2, PO2 in the last 72 hours. No results for input(s): CPK, CKNDX, TROIQ in the last 72 hours.     No lab exists for component: CPKMB  Lab Results   Component Value Date/Time    Cholesterol, total 90 07/14/2020 03:18 AM    HDL Cholesterol 27 07/14/2020 03:18 AM    LDL,Direct 163 (H) 12/05/2019 01:04 PM    LDL, calculated 49.8 07/14/2020 03:18 AM    Triglyceride 66 07/14/2020 03:18 AM    CHOL/HDL Ratio 3.3 07/14/2020 03:18 AM     Lab Results   Component Value Date/Time    Glucose (POC) 82 07/16/2020 06:23 PM    Glucose (POC) 98 07/16/2020 12:13 PM    Glucose (POC) 110 (H) 07/16/2020 06:53 AM    Glucose (POC) 93 07/16/2020 03:10 AM    Glucose (POC) 90 07/15/2020 06:15 PM     Lab Results   Component Value Date/Time    Color YELLOW/STRAW 07/13/2020 02:08 PM    Appearance CLEAR 07/13/2020 02:08 PM    Specific gravity 1.018 07/13/2020 02:08 PM    pH (UA) 5.0 07/13/2020 02:08 PM    Protein Negative 07/13/2020 02:08 PM    Glucose Negative 07/13/2020 02:08 PM    Ketone Negative 07/13/2020 02:08 PM    Bilirubin Negative 07/13/2020 02:08 PM    Urobilinogen 1.0 07/13/2020 02:08 PM    Nitrites Negative 07/13/2020 02:08 PM    Leukocyte Esterase Negative 07/13/2020 02:08 PM    Epithelial cells FEW 07/13/2020 02:08 PM    Bacteria Negative 07/13/2020 02:08 PM    WBC 0-4 07/13/2020 02:08 PM    RBC 0-5 07/13/2020 02:08 PM     Medications Reviewed:     Current Facility-Administered Medications   Medication Dose Route Frequency    dextrose 5% infusion  50 mL/hr IntraVENous CONTINUOUS    apixaban (ELIQUIS) tablet 2.5 mg  2.5 mg Oral BID    famotidine (PEPCID) tablet 20 mg  20 mg Oral Q24H    albuterol (PROVENTIL HFA, VENTOLIN HFA, PROAIR HFA) inhaler 2 Puff  2 Puff Inhalation Q4H PRN    atorvastatin (LIPITOR) tablet 40 mg  40 mg Oral QHS    carvediloL (COREG) tablet 6.25 mg  6.25 mg Oral BID WITH MEALS    acetaminophen (TYLENOL) tablet 650 mg  650 mg Oral Q4H PRN    Or    acetaminophen (TYLENOL) solution 650 mg  650 mg Per NG tube Q4H PRN    Or    acetaminophen (TYLENOL) suppository 650 mg  650 mg Rectal Q4H PRN    0.9% sodium chloride infusion 250 mL  250 mL IntraVENous PRN    0.9% sodium chloride infusion 250 mL  250 mL IntraVENous PRN    meropenem (MERREM) 500 mg in 0.9% sodium chloride (MBP/ADV) 50 mL  500 mg IntraVENous Q8H   ______________________________________________________________________  EXPECTED LENGTH OF STAY: 4d 19h  ACTUAL LENGTH OF STAY:          4               Ninoska Breaux MD

## 2020-07-17 NOTE — PROGRESS NOTES
ADIS:    Return to [facility] 22 - 19%  Has a Covid negative test   BLS for transport    Still awaiting MRI at this time. CM left VM for P[facility] Screen at [facility] [place] regarding possible discharge today. Will place on will call with ALEJO Santillan

## 2020-07-17 NOTE — PROGRESS NOTES
Problem: Dysphagia (Adult)  Goal: *Acute Goals and Plan of Care (Insert Text)  Description: Speech pathology goals  Initiated 7/15/2020  1. Patient will tolerate meds crushed in puree with no overt s/s aspiration within 7 days  2. Patient will participate in re-evaluation of swallow function within 7 days  Outcome: Progressing Towards Goal     Problem: Communication Impaired (Adult)  Goal: *Acute Goals and Plan of Care (Insert Text)  Description: Speech therapy goals  Initiated 7/17/2020   1. Patient will follow 1-step commands with 50% accuracy with mod cues within 7 days. 2. Patient will answer simple y/n questions with 60% accuracy with mod cues within 7 days  3. Patient will complete simple confrontation and responsive naming tasks with 50% accuracy with max cues within 7 days   Outcome: Progressing Towards Goal     SPEECH LANGUAGE PATHOLOGY BEDSIDE SWALLOW AND SPEECH EVALUATIONS  Patient: Ryan Cortés (68 y.o. female)  Date: 7/17/2020  Primary Diagnosis: CVA (cerebral vascular accident) ([facility] [address] [I63.9]  TIA (transient ischemic attack) [G45.9]        Precautions: aspiration  Fall, Skin  ASSESSMENT :  Based on the objective data described below, the patient presents with improving swallow function with a moderate oropharyngeal dysphagia with much improved tolerance of PO trials today. Patient demonstrates decreased bolus formation and control with anterior spillage with liquids, delayed posterior propulsion, suspected delayed swallow initiation and reduced hyolaryngeal elevation/excursion via palpation. Tolerated purees and single sips thin liquids via cup without s/s of aspiration. Throat clearing was noted with large successive sips thin liquids.       Patient also presents with mod/severe expressive and receptive aphasia, however, also uncertain what her baseline mental status is like and if this is true aphasia vs worsening of baseline mental status as she did not attempt a response to many tasks despite being fully alert with periods of good engagement. Patient with decreased command following, decreased y/n reliability and inability to make wants and needs known in a safe and timely manner. Patient will benefit from skilled intervention to address the above impairments. Patients rehabilitation potential is considered to be Fair     PLAN :  Recommendations and Planned Interventions:  --recommend initiation of puree/thin liquid diet, strict aspiration precautions. No straws, meds crushed in applesauce. --if patient demonstrates any s/s of aspiration with diet, would recommend resuming NPO except meds and completion of MBS study on Monday. --will follow for diet tolerance, consideration of upgrade, and language treatment. Frequency/Duration: Patient will be followed by speech-language pathology 3 times a week to address goals. Discharge Recommendations: Skilled Nursing Facility     SUBJECTIVE:   Patient alert, cooperative, more interactive today    OBJECTIVE:     Past Medical History:   Diagnosis Date    A-fib ([facility] [address]     Anemia     Chronic kidney disease     CKD (chronic kidney disease)     Hypercholesterolemia     Hyperlipidemia     Hypertension     MI (myocardial infarction) ([facility] [address]    No past surgical history on file.   Prior Level of Function/Home Situation:   Home Situation  Home Environment: Long term care([facility])  Current DME Used/Available at Home: Wheelchair, Walker, rolling  Cognitive and Communication Status:  Neurologic State: Alert, Confused  Orientation Level: Oriented to person, Disoriented to place, Disoriented to situation, Disoriented to time  Cognition: Decreased attention/concentration, Decreased command following  Perception: Cues to maintain midline in sitting  Perseveration: No perseveration noted  Safety/Judgement: Not assessed  Swallowing Evaluation:   Oral Assessment:  Oral Assessment  Labial: Decreased rate;Decreased seal;Left droop  Dentition: Natural;Limited  Oral Hygiene: mild drooling, no pooled secretions today   Lingual: Decreased rate;Decreased strength  Velum: Unable to visualize  Mandible: No impairment  P.O. Trials:  Patient Position: upright in bed   Vocal quality prior to P.O.: No impairment  Consistency Presented: Ice chips; Thin liquid;Puree  How Presented: Self-fed/presented;SLP-fed/presented;Cup/sip;Cup/gulp; Spoon; Successive swallows     Bolus Acceptance: No impairment  Bolus Formation/Control: Impaired  Type of Impairment: Delayed(slow and effortful manipulation )  Propulsion: Delayed (# of seconds)  Oral Residue: None  Initiation of Swallow: Delayed (# of seconds)  Laryngeal Elevation: Decreased  Aspiration Signs/Symptoms: Clear throat(with succesive sips only )  Pharyngeal Phase Characteristics: Double swallow; Suspected pharyngeal residue(suspected delayed initiation, reduced strength )  Effective Modifications: Small sips and bites  Cues for Modifications:  Moderate       Oral Phase Severity: Moderate  Pharyngeal Phase Severity : Moderate    Speech/Language Evaluation  Motor Speech:  Oral-Motor Structure/Motor Speech  Labial: Decreased rate;Decreased seal;Left droop  Dentition: Natural;Limited  Oral Hygiene: mild drooling, no pooled secretions today   Lingual: Decreased rate;Decreased strength  Velum: Unable to visualize  Mandible: No impairment  Language Comprehension and Expression:  Auditory Comprehension  Auditory Impairment: Yes  Hearing Aid: None  Response to Basic Yes/No Questions (%): 40 %(doesn't consistently respond )  One-Step Basic Commands (%): 30 %  Verbal Expression  Initiation: Impaired (%)  Automatic Speech Task: Impaired (comment)(no response )  Naming: Impaired  Confrontation (%): 10 %  Overall Impairment: Moderate-severe(however, question baseline mentation )        Neuro-Linguistics:                                                  Pragmatics:        Voice:                 Vocal Quality: No impairment NOMS:   The NOMS functional outcome measure was used to quantify this patient's level of receptive language impairment. Based on the NOMS, the patient was determined to be at level 3 for spoken language comprehension. NOMS Spoken Language Comprehension:  Level 1 (CN): Unable to follow directions or respond to simple yes/no  Level 2 (CM): Can follow directions & respond to simple y/n in context with max cues. Level 3 (CL): Responds to simple y/n; follows simple commands w/mod cues out of context  Level 4 (CK): Responds to y/n; occasionally follows simple directions without cues. Mod cues for complex info. Understands limited conversation about routine activities with familiar communication partner  Level 5 (Conyngham Alsergei): Understands communication in structured conversation with familiar and unfamiliar partners. Min cues for complex information. Occasionally initiates use of compensatory strategies  Level 6 (CI): Limitations still apparent in voc/social situations. Rarely cued for complex info. Level 7 (CH): Independently participates in vocational, avocational, and social situations. MOHAN. (2003). National Outcomes Measurement System (NOMS): Adult Speech-Language Pathology User's Guide. Pain:  Pain Scale 1: Numeric (0 - 10)  Pain Intensity 1: 0       After treatment:   Patient left in no apparent distress in bed, Call bell within reach, and Nursing notified    COMMUNICATION/EDUCATION:   Patient was educated regarding her deficit(s) of dysphagia, aphasia as this relates to her diagnosis of CVA workup. She demonstrated Guarded understanding as evidenced by limited response to education. The patient's plan of care including recommendations, planned interventions, and recommended diet changes were discussed with: Physical therapist and Registered nurse. Patient/family have participated as able in goal setting and plan of care.   Patient/family agree to work toward stated goals and plan of care. Thank you for this referral.  Candida Gooden M.CD.  CCC-SLP   Time Calculation: 22 mins

## 2020-07-17 NOTE — ROUTINE PROCESS
Pt was given to Neuro RN Neela Hua for care. Bedside and Verbal shift change report given to Ja Diaz (oncoming nurse) by Dylan Pineda (offgoing nurse). Report included the following information SBAR, Kardex, Intake/Output, MAR, Accordion and Recent Results.

## 2020-07-17 NOTE — PROGRESS NOTES
Problem: Heart Failure: Day 5  Goal: Activity/Safety  Outcome: Progressing Towards Goal  Goal: Diagnostic Test/Procedures  Outcome: Progressing Towards Goal  Goal: Nutrition/Diet  Outcome: Progressing Towards Goal  Goal: Discharge Planning  Outcome: Progressing Towards Goal  Goal: Medications  Outcome: Progressing Towards Goal  Goal: Respiratory  Outcome: Progressing Towards Goal  Goal: Treatments/Interventions/Procedures  Outcome: Progressing Towards Goal  Goal: Psychosocial  Outcome: Progressing Towards Goal     Problem: Heart Failure: Discharge Outcomes  Goal: *Demonstrates ability to perform prescribed activity without shortness of breath or discomfort  Outcome: Progressing Towards Goal  Goal: *Left ventricular function assessment completed prior to or during stay, or planned for post-discharge  Outcome: Progressing Towards Goal  Goal: *ACEI prescribed if LVEF less than 40% and no contraindications or ARB prescribed  Outcome: Progressing Towards Goal  Goal: *Verbalizes understanding and describes prescribed diet  Outcome: Progressing Towards Goal  Goal: *Verbalizes understanding/describes prescribed medications  Outcome: Progressing Towards Goal  Goal: *Describes available resources and support systems  Description: (eg: Home Health, Palliative Care, Advanced Medical Directive)  Outcome: Progressing Towards Goal  Goal: *Describes smoking cessation resources  Outcome: Progressing Towards Goal  Goal: *Understands and describes signs and symptoms to report to providers(Stroke Metric)  Outcome: Progressing Towards Goal  Goal: *Describes/verbalizes understanding of follow-up/return appt  Description: (eg: to physicians, diabetes treatment coordinator, and other resources  Outcome: Progressing Towards Goal  Goal: *Describes importance of continuing daily weights and changes to report to physician  Outcome: Progressing Towards Goal     Problem: TIA/CVA Stroke: Day 2 Until Discharge  Goal: Activity/Safety  Outcome: Progressing Towards Goal  Goal: Diagnostic Test/Procedures  Outcome: Progressing Towards Goal  Goal: Nutrition/Diet  Outcome: Progressing Towards Goal  Goal: Discharge Planning  Outcome: Progressing Towards Goal  Goal: Medications  Outcome: Progressing Towards Goal  Goal: Respiratory  Outcome: Progressing Towards Goal  Goal: Treatments/Interventions/Procedures  Outcome: Progressing Towards Goal  Goal: Psychosocial  Outcome: Progressing Towards Goal  Goal: *Verbalizes anxiety and depression are reduced or absent  Outcome: Progressing Towards Goal  Goal: *Absence of aspiration  Outcome: Progressing Towards Goal  Goal: *Absence of deep venous thrombosis signs and symptoms(Stroke Metric)  Outcome: Progressing Towards Goal  Goal: *Optimal pain control at patient's stated goal  Outcome: Progressing Towards Goal  Goal: *Tolerating diet  Outcome: Progressing Towards Goal  Goal: *Ability to perform ADLs and demonstrates progressive mobility and function  Outcome: Progressing Towards Goal  Goal: *Stroke education continued(Stroke Metric)  Outcome: Progressing Towards Goal     Problem: Ischemic Stroke: Discharge Outcomes  Goal: *Verbalizes anxiety and depression are reduced or absent  Outcome: Progressing Towards Goal  Goal: *Verbalize understanding of risk factor modification(Stroke Metric)  Outcome: Progressing Towards Goal  Goal: *Hemodynamically stable  Outcome: Progressing Towards Goal  Goal: *Absence of aspiration pneumonia  Outcome: Progressing Towards Goal  Goal: *Aware of needed dietary changes  Outcome: Progressing Towards Goal  Goal: *Verbalize understanding of prescribed medications including anti-coagulants, anti-lipid, and/or anti-platelets(Stroke Metric)  Outcome: Progressing Towards Goal  Goal: *Tolerating diet  Outcome: Progressing Towards Goal  Goal: *Aware of follow-up diagnostics related to anticoagulants  Outcome: Progressing Towards Goal  Goal: *Ability to perform ADLs and demonstrates progressive mobility and function  Outcome: Progressing Towards Goal  Goal: *Absence of DVT(Stroke Metric)  Outcome: Progressing Towards Goal  Goal: *Absence of aspiration  Outcome: Progressing Towards Goal  Goal: *Optimal pain control at patient's stated goal  Outcome: Progressing Towards Goal  Goal: *Home safety concerns addressed  Outcome: Progressing Towards Goal  Goal: *Describes available resources and support systems  Outcome: Progressing Towards Goal  Goal: *Verbalizes understanding of activation of EMS(911) for stroke symptoms(Stroke Metric)  Outcome: Progressing Towards Goal  Goal: *Understands and describes signs and symptoms to report to providers(Stroke Metric)  Outcome: Progressing Towards Goal  Goal: *Neurolgocially stable (absence of additional neurological deficits)  Outcome: Progressing Towards Goal  Goal: *Verbalizes importance of follow-up with primary care physician(Stroke Metric)  Outcome: Progressing Towards Goal

## 2020-07-17 NOTE — PROGRESS NOTES
Problem: Mobility Impaired (Adult and Pediatric)  Goal: *Acute Goals and Plan of Care (Insert Text)  Description:   FUNCTIONAL STATUS PRIOR TO ADMISSION: Pt poor historian. Receives assistance with ADLs and mobility. HOME SUPPORT PRIOR TO ADMISSION: Pt lives at [facility]. Physical Therapy Goals  Initiated 7/15/2020  1. Patient will move from supine to sit and sit to supine , scoot up and down, and roll side to side in bed with supervision/set-up within 7 day(s). 2.  Patient will transfer from bed to chair and chair to bed with minimal assistance/contact guard assist using the least restrictive device within 7 day(s). 3.  Patient will perform sit to stand with minimal assistance/contact guard assist within 7 day(s). 4.  Patient will ambulate with minimal assistance/contact guard assist for 15 feet with the least restrictive device within 7 day(s). 5.  Patient will improve Rubio Balance score by 7 points within 7 days. 7/17/2020 1540 by Onur Wong PT  Outcome: Progressing Towards Goal   PHYSICAL THERAPY TREATMENT  Patient: Wilberto Fischer (68 y.o. female)  Date: 7/17/2020  Diagnosis: CVA (cerebral vascular accident) ([facility] [address] [I63.9]  TIA (transient ischemic attack) [G45.9]   <principal problem not specified>       Precautions: Contact  Chart, physical therapy assessment, plan of care and goals were reviewed. ASSESSMENT  Patient continues with skilled PT services and is progressing towards goals. Pt received leaning toward L side with R LE off bed. Pt required max A to roll in bed. Prompted for pt to assist with scooting toward HOB. Pt required total A to reposition in bed. Transitioned bed into more upright position. Pt unable to lean anteriorly and required support for sitting. For the weekend, recommend patient to complete as able in order to maintain strength, endurance and independence with nursing: chair position in bed. PT to follow-up with patient after the weekend. Current Level of Function Impacting Discharge (mobility/balance): max-total A for bed mobility    Other factors to consider for discharge: fall risk, level of physical assistance required, from LTC? PLAN :  Patient continues to benefit from skilled intervention to address the above impairments. Continue treatment per established plan of care. to address goals. Recommendation for discharge: (in order for the patient to meet his/her long term goals)  Therapy up to 5 days/week in SNF setting  If pt were to d/c home, would require 2 person A for mobility    This discharge recommendation:  Has been made in collaboration with the attending provider and/or case management    IF patient discharges home will need the following DME: wheelchair       SUBJECTIVE:   Patient stated [facility], [facility].     OBJECTIVE DATA SUMMARY:   Critical Behavior:  Neurologic State: Alert, Confused  Orientation Level: Oriented to person, Disoriented to place, Disoriented to situation, Disoriented to time  Cognition: Decreased attention/concentration, Decreased command following  Safety/Judgement: Not assessed  Functional Mobility Training:  Bed Mobility:  Rolling: Maximum assistance        Scooting: Total assistance       Balance:  Sitting: Impaired; With support  Sitting - Static: Poor (constant support)      Activity Tolerance:   Poor  Please refer to the flowsheet for vital signs taken during this treatment. After treatment patient left in no apparent distress:   Supine in bed, Call bell within reach, and Side rails x 3    COMMUNICATION/COLLABORATION:   The patients plan of care was discussed with: Registered nurse.      Neal Castillo, PT, DPT   Time Calculation: 10 mins

## 2020-07-17 NOTE — PROGRESS NOTES
Bedside and Verbal shift change report given to [facility] (oncoming nurse) by Amelie Rodrigues (offgoing nurse). Report included the following information SBAR, Kardex, Intake/Output, MAR, Recent Results, Cardiac Rhythm AFIB and Dual Neuro Assessment.

## 2020-07-18 LAB
ANION GAP SERPL CALC-SCNC: 6 MMOL/L (ref 5–15)
BACTERIA SPEC CULT: NORMAL
BUN SERPL-MCNC: 40 MG/DL (ref 6–20)
BUN/CREAT SERPL: 25 (ref 12–20)
CALCIUM SERPL-MCNC: 9.1 MG/DL (ref 8.5–10.1)
CHLORIDE SERPL-SCNC: 118 MMOL/L (ref 97–108)
CO2 SERPL-SCNC: 25 MMOL/L (ref 21–32)
CREAT SERPL-MCNC: 1.57 MG/DL (ref 0.55–1.02)
ERYTHROCYTE [DISTWIDTH] IN BLOOD BY AUTOMATED COUNT: 23.1 % (ref 11.5–14.5)
GLUCOSE SERPL-MCNC: 97 MG/DL (ref 65–100)
HCT VFR BLD AUTO: 27.8 % (ref 35–47)
HGB BLD-MCNC: 7.1 G/DL (ref 11.5–16)
MCH RBC QN AUTO: 20.2 PG (ref 26–34)
MCHC RBC AUTO-ENTMCNC: 25.5 G/DL (ref 30–36.5)
MCV RBC AUTO: 79.2 FL (ref 80–99)
NRBC # BLD: 0.09 K/UL (ref 0–0.01)
NRBC BLD-RTO: 1.4 PER 100 WBC
PLATELET # BLD AUTO: 249 K/UL (ref 150–400)
PMV BLD AUTO: 9.3 FL (ref 8.9–12.9)
POTASSIUM SERPL-SCNC: 4.1 MMOL/L (ref 3.5–5.1)
RBC # BLD AUTO: 3.51 M/UL (ref 3.8–5.2)
SERVICE CMNT-IMP: NORMAL
SODIUM SERPL-SCNC: 149 MMOL/L (ref 136–145)
WBC # BLD AUTO: 6.6 K/UL (ref 3.6–11)

## 2020-07-18 PROCEDURE — 65660000000 HC RM CCU STEPDOWN

## 2020-07-18 PROCEDURE — 74011000258 HC RX REV CODE- 258: Performed by: HOSPITALIST

## 2020-07-18 PROCEDURE — 74011250637 HC RX REV CODE- 250/637: Performed by: FAMILY MEDICINE

## 2020-07-18 PROCEDURE — 85027 COMPLETE CBC AUTOMATED: CPT

## 2020-07-18 PROCEDURE — 74011250636 HC RX REV CODE- 250/636: Performed by: FAMILY MEDICINE

## 2020-07-18 PROCEDURE — 77030038269 HC DRN EXT URIN PURWCK BARD -A

## 2020-07-18 PROCEDURE — 36415 COLL VENOUS BLD VENIPUNCTURE: CPT

## 2020-07-18 PROCEDURE — 77010033678 HC OXYGEN DAILY

## 2020-07-18 PROCEDURE — 80048 BASIC METABOLIC PNL TOTAL CA: CPT

## 2020-07-18 PROCEDURE — 74011250637 HC RX REV CODE- 250/637: Performed by: INTERNAL MEDICINE

## 2020-07-18 PROCEDURE — 74011250636 HC RX REV CODE- 250/636: Performed by: HOSPITALIST

## 2020-07-18 PROCEDURE — 74011000258 HC RX REV CODE- 258: Performed by: FAMILY MEDICINE

## 2020-07-18 RX ADMIN — ATORVASTATIN CALCIUM 40 MG: 40 TABLET, FILM COATED ORAL at 22:25

## 2020-07-18 RX ADMIN — MEROPENEM 500 MG: 500 INJECTION, POWDER, FOR SOLUTION INTRAVENOUS at 07:10

## 2020-07-18 RX ADMIN — IRON SUCROSE 200 MG: 20 INJECTION, SOLUTION INTRAVENOUS at 09:45

## 2020-07-18 RX ADMIN — CARVEDILOL 6.25 MG: 6.25 TABLET, FILM COATED ORAL at 18:39

## 2020-07-18 RX ADMIN — MEROPENEM 500 MG: 500 INJECTION, POWDER, FOR SOLUTION INTRAVENOUS at 14:53

## 2020-07-18 RX ADMIN — CARVEDILOL 6.25 MG: 6.25 TABLET, FILM COATED ORAL at 09:43

## 2020-07-18 RX ADMIN — FAMOTIDINE 20 MG: 20 TABLET ORAL at 22:25

## 2020-07-18 RX ADMIN — MEROPENEM 500 MG: 500 INJECTION, POWDER, FOR SOLUTION INTRAVENOUS at 22:25

## 2020-07-18 RX ADMIN — APIXABAN 2.5 MG: 2.5 TABLET, FILM COATED ORAL at 22:25

## 2020-07-18 RX ADMIN — APIXABAN 2.5 MG: 2.5 TABLET, FILM COATED ORAL at 09:42

## 2020-07-18 NOTE — PROGRESS NOTES
Problem: Falls - Risk of  Goal: *Absence of Falls  Description: Document Catalina Newsome Fall Risk and appropriate interventions in the flowsheet. Outcome: Progressing Towards Goal  Note: Fall Risk Interventions:  Mobility Interventions: Communicate number of staff needed for ambulation/transfer, OT consult for ADLs, Patient to call before getting OOB, PT Consult for mobility concerns, PT Consult for assist device competence, Strengthening exercises (ROM-active/passive)    Mentation Interventions: Adequate sleep, hydration, pain control, Door open when patient unattended, Increase mobility, More frequent rounding, Reorient patient, Room close to nurse's station, Update white board    Medication Interventions: Assess postural VS orthostatic hypotension, Patient to call before getting OOB, Teach patient to arise slowly    Elimination Interventions:  Toileting schedule/hourly rounds, Stay With Me (per policy)              Problem: Patient Education: Go to Patient Education Activity  Goal: Patient/Family Education  Outcome: Progressing Towards Goal     Problem: Patient Education: Go to Patient Education Activity  Goal: Patient/Family Education  Outcome: Progressing Towards Goal

## 2020-07-18 NOTE — PROGRESS NOTES
[facility] Adult  Hospitalist Group                                                                                          Hospitalist Progress Note  Tabitha Ruiz MD  Answering service: [phone] from in house phone        Date of Service:  2020  NAME:  Nyasia Loja  :  1940  MRN:  [id number]      Admission Summary:     The patient is a 79-year-old female with recent admission to the hospital.  The patient was admitted to the hospital on 2020 and discharged on 2020. The patient apparently came in with sepsis and was diagnosed with ESBL E. coli and bacteremia because of the same and was started on IV meropenem. She had a PICC line placed. Her urine was growing ESBL and was started on ertapenem for 14 days and was discharged to a rehab facility. There were also concerns for possible pneumonia and acute metabolic encephalopathy, the patient also had acute renal failure. She has a history of chronic blood loss anemia and chronic AFib and is currently on Eliquis. The patient was at [facility] today and was undergoing rehab when she had sudden onset of slurred speech and left-sided weakness. EMS was called. The patient was found by EMS to have incomprehensible speech, but no left-sided weakness. The patient was brought to the ER. In the ER, the patient continued to have some speech difficulty and was requested to get admitted under the hospitalist service. The patient was also found to be hypothermic with a temperature of 91.6 while in the ER. Interval history / Subjective:     Poor historian, no acute event over night     Assessment & Plan:     Inability to speak and left sided weakness possible TIA   -on Eliquis reduced dose due to anemia, on lipitor  -CT head no acute intracranial abnormality identified. There has been no significant change.   -CTA head/neck R ICA 50% stenosis, L ICA 60%  -MRI brain study limited by motion artifact. No acute infarct.  Volume loss and white matter disease as above   -Echo LV EF 40-45% moderate concentric hypertrophy, mild global systolic function  -speech evaluation, severe dilated right atrium, moderate left atrium, mild to moderate MVR and TVR  -PT/OT  -left side weakness has resolved, appears in her base line  -Neurologist on board    Sepsis unclear etiology  -on empiric meropenem   -hypothermia resolved  -No leukocytosis  -hx of recent ESBL bacteremia   -has PICC line since 7/10  -blood cx no growth so far  -urine cx no growth so far  -COVID-19 test negative x 2    Acute metabolic encephalopathy   -back to base line  -patient conscious and alert, grabble speech   -continue neuro check and supportive care    Acute hypoxemic respiratory failure   -chest x ray increasing bilateral lower lobe atelectasis/infiltrates.  -SpO2 % on RA    Chronic A Fib, rate controlled  -on coreg and eliquis  -Eliquis is held for low Hgb    Anemia of chronic disease with severe iron deficiency  -Hgb low but stable at 7.1, no evidence of bleeding but on reduced dose of eliquis  -Hem occult negative  -monitor H/H and transfuse for Hgb <7.0  -iron profile low iron level and saturation  -vitamin B12 and folate normal  -will give iv iron x  3 doses    CKD stage III  -Creatinine improving, cut back IVF, at risk for fluid overload  -avoid nephrotoxin   -monitor renal function    Moderate to sever oral and moderate pharyngeal dysphagia  -continue on puree/thin liquid diet   -aspiration precaution  -speech therapist on board  -blood sugar run low normal, on D5W decrease rate to 25 ml/hr    DNR: at risk for decompensation, palliative care team on board      Code status: DNR  DVT prophylaxis: on [facility] discussed with: Patient/Family and Nurse  Anticipated Disposition: Patient from [facility] [place]  Anticipated Discharge: 24 hours to 48 hours     I called and discussed with Alba Olivia Brain her MPOA at [phone] and questions answered     Hospital Problems  Date Reviewed: 5/22/2020          Codes Class Noted POA    CVA (cerebral vascular accident) [facility]) ICD-10-CM: I63.9  ICD-9-CM: 434.91  7/13/2020 Unknown        TIA (transient ischemic attack) ICD-10-CM: G45.9  ICD-9-CM: 435.9  7/13/2020 Unknown                Vital Signs:    Last 24hrs VS reviewed since prior progress note. Most recent are:  Visit Vitals  /70 (BP 1 Location: Left arm, BP Patient Position: At rest)   Pulse 74   Temp 97.3 °F (36.3 °C)   Resp 19   Ht 5' 0.98\" (1.549 m)   Wt 92 kg (202 lb 13.2 oz)   SpO2 96%   BMI 38.34 kg/m²         Intake/Output Summary (Last 24 hours) at 7/18/2020 0738  Last data filed at 7/18/2020 0600  Gross per 24 hour   Intake 850 ml   Output 1100 ml   Net -250 ml        Physical Examination:             Constitutional:  No acute distress, cooperative, pleasant    ENT:  Oral mucosa moist, oropharynx benign. Resp:  Decrease bronchial breath sound bilaterally. No wheezing/rhonchi/rales. No accessory muscle use   CV:  Regular rhythm, normal rate, no murmurs, gallops, rubs    GI:  Soft, non distended, non tender. normoactive bowel sounds, no hepatosplenomegaly     Musculoskeletal:  No edema     Neurologic:  Conscious and alert, confused and disoriented, follow simple command, motor UE 4-5/5, LE 3/5     Skin:  Dry skin, no lesion       Data Review:    Review and/or order of clinical lab test  Review and/or order of tests in the radiology section of CPT  Review and/or order of tests in the medicine section of CPT      Labs:     Recent Labs     07/17/20  1507 07/17/20  0330   HGB 7.1* 6.9*   HCT 27.5* 26.3*     Recent Labs     07/17/20  0330   *   K 4.4   *   CO2 26   BUN 41*   CREA 1.58*   GLU 86   CA 8.9     No results for input(s): ALT, AP, TBIL, TBILI, TP, ALB, GLOB, GGT, AML, LPSE in the last 72 hours.     No lab exists for component: SGOT, GPT, AMYP, HLPSE  No results for input(s): INR, PTP, APTT, INREXT in the last 72 hours. Recent Labs     07/16/20  0500   TIBC 221*   PSAT 5*      Lab Results   Component Value Date/Time    Folate 5.4 07/16/2020 05:00 AM      No results for input(s): PH, PCO2, PO2 in the last 72 hours. No results for input(s): CPK, CKNDX, TROIQ in the last 72 hours.     No lab exists for component: CPKMB  Lab Results   Component Value Date/Time    Cholesterol, total 90 07/14/2020 03:18 AM    HDL Cholesterol 27 07/14/2020 03:18 AM    LDL,Direct 163 (H) 12/05/2019 01:04 PM    LDL, calculated 49.8 07/14/2020 03:18 AM    Triglyceride 66 07/14/2020 03:18 AM    CHOL/HDL Ratio 3.3 07/14/2020 03:18 AM     Lab Results   Component Value Date/Time    Glucose (POC) 82 07/16/2020 06:23 PM    Glucose (POC) 98 07/16/2020 12:13 PM    Glucose (POC) 110 (H) 07/16/2020 06:53 AM    Glucose (POC) 93 07/16/2020 03:10 AM    Glucose (POC) 90 07/15/2020 06:15 PM     Lab Results   Component Value Date/Time    Color YELLOW/STRAW 07/13/2020 02:08 PM    Appearance CLEAR 07/13/2020 02:08 PM    Specific gravity 1.018 07/13/2020 02:08 PM    pH (UA) 5.0 07/13/2020 02:08 PM    Protein Negative 07/13/2020 02:08 PM    Glucose Negative 07/13/2020 02:08 PM    Ketone Negative 07/13/2020 02:08 PM    Bilirubin Negative 07/13/2020 02:08 PM    Urobilinogen 1.0 07/13/2020 02:08 PM    Nitrites Negative 07/13/2020 02:08 PM    Leukocyte Esterase Negative 07/13/2020 02:08 PM    Epithelial cells FEW 07/13/2020 02:08 PM    Bacteria Negative 07/13/2020 02:08 PM    WBC 0-4 07/13/2020 02:08 PM    RBC 0-5 07/13/2020 02:08 PM         Medications Reviewed:     Current Facility-Administered Medications   Medication Dose Route Frequency    dextrose 5% infusion  50 mL/hr IntraVENous CONTINUOUS    apixaban (ELIQUIS) tablet 2.5 mg  2.5 mg Oral BID    famotidine (PEPCID) tablet 20 mg  20 mg Oral Q24H    albuterol (PROVENTIL HFA, VENTOLIN HFA, PROAIR HFA) inhaler 2 Puff  2 Puff Inhalation Q4H PRN    atorvastatin (LIPITOR) tablet 40 mg  40 mg Oral QHS  carvediloL (COREG) tablet 6.25 mg  6.25 mg Oral BID WITH MEALS    acetaminophen (TYLENOL) tablet 650 mg  650 mg Oral Q4H PRN    Or    acetaminophen (TYLENOL) solution 650 mg  650 mg Per NG tube Q4H PRN    Or    acetaminophen (TYLENOL) suppository 650 mg  650 mg Rectal Q4H PRN    0.9% sodium chloride infusion 250 mL  250 mL IntraVENous PRN    0.9% sodium chloride infusion 250 mL  250 mL IntraVENous PRN    meropenem (MERREM) 500 mg in 0.9% sodium chloride (MBP/ADV) 50 mL  500 mg IntraVENous Q8H     ______________________________________________________________________  EXPECTED LENGTH OF STAY: 4d 19h  ACTUAL LENGTH OF STAY:          5                 Minus MD Pamela

## 2020-07-18 NOTE — PROGRESS NOTES
Bedside shift change report given to Daniela Castillo RN (oncoming nurse) by Claudean Roca, RN (offgoing nurse). Report included the following information SBAR, Kardex, ED Summary, Procedure Summary, Intake/Output, MAR, Accordion, Recent Results, Med Rec Status, Cardiac Rhythm Afib, Alarm Parameters , Pre Procedure Checklist, Procedure Verification and Quality Measures. Problem: Falls - Risk of  Goal: *Absence of Falls  Description: Document Camilo Bonner Fall Risk and appropriate interventions in the flowsheet. Outcome: Progressing Towards Goal  Note: Fall Risk Interventions:  Mobility Interventions: PT Consult for mobility concerns    Mentation Interventions: Adequate sleep, hydration, pain control, Bed/chair exit alarm, Door open when patient unattended, Toileting rounds    Medication Interventions: Assess postural VS orthostatic hypotension    Elimination Interventions: Toileting schedule/hourly rounds  Problem: Pressure Injury - Risk of  Goal: *Prevention of pressure injury  Description: Document Pratik Scale and appropriate interventions in the flowsheet.   Outcome: Progressing Towards Goal  Note: Pressure Injury Interventions:  Sensory Interventions: Assess changes in LOC, Float heels, Discuss PT/OT consult with provider, Keep linens dry and wrinkle-free, Maintain/enhance activity level, Minimize linen layers, Monitor skin under medical devices, Pad between skin to skin, Pressure redistribution bed/mattress (bed type)    Moisture Interventions: Absorbent underpads, Apply protective barrier, creams and emollients, Check for incontinence Q2 hours and as needed, Internal/External urinary devices, Limit adult briefs, Maintain skin hydration (lotion/cream), Minimize layers, Moisture barrier    Activity Interventions: Pressure redistribution bed/mattress(bed type), PT/OT evaluation    Mobility Interventions: HOB 30 degrees or less, Float heels, Pressure redistribution bed/mattress (bed type), PT/OT evaluation    Nutrition Interventions: Document food/fluid/supplement intake    Friction and Shear Interventions: Apply protective barrier, creams and emollients, HOB 30 degrees or less, Lift sheet, Minimize layers  Problem: Afib Pathway: Day 1  Goal: Medications  Outcome: Progressing Towards Goal  Goal: Respiratory  Outcome: Progressing Towards Goal     Problem: Heart Failure: Day 5  Goal: Treatments/Interventions/Procedures  Outcome: Progressing Towards Goal     Problem: TIA/CVA Stroke: Day 2 Until Discharge  Goal: Treatments/Interventions/Procedures  Outcome: Progressing Towards Goal

## 2020-07-19 PROCEDURE — 74011250636 HC RX REV CODE- 250/636: Performed by: HOSPITALIST

## 2020-07-19 PROCEDURE — 51798 US URINE CAPACITY MEASURE: CPT

## 2020-07-19 PROCEDURE — 74011000258 HC RX REV CODE- 258: Performed by: HOSPITALIST

## 2020-07-19 PROCEDURE — 74011250637 HC RX REV CODE- 250/637: Performed by: INTERNAL MEDICINE

## 2020-07-19 PROCEDURE — 74011250637 HC RX REV CODE- 250/637: Performed by: FAMILY MEDICINE

## 2020-07-19 PROCEDURE — 94760 N-INVAS EAR/PLS OXIMETRY 1: CPT

## 2020-07-19 PROCEDURE — 77030038269 HC DRN EXT URIN PURWCK BARD -A

## 2020-07-19 PROCEDURE — 74011000258 HC RX REV CODE- 258: Performed by: FAMILY MEDICINE

## 2020-07-19 PROCEDURE — 74011250636 HC RX REV CODE- 250/636: Performed by: FAMILY MEDICINE

## 2020-07-19 PROCEDURE — 77010033678 HC OXYGEN DAILY

## 2020-07-19 PROCEDURE — 65660000000 HC RM CCU STEPDOWN

## 2020-07-19 RX ADMIN — ATORVASTATIN CALCIUM 40 MG: 40 TABLET, FILM COATED ORAL at 22:23

## 2020-07-19 RX ADMIN — FAMOTIDINE 20 MG: 20 TABLET ORAL at 22:23

## 2020-07-19 RX ADMIN — APIXABAN 2.5 MG: 2.5 TABLET, FILM COATED ORAL at 09:17

## 2020-07-19 RX ADMIN — MEROPENEM 500 MG: 500 INJECTION, POWDER, FOR SOLUTION INTRAVENOUS at 16:05

## 2020-07-19 RX ADMIN — CARVEDILOL 6.25 MG: 6.25 TABLET, FILM COATED ORAL at 09:15

## 2020-07-19 RX ADMIN — CARVEDILOL 6.25 MG: 6.25 TABLET, FILM COATED ORAL at 18:38

## 2020-07-19 RX ADMIN — MEROPENEM 500 MG: 500 INJECTION, POWDER, FOR SOLUTION INTRAVENOUS at 23:03

## 2020-07-19 RX ADMIN — MEROPENEM 500 MG: 500 INJECTION, POWDER, FOR SOLUTION INTRAVENOUS at 06:13

## 2020-07-19 RX ADMIN — APIXABAN 2.5 MG: 2.5 TABLET, FILM COATED ORAL at 20:45

## 2020-07-19 RX ADMIN — IRON SUCROSE 200 MG: 20 INJECTION, SOLUTION INTRAVENOUS at 09:09

## 2020-07-19 NOTE — PROGRESS NOTES
Problem: Falls - Risk of  Goal: *Absence of Falls  Description: Document Natividad Bowen Fall Risk and appropriate interventions in the flowsheet. Outcome: Progressing Towards Goal  Note: Fall Risk Interventions:  Mobility Interventions: Assess mobility with egress test, Communicate number of staff needed for ambulation/transfer, OT consult for ADLs, Patient to call before getting OOB, PT Consult for mobility concerns, Utilize gait belt for transfers/ambulation    Mentation Interventions: Adequate sleep, hydration, pain control, Increase mobility, More frequent rounding, Reorient patient, Room close to nurse's station, Toileting rounds, Update white board    Medication Interventions: Evaluate medications/consider consulting pharmacy, Patient to call before getting OOB, Teach patient to arise slowly    Elimination Interventions: Call light in reach, Bed/chair exit alarm, Patient to call for help with toileting needs, Stay With Me (per policy)    Problem: Pressure Injury - Risk of  Goal: *Prevention of pressure injury  Description: Document Pratik Scale and appropriate interventions in the flowsheet. Outcome: Progressing Towards Goal  Note: Pressure Injury Interventions:  Sensory Interventions: Assess changes in LOC, Assess need for specialty bed, Check visual cues for pain, Discuss PT/OT consult with provider, Float heels, Keep linens dry and wrinkle-free, Maintain/enhance activity level, Minimize linen layers, Monitor skin under medical devices, Pad between skin to skin, Pressure redistribution bed/mattress (bed type), Turn and reposition approx.  every two hours (pillows and wedges if needed)    Moisture Interventions: Moisture barrier, Minimize layers, Maintain skin hydration (lotion/cream), Limit adult briefs, Internal/External urinary devices, Check for incontinence Q2 hours and as needed, Assess need for specialty bed, Apply protective barrier, creams and emollients, Absorbent underpads    Activity Interventions: PT/OT evaluation, Increase time out of bed, Pressure redistribution bed/mattress(bed type), Assess need for specialty bed    Mobility Interventions: Turn and reposition approx.  every two hours(pillow and wedges), PT/OT evaluation, Pressure redistribution bed/mattress (bed type), HOB 30 degrees or less, Float heels, Assess need for specialty bed    Nutrition Interventions: Document food/fluid/supplement intake, Discuss nutritional consult with provider, Offer support with meals,snacks and hydration    Friction and Shear Interventions: Minimize layers, Lift sheet, HOB 30 degrees or less, Apply protective barrier, creams and emollients  Problem: Anemia Care Plan (Adult and Pediatric)  Goal: *Labs within defined limits  Outcome: Progressing Towards Goal     Problem: Heart Failure: Day 5  Goal: Respiratory  Outcome: Progressing Towards Goal     Problem: TIA/CVA Stroke: Day 2 Until Discharge  Goal: Treatments/Interventions/Procedures  Outcome: Progressing Towards Goal

## 2020-07-19 NOTE — PROGRESS NOTES
Bedside shift change report given to Sancho Raphael (oncoming nurse) by Onur Fong (offgoing nurse). Report included the following information SBAR, Kardex, Intake/Output, MAR and Cardiac Rhythm a-fib, PVCs.

## 2020-07-19 NOTE — ROUTINE PROCESS
Bedside shift change report given to Florecita (oncoming nurse) by Epifanio Higgins (offgoing nurse). Report included the following information SBAR, Kardex, Intake/Output, MAR, Accordion, Recent Results, Cardiac Rhythm a fib and Dual Neuro Assessment.

## 2020-07-19 NOTE — PROGRESS NOTES
Problem: Falls - Risk of  Goal: *Absence of Falls  Description: Document Aaron Stade Fall Risk and appropriate interventions in the flowsheet. Outcome: Progressing Towards Goal  Note: Fall Risk Interventions:  Mobility Interventions: Communicate number of staff needed for ambulation/transfer, Strengthening exercises (ROM-active/passive)    Mentation Interventions: Door open when patient unattended, More frequent rounding, Reorient patient, Toileting rounds    Medication Interventions: Patient to call before getting OOB, Teach patient to arise slowly    Elimination Interventions: Call light in reach, Toileting schedule/hourly rounds, Stay With Me (per policy)              Problem: Patient Education: Go to Patient Education Activity  Goal: Patient/Family Education  Outcome: Progressing Towards Goal     Problem: Pressure Injury - Risk of  Goal: *Prevention of pressure injury  Description: Document Pratik Scale and appropriate interventions in the flowsheet. Outcome: Progressing Towards Goal  Note: Pressure Injury Interventions:  Sensory Interventions: Avoid rigorous massage over bony prominences, Check visual cues for pain, Assess changes in LOC, Float heels, Minimize linen layers, Monitor skin under medical devices    Moisture Interventions: Absorbent underpads, Check for incontinence Q2 hours and as needed, Internal/External urinary devices, Minimize layers    Activity Interventions: Increase time out of bed, Pressure redistribution bed/mattress(bed type)    Mobility Interventions: Pressure redistribution bed/mattress (bed type), Turn and reposition approx.  every two hours(pillow and wedges), Float heels    Nutrition Interventions: Document food/fluid/supplement intake    Friction and Shear Interventions: Minimize layers, Lift sheet, HOB 30 degrees or less, Apply protective barrier, creams and emollients                Problem: Patient Education: Go to Patient Education Activity  Goal: Patient/Family Education  Outcome: Progressing Towards Goal     Problem: Patient Education: Go to Patient Education Activity  Goal: Patient/Family Education  Outcome: Progressing Towards Goal     Problem: Afib Pathway: Day 1  Goal: Off Pathway (Use only if patient is Off Pathway)  Outcome: Progressing Towards Goal  Goal: Activity/Safety  Outcome: Progressing Towards Goal  Goal: Consults, if ordered  Outcome: Progressing Towards Goal  Goal: Diagnostic Test/Procedures  Outcome: Progressing Towards Goal  Goal: Nutrition/Diet  Outcome: Progressing Towards Goal  Goal: Discharge Planning  Outcome: Progressing Towards Goal  Goal: Medications  Outcome: Progressing Towards Goal  Goal: Respiratory  Outcome: Progressing Towards Goal  Goal: Treatments/Interventions/Procedures  Outcome: Progressing Towards Goal  Goal: Psychosocial  Outcome: Progressing Towards Goal  Goal: *Optimal pain control at patient's stated goal  Outcome: Progressing Towards Goal  Goal: *Hemodynamically stable  Outcome: Progressing Towards Goal  Goal: *Stable cardiac rhythm  Outcome: Progressing Towards Goal  Goal: *Lungs clear or at baseline  Outcome: Progressing Towards Goal  Goal: *Labs within defined limits  Outcome: Progressing Towards Goal  Goal: *Describes available resources and support systems  Outcome: Progressing Towards Goal     Problem: Anemia Care Plan (Adult and Pediatric)  Goal: *Labs within defined limits  Outcome: Progressing Towards Goal  Goal: *Tolerates increased activity  Outcome: Progressing Towards Goal     Problem:  Body Temperature -  Risk of, Imbalanced  Goal: Ability to maintain a body temperature within defined limits  Outcome: Progressing Towards Goal  Goal: Will regain or maintain usual level of consciousness  Outcome: Progressing Towards Goal  Goal: Complications related to the disease process, condition or treatment will be avoided or minimized  Outcome: Progressing Towards Goal     Problem: Isolation Precautions - Risk of Spread of Infection  Goal: Prevent transmission of infectious organism to others  Outcome: Progressing Towards Goal     Problem: Nutrition Deficits  Goal: Optimize nutrtional status  Outcome: Progressing Towards Goal     Problem: Risk for Fluid Volume Deficit  Goal: Maintain normal heart rhythm  Outcome: Progressing Towards Goal  Goal: Maintain absence of muscle cramping  Outcome: Progressing Towards Goal  Goal: Maintain normal serum potassium, sodium, calcium, phosphorus, and pH  Outcome: Progressing Towards Goal     Problem: Loneliness or Risk for Loneliness  Goal: Demonstrate positive use of time alone when socialization is not possible  Outcome: Progressing Towards Goal     Problem: Fatigue  Goal: Verbalize increase energy and improved vitality  Outcome: Progressing Towards Goal     Problem: Patient Education: Go to Patient Education Activity  Goal: Patient/Family Education  Outcome: Progressing Towards Goal     Problem: Discharge Planning  Goal: *Discharge to safe environment  Description: See CM Notes  CRM: Hugo Mckay, MPH, [facility]; Z: [phone]    Outcome: Progressing Towards Goal  Goal: *Knowledge of medication management  Outcome: Progressing Towards Goal  Goal: *Knowledge of discharge instructions  Outcome: Progressing Towards Goal     Problem: Patient Education: Go to Patient Education Activity  Goal: Patient/Family Education  Outcome: Progressing Towards Goal     Problem: Risk for Spread of Infection  Goal: Prevent transmission of infectious organism to others  Description: Prevent the transmission of infectious organisms to other patients, staff members, and visitors.   Outcome: Progressing Towards Goal     Problem: Patient Education:  Go to Education Activity  Goal: Patient/Family Education  Outcome: Progressing Towards Goal     Problem: Patient Education: Go to Patient Education Activity  Goal: Patient/Family Education  Outcome: Progressing Towards Goal     Problem: Patient Education: Go to Patient Education Activity  Goal: Patient/Family Education  Outcome: Progressing Towards Goal

## 2020-07-19 NOTE — PROGRESS NOTES
ID weekend coverage. Chart reviewed. Patient hemodynamically stable. On broad-spectrum antibiotics. Nothing to add at this time. Dr Sid Jeronimo will see patient 7/20. Please call with any acute issues.

## 2020-07-20 LAB
ALBUMIN SERPL-MCNC: 1.9 G/DL (ref 3.5–5)
ALBUMIN/GLOB SERPL: 0.4 {RATIO} (ref 1.1–2.2)
ALP SERPL-CCNC: 102 U/L (ref 45–117)
ALT SERPL-CCNC: 13 U/L (ref 12–78)
ANION GAP SERPL CALC-SCNC: 5 MMOL/L (ref 5–15)
AST SERPL-CCNC: 30 U/L (ref 15–37)
BILIRUB SERPL-MCNC: 0.4 MG/DL (ref 0.2–1)
BUN SERPL-MCNC: 37 MG/DL (ref 6–20)
BUN/CREAT SERPL: 25 (ref 12–20)
CALCIUM SERPL-MCNC: 9.1 MG/DL (ref 8.5–10.1)
CHLORIDE SERPL-SCNC: 119 MMOL/L (ref 97–108)
CO2 SERPL-SCNC: 27 MMOL/L (ref 21–32)
CREAT SERPL-MCNC: 1.48 MG/DL (ref 0.55–1.02)
ERYTHROCYTE [DISTWIDTH] IN BLOOD BY AUTOMATED COUNT: 22.9 % (ref 11.5–14.5)
GLOBULIN SER CALC-MCNC: 5.3 G/DL (ref 2–4)
GLUCOSE SERPL-MCNC: 78 MG/DL (ref 65–100)
HCT VFR BLD AUTO: 28.4 % (ref 35–47)
HGB BLD-MCNC: 7.3 G/DL (ref 11.5–16)
MCH RBC QN AUTO: 20.3 PG (ref 26–34)
MCHC RBC AUTO-ENTMCNC: 25.7 G/DL (ref 30–36.5)
MCV RBC AUTO: 79.1 FL (ref 80–99)
NRBC # BLD: 0.23 K/UL (ref 0–0.01)
NRBC BLD-RTO: 4.4 PER 100 WBC
PLATELET # BLD AUTO: 197 K/UL (ref 150–400)
PMV BLD AUTO: 9.7 FL (ref 8.9–12.9)
POTASSIUM SERPL-SCNC: 4.3 MMOL/L (ref 3.5–5.1)
PROT SERPL-MCNC: 7.2 G/DL (ref 6.4–8.2)
RBC # BLD AUTO: 3.59 M/UL (ref 3.8–5.2)
SODIUM SERPL-SCNC: 151 MMOL/L (ref 136–145)
WBC # BLD AUTO: 5.2 K/UL (ref 3.6–11)

## 2020-07-20 PROCEDURE — 74011250636 HC RX REV CODE- 250/636: Performed by: HOSPITALIST

## 2020-07-20 PROCEDURE — 74011000258 HC RX REV CODE- 258: Performed by: HOSPITALIST

## 2020-07-20 PROCEDURE — 85027 COMPLETE CBC AUTOMATED: CPT

## 2020-07-20 PROCEDURE — 77030038269 HC DRN EXT URIN PURWCK BARD -A

## 2020-07-20 PROCEDURE — 80053 COMPREHEN METABOLIC PANEL: CPT

## 2020-07-20 PROCEDURE — 65660000000 HC RM CCU STEPDOWN

## 2020-07-20 PROCEDURE — 77010033678 HC OXYGEN DAILY

## 2020-07-20 PROCEDURE — 92526 ORAL FUNCTION THERAPY: CPT | Performed by: SPEECH-LANGUAGE PATHOLOGIST

## 2020-07-20 PROCEDURE — 74011250637 HC RX REV CODE- 250/637: Performed by: INTERNAL MEDICINE

## 2020-07-20 PROCEDURE — 36415 COLL VENOUS BLD VENIPUNCTURE: CPT

## 2020-07-20 PROCEDURE — 74011250637 HC RX REV CODE- 250/637: Performed by: FAMILY MEDICINE

## 2020-07-20 RX ORDER — DEXTROSE MONOHYDRATE 50 MG/ML
100 INJECTION, SOLUTION INTRAVENOUS CONTINUOUS
Status: DISPENSED | OUTPATIENT
Start: 2020-07-20 | End: 2020-07-21

## 2020-07-20 RX ADMIN — ATORVASTATIN CALCIUM 40 MG: 40 TABLET, FILM COATED ORAL at 22:22

## 2020-07-20 RX ADMIN — CARVEDILOL 6.25 MG: 6.25 TABLET, FILM COATED ORAL at 08:46

## 2020-07-20 RX ADMIN — APIXABAN 2.5 MG: 2.5 TABLET, FILM COATED ORAL at 08:46

## 2020-07-20 RX ADMIN — DEXTROSE MONOHYDRATE 100 ML/HR: 5 INJECTION, SOLUTION INTRAVENOUS at 12:26

## 2020-07-20 RX ADMIN — APIXABAN 2.5 MG: 2.5 TABLET, FILM COATED ORAL at 22:23

## 2020-07-20 RX ADMIN — CARVEDILOL 6.25 MG: 6.25 TABLET, FILM COATED ORAL at 17:16

## 2020-07-20 RX ADMIN — IRON SUCROSE 200 MG: 20 INJECTION, SOLUTION INTRAVENOUS at 08:49

## 2020-07-20 RX ADMIN — FAMOTIDINE 20 MG: 20 TABLET ORAL at 22:22

## 2020-07-20 NOTE — PROGRESS NOTES
Problem: Dysphagia (Adult)  Goal: *Acute Goals and Plan of Care (Insert Text)  Description: Speech pathology goals  Initiated 7/15/2020  1. Patient will tolerate meds crushed in puree with no overt s/s aspiration within 7 days  2. Patient will participate in re-evaluation of swallow function within 7 days  Outcome: [facility] TREATMENT  Patient: Abby Galvin (68 y.o. female)  Date: 7/20/2020  Diagnosis: CVA (cerebral vascular accident) ([facility] [address] [I63.9]  TIA (transient ischemic attack) [G45.9]   <principal problem not specified>       Precautions: aspiration Contact    ASSESSMENT:  Patient tolerating puree/thin liquid diet without s/s of aspiration, though level of alertness was a limiting factor this date. Per nsg, did well with meds crushed in puree this morning, but refused breakfast.  Patient did tolerate single sips thin liquids via straw without s/s of aspiration. Mild/moderate anterior spillage at times. Overall, she remains inconsistent in function and limited by mental status. Question her ability to meet nutritional needs via PO alone. PLAN:  Recommendations and Planned Interventions:  --continue puree/thin liquid diet. Strict aspiration precautions. Only feed when fully alert. Straws ok with single sips (remove from patient's mouth after each sip). Meds crushed in purees. --will follow for diet tolerance, consideration of upgrade and language treatment as alertness allows. Patient continues to benefit from skilled intervention to address the above impairments. Continue treatment per established plan of care. Discharge Recommendations:  Skilled Nursing Facility     SUBJECTIVE:   Patient alert initially but only sustained for short period.   Could not maintain alertness for language treatment after PO trials     OBJECTIVE:   Cognitive and Communication Status:  Neurologic State: Alert, Drowsy(limited ability to sustain alertness today )  Orientation Level: Oriented to person, Disoriented to place, Disoriented to situation, Disoriented to time  Cognition: Decreased attention/concentration, Decreased command following  Perception: Cues to maintain midline in sitting  Perseveration: No perseveration noted  Safety/Judgement: Not assessed  Dysphagia Treatment:  Oral Assessment:     P.O. Trials:  Patient Position: upright in bed  Vocal quality prior to P.O.: (nonverbal today)  Consistency Presented: Thin liquid;Puree  How Presented: SLP-fed/presented;Spoon;Straw     Bolus Acceptance: Impaired  Bolus Formation/Control: Impaired  Type of Impairment: Delayed;Premature spillage; Anterior;Spillage(decreased bolus formation/control )  Propulsion: Delayed (# of seconds)  Oral Residue: Less than 10% of bolus; Lingual  Initiation of Swallow: Delayed (# of seconds)  Laryngeal Elevation: Decreased  Aspiration Signs/Symptoms: None  Pharyngeal Phase Characteristics: Easily fatigued ; Suspected pharyngeal residue  Effective Modifications: Alternate liquids/solids;Small sips and bites  Cues for Modifications: Moderate  Comments: patient with limited level of alertness today. per nsg, did well with meds but refused breakfast    Oral Phase Severity: Moderate  Pharyngeal Phase Severity : Moderate                                                                                      Pain:  Pain Scale 1: Adult Nonverbal Pain Scale          After treatment:   Patient left in no apparent distress in bed, Call bell within reach, and Nursing notified    COMMUNICATION/EDUCATION:   Patient was educated regarding her deficit(s) of dysphagia as this relates to her diagnosis of CVA workup. She demonstrated poor understanding as evidenced by no response to education. The patient's plan of care including recommendations, planned interventions, and recommended diet changes were discussed with: Registered nurse. Berny Izaguirre M.CD.  CCC-SLP   Time Calculation: 10 mins

## 2020-07-20 NOTE — PROGRESS NOTES
Problem: Falls - Risk of  Goal: *Absence of Falls  Description: Document Sariah Zazueta Fall Risk and appropriate interventions in the flowsheet. 7/20/2020 1039 by Ministerio Apple  Outcome: Progressing Towards Goal  Note: Fall Risk Interventions:  Mobility Interventions: Bed/chair exit alarm, Communicate number of staff needed for ambulation/transfer, PT Consult for mobility concerns    Mentation Interventions: Adequate sleep, hydration, pain control, Bed/chair exit alarm, More frequent rounding, Reorient patient    Medication Interventions: Bed/chair exit alarm, Teach patient to arise slowly    Elimination Interventions: Bed/chair exit alarm, Call light in reach, Stay With Me (per policy)           9/52/3931 1038 by Ministerio Apple  Outcome: Progressing Towards Goal  Note: Fall Risk Interventions:  Mobility Interventions: Bed/chair exit alarm, Communicate number of staff needed for ambulation/transfer, PT Consult for mobility concerns    Mentation Interventions: Adequate sleep, hydration, pain control, Bed/chair exit alarm, More frequent rounding, Reorient patient    Medication Interventions: Bed/chair exit alarm, Teach patient to arise slowly    Elimination Interventions: Bed/chair exit alarm, Call light in reach, Stay With Me (per policy)              Problem: Pressure Injury - Risk of  Goal: *Prevention of pressure injury  Description: Document Pratik Scale and appropriate interventions in the flowsheet.   7/20/2020 1039 by Ministerio Apple  Outcome: Progressing Towards Goal  Note: Pressure Injury Interventions:  Sensory Interventions: Assess changes in LOC, Assess need for specialty bed, Check visual cues for pain, Float heels, Keep linens dry and wrinkle-free, Minimize linen layers    Moisture Interventions: Absorbent underpads, Assess need for specialty bed, Internal/External urinary devices, Apply protective barrier, creams and emollients, Minimize layers    Activity Interventions: Increase time out of bed, PT/OT evaluation, Assess need for specialty bed    Mobility Interventions: Assess need for specialty bed, Float heels, PT/OT evaluation    Nutrition Interventions: Document food/fluid/supplement intake, Discuss nutritional consult with provider    Friction and Shear Interventions: Apply protective barrier, creams and emollients, Lift sheet, Minimize layers             7/20/2020 1038 by Mari Machado  Outcome: Progressing Towards Goal  Note: Pressure Injury Interventions:  Sensory Interventions: Assess changes in LOC, Assess need for specialty bed, Check visual cues for pain, Float heels, Keep linens dry and wrinkle-free, Minimize linen layers    Moisture Interventions: Absorbent underpads, Assess need for specialty bed, Internal/External urinary devices, Apply protective barrier, creams and emollients, Minimize layers    Activity Interventions: Increase time out of bed, PT/OT evaluation, Assess need for specialty bed    Mobility Interventions: Assess need for specialty bed, Float heels, PT/OT evaluation    Nutrition Interventions: Document food/fluid/supplement intake, Discuss nutritional consult with provider    Friction and Shear Interventions: Apply protective barrier, creams and emollients, Lift sheet, Minimize layers                Problem: Anemia Care Plan (Adult and Pediatric)  Goal: *Labs within defined limits  Outcome: Progressing Towards Goal     Problem: Isolation Precautions - Risk of Spread of Infection  Goal: Prevent transmission of infectious organism to others  Outcome: Progressing Towards Goal     Problem: Heart Failure: Discharge Outcomes  Goal: *Demonstrates ability to perform prescribed activity without shortness of breath or discomfort  7/20/2020 1039 by Mari Machado  Outcome: Progressing Towards Goal  7/20/2020 1038 by Mari Machado  Outcome: Progressing Towards Goal  Goal: *Left ventricular function assessment completed prior to or during stay, or planned for post-discharge  7/20/2020 1039 by Eduard Mask  Outcome: Progressing Towards Goal  7/20/2020 1038 by Eduard Mask  Outcome: Progressing Towards Goal  Goal: *ACEI prescribed if LVEF less than 40% and no contraindications or ARB prescribed  7/20/2020 1039 by Eduard Mask  Outcome: Progressing Towards Goal  7/20/2020 1038 by Eduard Mask  Outcome: Progressing Towards Goal  Goal: *Verbalizes understanding and describes prescribed diet  Outcome: Progressing Towards Goal  Goal: *Verbalizes understanding/describes prescribed medications  Outcome: Progressing Towards Goal  Goal: *Describes available resources and support systems  Description: (eg: Home Health, Palliative Care, Advanced Medical Directive)  7/20/2020 1039 by Eduard Mask  Outcome: Progressing Towards Goal  7/20/2020 1038 by Eduard Mask  Outcome: Progressing Towards Goal  Goal: *Describes smoking cessation resources  7/20/2020 1039 by Eduard Mask  Outcome: Progressing Towards Goal  7/20/2020 1038 by Eduard Mask  Outcome: Progressing Towards Goal  Goal: *Understands and describes signs and symptoms to report to providers(Stroke Metric)  7/20/2020 1039 by Eduard Mask  Outcome: Progressing Towards Goal  7/20/2020 1038 by Eduard Mask  Outcome: Progressing Towards Goal     Problem: TIA/CVA Stroke: Day 2 Until Discharge  Goal: Activity/Safety  Outcome: Progressing Towards Goal  Goal: Diagnostic Test/Procedures  Outcome: Progressing Towards Goal  Goal: Medications  Outcome: Progressing Towards Goal  Goal: Respiratory  Outcome: Progressing Towards Goal  Goal: *Verbalizes anxiety and depression are reduced or absent  Outcome: Progressing Towards Goal  Goal: *Absence of aspiration  Outcome: Progressing Towards Goal  Goal: *Absence of deep venous thrombosis signs and symptoms(Stroke Metric)  Outcome: Progressing Towards Goal     Problem: Ischemic Stroke: Discharge Outcomes  Goal: *Optimal pain control at patient's stated goal  Outcome: Progressing Towards Goal  Goal: *Home safety concerns addressed  Outcome: Progressing Towards Goal

## 2020-07-20 NOTE — ROUTINE PROCESS
Bedside and Verbal shift change report given to Leann Pandya RN (oncoming nurse) by Kailyn Jane RN (offgoing nurse). Report included the following information SBAR, Kardex, Intake/Output, MAR, Recent Results, Med Rec Status, Cardiac Rhythm Afib w/ PVCs and Alarm Parameters .

## 2020-07-20 NOTE — CONSULTS
Infectious Disease Consult Note    Reason for Consult: sepsis, Hx of  ESBL ecoli bacteremia   Date of Consultation: July 20, 2020  Date of Admission: 7/13/2020  Referring Physician: Dr Jose E Walker       HPI:      Ms Jerod Krishna is a 46-year-old lady with a history of atrial fibrillation, hypertension who was admitted and discharged recently from [facility] ( 6/30-7/8/20) with sepsis, esbl ecoli bacteremia who is readmitted from  [facility] 7/13/14   after concerns for slurred speech, L sided weakness. She was discharged last admission with a PICC line and was on IV antibiotics. While hospitalized she was on IV meropenem and then recommended to be discharged on IV ertapenem. In regards to the left-sided weakness , that seems to have improved or resolved per primary team.  She has had a CT of her head done and MRI of her brain without any acute infarct. She has been seen by the neurology service. Palliative service is also seen her. She is currently on IV meropenem. From chart review, she is currently afebrile oxygenating well on 2 L nasal cannula. She does not have leukocytosis on labs. Her renal function is elevated. She does have anemia. Urine analysis done on 7/13/2020 only had 0-4 white cells and negative bacteria. Blood culture drawn on 7/13/2020 is negative at 5 days and urine culture from 713 had less than thousand colonies. Chest x-ray has been read as increasing bilateral lower lobe atelectasis/infiltrates. Patient is not much verbal.  History is very limited from the patient. She did deny any urinary complaints back pain or pain anywhere else. She denied fever chills or cough but unclear how reliable her history is.         Past Medical History:  Past Medical History:   Diagnosis Date    A-fib ([facility] [address]     Anemia     Chronic kidney disease     CKD (chronic kidney disease)     Hypercholesterolemia     Hyperlipidemia     Hypertension     MI (myocardial infarction) ([facility] [address] Surgical History:  No past surgical history on file. Family History:   Family History   Problem Relation Age of Onset    No Known Problems Mother     No Known Problems Father          Social History:  Patient presents from rehab  Denies smoking illicit drugs or alcohol use    Allergies:  No Known Allergies      Review of Systems:  10 point review of systems attempted and obtain  Pertinent positives per HPI   all others negative        Medications:  No current facility-administered medications on file prior to encounter. Current Outpatient Medications on File Prior to Encounter   Medication Sig Dispense Refill    [] ertapenem 1 gram 1 g IVPB 1 g by IntraVENous route every twenty-four (24) hours for 10 days. For CrCl <30 adjust dose to 500mg daily. Ordered by ID Dr Tomi Buck 10 Dose 0    atorvastatin (LIPITOR) 40 mg tablet Take 40 mg by mouth nightly.  bumetanide (BUMEX) 0.5 mg tablet Take 0.5 mg by mouth daily.  Eliquis 5 mg tablet Take 5 mg by mouth every twelve (12) hours.  cholecalciferol (VITAMIN D3) (50,000 UNITS /1250 MCG) capsule Take 50,000 Units by mouth every seven (7) days.  hydrALAZINE (APRESOLINE) 10 mg tablet Take 10 mg by mouth three (3) times daily.  isosorbide dinitrate (ISORDIL) 10 mg tablet Take 10 mg by mouth three (3) times daily.  metOLazone (ZAROXOLYN) 2.5 mg tablet Take 2.5 mg by mouth every Monday, Wednesday, Friday.  carvediloL (COREG) 6.25 mg tablet Take 6.25 mg by mouth two (2) times daily (with meals).  potassium chloride (KLOR-CON) 10 mEq tablet Take 20 mEq by mouth daily.  acetaminophen (TylenoL) 325 mg cap Take 2 Tabs by mouth every four to six (4-6) hours as needed.              Physical Exam:    Vitals:   Patient Vitals for the past 24 hrs:   Temp Pulse Resp BP SpO2   20 1347 97.5 °F (36.4 °C) 66 21 112/69 96 %   20 1002 97.4 °F (36.3 °C) 69 23 121/60 91 %   20 0846  80  119/67    20 0557 97.5 °F (36.4 °C)  22 130/63 95 %   07/20/20 0157 97.5 °F (36.4 °C) 67 22 118/66 97 %   07/19/20 2210 97.7 °F (36.5 °C) 67 17 101/59 95 %   07/19/20 2000     95 %   07/19/20 1822 97.5 °F (36.4 °C) 62 23 125/76 100 %     · GEN: NAD  · HEENT: No scleral icterus, very poor dentition, missing many teeth, no thrush   · CV: S1, S2 heard irregular   · Lungs: Clear to auscultation bilaterally  · Abdomen: soft, non distended, non tender, no CVA tenderness   · Extremities: no edema  · Neuro: Alert to self, verbal when asked questions but mumbles a lot,   · Skin: no rash  · Psych non tearful   · Musculoskeletal no edema feet and no  tenderness to palpation of her back      Labs:   Recent Results (from the past 24 hour(s))   CBC W/O DIFF    Collection Time: 07/20/20  1:50 AM   Result Value Ref Range    WBC 5.2 3.6 - 11.0 K/uL    RBC 3.59 (L) 3.80 - 5.20 M/uL    HGB 7.3 (L) 11.5 - 16.0 g/dL    HCT 28.4 (L) 35.0 - 47.0 %    MCV 79.1 (L) 80.0 - 99.0 FL    MCH 20.3 (L) 26.0 - 34.0 PG    MCHC 25.7 (L) 30.0 - 36.5 g/dL    RDW 22.9 (H) 11.5 - 14.5 %    PLATELET 262 769 - 573 K/uL    MPV 9.7 8.9 - 12.9 FL    NRBC 4.4 (H) 0  WBC    ABSOLUTE NRBC 0.23 (H) 0.00 - 6.56 K/uL   METABOLIC PANEL, COMPREHENSIVE    Collection Time: 07/20/20  1:50 AM   Result Value Ref Range    Sodium 151 (H) 136 - 145 mmol/L    Potassium 4.3 3.5 - 5.1 mmol/L    Chloride 119 (H) 97 - 108 mmol/L    CO2 27 21 - 32 mmol/L    Anion gap 5 5 - 15 mmol/L    Glucose 78 65 - 100 mg/dL    BUN 37 (H) 6 - 20 MG/DL    Creatinine 1.48 (H) 0.55 - 1.02 MG/DL    BUN/Creatinine ratio 25 (H) 12 - 20      GFR est AA 41 (L) >60 ml/min/1.73m2    GFR est non-AA 34 (L) >60 ml/min/1.73m2    Calcium 9.1 8.5 - 10.1 MG/DL    Bilirubin, total 0.4 0.2 - 1.0 MG/DL    ALT (SGPT) 13 12 - 78 U/L    AST (SGOT) 30 15 - 37 U/L    Alk.  phosphatase 102 45 - 117 U/L    Protein, total 7.2 6.4 - 8.2 g/dL    Albumin 1.9 (L) 3.5 - 5.0 g/dL    Globulin 5.3 (H) 2.0 - 4.0 g/dL    A-G Ratio 0.4 (L) 1.1 - 2.2         Microbiology Data:       Blood: 6/30/20  Blood         Component Value Ref Range & Units Status   Special Requests: NO SPECIAL REQUESTS    Final   Culture result: Abnormal     Final   ESCHERICHIA COLI ** (EXTENDED SPECTRUM BETA LACTAMASE ) ** GROWING IN ALL 4 BOTTLES DRAWN (SITES = [facility] AND )   Culture result: Abnormal     Final   PRELIMINARY REPORT OF GRAM NEGATIVE RODS GROWING IN 2 OF 4 BOTTLES DRAWN CALLED TO AND READ BACK BY PAULA BROWN RN ON 6/30/20 AT 2340 ([facility]).  MS   Susceptibility      Escherichia coli     LADY    Amikacin ($) 8 ug/mL S    Ampicillin ($) >=32 ug/mL R    Ampicillin/sulbactam ($) >=32 ug/mL R    Cefazolin ($) >=64 ug/mL R    Cefepime ($$)  R    Cefoxitin <=4 ug/mL S    Ceftazidime ($)  R    Ceftriaxone ($) >=64 ug/mL R    Ciprofloxacin ($) >=4 ug/mL R    Gentamicin ($) >=16 ug/mL R    Levofloxacin ($) >=8 ug/mL R    Meropenem ($$) <=0.25 ug/mL S    Piperacillin/Tazobac ($) <=4 ug/mL S    Tobramycin ($) >=16 ug/mL R    Trimeth/Sulfa >=320 ug/mL R          Result History         Blood 7/2/20  Component Value Ref Range & Units Status   Special Requests: NO SPECIAL REQUESTS    Preliminary   Culture result: Abnormal     Preliminary   GRAM NEGATIVE RODS GROWING IN 1 OF 4 BOTTLES DRAWN SITE= ( [facility]) PLEASE REFER TO [facility] [id number] FOR FURTHER IDENTIFICATION AND SENSITIVITIES   Culture result: Abnormal     Preliminary   PRELIMINARY REPORT OF GRAM NEGATIVE RODS GROWING IN 1 OF 4 BOTTLES DRAWN CALLED TO AND READ BACK BY MS BERTRAND MCNAIR AT 1940 ON 7/4/20.  PJ   Culture result:    Preliminary   REMAINING BOTTLE(S) HAS/HAVE NO GROWTH SO FAR    Result History           Urine: 6/30/20  Clean catch; Urine         Component Value Ref Range & Units Status   Special Requests: NO SPECIAL REQUESTS    Final   [place] Count >100,000   COLONIES/mL    Final   Culture result: Abnormal     Final   ESCHERICHIA COLI ** (EXTENDED SPECTRUM BETA LACTAMASE ) **    Susceptibility Escherichia coli     LADY    Amikacin ($) 4 ug/mL S    Ampicillin ($) >=32 ug/mL R    Ampicillin/sulbactam ($) >=32 ug/mL R    Cefazolin ($) >=64 ug/mL R    Cefepime ($$)  R    Cefoxitin <=4 ug/mL S    Ceftazidime ($)  R    Ceftriaxone ($)  R    Ciprofloxacin ($) >=4 ug/mL R    Gentamicin ($) >=16 ug/mL R    Levofloxacin ($) >=8 ug/mL R    Meropenem ($$) <=0.25 ug/mL S    Nitrofurantoin 64 ug/mL I    Piperacillin/Tazobac ($) <=4 ug/mL S    Tobramycin ($) >=16 ug/mL R    Trimeth/Sulfa >=320 ug/mL R        Blood 7/13/20  Negative    Urine 7/13/20 < 1000 colonies         Imaging:   Renal US  RIGHT KIDNEY: measures 10.3 cm in length, normal for age. Echogenicity is  abnormally increased. . No hydronephrosis, large shadowing calculus, or solid  contour-deforming renal mass. Incidental cyst in the interpolar region 2.2 cm. Tiny echogenic foci which may represent nonobstructing calculi. LEFT KIDNEY: measures 10.1 cm in length, normal for age. Renal echogenicity is  abnormally increased. No hydronephrosis, large shadowing calculus, or solid  contour-deforming renal mass. Probable small cyst interpolar. Tiny echogenic  foci which may represent nonshadowing calculi. AORTA: Normal caliber in its visualized portions. COMMON ILIAC ARTERIES: Normal caliber proximally. IVC: Normal caliber in its visualized portions. BLADDER: Decompressed by a Manley balloon catheter and not assessed. Incidentally noted gallbladder wall thickening with intraluminal calculi. Common  bile duct upper normal. Tiny ascites and perinephric fluid.     IMPRESSION  IMPRESSION:      1. No hydronephrosis. Small perinephric fluid. 2. Increased echogenicity of both kidneys consistent with medical renal  parenchymal disease. 3. Urinary bladder not assessed. 4. Incidentally noted cholelithiasis with gallbladder wall thickening and small  Ascites.       CXR  MRI Brain  7/17/20   FINDINGS:  Ventricles and cisterns are enlarged compatible with the overall degree of  volume loss. There is moderate to severe high signal within the periventricular  white matter especially involving the frontal lobes. Chronic hemosiderin  deposition of the left basal ganglia. No mass lesion or acute bleed demonstrated  There is no cerebellar tonsillar herniation. Expected arterial flow-voids are  present.     The paranasal sinuses, mastoid air cells, and middle ears are clear. The orbital  contents are within normal limits. No significant osseous or scalp lesions are  identified.     IMPRESSION  IMPRESSION:      1. Study limited by motion artifact. No acute infarct. Volume loss and white  matter disease as above    CT A head  FINDINGS:     Delayed contrast-enhanced head CT:     The ventricles are midline without hydrocephalus. There is no acute intra or  extra-axial hemorrhage. Moderate periventricular white matter disease. Prominent  ventricles and sulci indicative of involutional changes. The basal cisterns are  clear. The paranasal sinuses are clear.     CTA NECK:     Great vessels: Normal arch anatomy with the origins patent.     Right subclavian artery: Patent     Left subclavian artery: Patent      Right common carotid artery: Patent      Left common carotid artery: Patent      Cervical right internal carotid artery: Calcified and noncalcified plaque in the  proximal right internal carotid artery with 50% stenosis by NASCET criteria.     Cervical left internal carotid artery: Calcified and noncalcified plaque in the  proximal left internal carotid artery with 60% stenosis by NASCET criteria.     Right vertebral artery: Calcified plaque at the origin with at least moderate  stenosis.     Left vertebral artery: Patent     Small right pleural effusion, partially imaged. Lung apices demonstrate mosaic  attenuation suggesting small airways disease.  No thyroid nodule or cervical  lymphadenopathy.     CTA HEAD:     Right cavernous internal carotid artery: Calcified plaque in the cavernous  carotid artery without significant stenosis.     Left cavernous internal carotid artery: Patent     Anterior cerebral arteries: Hypoplastic left A1 segment. Patent bilateral A2  segments.     Anterior communicating artery: Patent     Right middle cerebral artery: Patent     Left middle cerebral artery: Patent     Posterior communicating arteries: Patent     Posterior cerebral arteries: Patent     Basilar artery: Patent     Distal vertebral arteries: Patent     No evidence for intracranial aneurysm or hemodynamically significant stenosis.     CT Perfusion:  No perfusion defect. Perfusion data is somewhat limited by technical factors.     IMPRESSION  IMPRESSION:  1. No evidence of large vessel occlusion. 2.  Chronic parenchymal changes consistent with atrophy and small vessel  ischemia. 3.  50% stenosis of the right internal carotid artery and 60% stenosis of the  left internal carotid artery. 4.  Partially imaged small right pleural effusion. CXR 7/13/20  Comparison to 7/8/2020. Portable exam obtained at 0710 demonstrates increasing  bilateral lower lobe atelectasis/infiltrates compared to the prior exam. PICC  line is unchanged in position.     IMPRESSION  Impression: Increasing bilateral lower lobe atelectasis/infiltrates. Assessment / Plan:     Ms Rivera Dupont is a 77-year-old lady with a history of atrial fibrillation, hypertension,  ESBL Ecoli bactermia admitted with slurrned speech, L sided weakness     1) Hx ESBL coli bacteremia 6/30-7/2/20  Negative blood cx 7/5 and 7/13/20  Probably source urine last admission   On Meropenem IV -- has ability to lower seizure threshold  Stop Meropenem as completed 2 weeks therapy   Remove picc line  Antibiotic side effects/adverse effects/toxicities discussed including gastrointestinal, renal, hematological , ability to lower siezure threshold, risk for C diff infection. Probiotics, daily yogurt encouraged.      2) CXR wt increasing atelectasis versus infiltrates   Clincally no cough but risk for aspiration  Has been on antibioics for 2 weeks  Recommend repeat imaging to ensure resolution /CT chest etc based on goals of care per primary team, noted palliative also consulted      2) Afib    3) HTN    4) Hx of breast cancer     5) DEVON on CDK renally dose antibiotics     6) ? TIA, stroke like symptom  S/P CTA, MRI brain, seen by neurology   Internal carotid artery stenosis, per primary teams     7) DVT Px           Thank for the opportunity to participate in the care of this patient. Please contact with questions or concerns.        Hilary Bui DO  4:13 PM

## 2020-07-20 NOTE — PROGRESS NOTES
Bedside and Verbal shift change report given to [id number] (oncoming nurse) by Adrian Rosenbaum (offgoing nurse). Report included the following information SBAR, Kardex, Intake/Output, MAR, Med Rec Status, Cardiac Rhythm NSR and Dual Neuro Assessment.

## 2020-07-20 NOTE — PROGRESS NOTES
ADIS:     Return to [facility] 22 - 19%  Has a Covid negative test   BLS for transport  [employer]

## 2020-07-20 NOTE — CDMP QUERY
Pt admitted with TIA with inability to speak and L sided weakness. If possible, please document in progress notes and d/c summary if you are evaluating and /or treating any of the following:       TIA Symptoms d/t Cerebral Artery Occlusion/Stenosis (without Infarction)   Cerebral embolism due to atrial fibrillation    TIA Symptoms d/t Dehydration (or e-lyte abnormalities)   TIA Symptoms d/t Anemia   Other explanation of clinical findings.  Unable to determine (no explanation for clinical findings).     The medical record reflects the following:    Risk Factors: 80F hx/o CVA with residuals       Clinical Indicators:     CT head with no acute intracranial abnormality  CTA head/neck R ICA 50% stenosis L ICA 60% stenosis  MRI no acute infarct  Echo LV EF 40-45% moderate concentric hypertrophy, mild global systolic function, severe dilated right atrium, moderate left atrium, mild to moderate MVR and TVR    Diagnosis of chronic afib, rate controlled on coreg and eliquis   Patient being treated for anemia (hg 6.9 on admission)    Attending documentation \"left sided weakness has resolved, appears in her base line\" \"Hypernatremia likely due to dehydration secondary to poor oral intake  -increase D5W at 100 ml/hr and repeat bmp in am\"      Treatment: admission to telemetry, neuro checks, CT, CTA, MRI, ECHO, Neurology consult, PT/OT,  Coreg, eliquis, iron iv x3 doses given, dehydration treated with IV and serial labs

## 2020-07-20 NOTE — PROGRESS NOTES
[address] [facility]                                                                                          Hospitalist Progress Note  Reg Gregory MD  Answering service: [phone] from in house phone        Date of Service:  2020  NAME:  Blank Mandujano  :  1940  MRN:  [id number]      Admission Summary:     The patient is a 62-year-old female with recent admission to the hospital.  The patient was admitted to the hospital on 2020 and discharged on 2020. The patient apparently came in with sepsis and was diagnosed with ESBL E. coli and bacteremia because of the same and was started on IV meropenem. She had a PICC line placed. Her urine was growing ESBL and was started on ertapenem for 14 days and was discharged to a rehab facility. There were also concerns for possible pneumonia and acute metabolic encephalopathy, the patient also had acute renal failure. She has a history of chronic blood loss anemia and chronic AFib and is currently on Eliquis. The patient was at [facility] today and was undergoing rehab when she had sudden onset of slurred speech and left-sided weakness. EMS was called. The patient was found by EMS to have incomprehensible speech, but no left-sided weakness. The patient was brought to the ER. In the ER, the patient continued to have some speech difficulty and was requested to get admitted under the hospitalist service. The patient was also found to be hypothermic with a temperature of 91.6 while in the ER. Interval history / Subjective:     Poor historian, no acute event over night     Assessment & Plan:     Inability to speak and left sided weakness likely due to metabolic encephalopathy   -continue on Eliquis, reduced dose due to anemia, on lipitor  -CT head no acute intracranial abnormality identified. There has been no significant change.   -CTA head/neck R ICA 50% stenosis, L ICA 60%  -MRI brain study limited by motion artifact. No acute infarct.  Volume loss and white matter disease as above   -Echo LV EF 40-45% moderate concentric hypertrophy, mild global systolic function  -speech evaluation, severe dilated right atrium, moderate left atrium, mild to moderate MVR and TVR  -PT/OT  -left side weakness has resolved, appears in her base line  -Neurologist on board    Sepsis unclear etiology  -on empiric meropenem   -hypothermia resolved  -No leukocytosis  -hx of recent ESBL bacteremia   -has PICC line since 7/10  -blood cx no growth so far  -urine cx no growth so far  -COVID-19 test negative x 2    Acute metabolic encephalopathy   -back to base line, patient conscious and alert, grabble speech   -follow simple command, moves upper extremities   -continue neuro check and supportive care    Acute hypoxemic respiratory failure   -chest x ray increasing bilateral lower lobe atelectasis/infiltrates.  -SpO2 % on RA    Chronic A Fib, rate controlled  -on coreg and eliquis  -Eliquis is held for low Hgb    Anemia of chronic disease with severe iron deficiency  -Hgb low but stable 7.3, no evidence of bleeding but on reduced dose of eliquis  -Hem occult negative  -monitor H/H and transfuse for Hgb <7.0  -iron profile low iron level and saturation  -vitamin B12 and folate normal  -will give iv iron x  3 doses    CKD stage III  -Creatinine improving, continue IVF, at risk for fluid overload  -avoid nephrotoxin   -monitor renal function    Moderate to sever oral and moderate pharyngeal dysphagia  -continue on puree/thin liquid diet   -aspiration precaution  -speech therapist on board  -blood sugar run low normal, on D5W decrease rate to 25 ml/hr    Hypernatremia likely due to dehydration secondary to poor oral intake  -increase D5W at 100 ml/hr and repeat bmp in am    DNR: at risk for decompensation, palliative care team on board      Code status: DNR  DVT prophylaxis: on [facility] discussed with: Patient/Family and Nurse  Anticipated Disposition: Patient from [facility] long term [facility]  Anticipated Discharge: 24 hours to 48 hours     I called and discussed with Bhavya Cordero her MPOA at  and questions answered 7/20     Hospital Problems  Date Reviewed: 5/22/2020          Codes Class Noted POA    CVA (cerebral vascular accident) [facility]) ICD-10-CM: I63.9  ICD-9-CM: 434.91  7/13/2020 Unknown        TIA (transient ischemic attack) ICD-10-CM: G45.9  ICD-9-CM: 435.9  7/13/2020 Unknown                Vital Signs:    Last 24hrs VS reviewed since prior progress note. Most recent are:  Visit Vitals  /60 (BP 1 Location: Right arm, BP Patient Position: At rest)   Pulse 69   Temp 97.4 °F (36.3 °C)   Resp 23   Ht 5' 0.98\" (1.549 m)   Wt 98.7 kg (217 lb 9.5 oz)   SpO2 91%   BMI 41.14 kg/m²         Intake/Output Summary (Last 24 hours) at 7/20/2020 1106  Last data filed at 7/19/2020 1211  Gross per 24 hour   Intake    Output 100 ml   Net -100 ml        Physical Examination:             Constitutional:  No acute distress, cooperative, pleasant    ENT:  Oral mucosa moist, oropharynx benign. Resp:  Decrease bronchial breath sound bilaterally. No wheezing/rhonchi/rales. No accessory muscle use   CV:  Regular rhythm, normal rate, no murmurs, gallops, rubs    GI:  Soft, non distended, non tender.  normoactive bowel sounds, no hepatosplenomegaly     Musculoskeletal:  No edema     Neurologic:  Conscious and alert, confused and disoriented, follow simple command, motor UE 4-5/5, LE 3/5     Skin:  Dry skin, no lesion       Data Review:    Review and/or order of clinical lab test  Review and/or order of tests in the radiology section of CPT  Review and/or order of tests in the medicine section of CPT      Labs:     Recent Labs     07/20/20 0150 07/18/20 0720   WBC 5.2 6.6   HGB 7.3* 7.1*   HCT 28.4* 27.8*    249     Recent Labs     07/20/20  0150 07/18/20  0720   * 149*   K 4.3 4.1   * 118*   CO2 27 25   BUN 37* 40*   CREA 1.48* 1.57*   GLU 78 97   CA 9.1 9.1     Recent Labs     07/20/20  0150   ALT 13      TBILI 0.4   TP 7.2   ALB 1.9*   GLOB 5.3*     No results for input(s): INR, PTP, APTT, INREXT, INREXT in the last 72 hours. No results for input(s): FE, TIBC, PSAT, FERR in the last 72 hours. Lab Results   Component Value Date/Time    Folate 5.4 07/16/2020 05:00 AM      No results for input(s): PH, PCO2, PO2 in the last 72 hours. No results for input(s): CPK, CKNDX, TROIQ in the last 72 hours.     No lab exists for component: CPKMB  Lab Results   Component Value Date/Time    Cholesterol, total 90 07/14/2020 03:18 AM    HDL Cholesterol 27 07/14/2020 03:18 AM    LDL,Direct 163 (H) 12/05/2019 01:04 PM    LDL, calculated 49.8 07/14/2020 03:18 AM    Triglyceride 66 07/14/2020 03:18 AM    CHOL/HDL Ratio 3.3 07/14/2020 03:18 AM     Lab Results   Component Value Date/Time    Glucose (POC) 82 07/16/2020 06:23 PM    Glucose (POC) 98 07/16/2020 12:13 PM    Glucose (POC) 110 (H) 07/16/2020 06:53 AM    Glucose (POC) 93 07/16/2020 03:10 AM    Glucose (POC) 90 07/15/2020 06:15 PM     Lab Results   Component Value Date/Time    Color YELLOW/STRAW 07/13/2020 02:08 PM    Appearance CLEAR 07/13/2020 02:08 PM    Specific gravity 1.018 07/13/2020 02:08 PM    pH (UA) 5.0 07/13/2020 02:08 PM    Protein Negative 07/13/2020 02:08 PM    Glucose Negative 07/13/2020 02:08 PM    Ketone Negative 07/13/2020 02:08 PM    Bilirubin Negative 07/13/2020 02:08 PM    Urobilinogen 1.0 07/13/2020 02:08 PM    Nitrites Negative 07/13/2020 02:08 PM    Leukocyte Esterase Negative 07/13/2020 02:08 PM    Epithelial cells FEW 07/13/2020 02:08 PM    Bacteria Negative 07/13/2020 02:08 PM    WBC 0-4 07/13/2020 02:08 PM    RBC 0-5 07/13/2020 02:08 PM         Medications Reviewed:     Current Facility-Administered Medications   Medication Dose Route Frequency    apixaban (ELIQUIS) tablet 2.5 mg  2.5 mg Oral BID    famotidine (PEPCID) tablet 20 mg  20 mg Oral Q24H    albuterol (PROVENTIL HFA, VENTOLIN HFA, PROAIR HFA) inhaler 2 Puff  2 Puff Inhalation Q4H PRN    atorvastatin (LIPITOR) tablet 40 mg  40 mg Oral QHS    carvediloL (COREG) tablet 6.25 mg  6.25 mg Oral BID WITH MEALS    acetaminophen (TYLENOL) tablet 650 mg  650 mg Oral Q4H PRN    Or    acetaminophen (TYLENOL) solution 650 mg  650 mg Per NG tube Q4H PRN    Or    acetaminophen (TYLENOL) suppository 650 mg  650 mg Rectal Q4H PRN    0.9% sodium chloride infusion 250 mL  250 mL IntraVENous PRN    0.9% sodium chloride infusion 250 mL  250 mL IntraVENous PRN     ______________________________________________________________________  EXPECTED LENGTH OF STAY: 4d 19h  ACTUAL LENGTH OF STAY:          7                 Ruchi Valdez MD

## 2020-07-20 NOTE — PROGRESS NOTES
Problem: Falls - Risk of  Goal: *Absence of Falls  Description: Document Pillo Hunt Fall Risk and appropriate interventions in the flowsheet. Outcome: Progressing Towards Goal  Note: Fall Risk Interventions:  Mobility Interventions: Communicate number of staff needed for ambulation/transfer, Strengthening exercises (ROM-active/passive)    Mentation Interventions: Adequate sleep, hydration, pain control, Bed/chair exit alarm, Door open when patient unattended, Reorient patient, Room close to nurse's station, Toileting rounds, More frequent rounding    Medication Interventions: Patient to call before getting OOB, Teach patient to arise slowly    Elimination Interventions: Call light in reach, Toileting schedule/hourly rounds, Stay With Me (per policy)              Problem: Patient Education: Go to Patient Education Activity  Goal: Patient/Family Education  Outcome: Progressing Towards Goal     Problem: Pressure Injury - Risk of  Goal: *Prevention of pressure injury  Description: Document Pratik Scale and appropriate interventions in the flowsheet. Outcome: Progressing Towards Goal  Note: Pressure Injury Interventions:  Sensory Interventions: Assess need for specialty bed, Assess changes in LOC, Discuss PT/OT consult with provider, Keep linens dry and wrinkle-free, Maintain/enhance activity level, Minimize linen layers, Pressure redistribution bed/mattress (bed type), Turn and reposition approx. every two hours (pillows and wedges if needed)    Moisture Interventions: Absorbent underpads, Apply protective barrier, creams and emollients, Assess need for specialty bed, Check for incontinence Q2 hours and as needed, Minimize layers, Maintain skin hydration (lotion/cream)    Activity Interventions: Pressure redistribution bed/mattress(bed type), Increase time out of bed    Mobility Interventions: Assess need for specialty bed, Pressure redistribution bed/mattress (bed type), PT/OT evaluation, Turn and reposition approx. every two hours(pillow and wedges)    Nutrition Interventions: Document food/fluid/supplement intake    Friction and Shear Interventions: Apply protective barrier, creams and emollients, Lift sheet, Lift team/patient mobility team, Minimize layers                Problem: Patient Education: Go to Patient Education Activity  Goal: Patient/Family Education  Outcome: Progressing Towards Goal     Problem: Risk for Spread of Infection  Goal: Prevent transmission of infectious organism to others  Description: Prevent the transmission of infectious organisms to other patients, staff members, and visitors.   Outcome: Progressing Towards Goal     Problem: Patient Education:  Go to Education Activity  Goal: Patient/Family Education  Outcome: Progressing Towards Goal     Problem: Patient Education: Go to Patient Education Activity  Goal: Patient/Family Education  Outcome: Progressing Towards Goal

## 2020-07-21 LAB
ANION GAP SERPL CALC-SCNC: 3 MMOL/L (ref 5–15)
BUN SERPL-MCNC: 36 MG/DL (ref 6–20)
BUN/CREAT SERPL: 24 (ref 12–20)
CALCIUM SERPL-MCNC: 8.8 MG/DL (ref 8.5–10.1)
CHLORIDE SERPL-SCNC: 117 MMOL/L (ref 97–108)
CO2 SERPL-SCNC: 27 MMOL/L (ref 21–32)
CREAT SERPL-MCNC: 1.48 MG/DL (ref 0.55–1.02)
ERYTHROCYTE [DISTWIDTH] IN BLOOD BY AUTOMATED COUNT: 23.3 % (ref 11.5–14.5)
GLUCOSE BLD STRIP.AUTO-MCNC: 134 MG/DL (ref 65–100)
GLUCOSE SERPL-MCNC: 89 MG/DL (ref 65–100)
HCT VFR BLD AUTO: 27.8 % (ref 35–47)
HGB BLD-MCNC: 7.2 G/DL (ref 11.5–16)
MCH RBC QN AUTO: 20.5 PG (ref 26–34)
MCHC RBC AUTO-ENTMCNC: 25.9 G/DL (ref 30–36.5)
MCV RBC AUTO: 79 FL (ref 80–99)
NRBC # BLD: 0.58 K/UL (ref 0–0.01)
NRBC BLD-RTO: 10.7 PER 100 WBC
PLATELET # BLD AUTO: 184 K/UL (ref 150–400)
PMV BLD AUTO: 9.7 FL (ref 8.9–12.9)
POTASSIUM SERPL-SCNC: 4.2 MMOL/L (ref 3.5–5.1)
RBC # BLD AUTO: 3.52 M/UL (ref 3.8–5.2)
SERVICE CMNT-IMP: ABNORMAL
SODIUM SERPL-SCNC: 147 MMOL/L (ref 136–145)
WBC # BLD AUTO: 5.4 K/UL (ref 3.6–11)

## 2020-07-21 PROCEDURE — 74011250636 HC RX REV CODE- 250/636: Performed by: HOSPITALIST

## 2020-07-21 PROCEDURE — 92526 ORAL FUNCTION THERAPY: CPT | Performed by: SPEECH-LANGUAGE PATHOLOGIST

## 2020-07-21 PROCEDURE — 74011000258 HC RX REV CODE- 258: Performed by: NURSE PRACTITIONER

## 2020-07-21 PROCEDURE — 65660000000 HC RM CCU STEPDOWN

## 2020-07-21 PROCEDURE — 97530 THERAPEUTIC ACTIVITIES: CPT

## 2020-07-21 PROCEDURE — 80048 BASIC METABOLIC PNL TOTAL CA: CPT

## 2020-07-21 PROCEDURE — 92507 TX SP LANG VOICE COMM INDIV: CPT | Performed by: SPEECH-LANGUAGE PATHOLOGIST

## 2020-07-21 PROCEDURE — 74011250637 HC RX REV CODE- 250/637: Performed by: INTERNAL MEDICINE

## 2020-07-21 PROCEDURE — 94760 N-INVAS EAR/PLS OXIMETRY 1: CPT

## 2020-07-21 PROCEDURE — 85027 COMPLETE CBC AUTOMATED: CPT

## 2020-07-21 PROCEDURE — 77010033678 HC OXYGEN DAILY

## 2020-07-21 PROCEDURE — 77030038269 HC DRN EXT URIN PURWCK BARD -A

## 2020-07-21 PROCEDURE — 82962 GLUCOSE BLOOD TEST: CPT

## 2020-07-21 PROCEDURE — 36415 COLL VENOUS BLD VENIPUNCTURE: CPT

## 2020-07-21 PROCEDURE — 74011250637 HC RX REV CODE- 250/637: Performed by: FAMILY MEDICINE

## 2020-07-21 RX ORDER — SODIUM CHLORIDE 450 MG/100ML
75 INJECTION, SOLUTION INTRAVENOUS CONTINUOUS
Status: DISPENSED | OUTPATIENT
Start: 2020-07-21 | End: 2020-07-22

## 2020-07-21 RX ADMIN — SODIUM CHLORIDE 75 ML/HR: 450 INJECTION, SOLUTION INTRAVENOUS at 22:58

## 2020-07-21 RX ADMIN — FAMOTIDINE 20 MG: 20 TABLET ORAL at 22:15

## 2020-07-21 RX ADMIN — DEXTROSE MONOHYDRATE 100 ML/HR: 5 INJECTION, SOLUTION INTRAVENOUS at 11:16

## 2020-07-21 RX ADMIN — ATORVASTATIN CALCIUM 40 MG: 40 TABLET, FILM COATED ORAL at 22:15

## 2020-07-21 RX ADMIN — APIXABAN 2.5 MG: 2.5 TABLET, FILM COATED ORAL at 22:14

## 2020-07-21 RX ADMIN — APIXABAN 2.5 MG: 2.5 TABLET, FILM COATED ORAL at 09:04

## 2020-07-21 RX ADMIN — CARVEDILOL 6.25 MG: 6.25 TABLET, FILM COATED ORAL at 17:12

## 2020-07-21 RX ADMIN — CARVEDILOL 6.25 MG: 6.25 TABLET, FILM COATED ORAL at 09:04

## 2020-07-21 NOTE — PROGRESS NOTES
[address] [facility]  Hospitalist Group                                                                                          Hospitalist Progress Note  Nai Hardy MD  Answering service: [phone] from in house phone        Date of Service:  2020  NAME:  Bashir Llamas  :  1940  MRN:  [id number]      Admission Summary:     The patient is a 27-year-old female with recent admission to the hospital.  The patient was admitted to the hospital on 2020 and discharged on 2020. The patient apparently came in with sepsis and was diagnosed with ESBL E. coli and bacteremia because of the same and was started on IV meropenem. She had a PICC line placed. Her urine was growing ESBL and was started on ertapenem for 14 days and was discharged to a rehab facility. There were also concerns for possible pneumonia and acute metabolic encephalopathy, the patient also had acute renal failure. She has a history of chronic blood loss anemia and chronic AFib and is currently on Eliquis. The patient was at [facility] today and was undergoing rehab when she had sudden onset of slurred speech and left-sided weakness. EMS was called. The patient was found by EMS to have incomprehensible speech, but no left-sided weakness. The patient was brought to the ER. In the ER, the patient continued to have some speech difficulty and was requested to get admitted under the hospitalist service. The patient was also found to be hypothermic with a temperature of 91.6 while in the ER.      Interval history / Subjective:     Poor historian, no acute event over night     Assessment & Plan:     Inability to speak and left sided weakness likely due to metabolic encephalopathy   -continue on Eliquis, reduced dose due to anemia, on lipitor  -improving, conscious and alert, answer simple questions, follows simple command, left side weakness improved, appears at her base line  -CT head no acute intracranial abnormality identified. There has been no significant change. -CTA head/neck R ICA 50% stenosis, L ICA 60%  -MRI brain study limited by motion artifact. No acute infarct.  Volume loss and white matter disease as above   -Echo LV EF 40-45% moderate concentric hypertrophy, mild global systolic function  -speech evaluation, severe dilated right atrium, moderate left atrium, mild to moderate MVR and TVR  -continue PT/OT  -continue supportive care  -Neurologist on board    Sepsis unclear etiology  -completed 2 weeks of IV meropenem   -hypothermia resolved  -No leukocytosis  -hx of recent ESBL bacteremia   -has PICC line since 7/10  -blood cx no growth so far  -urine cx no growth so far  -COVID-19 test negative x 2    Acute metabolic encephalopathy   -back to base line, patient conscious and alert, grabble speech   -follow simple command, moves upper extremities   -continue neuro check and supportive care    Acute hypoxemic respiratory failure   -chest x ray increasing bilateral lower lobe atelectasis/infiltrates.  -SpO2 % on RA    Chronic A Fib, rate controlled  -continue on coreg and eliquis  -Eliquis is held for low Hgb    Anemia of chronic disease with severe iron deficiency  -Hgb low but stable 7.2, no evidence of bleeding but on reduced dose of eliquis  -Hem occult negative  -monitor H/H and transfuse for Hgb <7.0  -iron profile low iron level and saturation  -vitamin B12 and folate normal  -will give iv iron x  3 doses    CKD stage III  -Creatinine improving, continue IVF, at risk for fluid overload  -avoid nephrotoxin   -monitor renal function    Moderate to sever oral and moderate pharyngeal dysphagia  -continue on puree/thin liquid diet   -aspiration precaution  -speech therapist on board  -blood sugar run low normal, on D5W decrease rate to 25 ml/hr    Hypernatremia likely due to dehydration secondary to poor oral intake  -improving, continue D5W at 100 ml/hr and repeat bmp in am    DNR: at risk for decompensation, palliative care team on board      Code status: DNR  DVT prophylaxis: on [facility] discussed with: Patient/Family and Nurse  Anticipated Disposition: Patient from [facility]  Anticipated Discharge: 24 hours to 48 hours     I called and discussed with Debby Iglesias her MPOA at  and questions answered 7/20  I tried to reach her Mr Connie and was a voice mail, will try later     Hospital Problems  Date Reviewed: 5/22/2020          Codes Class Noted POA    CVA (cerebral vascular accident) [facility]) ICD-10-CM: I63.9  ICD-9-CM: 434.91  7/13/2020 Unknown        TIA (transient ischemic attack) ICD-10-CM: G45.9  ICD-9-CM: 435.9  7/13/2020 Unknown                Vital Signs:    Last 24hrs VS reviewed since prior progress note. Most recent are:  Visit Vitals  /52 (BP 1 Location: Left arm, BP Patient Position: At rest)   Pulse 89   Temp 98.3 °F (36.8 °C)   Resp 21   Ht 5' 0.98\" (1.549 m)   Wt 97.7 kg (215 lb 6.2 oz)   SpO2 96%   BMI 40.72 kg/m²       No intake or output data in the 24 hours ending 07/21/20 1330     Physical Examination:             Constitutional:  No acute distress, cooperative, pleasant    ENT:  Oral mucosa moist, oropharynx benign. Resp:  Decrease bronchial breath sound bilaterally. No wheezing/rhonchi/rales. No accessory muscle use   CV:  Regular rhythm, normal rate, no murmurs, gallops, rubs    GI:  Soft, non distended, non tender.  normoactive bowel sounds, no hepatosplenomegaly     Musculoskeletal:  No edema     Neurologic:  Conscious and alert, confused and disoriented, follow simple command, motor UE 4-5/5, LE 3/5     Skin:  Dry skin, no lesion       Data Review:    Review and/or order of clinical lab test  Review and/or order of tests in the radiology section of CPT  Review and/or order of tests in the medicine section of CPT      Labs:     Recent Labs     07/21/20  0550 07/20/20  0150   WBC 5.4 5.2 HGB 7.2* 7.3*   HCT 27.8* 28.4*    197     Recent Labs     07/21/20  0550 07/20/20  0150   * 151*   K 4.2 4.3   * 119*   CO2 27 27   BUN 36* 37*   CREA 1.48* 1.48*   GLU 89 78   CA 8.8 9.1     Recent Labs     07/20/20  0150   ALT 13      TBILI 0.4   TP 7.2   ALB 1.9*   GLOB 5.3*     No results for input(s): INR, PTP, APTT, INREXT, INREXT in the last 72 hours. No results for input(s): FE, TIBC, PSAT, FERR in the last 72 hours. Lab Results   Component Value Date/Time    Folate 5.4 07/16/2020 05:00 AM      No results for input(s): PH, PCO2, PO2 in the last 72 hours. No results for input(s): CPK, CKNDX, TROIQ in the last 72 hours.     No lab exists for component: CPKMB  Lab Results   Component Value Date/Time    Cholesterol, total 90 07/14/2020 03:18 AM    HDL Cholesterol 27 07/14/2020 03:18 AM    LDL,Direct 163 (H) 12/05/2019 01:04 PM    LDL, calculated 49.8 07/14/2020 03:18 AM    Triglyceride 66 07/14/2020 03:18 AM    CHOL/HDL Ratio 3.3 07/14/2020 03:18 AM     Lab Results   Component Value Date/Time    Glucose (POC) 134 (H) 07/21/2020 11:50 AM    Glucose (POC) 82 07/16/2020 06:23 PM    Glucose (POC) 98 07/16/2020 12:13 PM    Glucose (POC) 110 (H) 07/16/2020 06:53 AM    Glucose (POC) 93 07/16/2020 03:10 AM     Lab Results   Component Value Date/Time    Color YELLOW/STRAW 07/13/2020 02:08 PM    Appearance CLEAR 07/13/2020 02:08 PM    Specific gravity 1.018 07/13/2020 02:08 PM    pH (UA) 5.0 07/13/2020 02:08 PM    Protein Negative 07/13/2020 02:08 PM    Glucose Negative 07/13/2020 02:08 PM    Ketone Negative 07/13/2020 02:08 PM    Bilirubin Negative 07/13/2020 02:08 PM    Urobilinogen 1.0 07/13/2020 02:08 PM    Nitrites Negative 07/13/2020 02:08 PM    Leukocyte Esterase Negative 07/13/2020 02:08 PM    Epithelial cells FEW 07/13/2020 02:08 PM    Bacteria Negative 07/13/2020 02:08 PM    WBC 0-4 07/13/2020 02:08 PM    RBC 0-5 07/13/2020 02:08 PM         Medications Reviewed:     Current Facility-Administered Medications   Medication Dose Route Frequency    apixaban (ELIQUIS) tablet 2.5 mg  2.5 mg Oral BID    famotidine (PEPCID) tablet 20 mg  20 mg Oral Q24H    albuterol (PROVENTIL HFA, VENTOLIN HFA, PROAIR HFA) inhaler 2 Puff  2 Puff Inhalation Q4H PRN    atorvastatin (LIPITOR) tablet 40 mg  40 mg Oral QHS    carvediloL (COREG) tablet 6.25 mg  6.25 mg Oral BID WITH MEALS    acetaminophen (TYLENOL) tablet 650 mg  650 mg Oral Q4H PRN    Or    acetaminophen (TYLENOL) solution 650 mg  650 mg Per NG tube Q4H PRN    Or    acetaminophen (TYLENOL) suppository 650 mg  650 mg Rectal Q4H PRN    0.9% sodium chloride infusion 250 mL  250 mL IntraVENous PRN    0.9% sodium chloride infusion 250 mL  250 mL IntraVENous PRN     ______________________________________________________________________  EXPECTED LENGTH OF STAY: 4d 19h  ACTUAL LENGTH OF STAY:          8                 Sandrine Teague MD

## 2020-07-21 NOTE — PROGRESS NOTES
Problem: Dysphagia (Adult)  Goal: *Acute Goals and Plan of Care (Insert Text)  Description: Speech pathology goals  Initiated 7/15/2020  1. Patient will tolerate meds crushed in puree with no overt s/s aspiration within 7 days  2. Patient will participate in re-evaluation of swallow function within 7 days  Outcome: [address] AND SPEECH TREATMENT  Patient: Marita Ball (68 y.o. female)  Date: 7/21/2020  Diagnosis: CVA (cerebral vascular accident) ([facility] [address] [I63.9]  TIA (transient ischemic attack) [G45.9]   <principal problem not specified>       Precautions: aspiration Contact    ASSESSMENT:  Patient with limited interest in PO intake this date, only accepted ~half of a cup of water via single straw sips without s/s of aspiration and declined pureed trials. Based on oral motor weakness, solids remain inappropriate and therefore not assessed. Patient continues with expressive and receptive language deficits with poor y/n reliability, reduced command following, and minimal verbalizations. Remains unable to communicate functional wants and needs at this time. PLAN:  Recommendations and Planned Interventions:  Continue puree/thin liquids. Meds crushed, straws ok with single sips. Continue to follow for diet tolerance and language treatment  Patient continues to benefit from skilled intervention to address the above impairments. Continue treatment per established plan of care. Discharge Recommendations:  Skilled Nursing Facility     SUBJECTIVE:   Patient stated mm karen, yep.  In response to her name    OBJECTIVE:   Cognitive and Communication Status:  Neurologic State: Alert, Drowsy, Confused(variable alertness )  Orientation Level: Oriented to person, Disoriented to place, Disoriented to situation, Disoriented to time  Cognition: Decreased attention/concentration, Decreased command following  Perception: Cues to maintain midline in sitting  Perseveration: No perseveration noted  Safety/Judgement: Not assessed    Dysphagia Treatment and Interventions:  Oral Assessment:     P.O. Trials:  Patient Position: upright in bed  Vocal quality prior to P.O.: No impairment(minimal verbalizations )  Consistency Presented: Thin liquid;Puree(declined purees)  How Presented: Straw;SLP-fed/presented     Bolus Acceptance: Impaired  Bolus Formation/Control: Impaired  Type of Impairment: Anterior;Spillage;Delayed  Propulsion: Delayed (# of seconds)  Oral Residue: None  Initiation of Swallow: Delayed (# of seconds)  Laryngeal Elevation: Decreased  Aspiration Signs/Symptoms: None  Pharyngeal Phase Characteristics: Double swallow; Suspected pharyngeal residue  Effective Modifications: Small sips and bites  Cues for Modifications: Moderate-maximal       Oral Phase Severity: Moderate  Pharyngeal Phase Severity : Moderate  Exercises:  Laryngeal Exercises:                                                                                                                                     Speech Treatment and Interventions: Motor Speech:                                   Language Comprehension and Expression:  Auditory Comprehension   Hearing Aid: None  Response to Basic Yes/No Questions (%): 30 %(no response to remaining tasks )  One-Step Basic Commands (%): 20 %  Verbal Expression  Verbal Expression  Automatic Speech Task: Impaired (comment)(no response to counting task)  Neuro-Linguistics:                                                  Voice:          Response & Tolerance to Activities:         Pain:  Pain Scale 1: Adult Nonverbal Pain Scale          After treatment:   Patient left in no apparent distress in bed, Call bell within reach, and Nursing notified    COMMUNICATION/EDUCATION:   Patient was educated regarding her deficit(s) of dysphagia, aphasia as this relates to her diagnosis of CVA workup.   She demonstrated Guarded understanding as evidenced by limited response to education. The patient's plan of care including recommendations, planned interventions, and recommended diet changes were discussed with: Registered nurse. Majo Gambino M.CD.  CCC-SLP   Time Calculation: 20 mins

## 2020-07-21 NOTE — PROGRESS NOTES
Bedside and Verbal shift change report given to [id number] (oncoming nurse) by Yuli Saldivar (offgoing nurse). Report included the following information SBAR, Kardex, MAR, Cardiac Rhythm NSR and Dual Neuro Assessment.

## 2020-07-21 NOTE — PROGRESS NOTES
Infectious Disease Progress Note    HPI:      Ms Rivera Dupont seen  Had a temp recorded at 96.2  7/20/20  She is awake, verbal but not coherent      Physical Exam:    Vitals:   Patient Vitals for the past 24 hrs:   Temp Pulse Resp BP SpO2   07/21/20 1004 98.3 °F (36.8 °C) 89 21 118/52 96 %   07/21/20 0904  74  102/49    07/21/20 0555 97.5 °F (36.4 °C) 81 21 108/57 96 %   07/21/20 0145 97.7 °F (36.5 °C) 70 23 112/90 95 %   07/21/20 0100     96 %   07/20/20 2247 97.4 °F (36.3 °C)       07/20/20 2202 (!) 96.2 °F (35.7 °C) 67 27 116/68 98 %   07/20/20 1716 97.4 °F (36.3 °C) 75 18 134/82 96 %   07/20/20 1347 97.5 °F (36.4 °C) 66 21 112/69 96 %     · GEN: NAD  · HEENT: No scleral icterus, very poor dentition, missing many teeth, no thrush   · CV: S1, S2 heard regular  · Lungs: Clear to auscultation bilaterally  · Abdomen: soft, non distended, non tender, no CVA tenderness   · Extremities: no edema  · Neuro: Alert to self, verbal,  mumbles a lot, not much coherent   · Skin: no rash  · Psych non tearful   · Musculoskeletal no edema feet and no  tenderness to palpation of her back      Labs:   Recent Results (from the past 24 hour(s))   CBC W/O DIFF    Collection Time: 07/21/20  5:50 AM   Result Value Ref Range    WBC 5.4 3.6 - 11.0 K/uL    RBC 3.52 (L) 3.80 - 5.20 M/uL    HGB 7.2 (L) 11.5 - 16.0 g/dL    HCT 27.8 (L) 35.0 - 47.0 %    MCV 79.0 (L) 80.0 - 99.0 FL    MCH 20.5 (L) 26.0 - 34.0 PG    MCHC 25.9 (L) 30.0 - 36.5 g/dL    RDW 23.3 (H) 11.5 - 14.5 %    PLATELET 642 297 - 694 K/uL    MPV 9.7 8.9 - 12.9 FL    NRBC 10.7 (H) 0  WBC    ABSOLUTE NRBC 0.58 (H) 0.00 - 8.15 K/uL   METABOLIC PANEL, BASIC    Collection Time: 07/21/20  5:50 AM   Result Value Ref Range    Sodium 147 (H) 136 - 145 mmol/L    Potassium 4.2 3.5 - 5.1 mmol/L    Chloride 117 (H) 97 - 108 mmol/L    CO2 27 21 - 32 mmol/L    Anion gap 3 (L) 5 - 15 mmol/L    Glucose 89 65 - 100 mg/dL    BUN 36 (H) 6 - 20 MG/DL    Creatinine 1.48 (H) 0.55 - 1.02 MG/DL    BUN/Creatinine ratio 24 (H) 12 - 20      GFR est AA 41 (L) >60 ml/min/1.73m2    GFR est non-AA 34 (L) >60 ml/min/1.73m2    Calcium 8.8 8.5 - 10.1 MG/DL       Microbiology Data:       Blood: 6/30/20  Blood         Component Value Ref Range & Units Status   Special Requests: NO SPECIAL REQUESTS    Final   Culture result: Abnormal     Final   ESCHERICHIA COLI ** (EXTENDED SPECTRUM BETA LACTAMASE ) ** GROWING IN ALL 4 BOTTLES DRAWN (SITES = [facility] AND )   Culture result: Abnormal     Final   PRELIMINARY REPORT OF GRAM NEGATIVE RODS GROWING IN 2 OF 4 BOTTLES DRAWN CALLED TO AND READ BACK BY PAULA BROWN RN ON 6/30/20 AT 2340 ([facility]).  MS   Susceptibility      Escherichia coli     LADY    Amikacin ($) 8 ug/mL S    Ampicillin ($) >=32 ug/mL R    Ampicillin/sulbactam ($) >=32 ug/mL R    Cefazolin ($) >=64 ug/mL R    Cefepime ($$)  R    Cefoxitin <=4 ug/mL S    Ceftazidime ($)  R    Ceftriaxone ($) >=64 ug/mL R    Ciprofloxacin ($) >=4 ug/mL R    Gentamicin ($) >=16 ug/mL R    Levofloxacin ($) >=8 ug/mL R    Meropenem ($$) <=0.25 ug/mL S    Piperacillin/Tazobac ($) <=4 ug/mL S    Tobramycin ($) >=16 ug/mL R    Trimeth/Sulfa >=320 ug/mL R          Result History         Blood 7/2/20  Component Value Ref Range & Units Status   Special Requests: NO SPECIAL REQUESTS    Preliminary   Culture result: Abnormal     Preliminary   GRAM NEGATIVE RODS GROWING IN 1 OF 4 BOTTLES DRAWN SITE= ( [facility]) PLEASE REFER TO [facility] [id number] FOR FURTHER IDENTIFICATION AND SENSITIVITIES   Culture result: Abnormal     Preliminary   PRELIMINARY REPORT OF GRAM NEGATIVE RODS GROWING IN 1 OF 4 BOTTLES DRAWN CALLED TO AND READ BACK BY MS BERTRAND MCNAIR AT 1940 ON 7/4/20.  PJ   Culture result:    Preliminary   REMAINING BOTTLE(S) HAS/HAVE NO GROWTH SO FAR    Result History           Urine: 6/30/20  Clean catch; Urine         Component Value Ref Range & Units Status   Special Requests: NO SPECIAL REQUESTS    Final   Milton Count >100,000 COLONIES/mL    Final   Culture result: Abnormal     Final   ESCHERICHIA COLI ** (EXTENDED SPECTRUM BETA LACTAMASE ) **    Susceptibility      Escherichia coli     LADY    Amikacin ($) 4 ug/mL S    Ampicillin ($) >=32 ug/mL R    Ampicillin/sulbactam ($) >=32 ug/mL R    Cefazolin ($) >=64 ug/mL R    Cefepime ($$)  R    Cefoxitin <=4 ug/mL S    Ceftazidime ($)  R    Ceftriaxone ($)  R    Ciprofloxacin ($) >=4 ug/mL R    Gentamicin ($) >=16 ug/mL R    Levofloxacin ($) >=8 ug/mL R    Meropenem ($$) <=0.25 ug/mL S    Nitrofurantoin 64 ug/mL I    Piperacillin/Tazobac ($) <=4 ug/mL S    Tobramycin ($) >=16 ug/mL R    Trimeth/Sulfa >=320 ug/mL R        Blood 7/13/20  Negative    Urine 7/13/20 < 1000 colonies         Imaging:   Renal US  RIGHT KIDNEY: measures 10.3 cm in length, normal for age. Echogenicity is  abnormally increased. . No hydronephrosis, large shadowing calculus, or solid  contour-deforming renal mass. Incidental cyst in the interpolar region 2.2 cm. Tiny echogenic foci which may represent nonobstructing calculi. LEFT KIDNEY: measures 10.1 cm in length, normal for age. Renal echogenicity is  abnormally increased. No hydronephrosis, large shadowing calculus, or solid  contour-deforming renal mass. Probable small cyst interpolar. Tiny echogenic  foci which may represent nonshadowing calculi. AORTA: Normal caliber in its visualized portions. COMMON ILIAC ARTERIES: Normal caliber proximally. IVC: Normal caliber in its visualized portions. BLADDER: Decompressed by a Manley balloon catheter and not assessed. Incidentally noted gallbladder wall thickening with intraluminal calculi. Common  bile duct upper normal. Tiny ascites and perinephric fluid.     IMPRESSION  IMPRESSION:      1. No hydronephrosis. Small perinephric fluid. 2. Increased echogenicity of both kidneys consistent with medical renal  parenchymal disease. 3. Urinary bladder not assessed.   4. Incidentally noted cholelithiasis with gallbladder wall thickening and small  Ascites. CXR  MRI Brain  7/17/20   FINDINGS:  Ventricles and cisterns are enlarged compatible with the overall degree of  volume loss. There is moderate to severe high signal within the periventricular  white matter especially involving the frontal lobes. Chronic hemosiderin  deposition of the left basal ganglia. No mass lesion or acute bleed demonstrated  There is no cerebellar tonsillar herniation. Expected arterial flow-voids are  present.     The paranasal sinuses, mastoid air cells, and middle ears are clear. The orbital  contents are within normal limits. No significant osseous or scalp lesions are  identified.     IMPRESSION  IMPRESSION:      1. Study limited by motion artifact. No acute infarct. Volume loss and white  matter disease as above    CT A head  FINDINGS:     Delayed contrast-enhanced head CT:     The ventricles are midline without hydrocephalus. There is no acute intra or  extra-axial hemorrhage. Moderate periventricular white matter disease. Prominent  ventricles and sulci indicative of involutional changes. The basal cisterns are  clear. The paranasal sinuses are clear.     CTA NECK:     Great vessels: Normal arch anatomy with the origins patent.     Right subclavian artery: Patent     Left subclavian artery: Patent      Right common carotid artery: Patent      Left common carotid artery: Patent      Cervical right internal carotid artery: Calcified and noncalcified plaque in the  proximal right internal carotid artery with 50% stenosis by NASCET criteria.     Cervical left internal carotid artery: Calcified and noncalcified plaque in the  proximal left internal carotid artery with 60% stenosis by NASCET criteria.     Right vertebral artery: Calcified plaque at the origin with at least moderate  stenosis.     Left vertebral artery: Patent     Small right pleural effusion, partially imaged.  Lung apices demonstrate mosaic  attenuation suggesting small airways disease. No thyroid nodule or cervical  lymphadenopathy.     CTA HEAD:     Right cavernous internal carotid artery: Calcified plaque in the cavernous  carotid artery without significant stenosis.     Left cavernous internal carotid artery: Patent     Anterior cerebral arteries: Hypoplastic left A1 segment. Patent bilateral A2  segments.     Anterior communicating artery: Patent     Right middle cerebral artery: Patent     Left middle cerebral artery: Patent     Posterior communicating arteries: Patent     Posterior cerebral arteries: Patent     Basilar artery: Patent     Distal vertebral arteries: Patent     No evidence for intracranial aneurysm or hemodynamically significant stenosis.     CT Perfusion:  No perfusion defect. Perfusion data is somewhat limited by technical factors.     IMPRESSION  IMPRESSION:  1. No evidence of large vessel occlusion. 2.  Chronic parenchymal changes consistent with atrophy and small vessel  ischemia. 3.  50% stenosis of the right internal carotid artery and 60% stenosis of the  left internal carotid artery. 4.  Partially imaged small right pleural effusion. CXR 7/13/20  Comparison to 7/8/2020. Portable exam obtained at 3531 demonstrates increasing  bilateral lower lobe atelectasis/infiltrates compared to the prior exam. PICC  line is unchanged in position.     IMPRESSION  Impression: Increasing bilateral lower lobe atelectasis/infiltrates.        Assessment / Plan:     Ms Des Robins is a 79-year-old lady with a history of atrial fibrillation, hypertension,  ESBL Ecoli bactermia admitted with slurrned speech, L sided weakness     1) Hx ESBL coli bacteremia 6/30-7/2/20  Negative blood cx 7/5 and 7/13/20  Probably source urine last admission   On Meropenem IV -- has ability to lower seizure threshold  Stopped  Meropenem as completed 2 weeks therapy 7/20/20   Remove picc line if no other use at this time   Antibiotic side effects/adverse effects/toxicities discussed including gastrointestinal, renal, hematological , ability to lower siezure threshold, risk for C diff infection. Probiotics, daily yogurt encouraged. 2) CXR wt increasing atelectasis versus infiltrates   Clincally no cough but risk for aspiration  Has been on antibioics for 2 weeks  Recommend repeat imaging to ensure resolution /CT chest etc based on goals of care per primary team, noted palliative also consulted      2) Afib    3) HTN    4) Hx of breast cancer     5) DEVON on CDK renally dose antibiotics     6) ? TIA, stroke like symptom  S/P CTA, MRI brain, seen by neurology   Internal carotid artery stenosis, per primary teams     7) DVT Px           Thank for the opportunity to participate in the care of this patient. Please contact with questions or concerns.        Chintan Whipple DO  10:51 AM

## 2020-07-21 NOTE — PROGRESS NOTES
Problem: Falls - Risk of  Goal: *Absence of Falls  Description: Document Aaron Stade Fall Risk and appropriate interventions in the flowsheet. Outcome: Progressing Towards Goal  Note: Fall Risk Interventions:  Mobility Interventions: Bed/chair exit alarm, OT consult for ADLs, PT Consult for mobility concerns    Mentation Interventions: Adequate sleep, hydration, pain control, Bed/chair exit alarm, More frequent rounding    Medication Interventions: Bed/chair exit alarm, Patient to call before getting OOB, Teach patient to arise slowly    Elimination Interventions: Call light in reach, Toileting schedule/hourly rounds, Bed/chair exit alarm              Problem: Pressure Injury - Risk of  Goal: *Prevention of pressure injury  Description: Document Pratik Scale and appropriate interventions in the flowsheet. Outcome: Progressing Towards Goal  Note: Pressure Injury Interventions:  Sensory Interventions: Assess changes in LOC, Assess need for specialty bed, Discuss PT/OT consult with provider, Check visual cues for pain, Float heels, Keep linens dry and wrinkle-free    Moisture Interventions: Absorbent underpads, Apply protective barrier, creams and emollients, Internal/External urinary devices, Minimize layers    Activity Interventions: PT/OT evaluation, Increase time out of bed, Assess need for specialty bed    Mobility Interventions: Float heels, PT/OT evaluation, Assess need for specialty bed    Nutrition Interventions: Document food/fluid/supplement intake, Discuss nutritional consult with provider    Friction and Shear Interventions: Apply protective barrier, creams and emollients, Lift sheet, Lift team/patient mobility team, Minimize layers                Problem: Anemia Care Plan (Adult and Pediatric)  Goal: *Labs within defined limits  Outcome: Progressing Towards Goal  Goal: *Tolerates increased activity  Outcome: Progressing Towards Goal     Problem:  Body Temperature -  Risk of, Imbalanced  Goal: Ability to maintain a body temperature within defined limits  Outcome: Progressing Towards Goal     Problem: Isolation Precautions - Risk of Spread of Infection  Goal: Prevent transmission of infectious organism to others  Outcome: Progressing Towards Goal     Problem: Fatigue  Goal: Verbalize increase energy and improved vitality  Outcome: Progressing Towards Goal

## 2020-07-21 NOTE — PROGRESS NOTES
Problem: Mobility Impaired (Adult and Pediatric)  Goal: *Acute Goals and Plan of Care (Insert Text)  Description:   FUNCTIONAL STATUS PRIOR TO ADMISSION: Pt poor historian. Receives assistance with ADLs and mobility. HOME SUPPORT PRIOR TO ADMISSION: Pt lives at [facility]. Physical Therapy Goals  Initiated 7/15/2020  1. Patient will move from supine to sit and sit to supine , scoot up and down, and roll side to side in bed with supervision/set-up within 7 day(s). 2.  Patient will transfer from bed to chair and chair to bed with minimal assistance/contact guard assist using the least restrictive device within 7 day(s). 3.  Patient will perform sit to stand with minimal assistance/contact guard assist within 7 day(s). 4.  Patient will ambulate with minimal assistance/contact guard assist for 15 feet with the least restrictive device within 7 day(s). 5.  Patient will improve Rubio Balance score by 7 points within 7 days. Outcome: Progressing Towards Goal   PHYSICAL THERAPY TREATMENT  Patient: Ghassan Mosley (68 y.o. female)  Date: 7/21/2020  Diagnosis: CVA (cerebral vascular accident) ([facility] [address] [I63.9]  TIA (transient ischemic attack) [G45.9]   <principal problem not specified>       Precautions: Contact  Chart, physical therapy assessment, plan of care and goals were reviewed. ASSESSMENT  Patient continues with skilled PT services and is progressing towards goals. Patient more alert today and following some  instructions. Difficulty to understand due to speech. Attempted to have her stand with assist of 2 and able to only partially stand. Anticipates returning to LTC. .     Current Level of Function Impacting Discharge (mobility/balance): min to max assist of 2    Other factors to consider for discharge:          PLAN :  Patient continues to benefit from skilled intervention to address the above impairments. Continue treatment per established plan of care.   to address goals. Recommendation for discharge: (in order for the patient to meet his/her long term goals)  Therapy up to 5 days/week in SNF setting    This discharge recommendation:  Has been made in collaboration with the attending provider and/or case management    IF patient discharges home will need the following DME: none       SUBJECTIVE:   Patient stated Jolanta Ricks.  when asked how she is    OBJECTIVE DATA SUMMARY:   Critical Behavior:  Neurologic State: Alert, Drowsy, Confused(variable alertness )  Orientation Level: Oriented to person, Disoriented to place, Disoriented to situation, Disoriented to time  Cognition: Decreased attention/concentration, Decreased command following  Safety/Judgement: Not assessed  Functional Mobility Training:  Bed Mobility:  Rolling: Minimum assistance  Supine to Sit: Maximum assistance  Sit to Supine: Maximum assistance;Assist x2  Scooting: Total assistance        Transfers:   Attempted stand with 2 assist and only partial stand achieved              Balance:  Sitting: Impaired; Without support  Sitting - Static: Fair (occasional)  Sitting - Dynamic: Poor (constant support)            Activity Tolerance:   Fair  Please refer to the flowsheet for vital signs taken during this treatment. After treatment patient left in no apparent distress:   Supine in bed, Call bell within reach, Bed / chair alarm activated, and Side rails x 3    COMMUNICATION/COLLABORATION:   The patients plan of care was discussed with: Registered nurse.      Paulina Duque, PT   Time Calculation: 20 mins

## 2020-07-22 LAB
ALBUMIN SERPL-MCNC: 1.9 G/DL (ref 3.5–5)
ALBUMIN/GLOB SERPL: 0.4 {RATIO} (ref 1.1–2.2)
ALP SERPL-CCNC: 104 U/L (ref 45–117)
ALT SERPL-CCNC: 16 U/L (ref 12–78)
ANION GAP SERPL CALC-SCNC: 4 MMOL/L (ref 5–15)
AST SERPL-CCNC: 38 U/L (ref 15–37)
BILIRUB SERPL-MCNC: 0.5 MG/DL (ref 0.2–1)
BUN SERPL-MCNC: 37 MG/DL (ref 6–20)
BUN/CREAT SERPL: 23 (ref 12–20)
CALCIUM SERPL-MCNC: 9.2 MG/DL (ref 8.5–10.1)
CHLORIDE SERPL-SCNC: 115 MMOL/L (ref 97–108)
CO2 SERPL-SCNC: 25 MMOL/L (ref 21–32)
CREAT SERPL-MCNC: 1.6 MG/DL (ref 0.55–1.02)
ERYTHROCYTE [DISTWIDTH] IN BLOOD BY AUTOMATED COUNT: 23.4 % (ref 11.5–14.5)
GLOBULIN SER CALC-MCNC: 5.3 G/DL (ref 2–4)
GLUCOSE SERPL-MCNC: 73 MG/DL (ref 65–100)
HCT VFR BLD AUTO: 28.1 % (ref 35–47)
HGB BLD-MCNC: 7.3 G/DL (ref 11.5–16)
MCH RBC QN AUTO: 20.5 PG (ref 26–34)
MCHC RBC AUTO-ENTMCNC: 26 G/DL (ref 30–36.5)
MCV RBC AUTO: 78.9 FL (ref 80–99)
NRBC # BLD: 0.47 K/UL (ref 0–0.01)
NRBC BLD-RTO: 8.2 PER 100 WBC
PLATELET # BLD AUTO: 201 K/UL (ref 150–400)
PMV BLD AUTO: 9.6 FL (ref 8.9–12.9)
POTASSIUM SERPL-SCNC: 4.1 MMOL/L (ref 3.5–5.1)
PROT SERPL-MCNC: 7.2 G/DL (ref 6.4–8.2)
RBC # BLD AUTO: 3.56 M/UL (ref 3.8–5.2)
SODIUM SERPL-SCNC: 144 MMOL/L (ref 136–145)
WBC # BLD AUTO: 5.7 K/UL (ref 3.6–11)

## 2020-07-22 PROCEDURE — 94760 N-INVAS EAR/PLS OXIMETRY 1: CPT

## 2020-07-22 PROCEDURE — 97530 THERAPEUTIC ACTIVITIES: CPT

## 2020-07-22 PROCEDURE — 97535 SELF CARE MNGMENT TRAINING: CPT

## 2020-07-22 PROCEDURE — 74011250636 HC RX REV CODE- 250/636: Performed by: HOSPITALIST

## 2020-07-22 PROCEDURE — 65660000000 HC RM CCU STEPDOWN

## 2020-07-22 PROCEDURE — 74011250637 HC RX REV CODE- 250/637: Performed by: FAMILY MEDICINE

## 2020-07-22 PROCEDURE — 92507 TX SP LANG VOICE COMM INDIV: CPT | Performed by: SPEECH-LANGUAGE PATHOLOGIST

## 2020-07-22 PROCEDURE — 85027 COMPLETE CBC AUTOMATED: CPT

## 2020-07-22 PROCEDURE — 92526 ORAL FUNCTION THERAPY: CPT | Performed by: SPEECH-LANGUAGE PATHOLOGIST

## 2020-07-22 PROCEDURE — 74011250637 HC RX REV CODE- 250/637: Performed by: INTERNAL MEDICINE

## 2020-07-22 PROCEDURE — 36415 COLL VENOUS BLD VENIPUNCTURE: CPT

## 2020-07-22 PROCEDURE — 77010033678 HC OXYGEN DAILY

## 2020-07-22 PROCEDURE — 80053 COMPREHEN METABOLIC PANEL: CPT

## 2020-07-22 RX ORDER — DEXTROSE MONOHYDRATE 50 MG/ML
100 INJECTION, SOLUTION INTRAVENOUS CONTINUOUS
Status: DISPENSED | OUTPATIENT
Start: 2020-07-22 | End: 2020-07-23

## 2020-07-22 RX ADMIN — APIXABAN 2.5 MG: 2.5 TABLET, FILM COATED ORAL at 21:10

## 2020-07-22 RX ADMIN — DEXTROSE MONOHYDRATE 100 ML/HR: 5 INJECTION, SOLUTION INTRAVENOUS at 09:46

## 2020-07-22 RX ADMIN — DEXTROSE MONOHYDRATE 100 ML/HR: 5 INJECTION, SOLUTION INTRAVENOUS at 18:26

## 2020-07-22 RX ADMIN — CARVEDILOL 6.25 MG: 6.25 TABLET, FILM COATED ORAL at 16:30

## 2020-07-22 RX ADMIN — ATORVASTATIN CALCIUM 40 MG: 40 TABLET, FILM COATED ORAL at 21:10

## 2020-07-22 RX ADMIN — CARVEDILOL 6.25 MG: 6.25 TABLET, FILM COATED ORAL at 08:36

## 2020-07-22 RX ADMIN — APIXABAN 2.5 MG: 2.5 TABLET, FILM COATED ORAL at 08:36

## 2020-07-22 RX ADMIN — FAMOTIDINE 20 MG: 20 TABLET ORAL at 23:17

## 2020-07-22 NOTE — PROGRESS NOTES
ID follow up  Chart reviewed  Afebrile  Completed antibiotic course  Remove picc line  F/U on imaging CXR/CT chest wt abnormality on previous imaging based on goals of care     Will sign off    pls call back if questions

## 2020-07-22 NOTE — PROGRESS NOTES
Problem: Self Care Deficits Care Plan (Adult)  Goal: *Acute Goals and Plan of Care (Insert Text)  Description:   FUNCTIONAL STATUS PRIOR TO ADMISSION: Patient admitted from [address] [place] [address] where she received assist for ADLs and mobility, poor historian. HOME SUPPORT: Resides at [facility]    Occupational Therapy Goals:  Goals reviewed and updated: 7/22/2020  Initiated 7/15/2020  1. Patient will perform self-feeding with moderate assistance  within 7 day(s). 2.  Patient will follow simple 1 step commands with 50% accuracy within 7 day(s). MET. Upgrade to 75% within 7 days  3. Patient will perform grooming with minimal assistance/contact guard assist within 7 day(s). MET for washing hands. Continue for next 7 days. 4.  Patient will perform rolling in supine for toilet transfers with maximal assistance x1 within 7 day(s). 5.  Patient will participate in upper extremity therapeutic exercise/activities with moderate assistance for 5 minutes within 7 day(s). Performed with mod to max assist for 1 set of 10 reps on 7/22/2020. Continue goal for next 7 days. Outcome: Progressing Towards Goal   OCCUPATIONAL THERAPY TREATMENT/WEEKLY RE-ASSESSMENT  Patient: Ryan Cortés (68 y.o. female)  Date: 7/22/2020  Diagnosis: CVA (cerebral vascular accident) ([address] [I63.9]  TIA (transient ischemic attack) [G45.9]   <principal problem not specified>       Precautions: Contact  Chart, occupational therapy assessment, plan of care, and goals were reviewed. ASSESSMENT  Patient continues with skilled OT services and is progressing towards goals. Patient met one goal and progressed with 2 others. Goals upgraded. Continue 3 times a week. Participation impacted by lethargy and dysarthria.     Current Level of Function Impacting Discharge (ADLs): Max to total assist for self care and functional mobility    Other factors to consider for discharge: from LTC         PLAN :  Goals have been updated based on progression since last assessment. Patient continues to benefit from skilled intervention to address the above impairments. Continue to follow patient 3 times a week to address goals. Recommend with staff: Encourage participation in UE self care and grooming, place in modified chair position 3 times a day, reorient    Recommend next OT session: UE bathing, command following, UE exercises. Recommendation for discharge: (in order for the patient to meet his/her long term goals)  Therapy up to 5 days/week in SNF setting    This discharge recommendation:  Has been made in collaboration with the attending provider and/or case management    IF patient discharges home will need the following DME: hospital bed       SUBJECTIVE:   Patient stated: patient able to state her name. OBJECTIVE DATA SUMMARY:   Cognitive/Behavioral Status:  Neurologic State: Lethargic;Eyes open to voice  Orientation Level: Disoriented to place; Disoriented to situation;Disoriented to time;Oriented to person  Cognition: Decreased command following;Decreased attention/concentration  Perception: Appears intact  Perseveration: No perseveration noted  Safety/Judgement: Decreased awareness of environment;Decreased insight into deficits    Functional Mobility and Transfers for ADLs:  Bed Mobility:   With head of bed elevated, patient able to bring her body forward away from bed for brief periods, Bed rail used      ADL Intervention:       Grooming  Washing Face: Maximum assistance  Washing Hands: Set-up    Upper Body Bathing  Bathing Assistance: Maximum assistance(simulated by applying lotion)  Position Performed: Long sitting on bed  Cues: Verbal cues provided; Tactile cues provided;Physical assistance              Lower Body Dressing Assistance  Dressing Assistance: Total assistance(dependent)    Toileting  Toileting Assistance:  Total assistance(dependent)    Cognitive Retraining  Orientation Retraining: Place  Organizing/Sequencing: Breaking task down  Attention to Task: Distractibility; Single task  Following Commands: Follows one step commands/directions  Safety/Judgement: Decreased awareness of environment;Decreased insight into deficits  Cues: Tactile cues provided;Verbal cues provided;Visual cues provided    Therapeutic Exercises: Bilateral UEs    EXERCISE   Sets   Reps   Active Active Assist   Passive   Comments   Shoulder Flexion 1 10 []               [x]               []                  Shoulder Abduction   []               []               []                  Shoulder external rotation   []               []               []                  Elbow flexion/extension 1 10 []               [x]               []                  Wrist flexion/extension   []               []               []                  Finger flexion/extension   []               []               []                          Activity Tolerance:   Fair  Please refer to the flowsheet for vital signs taken during this treatment.     After treatment patient left in no apparent distress:   Supine in bed, Call bell within reach, and Bed / chair alarm activated    COMMUNICATION/COLLABORATION:   The patients plan of care was discussed with: Registered nurse, Occupational Therapist.     LOUIE Bright  Time Calculation: 19 mins

## 2020-07-22 NOTE — PROGRESS NOTES
Problem: Dysphagia (Adult)  Goal: *Acute Goals and Plan of Care (Insert Text)  Description: Speech pathology goals  Initiated 7/15/2020  1. Patient will tolerate meds crushed in puree with no overt s/s aspiration within 7 days  2. Patient will participate in re-evaluation of swallow function within 7 days  Outcome: Progressing Towards Goal     Problem: Communication Impaired (Adult)  Goal: *Acute Goals and Plan of Care (Insert Text)  Description: Speech therapy goals  Initiated 7/17/2020   1. Patient will follow 1-step commands with 50% accuracy with mod cues within 7 days. 2. Patient will answer simple y/n questions with 60% accuracy with mod cues within 7 days  3. Patient will complete simple confrontation and responsive naming tasks with 50% accuracy with max cues within 7 days   Outcome: Progressing Towards [address] AND SPEECH TREATMENT  Patient: Devaughn Gunter (68 y.o. female)  Date: 7/22/2020  Diagnosis: CVA (cerebral vascular accident) ([facility] [address] [I63.9]  TIA (transient ischemic attack) [G45.9]   <principal problem not specified>       Precautions: Aspiration Contact    ASSESSMENT:  Patient continues to present with moderate oropharyngeal dysphagia characterized by significant oral motor weakness, suspected pharyngeal swallow delay and reduced laryngeal elevation via palpation. Dysphagia resulted in impaired bolus acceptance (possible limited desire for PO), anterior spillage on left, mild oral residue on left, poor bolus formation and oral transit. Patient accepted limited PO trials and required consistent cues to maintain alertness throughout session. Solid consistency not trials due to oral motor weakness and severity of dysphagia. Given bedside presentation feel patient is safe to continue pureed diet, thin liquids. Patient continues to present with receptive and expressive language deficits.   This date patient able to follow 1 step direction and answer simple/biographical yes/no questions with 60% accuracy. Patient demonstrated significant difficulty with naming tasks however she was able to state her first name when asked. Aphasia renders communication skills non-functional and patient is unable to communicate basic wants and needs. PLAN:  Recommendations and Planned Interventions:  Dysphagia 1 (pureed) diet  Thin liquids  Sit up for all PO  May use straws  Single sips  Small bites  Patient continues to benefit from skilled intervention to address the above impairments. Continue treatment per established plan of care. Discharge Recommendations: To Be Determined     SUBJECTIVE:   Patient stated Alright. Patient sleeping and required consistent cues to remain alert. OBJECTIVE:   Cognitive and Communication Status:  Neurologic State: Alert, Drowsy, Confused  Orientation Level: Oriented to person, Disoriented to time, Disoriented to situation, Disoriented to place  Cognition: Decreased command following, Decreased attention/concentration  Perception: Cues to maintain midline in sitting  Perseveration: No perseveration noted  Safety/Judgement: Not assessed    Dysphagia Treatment and Interventions:  Oral Assessment:     P.O. Trials:  Patient Position: Upright in bed  Vocal quality prior to P.O.:             Bolus Acceptance: Impaired  Bolus Formation/Control: Impaired  Type of Impairment: Delayed; Incomplete; Anterior;Spillage  Propulsion: Delayed (# of seconds)  Oral Residue: 10-50% of bolus; Left  Initiation of Swallow: Delayed (# of seconds)  Laryngeal Elevation: Decreased  Aspiration Signs/Symptoms: None                Oral Phase Severity: Moderate  Pharyngeal Phase Severity : Moderate    Speech Treatment and Interventions:    Language Comprehension and Expression:  Auditory Comprehension   Response to Basic Yes/No Questions (%): 60 %  One-Step Basic Commands (%): 60 %  Verbal Expression  Verbal Expression  Confrontation (%): 0 %  Responsive (%): 0 % After treatment:   Patient left in no apparent distress in bed, Call bell within reach, and Nursing notified    COMMUNICATION/EDUCATION:   Patient was educated regarding role of SLP, diet and POC. Suspect limited comprehension given aphasia. Further education warranted. The patient's plan of care including recommendations, planned interventions, and recommended diet changes were discussed with: Registered nurse.        AMRIT Fields  Time Calculation: 23 mins

## 2020-07-22 NOTE — PROGRESS NOTES
Problem: Falls - Risk of  Goal: *Absence of Falls  Description: Document Rosemary Dear Fall Risk and appropriate interventions in the flowsheet. Outcome: Progressing Towards Goal  Note: Fall Risk Interventions:  Mobility Interventions: Communicate number of staff needed for ambulation/transfer, Patient to call before getting OOB    Mentation Interventions: Adequate sleep, hydration, pain control, Evaluate medications/consider consulting pharmacy    Medication Interventions: Bed/chair exit alarm, Evaluate medications/consider consulting pharmacy, Patient to call before getting OOB    Elimination Interventions: Bed/chair exit alarm, Call light in reach, Patient to call for help with toileting needs, Stay With Me (per policy)              Problem: Anemia Care Plan (Adult and Pediatric)  Goal: *Labs within defined limits  Outcome: Progressing Towards Goal     Problem: Isolation Precautions - Risk of Spread of Infection  Goal: Prevent transmission of infectious organism to others  Outcome: Progressing Towards Goal     Problem: Risk for Fluid Volume Deficit  Goal: Maintain normal heart rhythm  Outcome: Progressing Towards Goal     Problem: Risk for Spread of Infection  Goal: Prevent transmission of infectious organism to others  Description: Prevent the transmission of infectious organisms to other patients, staff members, and visitors.   Outcome: Progressing Towards Goal     Problem: TIA/CVA Stroke: Day 2 Until Discharge  Goal: Activity/Safety  Outcome: Progressing Towards Goal  Goal: Diagnostic Test/Procedures  Outcome: Progressing Towards Goal  Goal: *Absence of deep venous thrombosis signs and symptoms(Stroke Metric)  Outcome: Progressing Towards Goal  Goal: *Stroke education continued(Stroke Metric)  Outcome: Progressing Towards Goal

## 2020-07-22 NOTE — PROGRESS NOTES
07/21/20 2202   Vital Signs   Temp 97.5 °F (36.4 °C)   Temp Source Rectal   Pulse (Heart Rate) 64   Heart Rate Source Monitor   Resp Rate 22   Level of Consciousness Alert   BP 94/57   MAP (Calculated) 69   BP 1 Method Automatic   BP 1 Location Left arm   BP Patient Position At rest   MEWS Score 3   Alarms Set and Audible Cardiac alarms   Box Number 652   Electrodes Replaced No   NP Edvin aware.  Orders received

## 2020-07-22 NOTE — PROGRESS NOTES
[facility] Adult  Hospitalist Group                                                                                          Hospitalist Progress Note  Nai Hardy MD  Answering service: [phone] from in house phone        Date of Service:  2020  NAME:  Bashir Llamas  :  1940  MRN:  [id number]      Admission Summary:     The patient is a 79-year-old female with recent admission to the hospital.  The patient was admitted to the hospital on 2020 and discharged on 2020. The patient apparently came in with sepsis and was diagnosed with ESBL E. coli and bacteremia because of the same and was started on IV meropenem. She had a PICC line placed. Her urine was growing ESBL and was started on ertapenem for 14 days and was discharged to a rehab facility. There were also concerns for possible pneumonia and acute metabolic encephalopathy, the patient also had acute renal failure. She has a history of chronic blood loss anemia and chronic AFib and is currently on Eliquis. The patient was at [facility] today and was undergoing rehab when she had sudden onset of slurred speech and left-sided weakness. EMS was called. The patient was found by EMS to have incomprehensible speech, but no left-sided weakness. The patient was brought to the ER. In the ER, the patient continued to have some speech difficulty and was requested to get admitted under the hospitalist service. The patient was also found to be hypothermic with a temperature of 91.6 while in the ER.      Interval history / Subjective:     Poor historian, no acute event over night     Assessment & Plan:     Inability to speak and left sided weakness likely due to metabolic encephalopathy   -continue on Eliquis, reduced dose due to anemia, on lipitor  -improving, conscious and alert, answer simple questions, follows simple command, left side weakness improved, appears at her base line  -CT head no acute intracranial abnormality identified. There has been no significant change. -CTA head/neck R ICA 50% stenosis, L ICA 60%  -MRI brain study limited by motion artifact. No acute infarct.  Volume loss and white matter disease as above   -Echo LV EF 40-45% moderate concentric hypertrophy, mild global systolic function  -speech evaluation, severe dilated right atrium, moderate left atrium, mild to moderate MVR and TVR  -continue PT/OT  -continue supportive care  -Neurologist on board    Sepsis unclear etiology  -completed 2 weeks of IV meropenem   -hypothermia resolved  -No leukocytosis  -hx of recent ESBL bacteremia   -has PICC line since 7/10, remove PICC line  -blood cx no growth so far  -urine cx no growth so far  -COVID-19 test negative x 2    Acute metabolic encephalopathy   -back to base line, patient conscious and alert, grabble speech   -follow simple command, moves upper extremities   -continue neuro check and supportive care    Acute hypoxemic respiratory failure   -chest x ray increasing bilateral lower lobe atelectasis/infiltrates.  -SpO2 % on RA    Chronic A Fib, rate controlled  -continue on coreg and eliquis  -Eliquis is held for low Hgb    Anemia of chronic disease with severe iron deficiency  -Hgb low but stable 7.3, no evidence of bleeding but on reduced dose of eliquis  -Hem occult negative  -monitor H/H and transfuse for Hgb <7.0  -iron profile low iron level and saturation  -vitamin B12 and folate normal  -received iv iron x  3 doses    CKD stage III  -Creatinine stable, continue IVF for 2 hrs, at risk for fluid overload  -avoid nephrotoxin   -monitor renal function    Moderate to sever oral and moderate pharyngeal dysphagia  -continue on puree/thin liquid diet   -aspiration precaution  -speech therapist on board  -blood sugar run low normal, on D5W decrease rate to 25 ml/hr    Hypernatremia likely due to dehydration secondary to poor oral intake  -improving, continue D5W at 100 ml/hr and repeat bmp in am    DNR: at risk for decompensation, palliative care team on board      Code status: DNR  DVT prophylaxis: on [facility] discussed with: Patient/Family and Nurse  Anticipated Disposition: Patient from [facility]  Anticipated Discharge: 24 hours to 48 hours     I called and discussed with  [place] her MPOA at  and questions answered 7/22, and he said he is coming today to sign it. Hospital Problems  Date Reviewed: 5/22/2020          Codes Class Noted POA    CVA (cerebral vascular accident) [facility]) ICD-10-CM: I63.9  ICD-9-CM: 434.91  7/13/2020 Unknown        TIA (transient ischemic attack) ICD-10-CM: G45.9  ICD-9-CM: 435.9  7/13/2020 Unknown                Vital Signs:    Last 24hrs VS reviewed since prior progress note. Most recent are:  Visit Vitals  /66   Pulse 75   Temp 97.7 °F (36.5 °C)   Resp 19   Ht 5' 0.98\" (1.549 m)   Wt 99 kg (218 lb 4.1 oz)   SpO2 93%   BMI 41.26 kg/m²       No intake or output data in the 24 hours ending 07/22/20 0857     Physical Examination:             Constitutional:  No acute distress, cooperative, pleasant    ENT:  Oral mucosa moist, oropharynx benign. Resp:  Decrease bronchial breath sound bilaterally. No wheezing/rhonchi/rales. No accessory muscle use   CV:  Regular rhythm, normal rate, no murmurs, gallops, rubs    GI:  Soft, non distended, non tender.  normoactive bowel sounds, no hepatosplenomegaly     Musculoskeletal:  No edema     Neurologic:  Conscious and alert, confused and disoriented, follow simple command, motor UE 4-5/5, LE 3/5     Skin:  Dry skin, no lesion       Data Review:    Review and/or order of clinical lab test  Review and/or order of tests in the radiology section of CPT  Review and/or order of tests in the medicine section of CPT      Labs:     Recent Labs     07/22/20  0535 07/21/20  0550   WBC 5.7 5.4   HGB 7.3* 7.2*   HCT 28.1* 27.8*    184     Recent Labs     07/22/20  0535 07/21/20  0550 07/20/20  0150    147* 151*   K 4.1 4.2 4.3   * 117* 119*   CO2 25 27 27   BUN 37* 36* 37*   CREA 1.60* 1.48* 1.48*   GLU 73 89 78   CA 9.2 8.8 9.1     Recent Labs     07/22/20  0535 07/20/20  0150   ALT 16 13    102   TBILI 0.5 0.4   TP 7.2 7.2   ALB 1.9* 1.9*   GLOB 5.3* 5.3*     No results for input(s): INR, PTP, APTT, INREXT, INREXT in the last 72 hours. No results for input(s): FE, TIBC, PSAT, FERR in the last 72 hours. Lab Results   Component Value Date/Time    Folate 5.4 07/16/2020 05:00 AM      No results for input(s): PH, PCO2, PO2 in the last 72 hours. No results for input(s): CPK, CKNDX, TROIQ in the last 72 hours.     No lab exists for component: CPKMB  Lab Results   Component Value Date/Time    Cholesterol, total 90 07/14/2020 03:18 AM    HDL Cholesterol 27 07/14/2020 03:18 AM    LDL,Direct 163 (H) 12/05/2019 01:04 PM    LDL, calculated 49.8 07/14/2020 03:18 AM    Triglyceride 66 07/14/2020 03:18 AM    CHOL/HDL Ratio 3.3 07/14/2020 03:18 AM     Lab Results   Component Value Date/Time    Glucose (POC) 134 (H) 07/21/2020 11:50 AM    Glucose (POC) 82 07/16/2020 06:23 PM    Glucose (POC) 98 07/16/2020 12:13 PM    Glucose (POC) 110 (H) 07/16/2020 06:53 AM    Glucose (POC) 93 07/16/2020 03:10 AM     Lab Results   Component Value Date/Time    Color YELLOW/STRAW 07/13/2020 02:08 PM    Appearance CLEAR 07/13/2020 02:08 PM    Specific gravity 1.018 07/13/2020 02:08 PM    pH (UA) 5.0 07/13/2020 02:08 PM    Protein Negative 07/13/2020 02:08 PM    Glucose Negative 07/13/2020 02:08 PM    Ketone Negative 07/13/2020 02:08 PM    Bilirubin Negative 07/13/2020 02:08 PM    Urobilinogen 1.0 07/13/2020 02:08 PM    Nitrites Negative 07/13/2020 02:08 PM    Leukocyte Esterase Negative 07/13/2020 02:08 PM    Epithelial cells FEW 07/13/2020 02:08 PM    Bacteria Negative 07/13/2020 02:08 PM    WBC 0-4 07/13/2020 02:08 PM    RBC 0-5 07/13/2020 02:08 PM         Medications Reviewed:     Current Facility-Administered Medications   Medication Dose Route Frequency    apixaban (ELIQUIS) tablet 2.5 mg  2.5 mg Oral BID    famotidine (PEPCID) tablet 20 mg  20 mg Oral Q24H    albuterol (PROVENTIL HFA, VENTOLIN HFA, PROAIR HFA) inhaler 2 Puff  2 Puff Inhalation Q4H PRN    atorvastatin (LIPITOR) tablet 40 mg  40 mg Oral QHS    carvediloL (COREG) tablet 6.25 mg  6.25 mg Oral BID WITH MEALS    acetaminophen (TYLENOL) tablet 650 mg  650 mg Oral Q4H PRN    Or    acetaminophen (TYLENOL) solution 650 mg  650 mg Per NG tube Q4H PRN    Or    acetaminophen (TYLENOL) suppository 650 mg  650 mg Rectal Q4H PRN    0.9% sodium chloride infusion 250 mL  250 mL IntraVENous PRN    0.9% sodium chloride infusion 250 mL  250 mL IntraVENous PRN     ______________________________________________________________________  EXPECTED LENGTH OF STAY: 4d 19h  ACTUAL LENGTH OF STAY:          9                 Reg Gregory MD

## 2020-07-22 NOTE — PROGRESS NOTES
Problem: Mobility Impaired (Adult and Pediatric)  Goal: *Acute Goals and Plan of Care (Insert Text)  Description:   FUNCTIONAL STATUS PRIOR TO ADMISSION: Pt poor historian. Receives assistance with ADLs and mobility. HOME SUPPORT PRIOR TO ADMISSION: Pt lives at [facility]. Physical Therapy Goals    Initiated 7/22/2020  1. Patient will move from supine to sit and sit to supine  in bed with moderate assistance  within 7 day(s). 2.  Patient will transfer from bed to chair and chair to bed with moderate assistance  using the least restrictive device within 7 day(s). 3.  Patient will perform sit to stand with moderate assistance  within 7 day(s). 4.  Patient will ambulate with moderate assistance  for 5 feet with the least restrictive device within 7 day(s). 5. Patient will tolerate seated EOB x 10 minutes witin 7 days      Initiated 7/15/2020  1. Patient will move from supine to sit and sit to supine , scoot up and down, and roll side to side in bed with supervision/set-up within 7 day(s). 2.  Patient will transfer from bed to chair and chair to bed with minimal assistance/contact guard assist using the least restrictive device within 7 day(s). 3.  Patient will perform sit to stand with minimal assistance/contact guard assist within 7 day(s). 4.  Patient will ambulate with minimal assistance/contact guard assist for 15 feet with the least restrictive device within 7 day(s). 5.  Patient will improve Rubio Balance score by 7 points within 7 days. Outcome: Progressing Towards Goal   PHYSICAL THERAPY TREATMENT: WEEKLY REASSESSMENT  Patient: Marita Ball (68 y.o. female)  Date: 7/22/2020  Primary Diagnosis: CVA (cerebral vascular accident) [facility]) [I63.9]  TIA (transient ischemic attack) [G45.9]        Precautions:   Contact      ASSESSMENT  Patient continues with skilled PT services and is slowly progressing towards goals.  Pt received supine in bed and very lethargic needing constant commands to stay alert enough to participate. Pt continues to be limited by decreased strength, decreased functional mobility, decreased tolerance to activity. Pt required max assist for rolling and repositioning and then became somewhat more alert and able to assist some with mobility. Placed pt in modified chair position to improve alertness and arousal. Pt is planning to return to LTC facility upon discharge    Patient's progression toward goals since last assessment: no goals were met; goals down graded    Current Level of Function Impacting Discharge (mobility/balance): mod-max A bed mobility           PLAN :  Goals have been updated based on progression since last assessment. Patient continues to benefit from skilled intervention to address the above impairments. Recommendations and Planned Interventions: bed mobility training, transfer training, gait training, therapeutic exercises, neuromuscular re-education, patient and family training/education, and therapeutic activities      Frequency/Duration: Patient will be followed by physical therapy:  3 times a week to address goals. Recommendation for discharge: (in order for the patient to meet his/her long term goals)  Return to LTC    This discharge recommendation:  Has been made in collaboration with the attending provider and/or case management           SUBJECTIVE:   Patient stated Jasmine Eid.   pt mumbling and difficult to understand    OBJECTIVE DATA SUMMARY:   HISTORY:    Past Medical History:   Diagnosis Date    A-fib ([address]     Anemia     Chronic kidney disease     CKD (chronic kidney disease)     Hypercholesterolemia     Hyperlipidemia     Hypertension     MI (myocardial infarction) ([address]    No past surgical history on file.     P    Home Situation  Home Environment: Long term care  One/Two Story Residence: One story  Living Alone: No  Support Systems: Long term acute care  Patient Expects to be Discharged to[de-identified] Skilled nursing facility  Current DME Used/Available at Home: Wheelchair, Walker    EXAMINATION/PRESENTATION/DECISION MAKING:   Critical Behavior:  Neurologic State: Lethargic, Eyes open to voice  Orientation Level: Disoriented to place, Disoriented to situation, Disoriented to time, Oriented to person  Cognition: Decreased command following, Decreased attention/concentration  Safety/Judgement: Decreased awareness of environment, Decreased insight into deficits  Hearing: Auditory  Auditory Impairment: Hard of hearing, bilateral    Range Of Motion:  AROM: Generally decreased, functional                       Strength:    Strength: Generally decreased, functional           Functional Mobility:  Bed Mobility:  Rolling: Moderate assistance;Maximum assistance        Scooting: Maximum assistance  Transfers:          Balance:   Sitting: Impaired(lean to the R)  Ambulation/Gait Training:                      Therapeutic Exercises: Ankle pumps with verbal cues to attend to take, passive knee flexion and hip abduction      Pain Rating:  Pt denied pain and appeared in no apparent distress    Activity Tolerance:   Fair  Please refer to the flowsheet for vital signs taken during this treatment. After treatment patient left in no apparent distress:   Supine in bed, Call bell within reach, Bed / chair alarm activated, and Side rails x 3    COMMUNICATION/EDUCATION:   The patients plan of care was discussed with: Occupational therapist and Registered nurse. Patient is unable to participate in goal setting and plan of care.     Thank you for this referral.  Jay Bryan PT, DPT   Time Calculation: 13 mins

## 2020-07-22 NOTE — PROGRESS NOTES
Problem: Falls - Risk of  Goal: *Absence of Falls  Description: Document Bull Zelaya Fall Risk and appropriate interventions in the flowsheet.   Outcome: Progressing Towards Goal  Note: Fall Risk Interventions:  Mobility Interventions: Communicate number of staff needed for ambulation/transfer, Patient to call before getting OOB    Mentation Interventions: Adequate sleep, hydration, pain control, Evaluate medications/consider consulting pharmacy    Medication Interventions: Bed/chair exit alarm, Evaluate medications/consider consulting pharmacy, Patient to call before getting OOB    Elimination Interventions: Bed/chair exit alarm, Call light in reach, Patient to call for help with toileting needs, Stay With Me (per policy)              Problem: Heart Failure: Discharge Outcomes  Goal: *Demonstrates ability to perform prescribed activity without shortness of breath or discomfort  Outcome: Progressing Towards Goal  Goal: *Left ventricular function assessment completed prior to or during stay, or planned for post-discharge  Outcome: Progressing Towards Goal  Goal: *ACEI prescribed if LVEF less than 40% and no contraindications or ARB prescribed  Outcome: Progressing Towards Goal  Goal: *Verbalizes understanding and describes prescribed diet  Outcome: Progressing Towards Goal  Goal: *Verbalizes understanding/describes prescribed medications  Outcome: Progressing Towards Goal  Goal: *Describes available resources and support systems  Description: (eg: Home Health, Palliative Care, Advanced Medical Directive)  Outcome: Progressing Towards Goal  Goal: *Describes smoking cessation resources  Outcome: Progressing Towards Goal  Goal: *Understands and describes signs and symptoms to report to providers(Stroke Metric)  Outcome: Progressing Towards Goal  Goal: *Describes/verbalizes understanding of follow-up/return appt  Description: (eg: to physicians, diabetes treatment coordinator, and other resources  Outcome: Progressing Towards Goal  Goal: *Describes importance of continuing daily weights and changes to report to physician  Outcome: Progressing Towards Goal     Problem: TIA/CVA Stroke: Day 2 Until Discharge  Goal: Activity/Safety  Outcome: Progressing Towards Goal  Goal: Diagnostic Test/Procedures  Outcome: Progressing Towards Goal  Goal: Nutrition/Diet  Outcome: Progressing Towards Goal  Goal: Discharge Planning  Outcome: Progressing Towards Goal  Goal: Medications  Outcome: Progressing Towards Goal  Goal: Respiratory  Outcome: Progressing Towards Goal  Goal: Treatments/Interventions/Procedures  Outcome: Progressing Towards Goal  Goal: Psychosocial  Outcome: Progressing Towards Goal  Goal: *Verbalizes anxiety and depression are reduced or absent  Outcome: Progressing Towards Goal  Goal: *Absence of aspiration  Outcome: Progressing Towards Goal  Goal: *Absence of deep venous thrombosis signs and symptoms(Stroke Metric)  Outcome: Progressing Towards Goal  Goal: *Optimal pain control at patient's stated goal  Outcome: Progressing Towards Goal  Goal: *Tolerating diet  Outcome: Progressing Towards Goal  Goal: *Ability to perform ADLs and demonstrates progressive mobility and function  Outcome: Progressing Towards Goal  Goal: *Stroke education continued(Stroke Metric)  Outcome: Progressing Towards Goal

## 2020-07-23 VITALS
RESPIRATION RATE: 17 BRPM | TEMPERATURE: 97.3 F | OXYGEN SATURATION: 98 % | BODY MASS INDEX: 41.87 KG/M2 | SYSTOLIC BLOOD PRESSURE: 143 MMHG | HEIGHT: 61 IN | DIASTOLIC BLOOD PRESSURE: 70 MMHG | HEART RATE: 60 BPM | WEIGHT: 221.78 LBS

## 2020-07-23 LAB
ANION GAP SERPL CALC-SCNC: 4 MMOL/L (ref 5–15)
BUN SERPL-MCNC: 37 MG/DL (ref 6–20)
BUN/CREAT SERPL: 22 (ref 12–20)
CALCIUM SERPL-MCNC: 8.6 MG/DL (ref 8.5–10.1)
CHLORIDE SERPL-SCNC: 111 MMOL/L (ref 97–108)
CO2 SERPL-SCNC: 26 MMOL/L (ref 21–32)
CREAT SERPL-MCNC: 1.71 MG/DL (ref 0.55–1.02)
ERYTHROCYTE [DISTWIDTH] IN BLOOD BY AUTOMATED COUNT: 24 % (ref 11.5–14.5)
GLUCOSE SERPL-MCNC: 97 MG/DL (ref 65–100)
HCT VFR BLD AUTO: 28.1 % (ref 35–47)
HGB BLD-MCNC: 7.4 G/DL (ref 11.5–16)
MCH RBC QN AUTO: 20.7 PG (ref 26–34)
MCHC RBC AUTO-ENTMCNC: 26.3 G/DL (ref 30–36.5)
MCV RBC AUTO: 78.7 FL (ref 80–99)
NRBC # BLD: 0.46 K/UL (ref 0–0.01)
NRBC BLD-RTO: 10 PER 100 WBC
PLATELET # BLD AUTO: 191 K/UL (ref 150–400)
POTASSIUM SERPL-SCNC: 4.2 MMOL/L (ref 3.5–5.1)
RBC # BLD AUTO: 3.57 M/UL (ref 3.8–5.2)
SODIUM SERPL-SCNC: 141 MMOL/L (ref 136–145)
WBC # BLD AUTO: 4.6 K/UL (ref 3.6–11)

## 2020-07-23 PROCEDURE — 74011250637 HC RX REV CODE- 250/637: Performed by: INTERNAL MEDICINE

## 2020-07-23 PROCEDURE — 74011250636 HC RX REV CODE- 250/636: Performed by: HOSPITALIST

## 2020-07-23 PROCEDURE — 36415 COLL VENOUS BLD VENIPUNCTURE: CPT

## 2020-07-23 PROCEDURE — 74011250637 HC RX REV CODE- 250/637: Performed by: FAMILY MEDICINE

## 2020-07-23 PROCEDURE — 85027 COMPLETE CBC AUTOMATED: CPT

## 2020-07-23 PROCEDURE — 80048 BASIC METABOLIC PNL TOTAL CA: CPT

## 2020-07-23 RX ADMIN — APIXABAN 2.5 MG: 2.5 TABLET, FILM COATED ORAL at 08:34

## 2020-07-23 RX ADMIN — DEXTROSE MONOHYDRATE 100 ML/HR: 5 INJECTION, SOLUTION INTRAVENOUS at 04:40

## 2020-07-23 RX ADMIN — CARVEDILOL 6.25 MG: 6.25 TABLET, FILM COATED ORAL at 08:34

## 2020-07-23 NOTE — PROGRESS NOTES
Transition of Care Plan  RUR 20%    Transition of Care Plan-  Return to LTC at [address] ([address] [place] [address]) phone [phone]    Second Medicare Letter given to patient. Contact SUNDAY Mendez  [phone]--   talked with him to inform of the discharge  He is in agreement  Second Medicare letter left with patient and copy in chart      Transition of Care Plan to SNF/Rehab    SNF/Rehab Transition:  Patient has been accepted to [facility] and meets criteria for admission. Patient will transported by [facility] and expected to leave at noon. Communication to Patient/Family:  Met with patient and  and they are agreeable to the transition plan. Communication to SNF/Rehab:  Bedside RN, Viviana Case, has been notified to update the transition plan to the facility and call report (phone number [phone]. Discharge information has been updated on the AVS.     Discharge instructions to be secured from [address] Please include all hard scripts for controlled substances, med rec and dc summary, and AVS in packet. Reviewed and confirmed with facility, [facility] , can manage the patient care needs for the following:     SNF/Rehab Transition:  Patient to follow-up with Home Health:  [facility]  PCP/Specialist:     Reviewed and confirmed with facility, Lynda Alston they can manage the patient care needs for the following:     Gavino Molina with (X) only those applicable:    Medication:  [x]  Medications will be available at the facility  []  IV Antibiotics   []  Controlled Substance - hard copy to be sent with patient   []  Weekly Labs   Documents:  [x] Hard RX  [x] MAR  [x] Kardex  [x] AVS  []Transfer Summary  []Discharge   Equipment:  []  CPAP/BiPAP  []  Wound Vacuum  []  [place] or Urinary Device  []  PICC/Central Line  []  Nebulizer  []  Ventilator   Treatment:  []Isolation (for MRSA, VRE, etc.)  []Surgical Drain Management  []Tracheostomy Care  []Dressing Changes  []Dialysis with transportation and chair time   []PEG Care  []Oxygen  []Daily Weights for Heart Failure   Dietary:  []Any diet limitations  []Tube Feedings   []Total Parenteral Management (TPN)   Eligible for Medicaid Long Term Services and Supports  Yes:  [] Eligible for medical assistance or will become eligible within 180 days and UAI completed. [] Provider/Patient and/or support system has requested screening. [] UAI copy provided to patient or responsible party,   [] UAI unavailable at discharge will send once processed to SNF provider. [] UAI unavailable at discharged mailed to patient  No:   [] Private pay and is not financially eligible for Medicaid within the next 180 days. [] Reside out-of-state.   [] A residents of a state owned/operated facility that is licensed  by [facility] or [facility]  [] Enrollment in [facility] hospice services  []  Medical [facility]  [] Patient /Family declines to have screening completed or provide financial information for screening     Financial Resources:  Medicaid    [] Initiated and application pending   [] Full coverage     Advanced Care Plan:  []Surrogate Decision Maker of Care  []POA  []Communicated Code Status  DDNR  (DDNR\", \"Full\")    Other     Financial Resources:  Medicaid    [] Initiated and application pending   [] Full coverage     Advanced Care Plan:  []Surrogate Decision Maker of Care  []POA  [x]Communicated Code Status   DDNR(DDNR\", \"Full\")    Other

## 2020-07-23 NOTE — PROGRESS NOTES
Bedside and Verbal shift change report given to TABBY Farnsworth (oncoming nurse) by Heriberto Copeland RN (offgoing nurse). Report included the following information SBAR, Kardex, Intake/Output, MAR, Recent Results, Med Rec Status, Cardiac Rhythm A-fib and Dual Neuro Assessment.

## 2020-07-23 NOTE — PROGRESS NOTES
Bedside and Verbal shift change report given to [address] [place] [address] (oncoming nurse) by David Gupta RN (offgoing nurse). Report included the following information SBAR, Kardex, ED Summary, Intake/Output, MAR, Cardiac Rhythm afib and Dual Neuro Assessment.

## 2020-07-23 NOTE — PROGRESS NOTES
Problem: Falls - Risk of  Goal: *Absence of Falls  Description: Document Chela Garrido Fall Risk and appropriate interventions in the flowsheet. Outcome: Progressing Towards Goal  Note: Fall Risk Interventions:  Mobility Interventions: Bed/chair exit alarm, Communicate number of staff needed for ambulation/transfer, OT consult for ADLs, Patient to call before getting OOB, PT Consult for mobility concerns, PT Consult for assist device competence, Utilize walker, cane, or other assistive device    Mentation Interventions: Bed/chair exit alarm, Adequate sleep, hydration, pain control, Door open when patient unattended, Evaluate medications/consider consulting pharmacy, Increase mobility, More frequent rounding, Room close to nurse's station, Toileting rounds    Medication Interventions: Bed/chair exit alarm, Evaluate medications/consider consulting pharmacy, Patient to call before getting OOB, Teach patient to arise slowly    Elimination Interventions: Bed/chair exit alarm, Call light in reach, Elevated toilet seat, Patient to call for help with toileting needs, Stay With Me (per policy), Toileting schedule/hourly rounds              Problem: Pressure Injury - Risk of  Goal: *Prevention of pressure injury  Description: Document Pratik Scale and appropriate interventions in the flowsheet. Outcome: Progressing Towards Goal  Note: Pressure Injury Interventions:  Sensory Interventions: Assess changes in LOC, Chair cushion, Check visual cues for pain, Discuss PT/OT consult with provider, Float heels, Keep linens dry and wrinkle-free, Maintain/enhance activity level, Minimize linen layers, Monitor skin under medical devices, Pressure redistribution bed/mattress (bed type), Pad between skin to skin, Turn and reposition approx.  every two hours (pillows and wedges if needed)    Moisture Interventions: Absorbent underpads, Apply protective barrier, creams and emollients, Check for incontinence Q2 hours and as needed, Internal/External urinary devices, Limit adult briefs, Maintain skin hydration (lotion/cream), Minimize layers    Activity Interventions: Chair cushion, Increase time out of bed, Pressure redistribution bed/mattress(bed type), PT/OT evaluation    Mobility Interventions: Chair cushion, Float heels, HOB 30 degrees or less, Pressure redistribution bed/mattress (bed type), PT/OT evaluation, Turn and reposition approx. every two hours(pillow and wedges)    Nutrition Interventions: Document food/fluid/supplement intake    Friction and Shear Interventions: Feet elevated on foot rest, Apply protective barrier, creams and emollients, HOB 30 degrees or less, Lift sheet, Minimize layers, Lift team/patient mobility team                Problem: Risk for Spread of Infection  Goal: Prevent transmission of infectious organism to others  Description: Prevent the transmission of infectious organisms to other patients, staff members, and visitors.   Outcome: Progressing Towards Goal     Problem: Heart Failure: Discharge Outcomes  Goal: *Demonstrates ability to perform prescribed activity without shortness of breath or discomfort  Outcome: Progressing Towards Goal  Goal: *Left ventricular function assessment completed prior to or during stay, or planned for post-discharge  Outcome: Progressing Towards Goal  Goal: *ACEI prescribed if LVEF less than 40% and no contraindications or ARB prescribed  Outcome: Progressing Towards Goal  Goal: *Verbalizes understanding and describes prescribed diet  Outcome: Progressing Towards Goal  Goal: *Verbalizes understanding/describes prescribed medications  Outcome: Progressing Towards Goal  Goal: *Describes available resources and support systems  Description: (eg: Home Health, Palliative Care, Advanced Medical Directive)  Outcome: Progressing Towards Goal  Goal: *Describes smoking cessation resources  Outcome: Progressing Towards Goal  Goal: *Understands and describes signs and symptoms to report to providers(Stroke Metric)  Outcome: Progressing Towards Goal  Goal: *Describes/verbalizes understanding of follow-up/return appt  Description: (eg: to physicians, diabetes treatment coordinator, and other resources  Outcome: Progressing Towards Goal  Goal: *Describes importance of continuing daily weights and changes to report to physician  Outcome: Progressing Towards Goal     Problem: TIA/CVA Stroke: Day 2 Until Discharge  Goal: Activity/Safety  Outcome: Progressing Towards Goal  Goal: Diagnostic Test/Procedures  Outcome: Progressing Towards Goal  Goal: Nutrition/Diet  Outcome: Progressing Towards Goal  Goal: Discharge Planning  Outcome: Progressing Towards Goal  Goal: Medications  Outcome: Progressing Towards Goal  Goal: Respiratory  Outcome: Progressing Towards Goal  Goal: Treatments/Interventions/Procedures  Outcome: Progressing Towards Goal  Goal: Psychosocial  Outcome: Progressing Towards Goal  Goal: *Verbalizes anxiety and depression are reduced or absent  Outcome: Progressing Towards Goal  Goal: *Absence of aspiration  Outcome: Progressing Towards Goal  Goal: *Absence of deep venous thrombosis signs and symptoms(Stroke Metric)  Outcome: Progressing Towards Goal  Goal: *Optimal pain control at patient's stated goal  Outcome: Progressing Towards Goal  Goal: *Tolerating diet  Outcome: Progressing Towards Goal  Goal: *Ability to perform ADLs and demonstrates progressive mobility and function  Outcome: Progressing Towards Goal  Goal: *Stroke education continued(Stroke Metric)  Outcome: Progressing Towards Goal

## 2020-07-23 NOTE — PROGRESS NOTES
Problem: Falls - Risk of  Goal: *Absence of Falls  Description: Document Rosemary Dear Fall Risk and appropriate interventions in the flowsheet. Outcome: Progressing Towards Goal  Note: Fall Risk Interventions:  Mobility Interventions: Bed/chair exit alarm, Communicate number of staff needed for ambulation/transfer, OT consult for ADLs, Patient to call before getting OOB, PT Consult for mobility concerns, PT Consult for assist device competence, Utilize walker, cane, or other assistive device    Mentation Interventions: Bed/chair exit alarm, Adequate sleep, hydration, pain control, Door open when patient unattended, Evaluate medications/consider consulting pharmacy, Increase mobility, More frequent rounding, Room close to nurse's station, Toileting rounds    Medication Interventions: Bed/chair exit alarm, Evaluate medications/consider consulting pharmacy, Patient to call before getting OOB, Teach patient to arise slowly    Elimination Interventions: Bed/chair exit alarm, Call light in reach, Elevated toilet seat, Patient to call for help with toileting needs, Stay With Me (per policy), Toileting schedule/hourly rounds              Problem: Pressure Injury - Risk of  Goal: *Prevention of pressure injury  Description: Document Pratik Scale and appropriate interventions in the flowsheet. Outcome: Progressing Towards Goal  Note: Pressure Injury Interventions:  Sensory Interventions: Assess changes in LOC, Chair cushion, Check visual cues for pain, Discuss PT/OT consult with provider, Float heels, Keep linens dry and wrinkle-free, Maintain/enhance activity level, Minimize linen layers, Monitor skin under medical devices, Pressure redistribution bed/mattress (bed type), Pad between skin to skin, Turn and reposition approx.  every two hours (pillows and wedges if needed)    Moisture Interventions: Absorbent underpads, Apply protective barrier, creams and emollients, Check for incontinence Q2 hours and as needed, Internal/External urinary devices, Limit adult briefs, Maintain skin hydration (lotion/cream), Minimize layers    Activity Interventions: Chair cushion, Increase time out of bed, Pressure redistribution bed/mattress(bed type), PT/OT evaluation    Mobility Interventions: Chair cushion, Float heels, HOB 30 degrees or less, Pressure redistribution bed/mattress (bed type), PT/OT evaluation, Turn and reposition approx. every two hours(pillow and wedges)    Nutrition Interventions: Document food/fluid/supplement intake    Friction and Shear Interventions: Feet elevated on foot rest, Apply protective barrier, creams and emollients, HOB 30 degrees or less, Lift sheet, Minimize layers, Lift team/patient mobility team                Problem: Anemia Care Plan (Adult and Pediatric)  Goal: *Labs within defined limits  Outcome: Progressing Towards Goal     Problem: Risk for Spread of Infection  Goal: Prevent transmission of infectious organism to others  Description: Prevent the transmission of infectious organisms to other patients, staff members, and visitors.   Outcome: Progressing Towards Goal     Problem: Heart Failure: Day 5  Goal: Activity/Safety  Outcome: Progressing Towards Goal  Goal: Diagnostic Test/Procedures  Outcome: Progressing Towards Goal  Goal: Nutrition/Diet  Outcome: Progressing Towards Goal     Problem: Ischemic Stroke: Discharge Outcomes  Goal: *Verbalizes anxiety and depression are reduced or absent  Outcome: Progressing Towards Goal  Goal: *Verbalize understanding of risk factor modification(Stroke Metric)  Outcome: Progressing Towards Goal  Goal: *Hemodynamically stable  Outcome: Progressing Towards Goal

## 2020-07-23 NOTE — PROGRESS NOTES
I have reviewed discharge instructions with the patient. The patient verbalized understanding. PIV and telemetry discontinued. Patient discharged to [facility] via [facility] with discharge instructions. Report called to [address], RN at [facility] [address] (relative) notified via telephone of patient departure time with [facility] at time of patient discharge.

## 2020-07-23 NOTE — DISCHARGE INSTRUCTIONS
Discharge SNF/Rehab Instructions/LTAC       PATIENT ID: Marita Ball  MRN: [id number]   YOB: 1940    DATE OF ADMISSION: 7/13/2020  1:11 PM    DATE OF DISCHARGE: 7/21/2020    PRIMARY CARE PROVIDER: Emily Diaz MD       ATTENDING PHYSICIAN: Butch Gregory MD  DISCHARGING PROVIDER: Luis Moreland MD     To contact this individual call [phone] and ask the  to page. If unavailable ask to be transferred the Adult Hospitalist Department. CONSULTATIONS: IP CONSULT TO NEUROLOGY  IP CONSULT TO PALLIATIVE CARE - PROVIDER  IP CONSULT TO INFECTIOUS DISEASES    PROCEDURES/SURGERIES: * No surgery found *    ADMITTING [address] [place] [address] COURSE:     The patient is a 68-year-old female with recent admission to the hospital.  The patient was admitted to the hospital on 06/30/2020 and discharged on 07/08/2020.  The patient apparently came in with sepsis and was diagnosed with ESBL E. coli and bacteremia because of the same and was started on IV meropenem.  She had a PICC line placed.  Her urine was growing ESBL and was started on ertapenem for 14 days and was discharged to a rehab facility. Maynard Sandifer were also concerns for possible pneumonia and acute metabolic encephalopathy, the patient also had acute renal failure.  She has a history of chronic blood loss anemia and chronic AFib and is currently on Eliquis.  The patient was at [facility] today and was undergoing rehab when she had sudden onset of slurred speech and left-sided weakness.  EMS was called.  The patient was found by EMS to have incomprehensible speech, but no left-sided weakness.  The patient was brought to the ER.  In the ER, the patient continued to have some speech difficulty and was requested to get admitted under the hospitalist service.  The patient was also found to be hypothermic with a temperature of 91.6 while in the ER.      Inability to speak and left sided weakness likely due to metabolic encephalopathy   -continue on Eliquis, reduced dose due to anemia, on lipitor  -improving, conscious and alert, answer simple questions, follows simple command, left side weakness improved, appears at her base line  -CT head no acute intracranial abnormality identified. There has been no significant change. -CTA head/neck R ICA 50% stenosis, L ICA 60%  -MRI brain study limited by motion artifact. No acute infarct.  Volume loss and white matter disease as above   -Echo LV EF 40-45% moderate concentric hypertrophy, mild global systolic function  -speech evaluation, severe dilated right atrium, moderate left atrium, mild to moderate MVR and TVR  -continue PT/OT  -continue supportive care  -Neurologist on board   Sepsis unclear etiology  -completed 2 weeks of IV meropenem   -hypothermia resolved  -No leukocytosis  -hx of recent ESBL bacteremia   -has PICC line since 7/10  -blood cx no growth so far  -urine cx no growth so far  -COVID-19 test negative x 2  Acute metabolic encephalopathy   -back to base line, patient conscious and alert, grabble speech   -follow simple command, moves upper extremities   -continue neuro check and supportive care   Acute hypoxemic respiratory failure   -chest x ray increasing bilateral lower lobe atelectasis/infiltrates.  -SpO2 % on RA   Chronic A Fib, rate controlled  -continue on coreg and eliquis  -Eliquis is held for low Hgb  Anemia of chronic disease with severe iron deficiency  -Hgb low but stable 7.2, no evidence of bleeding but on reduced dose of eliquis  -Hem occult negative  -monitor H/H and transfuse for Hgb <7.0  -iron profile low iron level and saturation  -vitamin B12 and folate normal  -will give iv iron x  3 doses  CKD stage III  -Creatinine improving, continue IVF, at risk for fluid overload  -avoid nephrotoxin   -monitor renal function   Moderate to sever oral and moderate pharyngeal dysphagia  -continue on puree/thin liquid diet   -aspiration precaution  -speech therapist on board  -blood sugar run low normal, on D5W decrease rate to 25 ml/hr  Hypernatremia likely due to dehydration secondary to poor oral intake  -improving, continue D5W at 100 ml/hr and repeat bmp in am     DNR: at risk for decompensation, palliative care team on board        Code status: DNR  DVT prophylaxis: on Eliquis         DISCHARGE DIAGNOSES / PLAN:        PENDING TEST RESULTS:   At the time of discharge the following test results are still pending: None    FOLLOW UP APPOINTMENTS:    Follow-up Information    None          ADDITIONAL CARE RECOMMENDATIONS:      Pl recheck renal function in 2-3 days avoid nephrotoxins stopped zaroxolyn cont Bumex after  Repeat renal function     DIET: Dysphagia Pureed Diet (NDD1)    TUBE FEEDING INSTRUCTIONS: None    OXYGEN / BiPAP SETTINGS: None    ACTIVITY: Activity as tolerated    WOUND CARE: None    EQUIPMENT needed: None      DISCHARGE MEDICATIONS:   See Medication Reconciliation Form      NOTIFY YOUR PHYSICIAN FOR ANY OF THE FOLLOWING:   Fever over 101 degrees for 24 hours. Chest pain, shortness of breath, fever, chills, nausea, vomiting, diarrhea, change in mentation, falling, weakness, bleeding. Severe pain or pain not relieved by medications. Or, any other signs or symptoms that you may have questions about.     DISPOSITION:    Home With:   OT  PT  HH  RN      x SNF/Inpatient Rehab/LTAC    Independent/assisted living    Hospice    Other:       PATIENT CONDITION AT DISCHARGE:     Functional status   x Poor     Deconditioned     Independent      Cognition     Lucid     Forgetful    x Dementia      Catheters/lines (plus indication)    Manley     PICC     PEG    x None      Code status     Full code    x DNR      PHYSICAL EXAMINATION AT DISCHARGE:   Refer to Progress Note       CHRONIC MEDICAL DIAGNOSES:  Problem List as of 7/21/2020 Date Reviewed: 5/22/2020          Codes Class Noted - Resolved    CVA (cerebral vascular accident) ([facility] ICD-10-CM: I63.9  ICD-9-CM: 434.91  7/13/2020 - Present        TIA (transient ischemic attack) ICD-10-CM: G45.9  ICD-9-CM: 435.9  7/13/2020 - Present        Sepsis ([facility] ICD-10-CM: A41.9  ICD-9-CM: 038.9, 995.91  6/30/2020 - Present        Infiltrating ductal carcinoma of breast, left ([facility] ICD-10-CM: C50.912  ICD-9-CM: 174.9  11/5/2018 - Present    Overview Addendum 12/28/2018  2:38 PM by Chapo MOORE     11/05/18: LEFT breast biopsies. PATH at LEFT site A: IDC, colloid type, nuclear grade 2, tumor involves entire tissue sample, ER+(100%)/NH+(100%)/HER2-. PATH at LEFT side B: Fragments of atypical papillary neoplasm. RIGHT breast bx PATH: Flat epithelial atypia involving dilated duct.              Obesity, morbid ([facility] ICD-10-CM: E66.01  ICD-9-CM: 278.01  2/28/2018 - Present        Mixed hyperlipidemia ICD-10-CM: E78.2  ICD-9-CM: 272.2  3/19/2015 - Present        Valvular disease ICD-10-CM: I38  ICD-9-CM: 424.90  3/19/2015 - Present        Paroxysmal A-fib (HCC) ICD-10-CM: I48.0  ICD-9-CM: 427.31  3/19/2015 - Present        Biventricular failure ([facility] ICD-10-CM: I50.82  ICD-9-CM: 428.9  3/19/2015 - Present        HTN (hypertension) ICD-10-CM: I10  ICD-9-CM: 401.9  2/18/2015 - Present        GERD (gastroesophageal reflux disease) ICD-10-CM: K21.9  ICD-9-CM: 530.81  2/18/2015 - Present        RESOLVED: HLD (hyperlipidemia) ICD-10-CM: E78.5  ICD-9-CM: 272.4  2/18/2015 - 3/19/2015                CDMP Checked:   Yes x     PROBLEM LIST Updated:  Yes x         Signed:   Zenaida Huber MD  7/23/20  8:42 AM

## 2020-07-24 NOTE — DISCHARGE SUMMARY
Discharge Summary       PATIENT ID: Maryjane Gregory  MRN: [id number]   YOB: 1940    DATE OF ADMISSION: 7/13/2020  1:11 PM    DATE OF DISCHARGE: 7/23/20   PRIMARY CARE PROVIDER: Traci Schirmer, MD     ATTENDING PHYSICIAN: Dillon Lu  DISCHARGING PROVIDER: Dorian Ziegler MD    To contact this individual call [phone] and ask the  to page. If unavailable ask to be transferred the Adult Hospitalist Department. CONSULTATIONS: IP CONSULT TO NEUROLOGY  IP CONSULT TO INFECTIOUS DISEASES    PROCEDURES/SURGERIES: * No surgery found *    ADMITTING DIAGNOSES & HOSPITAL COURSE:   he patient is a 29-year-old female with recent admission to the hospital.  The patient was admitted to the hospital on 06/30/2020 and discharged on 07/08/2020.  The patient apparently came in with sepsis and was diagnosed with ESBL E. coli and bacteremia because of the same and was started on IV meropenem.  She had a PICC line placed.  Her urine was growing ESBL and was started on ertapenem for 14 days and was discharged to a rehab facility. Ahmet Gonzalez were also concerns for possible pneumonia and acute metabolic encephalopathy, the patient also had acute renal failure.  She has a history of chronic blood loss anemia and chronic AFib and is currently on Eliquis.  The patient was at [facility] today and was undergoing rehab when she had sudden onset of slurred speech and left-sided weakness.  EMS was called.  The patient was found by EMS to have incomprehensible speech, but no left-sided weakness.  The patient was brought to the ER.  In the ER, the patient continued to have some speech difficulty and was requested to get admitted under the hospitalist service.  The patient was also found to be hypothermic with a temperature of 91.6 while in the ER.      Inability to speak and left sided weakness likely due to metabolic encephalopathy   -continue on Eliquis, reduced dose due to anemia, on lipitor  -improving, conscious and alert, answer simple questions, follows simple command, left side weakness improved, appears at her base line  -CT head no acute intracranial abnormality identified. There has been no significant change. -CTA head/neck R ICA 50% stenosis, L ICA 60%  -MRI brain study limited by motion artifact. No acute infarct.  Volume loss and white matter disease as above   -Echo LV EF 40-45% moderate concentric hypertrophy, mild global systolic function  -speech evaluation, severe dilated right atrium, moderate left atrium, mild to moderate MVR and TVR  -continue PT/OT  -continue supportive care  -Neurologist on board   Sepsis unclear etiology  -completed 2 weeks of IV meropenem   -hypothermia resolved  -No leukocytosis  -hx of recent ESBL bacteremia   -has PICC line since 7/10  -blood cx no growth so far  -urine cx no growth so far  -COVID-19 test negative x 2  Acute metabolic encephalopathy   -back to base line, patient conscious and alert, grabble speech   -follow simple command, moves upper extremities   -continue neuro check and supportive care   Acute hypoxemic respiratory failure   -chest x ray increasing bilateral lower lobe atelectasis/infiltrates.  -SpO2 % on RA   Chronic A Fib, rate controlled  -continue on coreg and eliquis  -Eliquis is held for low Hgb  Anemia of chronic disease with severe iron deficiency  -Hgb low but stable 7.2, no evidence of bleeding but on reduced dose of eliquis  -Hem occult negative  -monitor H/H and transfuse for Hgb <7.0  -iron profile low iron level and saturation  -vitamin B12 and folate normal  -will give iv iron x  3 doses  CKD stage III  -Creatinine improving, continue IVF, at risk for fluid overload  -avoid nephrotoxin   -monitor renal function   Moderate to sever oral and moderate pharyngeal dysphagia  -continue on puree/thin liquid diet   -aspiration precaution  -speech therapist on board  -blood sugar run low normal, on D5W decrease rate to 25 ml/hr  Hypernatremia likely due to dehydration secondary to poor oral intake  -improving, continue D5W at 100 ml/hr and repeat bmp in am     DNR: at risk for decompensation, palliative care team on board        Code status: DNR  DVT prophylaxis: on [facility] / PLAN:      1. D/c to [address]:        PENDING TEST RESULTS:   At the time of discharge the following test results are still pending:     FOLLOW UP APPOINTMENTS:    Follow-up Information     Follow up With Specialties Details Why Contact Info    Anny Parker MD Internal Medicine In 1 week pl reevaluate renal function  [address]   [address]  [address]   [phone]         please repear renal function              DIET: pureed    ACTIVITY: Activity as tolerated    WOUND CARE:     EQUIPMENT needed:       DISCHARGE MEDICATIONS:  Discharge Medication List as of 7/23/2020 11:21 AM      CONTINUE these medications which have CHANGED    Details   apixaban (ELIQUIS) 2.5 mg tablet Take 1 Tab by mouth two (2) times a day for 30 days. , No Print, Disp-60 Tab,R-0         CONTINUE these medications which have NOT CHANGED    Details   atorvastatin (LIPITOR) 40 mg tablet Take 40 mg by mouth nightly., Historical Med      bumetanide (BUMEX) 0.5 mg tablet Take 0.5 mg by mouth daily. , Historical Med      cholecalciferol (VITAMIN D3) (50,000 UNITS /1250 MCG) capsule Take 50,000 Units by mouth every seven (7) days. , Historical Med      hydrALAZINE (APRESOLINE) 10 mg tablet Take 10 mg by mouth three (3) times daily. , Historical Med      isosorbide dinitrate (ISORDIL) 10 mg tablet Take 10 mg by mouth three (3) times daily. , Historical Med      carvediloL (COREG) 6.25 mg tablet Take 6.25 mg by mouth two (2) times daily (with meals). , Historical Med      potassium chloride (KLOR-CON) 10 mEq tablet Take 20 mEq by mouth daily. , Historical Med      acetaminophen (TylenoL) 325 mg cap Take 2 Tabs by mouth every four to six (4-6) hours as needed., Historical Med         STOP taking these medications       ertapenem 1 gram 1 g IVPB Comments:   Reason for Stopping:         metOLazone (ZAROXOLYN) 2.5 mg tablet Comments:   Reason for Stopping:                 NOTIFY YOUR PHYSICIAN FOR ANY OF THE FOLLOWING:   Fever over 101 degrees for 24 hours. Chest pain, shortness of breath, fever, chills, nausea, vomiting, diarrhea, change in mentation, falling, weakness, bleeding. Severe pain or pain not relieved by medications. Or, any other signs or symptoms that you may have questions about. DISPOSITION:    Home With:   OT  PT  HH  RN      x Long term SNF/Inpatient Rehab    Independent/assisted living    Hospice    Other:       PATIENT CONDITION AT DISCHARGE:     Functional status   x Poor     Deconditioned     Independent      Cognition     Lucid     Forgetful    x Dementia      Catheters/lines (plus indication)    Manley     PICC     PEG    x None      Code status     Full code    x DNR      PHYSICAL EXAMINATION AT DISCHARGE:  General:          Alert, cooperative, no distress, appears stated age. HEENT:           Atraumatic, anicteric sclerae, pink conjunctivae                          No oral ulcers, mucosa moist, throat clear, dentition fair  Neck:               Supple, symmetrical  Lungs:             Clear to auscultation bilaterally. No Wheezing or Rhonchi. No rales. Chest wall:      No tenderness  No Accessory muscle use. Heart:              Regular  rhythm,  No  murmur   No edema  Abdomen:        Soft, non-tender. Not distended. Bowel sounds normal  Extremities:     No cyanosis. No clubbing,                            Skin turgor normal, Capillary refill normal  Skin:                Not pale. Not Jaundiced  No rashes   Psych:             Not anxious or agitated.   Neurologic:      Alert, moves all extremities, answers questions appropriately and responds to commands       CHRONIC MEDICAL DIAGNOSES:  Problem List as of 7/23/2020 Date Reviewed: 7/23/2020          Codes Class Noted - Resolved    CVA (cerebral vascular accident) [facility]) ICD-10-CM: I63.9  ICD-9-CM: 434.91  7/13/2020 - Present        TIA (transient ischemic attack) ICD-10-CM: G45.9  ICD-9-CM: 435.9  7/13/2020 - Present        Sepsis ([facility] ICD-10-CM: A41.9  ICD-9-CM: 038.9, 995.91  6/30/2020 - Present        Infiltrating ductal carcinoma of breast, left ([facility] ICD-10-CM: C50.912  ICD-9-CM: 174.9  11/5/2018 - Present    Overview Addendum 12/28/2018  2:38 PM by Alejandro MOORE     11/05/18: LEFT breast biopsies. PATH at LEFT site A: IDC, colloid type, nuclear grade 2, tumor involves entire tissue sample, ER+(100%)/MT+(100%)/HER2-. PATH at LEFT side B: Fragments of atypical papillary neoplasm. RIGHT breast bx PATH: Flat epithelial atypia involving dilated duct.              Obesity, morbid ([facility] ICD-10-CM: E66.01  ICD-9-CM: 278.01  2/28/2018 - Present        Mixed hyperlipidemia ICD-10-CM: E78.2  ICD-9-CM: 272.2  3/19/2015 - Present        Valvular disease ICD-10-CM: I38  ICD-9-CM: 424.90  3/19/2015 - Present        Paroxysmal A-fib (HCC) ICD-10-CM: I48.0  ICD-9-CM: 427.31  3/19/2015 - Present        Biventricular failure ([facility] ICD-10-CM: I50.82  ICD-9-CM: 428.9  3/19/2015 - Present        HTN (hypertension) ICD-10-CM: I10  ICD-9-CM: 401.9  2/18/2015 - Present        GERD (gastroesophageal reflux disease) ICD-10-CM: K21.9  ICD-9-CM: 530.81  2/18/2015 - Present        RESOLVED: HLD (hyperlipidemia) ICD-10-CM: E78.5  ICD-9-CM: 272.4  2/18/2015 - 3/19/2015              Greater than 30  minutes were spent with the patient on counseling and coordination of care    Signed:   Magdalene Snider MD  7/24/2020  3:33 PM

## 2020-07-28 ENCOUNTER — TELEPHONE (OUTPATIENT)
Dept: INTERNAL MEDICINE CLINIC | Age: 80
End: 2020-07-28

## 2020-07-28 NOTE — TELEPHONE ENCOUNTER
[facility] know that they put a death certificate in the system for this patient. Their number is [phone]

## 2021-01-24 NOTE — PROGRESS NOTES
[facility]  Hospitalist Group                                                                                          Hospitalist Progress Note  Minus MD Pamela  Answering service: [phone] from in house phone        Date of Service:  2020  NAME:  Clementine Pritchett  :  1940  MRN:  [id number]      Admission Summary:     The patient is a 28-year-old female with recent admission to the hospital.  The patient was admitted to the hospital on 2020 and discharged on 2020. The patient apparently came in with sepsis and was diagnosed with ESBL E. coli and bacteremia because of the same and was started on IV meropenem. She had a PICC line placed. Her urine was growing ESBL and was started on ertapenem for 14 days and was discharged to a rehab facility. There were also concerns for possible pneumonia and acute metabolic encephalopathy, the patient also had acute renal failure. She has a history of chronic blood loss anemia and chronic AFib and is currently on Eliquis. The patient was at [facility] today and was undergoing rehab when she had sudden onset of slurred speech and left-sided weakness. EMS was called. The patient was found by EMS to have incomprehensible speech, but no left-sided weakness. The patient was brought to the ER. In the ER, the patient continued to have some speech difficulty and was requested to get admitted under the hospitalist service. The patient was also found to be hypothermic with a temperature of 91.6 while in the ER. Interval history / Subjective:     Poor historian, no acute event over night     Assessment & Plan:     Inability to speak and left sided weakness possible TIA   -continue on Eliquis, reduced dose due to anemia, on lipitor  -CT head no acute intracranial abnormality identified. There has been no significant change.   -CTA head/neck R ICA 50% stenosis, L ICA 60%  -MRI brain study limited by motion artifact. No acute infarct.  Volume loss and white matter disease as above   -Echo LV EF 40-45% moderate concentric hypertrophy, mild global systolic function  -speech evaluation, severe dilated right atrium, moderate left atrium, mild to moderate MVR and TVR  -PT/OT  -left side weakness has resolved, appears in her base line  -Neurologist on board    Sepsis unclear etiology  -on empiric meropenem   -hypothermia resolved  -No leukocytosis  -hx of recent ESBL bacteremia   -has PICC line since 7/10  -blood cx no growth so far  -urine cx no growth so far  -COVID-19 test negative x 2    Acute metabolic encephalopathy   -back to base line  -patient conscious and alert, grabble speech   -continue neuro check and supportive care    Acute hypoxemic respiratory failure   -chest x ray increasing bilateral lower lobe atelectasis/infiltrates.  -SpO2 % on RA    Chronic A Fib, rate controlled  -on coreg and eliquis  -Eliquis is held for low Hgb    Anemia of chronic disease with severe iron deficiency  -Hgb low but stable at 7.1, no evidence of bleeding but on reduced dose of eliquis  -Hem occult negative  -monitor H/H and transfuse for Hgb <7.0  -iron profile low iron level and saturation  -vitamin B12 and folate normal  -will give iv iron x  3 doses    CKD stage III  -Creatinine improving, cut back IVF, at risk for fluid overload  -avoid nephrotoxin   -monitor renal function    Moderate to sever oral and moderate pharyngeal dysphagia  -continue on puree/thin liquid diet   -aspiration precaution  -speech therapist on board  -blood sugar run low normal, on D5W decrease rate to 25 ml/hr    DNR: at risk for decompensation, palliative care team on board      Code status: DNR  DVT prophylaxis: on [facility] discussed with: Patient/Family and Nurse  Anticipated Disposition: Patient from [facility]  Anticipated Discharge: 24 hours to 48 hours     I called and discussed with Arnol Rosales her MPOA at  and questions answered 7/18     Hospital Problems  Date Reviewed: 5/22/2020          Codes Class Noted POA    CVA (cerebral vascular accident) [facility]) ICD-10-CM: I63.9  ICD-9-CM: 434.91  7/13/2020 Unknown        TIA (transient ischemic attack) ICD-10-CM: G45.9  ICD-9-CM: 435.9  7/13/2020 Unknown                Vital Signs:    Last 24hrs VS reviewed since prior progress note. Most recent are:  Visit Vitals  /64 (BP 1 Location: Left arm, BP Patient Position: At rest)   Pulse 68   Temp 97.4 °F (36.3 °C)   Resp 18   Ht 5' 0.98\" (1.549 m)   Wt 94.7 kg (208 lb 12.4 oz)   SpO2 95%   BMI 39.47 kg/m²         Intake/Output Summary (Last 24 hours) at 7/19/2020 2119  Last data filed at 7/19/2020 4655  Gross per 24 hour   Intake 370 ml   Output 201 ml   Net 169 ml        Physical Examination:             Constitutional:  No acute distress, cooperative, pleasant    ENT:  Oral mucosa moist, oropharynx benign. Resp:  Decrease bronchial breath sound bilaterally. No wheezing/rhonchi/rales. No accessory muscle use   CV:  Regular rhythm, normal rate, no murmurs, gallops, rubs    GI:  Soft, non distended, non tender. normoactive bowel sounds, no hepatosplenomegaly     Musculoskeletal:  No edema     Neurologic:  Conscious and alert, confused and disoriented, follow simple command, motor UE 4-5/5, LE 3/5     Skin:  Dry skin, no lesion       Data Review:    Review and/or order of clinical lab test  Review and/or order of tests in the radiology section of CPT  Review and/or order of tests in the medicine section of CPT      Labs:     Recent Labs     07/18/20  0720 07/17/20  1507   WBC 6.6  --    HGB 7.1* 7.1*   HCT 27.8* 27.5*     --      Recent Labs     07/18/20  0720 07/17/20  0330   * 150*   K 4.1 4.4   * 120*   CO2 25 26   BUN 40* 41*   CREA 1.57* 1.58*   GLU 97 86   CA 9.1 8.9     No results for input(s): ALT, AP, TBIL, TBILI, TP, ALB, GLOB, GGT, AML, LPSE in the last 72 hours.     No lab exists for component: SGOT, GPT, AMYP, HLPSE  No results for input(s): INR, PTP, APTT, INREXT, INREXT in the last 72 hours. No results for input(s): FE, TIBC, PSAT, FERR in the last 72 hours. Lab Results   Component Value Date/Time    Folate 5.4 07/16/2020 05:00 AM      No results for input(s): PH, PCO2, PO2 in the last 72 hours. No results for input(s): CPK, CKNDX, TROIQ in the last 72 hours.     No lab exists for component: CPKMB  Lab Results   Component Value Date/Time    Cholesterol, total 90 07/14/2020 03:18 AM    HDL Cholesterol 27 07/14/2020 03:18 AM    LDL,Direct 163 (H) 12/05/2019 01:04 PM    LDL, calculated 49.8 07/14/2020 03:18 AM    Triglyceride 66 07/14/2020 03:18 AM    CHOL/HDL Ratio 3.3 07/14/2020 03:18 AM     Lab Results   Component Value Date/Time    Glucose (POC) 82 07/16/2020 06:23 PM    Glucose (POC) 98 07/16/2020 12:13 PM    Glucose (POC) 110 (H) 07/16/2020 06:53 AM    Glucose (POC) 93 07/16/2020 03:10 AM    Glucose (POC) 90 07/15/2020 06:15 PM     Lab Results   Component Value Date/Time    Color YELLOW/STRAW 07/13/2020 02:08 PM    Appearance CLEAR 07/13/2020 02:08 PM    Specific gravity 1.018 07/13/2020 02:08 PM    pH (UA) 5.0 07/13/2020 02:08 PM    Protein Negative 07/13/2020 02:08 PM    Glucose Negative 07/13/2020 02:08 PM    Ketone Negative 07/13/2020 02:08 PM    Bilirubin Negative 07/13/2020 02:08 PM    Urobilinogen 1.0 07/13/2020 02:08 PM    Nitrites Negative 07/13/2020 02:08 PM    Leukocyte Esterase Negative 07/13/2020 02:08 PM    Epithelial cells FEW 07/13/2020 02:08 PM    Bacteria Negative 07/13/2020 02:08 PM    WBC 0-4 07/13/2020 02:08 PM    RBC 0-5 07/13/2020 02:08 PM         Medications Reviewed:     Current Facility-Administered Medications   Medication Dose Route Frequency    iron sucrose (VENOFER) 200 mg in 0.9% sodium chloride 100 mL IVPB  200 mg IntraVENous Q24H    apixaban (ELIQUIS) tablet 2.5 mg  2.5 mg Oral BID    famotidine (PEPCID) tablet 20 mg  20 mg Oral Q24H  albuterol (PROVENTIL HFA, VENTOLIN HFA, PROAIR HFA) inhaler 2 Puff  2 Puff Inhalation Q4H PRN    atorvastatin (LIPITOR) tablet 40 mg  40 mg Oral QHS    carvediloL (COREG) tablet 6.25 mg  6.25 mg Oral BID WITH MEALS    acetaminophen (TYLENOL) tablet 650 mg  650 mg Oral Q4H PRN    Or    acetaminophen (TYLENOL) solution 650 mg  650 mg Per NG tube Q4H PRN    Or    acetaminophen (TYLENOL) suppository 650 mg  650 mg Rectal Q4H PRN    0.9% sodium chloride infusion 250 mL  250 mL IntraVENous PRN    0.9% sodium chloride infusion 250 mL  250 mL IntraVENous PRN    meropenem (MERREM) 500 mg in 0.9% sodium chloride (MBP/ADV) 50 mL  500 mg IntraVENous Q8H     ______________________________________________________________________  EXPECTED LENGTH OF STAY: 4d 19h  ACTUAL LENGTH OF STAY:          6                 Jose Enrique Geller MD Negative

## 2022-03-18 PROBLEM — G45.9 TIA (TRANSIENT ISCHEMIC ATTACK): Status: ACTIVE | Noted: 2020-07-13

## 2022-03-19 PROBLEM — I63.9 CVA (CEREBRAL VASCULAR ACCIDENT) (HCC): Status: ACTIVE | Noted: 2020-07-13

## 2022-03-19 PROBLEM — E66.01 OBESITY, MORBID (HCC): Status: ACTIVE | Noted: 2018-02-28

## 2022-03-19 PROBLEM — C50.912 INFILTRATING DUCTAL CARCINOMA OF BREAST, LEFT (HCC): Status: ACTIVE | Noted: 2018-11-05

## 2022-03-19 PROBLEM — A41.9 SEPSIS (HCC): Status: ACTIVE | Noted: 2020-06-30

## 2023-05-22 RX ORDER — POTASSIUM CHLORIDE 750 MG/1
20 TABLET, EXTENDED RELEASE ORAL DAILY
COMMUNITY
Start: 2020-05-01

## 2023-05-22 RX ORDER — BUMETANIDE 0.5 MG/1
0.5 TABLET ORAL DAILY
COMMUNITY

## 2023-05-22 RX ORDER — ISOSORBIDE DINITRATE 10 MG/1
10 TABLET ORAL 3 TIMES DAILY
COMMUNITY
Start: 2020-05-07

## 2023-05-22 RX ORDER — CARVEDILOL 6.25 MG/1
6.25 TABLET ORAL 2 TIMES DAILY WITH MEALS
COMMUNITY
Start: 2020-05-11

## 2023-05-22 RX ORDER — ATORVASTATIN CALCIUM 40 MG/1
40 TABLET, FILM COATED ORAL NIGHTLY
COMMUNITY

## 2023-05-22 RX ORDER — HYDRALAZINE HYDROCHLORIDE 10 MG/1
10 TABLET, FILM COATED ORAL 3 TIMES DAILY
COMMUNITY
Start: 2020-04-13

## 2023-10-02 NOTE — H&P
[address]  HISTORY AND PHYSICAL    Name:  Abimael Matamoros  MR#:  [id number]  :  1940  ACCOUNT #:  [de-identified]  ADMIT DATE:  2020    CHIEF COMPLAINT:  Dysarthria. HISTORY OF PRESENT ILLNESS:  The patient is a 72-year-old female with recent admission to the hospital.  The patient was admitted to the hospital on 2020 and discharged on 2020. The patient apparently came in with sepsis and was diagnosed with ESBL E. coli and bacteremia because of the same and was started on IV meropenem. She had a PICC line placed. Her urine was growing ESBL and was started on ertapenem for 14 days and was discharged to a rehab facility. There were also concerns for possible pneumonia and acute metabolic encephalopathy, the patient also had acute renal failure. She has a history of chronic blood loss anemia and chronic AFib and is currently on Eliquis. The patient was at [facility] today and was undergoing rehab when she had sudden onset of slurred speech and left-sided weakness. EMS was called. The patient was found by EMS to have incomprehensible speech, but no left-sided weakness. The patient was brought to the ER. In the ER, the patient continued to have some speech difficulty and was requested to get admitted under the hospitalist service. The patient was also found to be hypothermic with a temperature of 91.6 while in the ER. The patient is currently resting in bed and denies complaints of problems. The patient had a hemoglobin of 6.4. The patient has poor mental status and is a poor historian. No further history could be obtained from the patient. No review of symptoms could be done. PAST MEDICAL HISTORY:  See above. HOME MEDICATIONS:  Currently, the patient is on  1. Lipitor 40 mg nightly. 2.  Hydralazine 10 mg t.i.d.  3.  Zaroxolyn 2.5 mg every Monday, Wednesday and Friday. 4.  Coreg 6.25 mg b.i.d.  5.  Tylenol as needed.   6. Tip: Hydropeel Tip, Clear Bumex 0.5 mg daily. 7.  Eliquis 5 mg every 12 hours. 8.  Cholecalciferol. 9.  Isordil 10 mg t.i.d.  10.  Potassium chloride 20 mEq daily. ALLERGIES:  NO KNOWN DRUG ALLERGIES. SOCIAL HISTORY:  Former smoker. No alcohol. No IV drug abuse. Lives at home. FAMILY HISTORY:  Could not be obtained from the patient due to her mental status. REVIEW OF SYSTEMS:  Could not be obtained from the patient. PHYSICAL EXAMINATION:  VITAL SIGNS:  Temperature 91.6, pulse 81, respiratory rate 18, blood pressure 98/82, pulse oximetry 90% on 2 liters. GENERAL:  Alert x1, awake, confused female, appears to be stated age. HEENT:  Pupils equal and reactive to light. Dry mucous membranes. Tympanic membranes clear. NECK:  Supple. CHEST:  Decreased basilar breath sounds. HEART:  S1 and S2 heard. ABDOMEN:  Soft, nontender, nondistended. Bowel sounds are physiological.  EXTREMITIES:  No clubbing, no cyanosis. Trace edema. NEURO/PSYCH:  Very limited exam.  DTRs 1+ x4. Rest of the exam could not be performed due to mental status. SKIN:  Warm. LABORATORY DATA:  White count 4, hemoglobin 6.4, hematocrit 23.6, platelets 855. INR 1.7. Sodium 141, potassium 4.4, chloride 109, bicarbonate 24, anion gap 8, glucose 86, BUN 49, creatinine 1.66, calcium 9.4, bilirubin total 0.4, ALT 13, AST 20, alkaline phosphatase 98, lactic acid is 1.6. Troponin less than 0.05. CT of the head shows no acute intracranial abnormality, no significant changes. CTA of the head and neck shows no evidence of large vessel occlusion; chronic parenchymal changes consistent with atrophy and small vessel ischemia; 50% stenosis of right internal carotid artery and 60% stenosis of the left internal carotid artery; partially imaged small right pleural effusion. ABG with a pH of 7.30, pCO2 of 47.5, pO2 of 63. ASSESSMENT AND PLAN:  1. Transient ischemic attack: The patient will be observed on a telemetry bed.   We will get an MRI of the Price (Use Numbers Only, No Special Characters Or $): 349.00 Vacuum Pressure High Setting (Will Not Render If Set To 0): 0 brain.  Neurovascular checks. The patient is anemic at this point of time. The patient is currently on Eliquis and with low hemoglobin. We will hold aspirin for now. We will get physical therapy/occupational therapy/speech consult, neurology consult. Telemetry monitoring. Continue to monitor. Further intervention per hospital course and reassess as needed. 2.  Acute metabolic encephalopathy:  Unclear etiology. Rule out central process. Concern for underlying infection/sepsis. IV hydration, IV antibiotics. If symptoms persist, we will consider further intervention and diagnostics. Continue to monitor. I assume once the underlying cause gets better, metabolic encephalopathy should improve. Reassess as needed. 3.  Hypothermia:  Unclear etiology, underlying infection, although the patient is on broad-spectrum IV antibiotics, could be age-related. We will provide IV hydration, IV antibiotics, rule out infection, blood cultures, urine culture, UA, supportive care and close monitoring. Chest x-ray is pending. If symptoms persist, may consider further intervention and diagnostics. Continue to closely monitor. 4.  Acute hypoxemic respiratory failure: Unclear etiology. SARS COVID-19 pending. The patient is on IV antibiotics. X-ray of the chest is pending. Oxygen support, pulse oximetry monitoring, telemetry and monitor. No obvious signs of volume overload. Further intervention per hospital course. If persists, may consider further intervention and diagnostics including . 5. Chronic atrial fibrillation:  Hold Eliquis for now as the patient is anemic. Monitor on telemetry. 6.  Acute on chronic anemia:  Unclear etiology. Stool occult sent in the emergency room. We will give one dose of Protonix, n.p.o.  Monitor H and H every 8 hours. Transfuse as needed. We will transfuse one unit of packed red blood cells right now and further intervention per hospital course. Reassess as needed.   If stool Solution Override occult blood is positive, may consider getting a gastroenterology consult. Monitor for now. 7.  Gastrointestinal/deep venous thrombosis prophylaxis:  The patient will be on sequential compression devices.       Jane Martinez MD      [id number]  D:  07/13/2020 15:42  T:  07/13/2020 19:10  JOB #:  [id number] Indication: anti-aging Solution Override: Antiox + Location: face Vacuum Pressure Low Setting (Will Not Render If Set To 0): 12 Tip Override Solution: Activ-4 Solution: Antiox-6 Solution Override: activ4 + betaHD Procedure: Peel Location Override: Face, neck and chest Vacuum Pressure Low Setting (Will Not Render If Set To 0): 11 Tip: Hydropeel Tip, Teal Treatment Number: 1 Consent: Written consent obtained, risks reviewed including but not limited to crusting, scabbing, blistering, scarring, darker or lighter pigmentary change, bruising, and/or incomplete response. Solution: GlySal 15% Post-Care Instructions: I reviewed with the patient in detail post-care instructions. Patient should avoid direct sun exposure and wear sunscreen daily. Vacuum Pressure Low Setting (Will Not Render If Set To 0): 16 Tip: Hydropeel Tip, Blue
